# Patient Record
Sex: FEMALE | Race: WHITE | NOT HISPANIC OR LATINO | URBAN - METROPOLITAN AREA
[De-identification: names, ages, dates, MRNs, and addresses within clinical notes are randomized per-mention and may not be internally consistent; named-entity substitution may affect disease eponyms.]

---

## 2018-02-05 ENCOUNTER — INPATIENT (INPATIENT)
Facility: HOSPITAL | Age: 83
LOS: 0 days | Discharge: HOME | DRG: 203 | End: 2018-02-06
Attending: FAMILY MEDICINE | Admitting: FAMILY MEDICINE
Payer: COMMERCIAL

## 2018-02-05 ENCOUNTER — TRANSCRIPTION ENCOUNTER (OUTPATIENT)
Age: 83
End: 2018-02-05

## 2018-02-05 VITALS
OXYGEN SATURATION: 95 % | HEART RATE: 87 BPM | RESPIRATION RATE: 18 BRPM | WEIGHT: 125 LBS | DIASTOLIC BLOOD PRESSURE: 70 MMHG | TEMPERATURE: 98 F | SYSTOLIC BLOOD PRESSURE: 164 MMHG

## 2018-02-05 DIAGNOSIS — R05 COUGH: ICD-10-CM

## 2018-02-05 DIAGNOSIS — J20.9 ACUTE BRONCHITIS, UNSPECIFIED: ICD-10-CM

## 2018-02-05 DIAGNOSIS — R06.2 WHEEZING: ICD-10-CM

## 2018-02-05 DIAGNOSIS — I10 ESSENTIAL (PRIMARY) HYPERTENSION: ICD-10-CM

## 2018-02-05 DIAGNOSIS — Z96.662 PRESENCE OF LEFT ARTIFICIAL ANKLE JOINT: Chronic | ICD-10-CM

## 2018-02-05 DIAGNOSIS — B97.81 HUMAN METAPNEUMOVIRUS AS THE CAUSE OF DISEASES CLASSIFIED ELSEWHERE: ICD-10-CM

## 2018-02-05 DIAGNOSIS — Z29.9 ENCOUNTER FOR PROPHYLACTIC MEASURES, UNSPECIFIED: ICD-10-CM

## 2018-02-05 LAB
ALBUMIN SERPL ELPH-MCNC: 3.7 G/DL — SIGNIFICANT CHANGE UP (ref 3.3–5)
ALP SERPL-CCNC: 101 U/L — SIGNIFICANT CHANGE UP (ref 40–120)
ALT FLD-CCNC: 22 U/L — SIGNIFICANT CHANGE UP (ref 12–78)
ANION GAP SERPL CALC-SCNC: 3 MMOL/L — LOW (ref 5–17)
APPEARANCE UR: CLEAR — SIGNIFICANT CHANGE UP
APTT BLD: 28.6 SEC — SIGNIFICANT CHANGE UP (ref 27.5–37.4)
AST SERPL-CCNC: 31 U/L — SIGNIFICANT CHANGE UP (ref 15–37)
BACTERIA # UR AUTO: ABNORMAL
BASOPHILS # BLD AUTO: 0.1 K/UL — SIGNIFICANT CHANGE UP (ref 0–0.2)
BASOPHILS NFR BLD AUTO: 1.1 % — SIGNIFICANT CHANGE UP (ref 0–2)
BILIRUB SERPL-MCNC: 0.4 MG/DL — SIGNIFICANT CHANGE UP (ref 0.2–1.2)
BILIRUB UR-MCNC: NEGATIVE — SIGNIFICANT CHANGE UP
BUN SERPL-MCNC: 26 MG/DL — HIGH (ref 7–23)
CALCIUM SERPL-MCNC: 9 MG/DL — SIGNIFICANT CHANGE UP (ref 8.5–10.1)
CHLORIDE SERPL-SCNC: 103 MMOL/L — SIGNIFICANT CHANGE UP (ref 96–108)
CO2 SERPL-SCNC: 32 MMOL/L — HIGH (ref 22–31)
COLOR SPEC: YELLOW — SIGNIFICANT CHANGE UP
CREAT SERPL-MCNC: 1.1 MG/DL — SIGNIFICANT CHANGE UP (ref 0.5–1.3)
DIFF PNL FLD: ABNORMAL
EOSINOPHIL # BLD AUTO: 0.1 K/UL — SIGNIFICANT CHANGE UP (ref 0–0.5)
EOSINOPHIL NFR BLD AUTO: 1.6 % — SIGNIFICANT CHANGE UP (ref 0–6)
EPI CELLS # UR: SIGNIFICANT CHANGE UP
GLUCOSE SERPL-MCNC: 126 MG/DL — HIGH (ref 70–99)
GLUCOSE UR QL: NEGATIVE — SIGNIFICANT CHANGE UP
HCT VFR BLD CALC: 37.3 % — SIGNIFICANT CHANGE UP (ref 34.5–45)
HGB BLD-MCNC: 12.4 G/DL — SIGNIFICANT CHANGE UP (ref 11.5–15.5)
HMPV RNA SPEC QL NAA+PROBE: DETECTED
INR BLD: 1.05 RATIO — SIGNIFICANT CHANGE UP (ref 0.88–1.16)
KETONES UR-MCNC: NEGATIVE — SIGNIFICANT CHANGE UP
LACTATE SERPL-SCNC: 1.3 MMOL/L — SIGNIFICANT CHANGE UP (ref 0.7–2)
LEUKOCYTE ESTERASE UR-ACNC: NEGATIVE — SIGNIFICANT CHANGE UP
LYMPHOCYTES # BLD AUTO: 1.3 K/UL — SIGNIFICANT CHANGE UP (ref 1–3.3)
LYMPHOCYTES # BLD AUTO: 23.2 % — SIGNIFICANT CHANGE UP (ref 13–44)
MCHC RBC-ENTMCNC: 30.3 PG — SIGNIFICANT CHANGE UP (ref 27–34)
MCHC RBC-ENTMCNC: 33.3 GM/DL — SIGNIFICANT CHANGE UP (ref 32–36)
MCV RBC AUTO: 91 FL — SIGNIFICANT CHANGE UP (ref 80–100)
MONOCYTES # BLD AUTO: 0.7 K/UL — SIGNIFICANT CHANGE UP (ref 0–0.9)
MONOCYTES NFR BLD AUTO: 13.1 % — HIGH (ref 1–9)
NEUTROPHILS # BLD AUTO: 3.5 K/UL — SIGNIFICANT CHANGE UP (ref 1.8–7.4)
NEUTROPHILS NFR BLD AUTO: 61 % — SIGNIFICANT CHANGE UP (ref 43–77)
NITRITE UR-MCNC: NEGATIVE — SIGNIFICANT CHANGE UP
PH UR: 6 — SIGNIFICANT CHANGE UP (ref 5–8)
PLATELET # BLD AUTO: 166 K/UL — SIGNIFICANT CHANGE UP (ref 150–400)
POTASSIUM SERPL-MCNC: 3.9 MMOL/L — SIGNIFICANT CHANGE UP (ref 3.5–5.3)
POTASSIUM SERPL-SCNC: 3.9 MMOL/L — SIGNIFICANT CHANGE UP (ref 3.5–5.3)
PROCALCITONIN SERPL-MCNC: <0.05 — SIGNIFICANT CHANGE UP (ref 0–0.04)
PROT SERPL-MCNC: 7 G/DL — SIGNIFICANT CHANGE UP (ref 6–8.3)
PROT UR-MCNC: NEGATIVE — SIGNIFICANT CHANGE UP
PROTHROM AB SERPL-ACNC: 11.5 SEC — SIGNIFICANT CHANGE UP (ref 9.8–12.7)
RAPID RVP RESULT: DETECTED
RBC # BLD: 4.1 M/UL — SIGNIFICANT CHANGE UP (ref 3.8–5.2)
RBC # FLD: 12.2 % — SIGNIFICANT CHANGE UP (ref 10.3–14.5)
RBC CASTS # UR COMP ASSIST: SIGNIFICANT CHANGE UP /HPF (ref 0–4)
SODIUM SERPL-SCNC: 138 MMOL/L — SIGNIFICANT CHANGE UP (ref 135–145)
SP GR SPEC: 1.01 — SIGNIFICANT CHANGE UP (ref 1.01–1.02)
UROBILINOGEN FLD QL: NEGATIVE — SIGNIFICANT CHANGE UP
WBC # BLD: 5.7 K/UL — SIGNIFICANT CHANGE UP (ref 3.8–10.5)
WBC # FLD AUTO: 5.7 K/UL — SIGNIFICANT CHANGE UP (ref 3.8–10.5)
WBC UR QL: SIGNIFICANT CHANGE UP

## 2018-02-05 PROCEDURE — 93010 ELECTROCARDIOGRAM REPORT: CPT | Mod: 76

## 2018-02-05 PROCEDURE — 71045 X-RAY EXAM CHEST 1 VIEW: CPT | Mod: 26

## 2018-02-05 PROCEDURE — 99285 EMERGENCY DEPT VISIT HI MDM: CPT

## 2018-02-05 RX ORDER — CEFTRIAXONE 500 MG/1
1 INJECTION, POWDER, FOR SOLUTION INTRAMUSCULAR; INTRAVENOUS EVERY 24 HOURS
Qty: 0 | Refills: 0 | Status: DISCONTINUED | OUTPATIENT
Start: 2018-02-05 | End: 2018-02-06

## 2018-02-05 RX ORDER — IPRATROPIUM/ALBUTEROL SULFATE 18-103MCG
3 AEROSOL WITH ADAPTER (GRAM) INHALATION ONCE
Qty: 0 | Refills: 0 | Status: COMPLETED | OUTPATIENT
Start: 2018-02-05 | End: 2018-02-05

## 2018-02-05 RX ORDER — LACTOBACILLUS ACIDOPHILUS 100MM CELL
1 CAPSULE ORAL DAILY
Qty: 0 | Refills: 0 | Status: DISCONTINUED | OUTPATIENT
Start: 2018-02-05 | End: 2018-02-06

## 2018-02-05 RX ORDER — IPRATROPIUM/ALBUTEROL SULFATE 18-103MCG
3 AEROSOL WITH ADAPTER (GRAM) INHALATION EVERY 6 HOURS
Qty: 0 | Refills: 0 | Status: DISCONTINUED | OUTPATIENT
Start: 2018-02-05 | End: 2018-02-06

## 2018-02-05 RX ORDER — HEPARIN SODIUM 5000 [USP'U]/ML
5000 INJECTION INTRAVENOUS; SUBCUTANEOUS EVERY 12 HOURS
Qty: 0 | Refills: 0 | Status: DISCONTINUED | OUTPATIENT
Start: 2018-02-05 | End: 2018-02-06

## 2018-02-05 RX ORDER — CEFTRIAXONE 500 MG/1
1 INJECTION, POWDER, FOR SOLUTION INTRAMUSCULAR; INTRAVENOUS ONCE
Qty: 0 | Refills: 0 | Status: COMPLETED | OUTPATIENT
Start: 2018-02-05 | End: 2018-02-05

## 2018-02-05 RX ORDER — AZITHROMYCIN 500 MG/1
500 TABLET, FILM COATED ORAL EVERY 24 HOURS
Qty: 0 | Refills: 0 | Status: DISCONTINUED | OUTPATIENT
Start: 2018-02-05 | End: 2018-02-06

## 2018-02-05 RX ORDER — TIOTROPIUM BROMIDE 18 UG/1
1 CAPSULE ORAL; RESPIRATORY (INHALATION) DAILY
Qty: 0 | Refills: 0 | Status: DISCONTINUED | OUTPATIENT
Start: 2018-02-05 | End: 2018-02-06

## 2018-02-05 RX ORDER — AMLODIPINE BESYLATE 2.5 MG/1
5 TABLET ORAL DAILY
Qty: 0 | Refills: 0 | Status: DISCONTINUED | OUTPATIENT
Start: 2018-02-05 | End: 2018-02-06

## 2018-02-05 RX ORDER — AZITHROMYCIN 500 MG/1
500 TABLET, FILM COATED ORAL ONCE
Qty: 0 | Refills: 0 | Status: COMPLETED | OUTPATIENT
Start: 2018-02-05 | End: 2018-02-05

## 2018-02-05 RX ORDER — SODIUM CHLORIDE 9 MG/ML
1000 INJECTION INTRAMUSCULAR; INTRAVENOUS; SUBCUTANEOUS ONCE
Qty: 0 | Refills: 0 | Status: COMPLETED | OUTPATIENT
Start: 2018-02-05 | End: 2018-02-05

## 2018-02-05 RX ADMIN — SODIUM CHLORIDE 1000 MILLILITER(S): 9 INJECTION INTRAMUSCULAR; INTRAVENOUS; SUBCUTANEOUS at 11:41

## 2018-02-05 RX ADMIN — Medication 3 MILLILITER(S): at 11:38

## 2018-02-05 RX ADMIN — HEPARIN SODIUM 5000 UNIT(S): 5000 INJECTION INTRAVENOUS; SUBCUTANEOUS at 18:52

## 2018-02-05 RX ADMIN — CEFTRIAXONE 100 GRAM(S): 500 INJECTION, POWDER, FOR SOLUTION INTRAMUSCULAR; INTRAVENOUS at 12:46

## 2018-02-05 RX ADMIN — AZITHROMYCIN 255 MILLIGRAM(S): 500 TABLET, FILM COATED ORAL at 13:16

## 2018-02-05 RX ADMIN — Medication 125 MILLIGRAM(S): at 11:40

## 2018-02-05 NOTE — ED ADULT TRIAGE NOTE - CHIEF COMPLAINT QUOTE
c/o shortness of breath X a week. yesterday patient went to Urgent Care and was started with antibiotic. today, patient was instructed to go to ED for Pneumonia from their XRay result.

## 2018-02-05 NOTE — CONSULT NOTE ADULT - SUBJECTIVE AND OBJECTIVE BOX
HPI:  89 YO F hx HTN presented to the hospital with CC cold and congestion. Was seen at   urgent care and was told she possibly could have "mild pneumonia". Sent to ER to be  evaluated. Denies fever chills n/v/diarrhea. In ER she was afebrile. CXR clear. RVP is +  for hMPV.     Infectious Disease consult called to evaluate pt.      Past Medical & Surgical Hx:  PAST MEDICAL & SURGICAL HISTORY:  Fall  Hypertension  S/P ankle joint replacement, left    Social History--  EtOH: denies   Tobacco: denies  Drug Use: denies  Lives alone    Travel/Environmental/Occupational History:  *** inserth T/E/O Hx ***    FAMILY HISTORY:  No pertinent family history in first degree relatives      Allergies  codeine (Nausea)  Percocet 5/325 (Vomiting)    Home Medications:  amLODIPine 5 mg oral tablet: 1 tab(s) orally once a day (05 Feb 2018 15:35)  bisoprolol-hydrochlorothiazide 2.5 mg-6.25 mg oral tablet: 1 tab(s) orally once a day (05 Feb 2018 15:35)  Spiriva Respimat 1.25 mcg/inh inhalation aerosol: 2 puff(s) inhaled once a day (05 Feb 2018 11:10)      Current Inpatient Medications :    ANTIBIOTICS:   azithromycin  IVPB 500 milliGRAM(s) IV Intermittent every 24 hours  cefTRIAXone   IVPB 1 Gram(s) IV Intermittent every 24 hours      OTHER RELEVANT MEDICATIONS :  ALBUTerol/ipratropium for Nebulization 3 milliLiter(s) Nebulizer every 6 hours  amLODIPine   Tablet 5 milliGRAM(s) Oral daily  heparin  Injectable 5000 Unit(s) SubCutaneous every 12 hours  tiotropium 18 MICROgram(s) Capsule 1 Capsule(s) Inhalation daily      ROS:  CONSTITUTIONAL:  Negative fever or chills  EYES:  Negative  blurry vision or double vision  CARDIOVASCULAR:  Negative for chest pain or palpitations  RESPIRATORY:  Negative for cough, wheezing, or SOB   GASTROINTESTINAL:  Negative for nausea, vomiting, diarrhea, constipation, or abdominal pain  GENITOURINARY:  Negative frequency, urgency , dysuria or hematuria   NEUROLOGIC:  No headache, confusion, dizziness, lightheadedness  All other systems were reviewed and are negative    I&O's Detail      Physical Exam:  Vital Signs Last 24 Hrs  T(C): 36.8 (05 Feb 2018 12:50), Max: 36.8 (05 Feb 2018 12:50)  T(F): 98.3 (05 Feb 2018 12:50), Max: 98.3 (05 Feb 2018 12:50)  HR: 113 (05 Feb 2018 14:20) (77 - 125)  BP: 114/57 (05 Feb 2018 14:20) (114/57 - 164/70)  BP(mean): --  RR: 20 (05 Feb 2018 14:20) (18 - 20)    SpO2: 95% (05 Feb 2018 14:20) (93% - 98%)    Weight (kg): 56.7 (02-05 @ 11:03)    General: well developed well nourished, in no acute distress  Eyes: sclera anicteric, pupils equal and reactive to light  ENMT: buccal mucosa moist, pharynx not injected  Neck: supple, trachea midline  Lungs: clear, no wheeze/rhonchi  Cardiovascular: regular rate and rhythm, S1 S2  Abdomen: soft, nontender, no organomegaly present, bowel sounds normal  Neurological:  alert and oriented x3, Cranial Nerves II-XII grossly intact  Skin:no increased ecchymosis/petechiae/purpura  Lymph Nodes: no palpable cervical/supraclavicular lymph nodes enlargements  Extremities: no cyanosis/clubbing/edema    Labs:                         12.4   5.7   )-----------( 166      ( 05 Feb 2018 11:43 )             37.3     02-05    138  |  103  |  26<H>  ----------------------------<  126<H>  3.9   |  32<H>  |  1.10    Ca    9.0      05 Feb 2018 11:43    TPro  7.0  /  Alb  3.7  /  TBili  0.4  /  DBili  x   /  AST  31  /  ALT  22  /  AlkPhos  101  02-05    RECENT CULTURES:  Rapid Respiratory Viral Panel (02.05.18 @ 11:43)    Rapid RVP Result: Detected: The [employer] RVP Rapid uses polymerase chain reaction (PCR) and melt  curve analysis to screen for adenovirus; coronavirus HKU1, NL63, 229E,  OC43; human metapneumovirus (hMPV); human enterovirus/rhinovirus  (Entero/RV); influenza A; influenza A/H1;influenza A/H3; influenza  A/H1-2009; influenza B; parainfluenza viruses 1, 2, 3, 4; respiratory  syncytial virus; Bordetella pertussis; Mycoplasma pneumoniae; and  Chlamydophila pneumoniae.    hMPV (RapRVP): Detected      RADIOLOGY & ADDITIONAL STUDIES:  EXAM:  XR CHEST PORTABLE URGENT 1V                            PROCEDURE DATE:  02/05/2018        INTERPRETATION:  Short of breath.    AP chest. Prior dated 2/4/2018    No consolidation effusion or other interval change.    Impression: No acute interval change.    Assessment :   89 YO F admitted with cough congestion admitted with hPMV bronchitis  Doubt pna    Plan :   Empiric Rocephin Zithromax for now  Repeat CXR PA/LAT in am  If CXR negative dc antibiotics    Continue with present regiment .  Appropriate use of antibiotics and adverse effects reviewed.      I have discussed the above plan of care with patient in detail. She expressed understanding of the treatment plan . Risks, benefits and alternatives discussed in detail.   I have asked if she has any questions or concerns and appropriately addressed them to the best of my ability .      > 45 minutes spent in direct patient care reviewing  the notes, lab data/ imaging , discussion with multidisciplinary team. All questions were addressed and answered to the best of my capacity .    Thank you for allowing me to participate in the care of your patient .      Escobar Omalley MD  Infectious Disease  [phone]
PULMONARY/CRITICAL CARE        Patient is a 89y old  Female who presents with a chief complaint of sob (2018 15:35)    BRIEF HOSPITAL COURSE: ***HPI:  89 YO F hx HTN presented to the hospital with CC cold and congestion. Was seen at   urgent care and was told she possibly could have "mild pneumonia". Sent to ER to be  evaluated. Denies fever chills n/v/diarrhea. In ER she was afebrile. CXR clear. RVP is +  for hMPV.       Events last 24 hours: ***    PAST MEDICAL & SURGICAL HISTORY:  Fall  Hypertension  S/P ankle joint replacement, left    Allergies    codeine (Nausea)  Percocet 5/325 (Vomiting)    Intolerances      FAMILY HISTORY Lives at home, no cigs or etoh  No pertinent family history in first degree relatives      Review of Systems:  CONSTITUTIONAL: No fever, chills, or fatigue  EYES: No eye pain, visual disturbances, or discharge  ENMT:  No difficulty hearing, tinnitus, vertigo; No sinus or throat pain  NECK: No pain or stiffness  RESPIRATORY:Some cough, wheezing, No chills or hemoptysis; Mild shortness of breath  CARDIOVASCULAR: No chest pain, palpitations, dizziness, or leg swelling  GASTROINTESTINAL: No abdominal or epigastric pain. No nausea, vomiting, or hematemesis; No diarrhea or constipation. No melena or hematochezia.  GENITOURINARY: No dysuria, frequency, hematuria, or incontinence  NEUROLOGICAL: No headaches, memory loss, loss of strength, numbness, or tremors  SKIN: No itching, burning, rashes, or lesions   MUSCULOSKELETAL: No joint pain or swelling; No muscle, back, or extremity pain  PSYCHIATRIC: No depression, anxiety, mood swings, or difficulty sleeping      Medications:  azithromycin  IVPB 500 milliGRAM(s) IV Intermittent every 24 hours  cefTRIAXone   IVPB 1 Gram(s) IV Intermittent every 24 hours    amLODIPine   Tablet 5 milliGRAM(s) Oral daily    ALBUTerol/ipratropium for Nebulization 3 milliLiter(s) Nebulizer every 6 hours  tiotropium 18 MICROgram(s) Capsule 1 Capsule(s) Inhalation daily        heparin  Injectable 5000 Unit(s) SubCutaneous every 12 hours                lactobacillus acidophilus 1 Tablet(s) Oral daily          ICU Vital Signs Last 24 Hrs  T(C): 36.7 (2018 17:00), Max: 36.8 (2018 12:50)  T(F): 98 (2018 17:00), Max: 98.3 (2018 12:50)  HR: 92 (2018 17:00) (77 - 125)  BP: 118/60 (2018 17:00) (114/57 - 164/70)  BP(mean): --  ABP: --  ABP(mean): --  RR: 16 (2018 17:00) (16 - 20)  SpO2: 97% (2018 17:00) (93% - 98%)    Vital Signs Last 24 Hrs  T(C): 36.7 (2018 17:00), Max: 36.8 (2018 12:50)  T(F): 98 (2018 17:00), Max: 98.3 (2018 12:50)  HR: 92 (2018 17:00) (77 - 125)  BP: 118/60 (2018 17:00) (114/57 - 164/70)  BP(mean): --  RR: 16 (2018 17:00) (16 - 20)  SpO2: 97% (2018 17:00) (93% - 98%)        I&O's Detail        LABS:                        12.4   5.7   )-----------( 166      ( 2018 11:43 )             37.3     02-05    138  |  103  |  26<H>  ----------------------------<  126<H>  3.9   |  32<H>  |  1.10    Ca    9.0      2018 11:43    TPro  7.0  /  Alb  3.7  /  TBili  0.4  /  DBili  x   /  AST  31  /  ALT  22  /  AlkPhos  101  02-05      CARDIAC MARKERS ( 2018 11:43 )  <.015 ng/mL / x     / x     / x     / x          CAPILLARY BLOOD GLUCOSE        PT/INR - ( 2018 11:43 )   PT: 11.5 sec;   INR: 1.05 ratio         PTT - ( 2018 11:43 )  PTT:28.6 sec  Urinalysis Basic - ( 2018 15:46 )    Color: Yellow / Appearance: Clear / S.010 / pH: x  Gluc: x / Ketone: Negative  / Bili: Negative / Urobili: Negative   Blood: x / Protein: Negative / Nitrite: Negative   Leuk Esterase: Negative / RBC: 0-2 /HPF / WBC 0-2   Sq Epi: x / Non Sq Epi: Occasional / Bacteria: Occasional      CULTURES:      Physical Examination:    General: No acute distress.      HEENT: Pupils equal, reactive to light.  Symmetric.    PULM: Few rhonchi, wheezes . Good excursion    CVS: Regular rate and rhythm, no murmurs, rubs, or gallops    ABD: Soft, nondistended, nontender, normoactive bowel sounds, no masses    EXT: No edema, nontender    SKIN: Warm and well perfused, no rashes noted.    NEURO: Alert, oriented, interactive, nonfocal    RADIOLOGY: ***< from: Xray Chest 1 View- PORTABLE-Urgent (18 @ 12:18) >    EXAM:  XR CHEST PORTABLE URGENT 1V                            PROCEDURE DATE:  2018          INTERPRETATION:  Short of breath.    AP chest. Prior dated 2018    No consolidation effusion or other interval change.    Impression: No acute interval change.                EMMA TAPIA M.D., ATTENDING RADIOLOGIST  This document has been electronically signed. 2018 12:24PM        < end of copied text >      CRITICAL CARE TIME SPENT: ***

## 2018-02-05 NOTE — ED PROVIDER NOTE - ATTENDING CONTRIBUTION TO CARE
89 yo male with hx htn c/o 2-3 days of cough, congestion, weakness  patient went to [facility] yesterday, started on amox, and told possible pneumonia  +rhonchi b/l, diffusely  labs, cxr, nebs

## 2018-02-05 NOTE — PATIENT PROFILE ADULT. - ABILITY TO HEAR (WITH HEARING AID OR HEARING APPLIANCE IF NORMALLY USED):
Mildly to Moderately Impaired: difficulty hearing in some environments or speaker may need to increase volume or speak distinctly/does not wear any hearing devices

## 2018-02-05 NOTE — ED ADULT NURSE NOTE - OBJECTIVE STATEMENT
received pt in bed #4b Pt A&O c/o non productive cough x 1-2 weeks .....developed productive cough this AM. Pt went to urgent care yesterday where they did an xray & started her on Amox... States she got a call this AM & was told CXR =possible PN & to go to ED. CM placed w/ SR EKG done O2 sat 96% on RA Lung sounds w/ wheezing & rhonchi Resp Tx given as charted per order PA Leila in to see pt Will monitor

## 2018-02-05 NOTE — H&P ADULT - NSHPPHYSICALEXAM_GEN_ALL_CORE
PHYSICAL EXAM:   · CONSTITUTIONAL: Well appearing, well nourished, awake, alert, oriented to person, place, time/situation and in no apparent distress.  · CARDIAC: Normal rate, regular rhythm.  Heart sounds S1, S2.  No murmurs, rubs or gallops.  · RESPIRATORY: - - -  · Breath Sounds: WHEEZES  · Wheezes: DIFFUSE  · GASTROINTESTINAL: Abdomen soft, non-tender, no guarding.  · MUSCULOSKELETAL: Spine appears normal, range of motion is not limited, no muscle or joint tenderness  · NEUROLOGICAL: Alert and oriented, no focal deficits, no motor or sensory deficits.  · SKIN: Skin normal color for race, warm, dry and intact. No evidence of rash.  · PSYCHIATRIC: Alert and oriented to person, place, time/situation. normal mood and affect. no apparent risk to self or others.

## 2018-02-05 NOTE — GOALS OF CARE CONVERSATION - PERSONAL ADVANCE DIRECTIVE - CONVERSATION DETAILS
Pt states that she has all documents concerning advance directives at home. She has a HCP and Living Will. Asked pt about DNR   and she states she needs to think about that , She doesn't think that she ever completed that document but will think about it.

## 2018-02-05 NOTE — ED PROVIDER NOTE - CARE PLAN
Principal Discharge DX:	Cough with expectoration Principal Discharge DX:	Cough with expectoration  Secondary Diagnosis:	Wheezing  Secondary Diagnosis:	Hypoxia

## 2018-02-05 NOTE — ED ADULT NURSE REASSESSMENT NOTE - COMFORT CARE
wait time explained/darkened lights/warm blanket provided
wait time explained/repositioned/darkened lights/plan of care explained/po fluids offered
warm blanket provided/po fluids offered

## 2018-02-05 NOTE — H&P ADULT - NSHPREVIEWOFSYSTEMS_GEN_ALL_CORE
· CONSTITUTIONAL: no fever and no chills.  · EYES: no discharge, no irritation, no pain, no redness, and no visual changes.  · ENMT: Ears: no ear pain and no hearing problems.Nose: no nasal congestion and no nasal drainage.Mouth/Throat: no dysphagia, no hoarseness and no throat pain.Neck: no lumps, no pain, no stiffness and no swollen glands.  · CARDIOVASCULAR: normal rate and rhythm, no chest pain and no edema.  · RESPIRATORY: - - -  · Respiratory [+]: COUGH, wheezing  · GASTROINTESTINAL: no abdominal pain, no bloating, no constipation, no diarrhea, no nausea and no vomiting.  · GENITOURINARY: no dysuria, no frequency, and no hematuria.  · MUSCULOSKELETAL: no back pain, no gout, no musculoskeletal pain, no neck pain, and no weakness.  · SKIN: no abrasions, no jaundice, no lesions, no pruritis, and no rashes.  · NEURO: no loss of consciousness, no gait abnormality, no headache, no sensory deficits, and no weakness.  · PSYCHIATRIC: no known mental health issues.  · ROS STATEMENT: all other ROS negative except as per HPI

## 2018-02-05 NOTE — H&P ADULT - HISTORY OF PRESENT ILLNESS
89 yo female accompanied to ed by daughter presents with productive cough x 2 weeks, states intermittently has had sob with the coughing,  body aches, denies fever, no sick contacts.  was seen at urgent care and had cxr done , was started on amoxicillin yesterday.  former smoker , quit 40 years ago.  states has hx of flu years ago, no hx of pna,  had her flu shot this year.  In ER patient was found to have persistant wheezing and is being admitted for further work up and treatment.

## 2018-02-05 NOTE — ED PROVIDER NOTE - PROGRESS NOTE DETAILS
after albuterol, patients hr went to 130s patient c/w wheezing and shortness of breath. +hmv  d/w Dr. Martinez who will admit patient

## 2018-02-05 NOTE — H&P ADULT - NSHPLABSRESULTS_GEN_ALL_CORE
138  |  103  |  26<H>  ----------------------------<  126<H>  3.9   |  32<H>  |  1.10    Ca    9.0      2018 11:43    TPro  7.0  /  Alb  3.7  /  TBili  0.4  /  DBili  x   /  AST  31  /  ALT  22  /  AlkPhos  101                              12.4   5.7   )-----------( 166      ( 2018 11:43 )             37.3       CARDIAC MARKERS ( 2018 11:43 )  <.015 ng/mL / x     / x     / x     / x            LIVER FUNCTIONS - ( 2018 11:43 )  Alb: 3.7 g/dL / Pro: 7.0 g/dL / ALK PHOS: 101 U/L / ALT: 22 U/L / AST: 31 U/L / GGT: x             PT/INR - ( 2018 11:43 )   PT: 11.5 sec;   INR: 1.05 ratio         PTT - ( 2018 11:43 )  PTT:28.6 sec    Urinalysis Basic - ( 2018 15:46 )    Color: Yellow / Appearance: Clear / S.010 / pH: x  Gluc: x / Ketone: Negative  / Bili: Negative / Urobili: Negative   Blood: x / Protein: Negative / Nitrite: Negative   Leuk Esterase: Negative / RBC: 0-2 /HPF / WBC 0-2   Sq Epi: x / Non Sq Epi: Occasional / Bacteria: Occasional

## 2018-02-05 NOTE — ED PROVIDER NOTE - OBJECTIVE STATEMENT
89 yo female accompanied to ed by daughter presents with productive cough x 2 weeks, states intermittently has had sob with the coughing,  body aches, denies fever, no sick contacts.  was seen at urgent care and had cxr done , was started on amoxicillin yesterday.  former smoker , quit 40 years ago.  states has hx of flu years ago, no hx of pna,  had her flu shot this year.  PMd Dr Gaytan.

## 2018-02-05 NOTE — ED ADULT NURSE REASSESSMENT NOTE - GENERAL PATIENT STATE
smiling/interactive/family/SO at bedside/resting/sleeping/comfortable appearance
comfortable appearance/resting/sleeping/improvement verbalized/family/SO at bedside
smiling/interactive/family/SO at bedside/comfortable appearance

## 2018-02-06 ENCOUNTER — TRANSCRIPTION ENCOUNTER (OUTPATIENT)
Age: 83
End: 2018-02-06

## 2018-02-06 VITALS
TEMPERATURE: 98 F | DIASTOLIC BLOOD PRESSURE: 56 MMHG | SYSTOLIC BLOOD PRESSURE: 152 MMHG | RESPIRATION RATE: 17 BRPM | OXYGEN SATURATION: 98 % | HEART RATE: 88 BPM

## 2018-02-06 LAB
CULTURE RESULTS: NO GROWTH — SIGNIFICANT CHANGE UP
HBA1C BLD-MCNC: 5.9 % — HIGH (ref 4–5.6)
SPECIMEN SOURCE: SIGNIFICANT CHANGE UP

## 2018-02-06 PROCEDURE — G8978: CPT | Mod: CI

## 2018-02-06 PROCEDURE — 85610 PROTHROMBIN TIME: CPT

## 2018-02-06 PROCEDURE — 87086 URINE CULTURE/COLONY COUNT: CPT

## 2018-02-06 PROCEDURE — 87798 DETECT AGENT NOS DNA AMP: CPT

## 2018-02-06 PROCEDURE — 87040 BLOOD CULTURE FOR BACTERIA: CPT

## 2018-02-06 PROCEDURE — 36415 COLL VENOUS BLD VENIPUNCTURE: CPT

## 2018-02-06 PROCEDURE — 94760 N-INVAS EAR/PLS OXIMETRY 1: CPT

## 2018-02-06 PROCEDURE — 83605 ASSAY OF LACTIC ACID: CPT

## 2018-02-06 PROCEDURE — 71046 X-RAY EXAM CHEST 2 VIEWS: CPT | Mod: 26

## 2018-02-06 PROCEDURE — 96367 TX/PROPH/DG ADDL SEQ IV INF: CPT

## 2018-02-06 PROCEDURE — 84484 ASSAY OF TROPONIN QUANT: CPT

## 2018-02-06 PROCEDURE — 84145 PROCALCITONIN (PCT): CPT

## 2018-02-06 PROCEDURE — 94640 AIRWAY INHALATION TREATMENT: CPT

## 2018-02-06 PROCEDURE — 71250 CT THORAX DX C-: CPT

## 2018-02-06 PROCEDURE — 87486 CHLMYD PNEUM DNA AMP PROBE: CPT

## 2018-02-06 PROCEDURE — 85730 THROMBOPLASTIN TIME PARTIAL: CPT

## 2018-02-06 PROCEDURE — 71046 X-RAY EXAM CHEST 2 VIEWS: CPT

## 2018-02-06 PROCEDURE — 71250 CT THORAX DX C-: CPT | Mod: 26

## 2018-02-06 PROCEDURE — 87581 M.PNEUMON DNA AMP PROBE: CPT

## 2018-02-06 PROCEDURE — 81001 URINALYSIS AUTO W/SCOPE: CPT

## 2018-02-06 PROCEDURE — G8979: CPT | Mod: CI

## 2018-02-06 PROCEDURE — 96365 THER/PROPH/DIAG IV INF INIT: CPT

## 2018-02-06 PROCEDURE — 80053 COMPREHEN METABOLIC PANEL: CPT

## 2018-02-06 PROCEDURE — 71045 X-RAY EXAM CHEST 1 VIEW: CPT

## 2018-02-06 PROCEDURE — G8980: CPT

## 2018-02-06 PROCEDURE — 97161 PT EVAL LOW COMPLEX 20 MIN: CPT

## 2018-02-06 PROCEDURE — 93005 ELECTROCARDIOGRAM TRACING: CPT

## 2018-02-06 PROCEDURE — 85027 COMPLETE CBC AUTOMATED: CPT

## 2018-02-06 PROCEDURE — 83880 ASSAY OF NATRIURETIC PEPTIDE: CPT

## 2018-02-06 PROCEDURE — 96372 THER/PROPH/DIAG INJ SC/IM: CPT | Mod: 59

## 2018-02-06 PROCEDURE — 87633 RESP VIRUS 12-25 TARGETS: CPT

## 2018-02-06 PROCEDURE — 99285 EMERGENCY DEPT VISIT HI MDM: CPT | Mod: 25

## 2018-02-06 PROCEDURE — 96375 TX/PRO/DX INJ NEW DRUG ADDON: CPT

## 2018-02-06 RX ORDER — ALBUTEROL 90 UG/1
1 AEROSOL, METERED ORAL
Qty: 1 | Refills: 0
Start: 2018-02-06

## 2018-02-06 RX ORDER — HEPARIN SODIUM 5000 [USP'U]/ML
5000 INJECTION INTRAVENOUS; SUBCUTANEOUS EVERY 8 HOURS
Qty: 0 | Refills: 0 | Status: DISCONTINUED | OUTPATIENT
Start: 2018-02-06 | End: 2018-02-06

## 2018-02-06 RX ORDER — AZITHROMYCIN 500 MG/1
1 TABLET, FILM COATED ORAL
Qty: 5 | Refills: 0 | OUTPATIENT
Start: 2018-02-06 | End: 2018-02-10

## 2018-02-06 RX ORDER — AZITHROMYCIN 500 MG/1
250 TABLET, FILM COATED ORAL DAILY
Qty: 0 | Refills: 0 | Status: DISCONTINUED | OUTPATIENT
Start: 2018-02-06 | End: 2018-02-06

## 2018-02-06 RX ORDER — BUDESONIDE AND FORMOTEROL FUMARATE DIHYDRATE 160; 4.5 UG/1; UG/1
2 AEROSOL RESPIRATORY (INHALATION)
Qty: 0 | Refills: 0 | Status: DISCONTINUED | OUTPATIENT
Start: 2018-02-06 | End: 2018-02-06

## 2018-02-06 RX ORDER — AZITHROMYCIN 500 MG/1
500 TABLET, FILM COATED ORAL EVERY 24 HOURS
Qty: 0 | Refills: 0 | Status: DISCONTINUED | OUTPATIENT
Start: 2018-02-06 | End: 2018-02-06

## 2018-02-06 RX ORDER — LACTOBACILLUS ACIDOPHILUS 100MM CELL
1 CAPSULE ORAL
Qty: 0 | Refills: 0 | Status: DISCONTINUED | OUTPATIENT
Start: 2018-02-06 | End: 2018-02-06

## 2018-02-06 RX ORDER — LACTOBACILLUS ACIDOPHILUS 100MM CELL
1 CAPSULE ORAL
Qty: 0 | Refills: 0 | DISCHARGE
Start: 2018-02-06

## 2018-02-06 RX ORDER — FLUTICASONE PROPIONATE AND SALMETEROL 50; 250 UG/1; UG/1
1 POWDER ORAL; RESPIRATORY (INHALATION)
Qty: 1 | Refills: 0
Start: 2018-02-06 | End: 2018-03-07

## 2018-02-06 RX ADMIN — Medication 20 MILLIGRAM(S): at 06:28

## 2018-02-06 RX ADMIN — AZITHROMYCIN 250 MILLIGRAM(S): 500 TABLET, FILM COATED ORAL at 11:56

## 2018-02-06 RX ADMIN — TIOTROPIUM BROMIDE 1 CAPSULE(S): 18 CAPSULE ORAL; RESPIRATORY (INHALATION) at 06:30

## 2018-02-06 RX ADMIN — HEPARIN SODIUM 5000 UNIT(S): 5000 INJECTION INTRAVENOUS; SUBCUTANEOUS at 15:31

## 2018-02-06 RX ADMIN — Medication 1 TABLET(S): at 15:55

## 2018-02-06 RX ADMIN — Medication 20 MILLIGRAM(S): at 10:07

## 2018-02-06 RX ADMIN — AMLODIPINE BESYLATE 5 MILLIGRAM(S): 2.5 TABLET ORAL at 06:29

## 2018-02-06 RX ADMIN — HEPARIN SODIUM 5000 UNIT(S): 5000 INJECTION INTRAVENOUS; SUBCUTANEOUS at 06:29

## 2018-02-06 RX ADMIN — Medication 3 MILLILITER(S): at 14:24

## 2018-02-06 NOTE — PHYSICAL THERAPY INITIAL EVALUATION ADULT - GENERAL OBSERVATIONS, REHAB EVAL
Pt presents in recliner on 3 west /c droplet mask off face. RN outside. Pt is agreeable /c PT eval. Per RN pt has been ambulating self in room.

## 2018-02-06 NOTE — DISCHARGE NOTE ADULT - PATIENT PORTAL LINK FT
You can access the [facility] Portal, offered by [facility], by registering with the following website: [email or web address]

## 2018-02-06 NOTE — CHART NOTE - NSCHARTNOTEFT_GEN_A_CORE

## 2018-02-06 NOTE — DISCHARGE NOTE ADULT - HOSPITAL COURSE
admitted for SOB / SHIN with wheezing  found to have bronchitis  responded well to nebs and steroids  non compliant with nebulizer at home  Dc after PULM and ID clearance

## 2018-02-06 NOTE — DISCHARGE NOTE ADULT - CARE PROVIDER_API CALL
David Gaytan), Internal Medicine  [address] [place] [address]  Phone: [phone]  Fax: [phone]    Weil, Peter A (MD), Critical Care Medicine; Internal Medicine; Pulmonary Disease  [address] [facility]  [address]  [place] [address]  Phone: [phone]  Fax: [phone]

## 2018-02-06 NOTE — DISCHARGE NOTE ADULT - MEDICATION SUMMARY - MEDICATIONS TO TAKE
I will START or STAY ON the medications listed below when I get home from the hospital:    Medrol Dosepak 4 mg oral tablet  -- 1 packet(s) by mouth once   dispense 1 packet  take as directed on packet  -- It is very important that you take or use this exactly as directed.  Do not skip doses or discontinue unless directed by your doctor.  Obtain medical advice before taking any non-prescription drugs as some may affect the action of this medication.  Take with food or milk.    -- Indication: For Wheezing    bisoprolol-hydrochlorothiazide 2.5 mg-6.25 mg oral tablet  -- 1 tab(s) by mouth once a day  -- Indication: For Hypertension    Tessalon Perles 100 mg oral capsule  -- 1 cap(s) by mouth 3 times a day -for cough   -- May cause drowsiness.  Alcohol may intensify this effect.  Use care when operating dangerous machinery.  Swallow whole.  Do not crush.    -- Indication: For Cough    Spiriva Respimat 1.25 mcg/inh inhalation aerosol  -- 2 puff(s) inhaled once a day  -- Indication: For Copd    Ventolin HFA 90 mcg/inh inhalation aerosol  -- 1 puff(s) inhaled 4 times a day, As Needed   -- For inhalation only.  It is very important that you take or use this exactly as directed.  Do not skip doses or discontinue unless directed by your doctor.  Obtain medical advice before taking any non-prescription drugs as some may affect the action of this medication.  Shake well before use.    -- Indication: For Copd    Advair Diskus 250 mcg-50 mcg inhalation powder  -- 1 puff(s) inhaled 2 times a day   -- Check with your doctor before becoming pregnant.  For inhalation only.  Obtain medical advice before taking any non-prescription drugs as some may affect the action of this medication.  Rinse mouth thoroughly after use.    -- Indication: For Copd    amLODIPine 5 mg oral tablet  -- 1 tab(s) by mouth once a day  -- Indication: For Hypertension    azithromycin 250 mg oral tablet  -- 1 tab(s) by mouth once a day  -- Indication: For Acute bronchitis    lactobacillus acidophilus oral capsule  -- 1  by mouth once a day  -- Indication: For suplement

## 2018-02-06 NOTE — DISCHARGE NOTE ADULT - CARE PLAN
Principal Discharge DX:	Wheezing  Goal:	breath better  Assessment and plan of treatment:	follow up with LUNG Dr. WEIL  Secondary Diagnosis:	Acute bronchitis  Secondary Diagnosis:	Cough with expectoration  Secondary Diagnosis:	Hypertension

## 2018-02-06 NOTE — PHYSICAL THERAPY INITIAL EVALUATION ADULT - ASSISTIVE DEVICE FOR TRANSFER: GAIT, REHAB EVAL
pt periodically not placing cane on ground when ambulating, pt gait is narrow and shuffles. decreased heel strike and stride length/straight cane

## 2018-02-06 NOTE — PHYSICAL THERAPY INITIAL EVALUATION ADULT - RANGE OF MOTION EXAMINATION, REHAB EVAL
bilateral upper extremity ROM was WFL (within functional limits)/left ankle decreased flexibility and ROm due to old ankle fx. However ankle ROM is WFL/bilateral lower extremity ROM was WFL (within functional limits)

## 2018-02-06 NOTE — PROGRESS NOTE ADULT - SUBJECTIVE AND OBJECTIVE BOX
PULMONARY/CRITICAL CARE      INTERVAL HPI/OVERNIGHT EVENTS:  Pt. improved, less sob. Alert, nad  89y FemaleHPI:  89 yo female accompanied to ed by daughter presents with productive cough x 2 weeks, states intermittently has had sob with the coughing,  body aches, denies fever, no sick contacts.  was seen at urgent care and had cxr done , was started on amoxicillin yesterday.  former smoker , quit 40 years ago.  states has hx of flu years ago, no hx of pna,  had her flu shot this year.  In ER patient was found to have persistant wheezing and is being admitted for further work up and treatment. (2018 15:35)        PAST MEDICAL & SURGICAL HISTORY:  Pelvic fracture  Fall  Fall  Hypertension  S/P ankle joint replacement, left        ICU Vital Signs Last 24 Hrs  T(C): 36.6 (2018 05:46), Max: 36.8 (2018 12:50)  T(F): 97.8 (2018 05:46), Max: 98.3 (2018 12:50)  HR: 91 (2018 05:46) (77 - 125)  BP: 162/61 (2018 05:46) (114/57 - 164/70)  BP(mean): --  ABP: --  ABP(mean): --  RR: 18 (2018 05:46) (16 - 20)  SpO2: 94% (2018 05:46) (93% - 98%)    Qtts:     I&O's Summary          REVIEW OF SYSTEMS:    CONSTITUTIONAL: No fever, weight loss, or fatigue  EYES: No eye pain, visual disturbances, or discharge  ENMT:  No difficulty hearing, tinnitus, vertigo; No sinus or throat pain  NECK: No pain or stiffness  BREASTS: No pain, masses, or nipple discharge  RESPIRATORY: slight cough, No wheezing, chills or hemoptysis; No shortness of breath  CARDIOVASCULAR: No chest pain, palpitations, dizziness, or leg swelling  GASTROINTESTINAL: No abdominal or epigastric pain. No nausea, vomiting, or hematemesis; No diarrhea or constipation. No melena or hematochezia.  GENITOURINARY: No dysuria, frequency, hematuria, or incontinence  NEUROLOGICAL: No headaches, memory loss, loss of strength, numbness, or tremors  SKIN: No itching, burning, rashes, or lesions   LYMPH NODES: No enlarged glands  ENDOCRINE: No heat or cold intolerance; No hair loss  MUSCULOSKELETAL: No joint pain or swelling; No muscle, back, or extremity pain, no calf tenderness  PSYCHIATRIC: No depression, anxiety, mood swings, or difficulty sleeping  HEME/LYMPH: No easy bruising, or bleeding gums  ALLERGY AND IMMUNOLOGIC: No hives or eczema      PHYSICAL EXAM:    GENERAL: NAD, well-groomed, well-developed, NAD  HEAD:  Atraumatic, Normocephalic  EYES: EOMI, PERRLA, conjunctiva and sclera clear  ENMT: No tonsillar erythema, exudates, or enlargement; Moist mucous membranes, Good dentition, No lesions  NECK: Supple, No JVD, Normal thyroid  NERVOUS SYSTEM:  Alert & Oriented X3, Good concentration; Motor Strength 5/5 B/L upper and lower extremities  CHEST/LUNG: few rhonchi, No wheezing, or rubs  HEART: Regular rate and rhythm; No murmurs, rubs, or gallops  ABDOMEN: Soft, Nontender, Nondistended; Bowel sounds present  EXTREMITIES:  2+ Peripheral Pulses, No clubbing, cyanosis, or edema  LYMPH: No lymphadenopathy noted  SKIN: No rashes or lesions        LABS:                        12.4   5.7   )-----------( 166      ( 2018 11:43 )             37.3     02-    138  |  103  |  26<H>  ----------------------------<  126<H>  3.9   |  32<H>  |  1.10    Ca    9.0      2018 11:43    TPro  7.0  /  Alb  3.7  /  TBili  0.4  /  DBili  x   /  AST  31  /  ALT  22  /  AlkPhos  101  02-05    PT/INR - ( 2018 11:43 )   PT: 11.5 sec;   INR: 1.05 ratio         PTT - ( 2018 11:43 )  PTT:28.6 sec  Urinalysis Basic - ( 2018 15:46 )    Color: Yellow / Appearance: Clear / S.010 / pH: x  Gluc: x / Ketone: Negative  / Bili: Negative / Urobili: Negative   Blood: x / Protein: Negative / Nitrite: Negative   Leuk Esterase: Negative / RBC: 0-2 /HPF / WBC 0-2   Sq Epi: x / Non Sq Epi: Occasional / Bacteria: Occasional        vanco through     RADIOLOGY & ADDITIONAL STUDIES:      CRITICAL CARE TIME SPENT:

## 2018-02-06 NOTE — PHYSICAL THERAPY INITIAL EVALUATION ADULT - IMPAIRMENTS FOUND, PT EVAL
joint integrity and mobility/gait, locomotion, and balance/muscle strength/but close or at her baseline

## 2018-02-06 NOTE — PROGRESS NOTE ADULT - SUBJECTIVE AND OBJECTIVE BOX
SANDEE PEARSON is a 89yFemale , patient examined and chart reviewed.     INTERVAL HPI/ OVERNIGHT EVENTS:   Feeling better. Afebrile.    PAST MEDICAL & SURGICAL HISTORY:  Pelvic fracture  Fall  Fall  Hypertension  S/P ankle joint replacement, left    For details regarding the patient's social history, family history, and other miscellaneous elements, please refer the initial infectious diseases consultation and/or the admitting history and physical examination for this admission.    ROS:  CONSTITUTIONAL:  Negative fever or chills  EYES:  Negative  blurry vision or double vision  CARDIOVASCULAR:  Negative for chest pain or palpitations  RESPIRATORY:  Negative for cough, wheezing, or SOB   GASTROINTESTINAL:  Negative for nausea, vomiting, diarrhea, constipation, or abdominal pain  GENITOURINARY:  Negative frequency, urgency or dysuria  NEUROLOGIC:  No headache, confusion, dizziness, lightheadedness  All other systems were reviewed and are negative     ALLERGIES:  codeine (Nausea)  Percocet 5/325 (Vomiting)      Current inpatient medications :    ANTIBIOTICS/RELEVANT:  azithromycin   Tablet 250 milliGRAM(s) Oral daily  lactobacillus acidophilus 1 Tablet(s) Oral two times a day with meals      ALBUTerol/ipratropium for Nebulization 3 milliLiter(s) Nebulizer every 6 hours  amLODIPine   Tablet 5 milliGRAM(s) Oral daily  buDESOnide  80 MICROgram(s)/formoterol 4.5 MICROgram(s) Inhaler 2 Puff(s) Inhalation two times a day  heparin  Injectable 5000 Unit(s) SubCutaneous every 8 hours  predniSONE   Tablet 20 milliGRAM(s) Oral daily  tiotropium 18 MICROgram(s) Capsule 1 Capsule(s) Inhalation daily      Objective:     @ 07:01  -   @ 14:01  --------------------------------------------------------  IN: 360 mL / OUT: 0 mL / NET: 360 mL      T(C): 36.7 (18 @ 09:58), Max: 36.7 (18 @ 17:00)  HR: 95 (18 @ 09:58) (89 - 113)  BP: 176/63 (18 @ 09:58) (114/57 - 176/63)  RR: 18 (18 @ 09:58) (16 - 20)  SpO2: 96% (18 @ 09:58) (94% - 97%)  Wt(kg): --      Physical Exam:  General: no acute distress  Eyes: sclera anicteric, pupils equal and reactive to light  ENMT: buccal mucosa moist, pharynx not injected  Neck: supple, trachea midline  Lungs: clear, no wheeze/rhonchi  Cardiovascular: regular rate and rhythm, S1 S2  Abdomen: soft, nontender, no organomegaly present, bowel sounds normal  Neurological: alert and oriented x3, Cranial Nerves II-XII grossly intact  Skin: no increased ecchymosis/petechiae/purpura  Lymph Nodes: no palpable cervical/supraclavicular lymph nodes enlargements  Extremities: no cyanosis/clubbing/edema    LABS:                          12.4   5.7   )-----------( 166      ( 2018 11:43 )             37.3           138  |  103  |  26<H>  ----------------------------<  126<H>  3.9   |  32<H>  |  1.10    Ca    9.0      2018 11:43    TPro  7.0  /  Alb  3.7  /  TBili  0.4  /  DBili  x   /  AST  31  /  ALT  22  /  AlkPhos  101  02-05      PT/INR - ( 2018 11:43 )   PT: 11.5 sec;   INR: 1.05 ratio         PTT - ( 2018 11:43 )  PTT:28.6 sec  Urinalysis Basic - ( 2018 15:46 )    Color: Yellow / Appearance: Clear / S.010 / pH: x  Gluc: x / Ketone: Negative  / Bili: Negative / Urobili: Negative   Blood: x / Protein: Negative / Nitrite: Negative   Leuk Esterase: Negative / RBC: 0-2 /HPF / WBC 0-2   Sq Epi: x / Non Sq Epi: Occasional / Bacteria: Occasional    MICROBIOLOGY:  Rapid Respiratory Viral Panel (18 @ 11:43)    Rapid RVP Result: Detected: The [employer] RVP Rapid uses polymerase chain reaction (PCR) and melt  curve analysis to screen for adenovirus; coronavirus HKU1, NL63, 229E,  OC43; human metapneumovirus (hMPV); human enterovirus/rhinovirus  (Entero/RV); influenza A; influenza A/H1;influenza A/H3; influenza  A/H1-2009; influenza B; parainfluenza viruses 1, 2, 3, 4; respiratory  syncytial virus; Bordetella pertussis; Mycoplasma pneumoniae; and  Chlamydophila pneumoniae.    hMPV (RapRVP): Detected      RADIOLOGY & ADDITIONAL STUDIES:  EXAM:  XR CHEST PA LAT 2V                            PROCEDURE DATE:  2018          INTERPRETATION:  Cough, congestion.    PA lateral. Prior dated 2018.    Normal heart size. Atherosclerotic aorta. Hyperinflated lungs. Vague   nodular density projects over the left midlung field. Correlate with   chest CT.    Impression: Hyperinflated lungs. Vague nodular density left midlung.   Correlate with chest CT.        EXAM:  CT CHEST                            PROCEDURE DATE:  2018          INTERPRETATION:  Indication: Possible nodular density on chest plain film   of the same day.    Noncontrast chest CT.    Mild parenchymal scarring bilateral apices right middle lobe and lingula.   No lobar consolidation or suspicious nodularity.  No pleural collections.  Patent central airways. Normal caliber thoracic aorta. Scattered   nonspecific subcentimeter mediastinal lymph nodes. Hilar evaluation   limited without IV contrast.  Normal heart size. Trace pericardial effusion versus pericardial   thickening. Coronary artery calcification.  Nonobstructive left renal calculus.    Impression:    Mild bilateral parenchymal scarring as discussed. No suspicious   nodularity. No acute or malignant pathology on this noncontrast study.    Assessment :   89 YO F admitted with cough congestion admitted with hPMV bronchitis  Doubt pna  CT chest noted  Clinically better    Plan :   Zithromax 5 days.  Stable from ID standpoint    Continue with present regiment.  Appropriate use of antibiotics and adverse effects reviewed.      I have discussed the above plan of care with patientin detail. She expressed understanding of the  treatment plan . Risks, benefits and alternatives discussed in detail. I have asked if she has any questions or concerns and appropriately addressed them to the best of my ability .    > 35 minutes were spent in direct patient care reviewing notes, medications ,labs data/ imaging , discussion with multidisciplinary team.    Thank you for allowing me to participate in care of your patient .    Escobar Omalley MD  Infectious Disease  [phone]

## 2018-02-06 NOTE — PHYSICAL THERAPY INITIAL EVALUATION ADULT - ADDITIONAL COMMENTS
Pt lives in a private home with 3 steps to enter with 1 handrail. Once inside pt dwells on the 1st floor [place] style. Pt owns a cane and a wheeled walker, + driving. Pt is independent /c all functional mobility and ADLs prior to admission.

## 2018-02-06 NOTE — PHYSICAL THERAPY INITIAL EVALUATION ADULT - PERTINENT HX OF CURRENT PROBLEM, REHAB EVAL
As per H&P:"89 yo female accompanied to ed by daughter presents with productive cough x 2 weeks, states intermittently has had sob with the coughing,  body aches, denies fever, no sick contacts.  was seen at urgent care and had cxr done , was started on amoxicillin yesterday.  former smoker , quit 40 years ago.  states has hx of flu years ago, no hx of pna,  had her flu shot this year."

## 2018-02-07 ENCOUNTER — INPATIENT (INPATIENT)
Facility: HOSPITAL | Age: 83
LOS: 5 days | Discharge: NURSING, REHAB OR LONG-TERM CARE | DRG: 871 | End: 2018-02-13
Attending: FAMILY MEDICINE | Admitting: FAMILY MEDICINE
Payer: MEDICARE

## 2018-02-07 VITALS
DIASTOLIC BLOOD PRESSURE: 90 MMHG | TEMPERATURE: 99 F | SYSTOLIC BLOOD PRESSURE: 170 MMHG | HEART RATE: 100 BPM | RESPIRATION RATE: 16 BRPM | HEIGHT: 64 IN | WEIGHT: 110.01 LBS | OXYGEN SATURATION: 99 %

## 2018-02-07 DIAGNOSIS — J96.01 ACUTE RESPIRATORY FAILURE WITH HYPOXIA: ICD-10-CM

## 2018-02-07 DIAGNOSIS — Z29.9 ENCOUNTER FOR PROPHYLACTIC MEASURES, UNSPECIFIED: ICD-10-CM

## 2018-02-07 DIAGNOSIS — I48.91 UNSPECIFIED ATRIAL FIBRILLATION: ICD-10-CM

## 2018-02-07 DIAGNOSIS — Z96.662 PRESENCE OF LEFT ARTIFICIAL ANKLE JOINT: Chronic | ICD-10-CM

## 2018-02-07 DIAGNOSIS — A41.9 SEPSIS, UNSPECIFIED ORGANISM: ICD-10-CM

## 2018-02-07 DIAGNOSIS — R74.8 ABNORMAL LEVELS OF OTHER SERUM ENZYMES: ICD-10-CM

## 2018-02-07 DIAGNOSIS — J20.9 ACUTE BRONCHITIS, UNSPECIFIED: ICD-10-CM

## 2018-02-07 DIAGNOSIS — B97.81 HUMAN METAPNEUMOVIRUS AS THE CAUSE OF DISEASES CLASSIFIED ELSEWHERE: ICD-10-CM

## 2018-02-07 DIAGNOSIS — I10 ESSENTIAL (PRIMARY) HYPERTENSION: ICD-10-CM

## 2018-02-07 DIAGNOSIS — R06.02 SHORTNESS OF BREATH: ICD-10-CM

## 2018-02-07 DIAGNOSIS — J96.00 ACUTE RESPIRATORY FAILURE, UNSPECIFIED WHETHER WITH HYPOXIA OR HYPERCAPNIA: ICD-10-CM

## 2018-02-07 LAB
ALBUMIN SERPL ELPH-MCNC: 3.1 G/DL — LOW (ref 3.3–5)
ALBUMIN SERPL ELPH-MCNC: 4.1 G/DL — SIGNIFICANT CHANGE UP (ref 3.3–5)
ALP SERPL-CCNC: 71 U/L — SIGNIFICANT CHANGE UP (ref 40–120)
ALP SERPL-CCNC: 96 U/L — SIGNIFICANT CHANGE UP (ref 40–120)
ALT FLD-CCNC: 24 U/L — SIGNIFICANT CHANGE UP (ref 12–78)
ALT FLD-CCNC: 31 U/L — SIGNIFICANT CHANGE UP (ref 12–78)
ANION GAP SERPL CALC-SCNC: 10 MMOL/L — SIGNIFICANT CHANGE UP (ref 5–17)
ANION GAP SERPL CALC-SCNC: 11 MMOL/L — SIGNIFICANT CHANGE UP (ref 5–17)
ANION GAP SERPL CALC-SCNC: 12 MMOL/L — SIGNIFICANT CHANGE UP (ref 5–17)
APPEARANCE UR: CLEAR — SIGNIFICANT CHANGE UP
APTT BLD: 22.2 SEC — LOW (ref 27.5–37.4)
APTT BLD: 23.7 SEC — LOW (ref 27.5–37.4)
AST SERPL-CCNC: 37 U/L — SIGNIFICANT CHANGE UP (ref 15–37)
AST SERPL-CCNC: 49 U/L — HIGH (ref 15–37)
BASE EXCESS BLDA CALC-SCNC: -4 MMOL/L — LOW (ref -2–2)
BASE EXCESS BLDA CALC-SCNC: 1.4 MMOL/L — SIGNIFICANT CHANGE UP (ref -2–2)
BASOPHILS NFR BLD AUTO: 1 % — SIGNIFICANT CHANGE UP (ref 0–2)
BILIRUB SERPL-MCNC: 0.3 MG/DL — SIGNIFICANT CHANGE UP (ref 0.2–1.2)
BILIRUB SERPL-MCNC: 0.4 MG/DL — SIGNIFICANT CHANGE UP (ref 0.2–1.2)
BILIRUB UR-MCNC: NEGATIVE — SIGNIFICANT CHANGE UP
BLOOD GAS COMMENTS ARTERIAL: SIGNIFICANT CHANGE UP
BUN SERPL-MCNC: 21 MG/DL — SIGNIFICANT CHANGE UP (ref 7–23)
BUN SERPL-MCNC: 24 MG/DL — HIGH (ref 7–23)
BUN SERPL-MCNC: 25 MG/DL — HIGH (ref 7–23)
CALCIUM SERPL-MCNC: 7.5 MG/DL — LOW (ref 8.5–10.1)
CALCIUM SERPL-MCNC: 7.7 MG/DL — LOW (ref 8.5–10.1)
CALCIUM SERPL-MCNC: 8.6 MG/DL — SIGNIFICANT CHANGE UP (ref 8.5–10.1)
CHLORIDE SERPL-SCNC: 104 MMOL/L — SIGNIFICANT CHANGE UP (ref 96–108)
CHLORIDE SERPL-SCNC: 110 MMOL/L — HIGH (ref 96–108)
CHLORIDE SERPL-SCNC: 110 MMOL/L — HIGH (ref 96–108)
CK MB BLD-MCNC: 4.1 % — HIGH (ref 0–3.5)
CK MB BLD-MCNC: 4.7 % — HIGH (ref 0–3.5)
CK MB CFR SERPL CALC: 6.2 NG/ML — HIGH (ref 0–3.6)
CK MB CFR SERPL CALC: 7.1 NG/ML — HIGH (ref 0–3.6)
CK SERPL-CCNC: 150 U/L — SIGNIFICANT CHANGE UP (ref 26–192)
CK SERPL-CCNC: 152 U/L — SIGNIFICANT CHANGE UP (ref 26–192)
CO2 SERPL-SCNC: 22 MMOL/L — SIGNIFICANT CHANGE UP (ref 22–31)
CO2 SERPL-SCNC: 22 MMOL/L — SIGNIFICANT CHANGE UP (ref 22–31)
CO2 SERPL-SCNC: 27 MMOL/L — SIGNIFICANT CHANGE UP (ref 22–31)
COLOR SPEC: YELLOW — SIGNIFICANT CHANGE UP
CREAT SERPL-MCNC: 1.1 MG/DL — SIGNIFICANT CHANGE UP (ref 0.5–1.3)
CREAT SERPL-MCNC: 1.1 MG/DL — SIGNIFICANT CHANGE UP (ref 0.5–1.3)
CREAT SERPL-MCNC: 1.2 MG/DL — SIGNIFICANT CHANGE UP (ref 0.5–1.3)
DIFF PNL FLD: ABNORMAL
GLUCOSE SERPL-MCNC: 133 MG/DL — HIGH (ref 70–99)
GLUCOSE SERPL-MCNC: 207 MG/DL — HIGH (ref 70–99)
GLUCOSE SERPL-MCNC: 228 MG/DL — HIGH (ref 70–99)
GLUCOSE UR QL: NEGATIVE — SIGNIFICANT CHANGE UP
HCO3 BLDA-SCNC: 21 MMOL/L — LOW (ref 23–27)
HCO3 BLDA-SCNC: 26 MMOL/L — SIGNIFICANT CHANGE UP (ref 23–27)
HCT VFR BLD CALC: 28.1 % — LOW (ref 34.5–45)
HCT VFR BLD CALC: 31.4 % — LOW (ref 34.5–45)
HCT VFR BLD CALC: 37.9 % — SIGNIFICANT CHANGE UP (ref 34.5–45)
HGB BLD-MCNC: 10.2 G/DL — LOW (ref 11.5–15.5)
HGB BLD-MCNC: 12.6 G/DL — SIGNIFICANT CHANGE UP (ref 11.5–15.5)
HGB BLD-MCNC: 8.9 G/DL — LOW (ref 11.5–15.5)
HMPV RNA SPEC QL NAA+PROBE: DETECTED
HOROWITZ INDEX BLDA+IHG-RTO: 50 — SIGNIFICANT CHANGE UP
INR BLD: 0.98 RATIO — SIGNIFICANT CHANGE UP (ref 0.88–1.16)
INR BLD: 1.01 RATIO — SIGNIFICANT CHANGE UP (ref 0.88–1.16)
KETONES UR-MCNC: NEGATIVE — SIGNIFICANT CHANGE UP
LACTATE SERPL-SCNC: 3 MMOL/L — HIGH (ref 0.7–2)
LACTATE SERPL-SCNC: 3.6 MMOL/L — HIGH (ref 0.7–2)
LACTATE SERPL-SCNC: 4.2 MMOL/L — CRITICAL HIGH (ref 0.7–2)
LEUKOCYTE ESTERASE UR-ACNC: NEGATIVE — SIGNIFICANT CHANGE UP
LYMPHOCYTES # BLD AUTO: 1 % — LOW (ref 13–44)
LYMPHOCYTES # BLD AUTO: 19 % — SIGNIFICANT CHANGE UP (ref 13–44)
MAGNESIUM SERPL-MCNC: 1.7 MG/DL — SIGNIFICANT CHANGE UP (ref 1.6–2.6)
MAGNESIUM SERPL-MCNC: 1.9 MG/DL — SIGNIFICANT CHANGE UP (ref 1.6–2.6)
MCHC RBC-ENTMCNC: 29.1 PG — SIGNIFICANT CHANGE UP (ref 27–34)
MCHC RBC-ENTMCNC: 29.7 PG — SIGNIFICANT CHANGE UP (ref 27–34)
MCHC RBC-ENTMCNC: 30.2 PG — SIGNIFICANT CHANGE UP (ref 27–34)
MCHC RBC-ENTMCNC: 31.8 GM/DL — LOW (ref 32–36)
MCHC RBC-ENTMCNC: 32.4 GM/DL — SIGNIFICANT CHANGE UP (ref 32–36)
MCHC RBC-ENTMCNC: 33.1 GM/DL — SIGNIFICANT CHANGE UP (ref 32–36)
MCV RBC AUTO: 91.4 FL — SIGNIFICANT CHANGE UP (ref 80–100)
MCV RBC AUTO: 91.5 FL — SIGNIFICANT CHANGE UP (ref 80–100)
MCV RBC AUTO: 91.7 FL — SIGNIFICANT CHANGE UP (ref 80–100)
MONOCYTES NFR BLD AUTO: 7 % — SIGNIFICANT CHANGE UP (ref 1–9)
MYELOCYTES NFR BLD: 1 % — HIGH (ref 0–0)
NEUTROPHILS NFR BLD AUTO: 59 % — SIGNIFICANT CHANGE UP (ref 43–77)
NEUTROPHILS NFR BLD AUTO: 84 % — HIGH (ref 43–77)
NEUTS BAND # BLD: 9 % — HIGH (ref 0–8)
NITRITE UR-MCNC: NEGATIVE — SIGNIFICANT CHANGE UP
PCO2 BLDA: 38 MMHG — SIGNIFICANT CHANGE UP (ref 32–46)
PCO2 BLDA: 43 MMHG — SIGNIFICANT CHANGE UP (ref 32–46)
PH BLDA: 7.31 — LOW (ref 7.35–7.45)
PH BLDA: 7.44 — SIGNIFICANT CHANGE UP (ref 7.35–7.45)
PH UR: 5 — SIGNIFICANT CHANGE UP (ref 5–8)
PHOSPHATE SERPL-MCNC: 2.2 MG/DL — LOW (ref 2.5–4.5)
PHOSPHATE SERPL-MCNC: 2.4 MG/DL — LOW (ref 2.5–4.5)
PLAT MORPH BLD: NORMAL — SIGNIFICANT CHANGE UP
PLATELET # BLD AUTO: 129 K/UL — LOW (ref 150–400)
PLATELET # BLD AUTO: 146 K/UL — LOW (ref 150–400)
PLATELET # BLD AUTO: 200 K/UL — SIGNIFICANT CHANGE UP (ref 150–400)
PO2 BLDA: 217 MMHG — HIGH (ref 74–108)
PO2 BLDA: 312 MMHG — HIGH (ref 74–108)
POTASSIUM SERPL-MCNC: 3.3 MMOL/L — LOW (ref 3.5–5.3)
POTASSIUM SERPL-MCNC: 3.4 MMOL/L — LOW (ref 3.5–5.3)
POTASSIUM SERPL-MCNC: 5 MMOL/L — SIGNIFICANT CHANGE UP (ref 3.5–5.3)
POTASSIUM SERPL-SCNC: 3.3 MMOL/L — LOW (ref 3.5–5.3)
POTASSIUM SERPL-SCNC: 3.4 MMOL/L — LOW (ref 3.5–5.3)
POTASSIUM SERPL-SCNC: 5 MMOL/L — SIGNIFICANT CHANGE UP (ref 3.5–5.3)
PROT SERPL-MCNC: 5.8 G/DL — LOW (ref 6–8.3)
PROT SERPL-MCNC: 7.4 G/DL — SIGNIFICANT CHANGE UP (ref 6–8.3)
PROT UR-MCNC: 25 MG/DL
PROTHROM AB SERPL-ACNC: 10.7 SEC — SIGNIFICANT CHANGE UP (ref 9.8–12.7)
PROTHROM AB SERPL-ACNC: 11 SEC — SIGNIFICANT CHANGE UP (ref 9.8–12.7)
RAPID RVP RESULT: DETECTED
RBC # BLD: 3.07 M/UL — LOW (ref 3.8–5.2)
RBC # BLD: 3.43 M/UL — LOW (ref 3.8–5.2)
RBC # BLD: 4.15 M/UL — SIGNIFICANT CHANGE UP (ref 3.8–5.2)
RBC # FLD: 12.4 % — SIGNIFICANT CHANGE UP (ref 10.3–14.5)
RBC # FLD: 12.6 % — SIGNIFICANT CHANGE UP (ref 10.3–14.5)
RBC # FLD: 12.6 % — SIGNIFICANT CHANGE UP (ref 10.3–14.5)
RBC BLD AUTO: SIGNIFICANT CHANGE UP
SAO2 % BLDA: 100 % — HIGH (ref 92–96)
SAO2 % BLDA: 100 % — HIGH (ref 92–96)
SODIUM SERPL-SCNC: 142 MMOL/L — SIGNIFICANT CHANGE UP (ref 135–145)
SODIUM SERPL-SCNC: 142 MMOL/L — SIGNIFICANT CHANGE UP (ref 135–145)
SODIUM SERPL-SCNC: 144 MMOL/L — SIGNIFICANT CHANGE UP (ref 135–145)
SP GR SPEC: 1.01 — SIGNIFICANT CHANGE UP (ref 1.01–1.02)
TROPONIN I SERPL-MCNC: 0.38 NG/ML — HIGH (ref 0.01–0.04)
TROPONIN I SERPL-MCNC: 0.38 NG/ML — HIGH (ref 0.01–0.04)
UROBILINOGEN FLD QL: NEGATIVE — SIGNIFICANT CHANGE UP
VARIANT LYMPHS # BLD: 4 % — SIGNIFICANT CHANGE UP (ref 0–6)
WBC # BLD: 19.8 K/UL — HIGH (ref 3.8–10.5)
WBC # BLD: 8 K/UL — SIGNIFICANT CHANGE UP (ref 3.8–10.5)
WBC # BLD: 8.7 K/UL — SIGNIFICANT CHANGE UP (ref 3.8–10.5)
WBC # FLD AUTO: 19.8 K/UL — HIGH (ref 3.8–10.5)
WBC # FLD AUTO: 8 K/UL — SIGNIFICANT CHANGE UP (ref 3.8–10.5)
WBC # FLD AUTO: 8.7 K/UL — SIGNIFICANT CHANGE UP (ref 3.8–10.5)

## 2018-02-07 PROCEDURE — 99291 CRITICAL CARE FIRST HOUR: CPT

## 2018-02-07 PROCEDURE — 99285 EMERGENCY DEPT VISIT HI MDM: CPT

## 2018-02-07 PROCEDURE — 93970 EXTREMITY STUDY: CPT | Mod: 26

## 2018-02-07 PROCEDURE — 71045 X-RAY EXAM CHEST 1 VIEW: CPT | Mod: 26

## 2018-02-07 PROCEDURE — 93010 ELECTROCARDIOGRAM REPORT: CPT

## 2018-02-07 RX ORDER — HEPARIN SODIUM 5000 [USP'U]/ML
5000 INJECTION INTRAVENOUS; SUBCUTANEOUS EVERY 12 HOURS
Qty: 0 | Refills: 0 | Status: DISCONTINUED | OUTPATIENT
Start: 2018-02-07 | End: 2018-02-13

## 2018-02-07 RX ORDER — VANCOMYCIN HCL 1 G
VIAL (EA) INTRAVENOUS
Qty: 0 | Refills: 0 | Status: DISCONTINUED | OUTPATIENT
Start: 2018-02-07 | End: 2018-02-08

## 2018-02-07 RX ORDER — ACETAMINOPHEN 500 MG
650 TABLET ORAL EVERY 6 HOURS
Qty: 0 | Refills: 0 | Status: DISCONTINUED | OUTPATIENT
Start: 2018-02-07 | End: 2018-02-07

## 2018-02-07 RX ORDER — IPRATROPIUM/ALBUTEROL SULFATE 18-103MCG
3 AEROSOL WITH ADAPTER (GRAM) INHALATION EVERY 6 HOURS
Qty: 0 | Refills: 0 | Status: DISCONTINUED | OUTPATIENT
Start: 2018-02-07 | End: 2018-02-07

## 2018-02-07 RX ORDER — VANCOMYCIN HCL 1 G
750 VIAL (EA) INTRAVENOUS ONCE
Qty: 0 | Refills: 0 | Status: COMPLETED | OUTPATIENT
Start: 2018-02-07 | End: 2018-02-07

## 2018-02-07 RX ORDER — ASPIRIN/CALCIUM CARB/MAGNESIUM 324 MG
325 TABLET ORAL DAILY
Qty: 0 | Refills: 0 | Status: DISCONTINUED | OUTPATIENT
Start: 2018-02-07 | End: 2018-02-13

## 2018-02-07 RX ORDER — METOPROLOL TARTRATE 50 MG
25 TABLET ORAL
Qty: 0 | Refills: 0 | Status: DISCONTINUED | OUTPATIENT
Start: 2018-02-07 | End: 2018-02-09

## 2018-02-07 RX ORDER — AZITHROMYCIN 500 MG/1
500 TABLET, FILM COATED ORAL EVERY 24 HOURS
Qty: 0 | Refills: 0 | Status: DISCONTINUED | OUTPATIENT
Start: 2018-02-07 | End: 2018-02-08

## 2018-02-07 RX ORDER — IPRATROPIUM/ALBUTEROL SULFATE 18-103MCG
3 AEROSOL WITH ADAPTER (GRAM) INHALATION ONCE
Qty: 0 | Refills: 0 | Status: COMPLETED | OUTPATIENT
Start: 2018-02-07 | End: 2018-02-07

## 2018-02-07 RX ORDER — SODIUM CHLORIDE 9 MG/ML
1000 INJECTION INTRAMUSCULAR; INTRAVENOUS; SUBCUTANEOUS
Qty: 0 | Refills: 0 | Status: COMPLETED | OUTPATIENT
Start: 2018-02-07 | End: 2018-02-07

## 2018-02-07 RX ORDER — BUDESONIDE AND FORMOTEROL FUMARATE DIHYDRATE 160; 4.5 UG/1; UG/1
2 AEROSOL RESPIRATORY (INHALATION)
Qty: 0 | Refills: 0 | Status: DISCONTINUED | OUTPATIENT
Start: 2018-02-07 | End: 2018-02-07

## 2018-02-07 RX ORDER — PIPERACILLIN AND TAZOBACTAM 4; .5 G/20ML; G/20ML
3.38 INJECTION, POWDER, LYOPHILIZED, FOR SOLUTION INTRAVENOUS EVERY 8 HOURS
Qty: 0 | Refills: 0 | Status: DISCONTINUED | OUTPATIENT
Start: 2018-02-07 | End: 2018-02-09

## 2018-02-07 RX ORDER — SODIUM CHLORIDE 9 MG/ML
1000 INJECTION, SOLUTION INTRAVENOUS
Qty: 0 | Refills: 0 | Status: DISCONTINUED | OUTPATIENT
Start: 2018-02-07 | End: 2018-02-07

## 2018-02-07 RX ORDER — PIPERACILLIN AND TAZOBACTAM 4; .5 G/20ML; G/20ML
3.38 INJECTION, POWDER, LYOPHILIZED, FOR SOLUTION INTRAVENOUS ONCE
Qty: 0 | Refills: 0 | Status: COMPLETED | OUTPATIENT
Start: 2018-02-07 | End: 2018-02-07

## 2018-02-07 RX ORDER — POTASSIUM CHLORIDE 20 MEQ
40 PACKET (EA) ORAL
Qty: 0 | Refills: 0 | Status: COMPLETED | OUTPATIENT
Start: 2018-02-07 | End: 2018-02-07

## 2018-02-07 RX ORDER — VANCOMYCIN HCL 1 G
750 VIAL (EA) INTRAVENOUS EVERY 12 HOURS
Qty: 0 | Refills: 0 | Status: DISCONTINUED | OUTPATIENT
Start: 2018-02-07 | End: 2018-02-08

## 2018-02-07 RX ORDER — INSULIN LISPRO 100/ML
VIAL (ML) SUBCUTANEOUS
Qty: 0 | Refills: 0 | Status: DISCONTINUED | OUTPATIENT
Start: 2018-02-07 | End: 2018-02-13

## 2018-02-07 RX ORDER — IPRATROPIUM/ALBUTEROL SULFATE 18-103MCG
3 AEROSOL WITH ADAPTER (GRAM) INHALATION EVERY 6 HOURS
Qty: 0 | Refills: 0 | Status: DISCONTINUED | OUTPATIENT
Start: 2018-02-07 | End: 2018-02-08

## 2018-02-07 RX ORDER — ACETAMINOPHEN 500 MG
650 TABLET ORAL ONCE
Qty: 0 | Refills: 0 | Status: COMPLETED | OUTPATIENT
Start: 2018-02-07 | End: 2018-02-07

## 2018-02-07 RX ORDER — TIOTROPIUM BROMIDE 18 UG/1
1 CAPSULE ORAL; RESPIRATORY (INHALATION) EVERY 12 HOURS
Qty: 0 | Refills: 0 | Status: DISCONTINUED | OUTPATIENT
Start: 2018-02-07 | End: 2018-02-07

## 2018-02-07 RX ORDER — ASPIRIN/CALCIUM CARB/MAGNESIUM 324 MG
325 TABLET ORAL ONCE
Qty: 0 | Refills: 0 | Status: COMPLETED | OUTPATIENT
Start: 2018-02-07 | End: 2018-02-07

## 2018-02-07 RX ORDER — ACETAMINOPHEN 500 MG
650 TABLET ORAL EVERY 6 HOURS
Qty: 0 | Refills: 0 | Status: DISCONTINUED | OUTPATIENT
Start: 2018-02-07 | End: 2018-02-13

## 2018-02-07 RX ORDER — MORPHINE SULFATE 50 MG/1
1 CAPSULE, EXTENDED RELEASE ORAL ONCE
Qty: 0 | Refills: 0 | Status: DISCONTINUED | OUTPATIENT
Start: 2018-02-07 | End: 2018-02-07

## 2018-02-07 RX ORDER — BUDESONIDE, MICRONIZED 100 %
0.25 POWDER (GRAM) MISCELLANEOUS EVERY 12 HOURS
Qty: 0 | Refills: 0 | Status: DISCONTINUED | OUTPATIENT
Start: 2018-02-07 | End: 2018-02-07

## 2018-02-07 RX ORDER — LACTOBACILLUS ACIDOPHILUS 100MM CELL
1 CAPSULE ORAL
Qty: 0 | Refills: 0 | Status: DISCONTINUED | OUTPATIENT
Start: 2018-02-07 | End: 2018-02-13

## 2018-02-07 RX ORDER — ALBUTEROL 90 UG/1
1.25 AEROSOL, METERED ORAL EVERY 6 HOURS
Qty: 0 | Refills: 0 | Status: DISCONTINUED | OUTPATIENT
Start: 2018-02-07 | End: 2018-02-07

## 2018-02-07 RX ORDER — FUROSEMIDE 40 MG
20 TABLET ORAL ONCE
Qty: 0 | Refills: 0 | Status: COMPLETED | OUTPATIENT
Start: 2018-02-07 | End: 2018-02-07

## 2018-02-07 RX ADMIN — AZITHROMYCIN 255 MILLIGRAM(S): 500 TABLET, FILM COATED ORAL at 16:45

## 2018-02-07 RX ADMIN — Medication 3 MILLILITER(S): at 10:21

## 2018-02-07 RX ADMIN — Medication 40 MILLIEQUIVALENT(S): at 18:04

## 2018-02-07 RX ADMIN — Medication 40 MILLIGRAM(S): at 21:38

## 2018-02-07 RX ADMIN — PIPERACILLIN AND TAZOBACTAM 25 GRAM(S): 4; .5 INJECTION, POWDER, LYOPHILIZED, FOR SOLUTION INTRAVENOUS at 16:45

## 2018-02-07 RX ADMIN — Medication 650 MILLIGRAM(S): at 12:10

## 2018-02-07 RX ADMIN — Medication 40 MILLIEQUIVALENT(S): at 19:46

## 2018-02-07 RX ADMIN — SODIUM CHLORIDE 1000 MILLILITER(S): 9 INJECTION INTRAMUSCULAR; INTRAVENOUS; SUBCUTANEOUS at 10:29

## 2018-02-07 RX ADMIN — Medication 125 MILLIGRAM(S): at 10:29

## 2018-02-07 RX ADMIN — Medication 150 MILLIGRAM(S): at 12:41

## 2018-02-07 RX ADMIN — Medication 325 MILLIGRAM(S): at 11:41

## 2018-02-07 RX ADMIN — HEPARIN SODIUM 5000 UNIT(S): 5000 INJECTION INTRAVENOUS; SUBCUTANEOUS at 18:05

## 2018-02-07 RX ADMIN — SODIUM CHLORIDE 1000 MILLILITER(S): 9 INJECTION INTRAMUSCULAR; INTRAVENOUS; SUBCUTANEOUS at 11:42

## 2018-02-07 RX ADMIN — Medication 3 MILLILITER(S): at 20:22

## 2018-02-07 RX ADMIN — PIPERACILLIN AND TAZOBACTAM 25 GRAM(S): 4; .5 INJECTION, POWDER, LYOPHILIZED, FOR SOLUTION INTRAVENOUS at 23:27

## 2018-02-07 RX ADMIN — Medication 25 MILLIGRAM(S): at 18:04

## 2018-02-07 RX ADMIN — Medication 2: at 18:15

## 2018-02-07 RX ADMIN — Medication 3 MILLILITER(S): at 13:57

## 2018-02-07 RX ADMIN — Medication 3 MILLILITER(S): at 20:16

## 2018-02-07 RX ADMIN — TIOTROPIUM BROMIDE 1 CAPSULE(S): 18 CAPSULE ORAL; RESPIRATORY (INHALATION) at 19:04

## 2018-02-07 RX ADMIN — Medication 100 MILLIGRAM(S): at 16:46

## 2018-02-07 RX ADMIN — Medication 1 TABLET(S): at 18:04

## 2018-02-07 RX ADMIN — Medication 20 MILLIGRAM(S): at 19:33

## 2018-02-07 RX ADMIN — SODIUM CHLORIDE 100 MILLILITER(S): 9 INJECTION, SOLUTION INTRAVENOUS at 16:47

## 2018-02-07 RX ADMIN — Medication 150 MILLIGRAM(S): at 18:25

## 2018-02-07 RX ADMIN — PIPERACILLIN AND TAZOBACTAM 200 GRAM(S): 4; .5 INJECTION, POWDER, LYOPHILIZED, FOR SOLUTION INTRAVENOUS at 11:41

## 2018-02-07 RX ADMIN — Medication 40 MILLIGRAM(S): at 18:25

## 2018-02-07 RX ADMIN — MORPHINE SULFATE 1 MILLIGRAM(S): 50 CAPSULE, EXTENDED RELEASE ORAL at 18:37

## 2018-02-07 NOTE — H&P ADULT - PROBLEM SELECTOR PLAN 1
Patient is critically ill -- ADMIT TO ICU  Start BIPAP for respiratory support  IV solumedrol 40mg q8h  Duonebs q6h  Oxygen as needed  Check urine legionella  Check LE u/s  Pulmonary/critical care consult  Further work-up/management pending clinical course.

## 2018-02-07 NOTE — PROGRESS NOTE ADULT - SUBJECTIVE AND OBJECTIVE BOX
PULMONARY/CRITICAL CARE      INTERVAL HPI/OVERNIGHT EVENTS:    89y FemaleHPI:· Chief Complaint: The patient is a 89y Female complaining of difficulty breathing.  · HPI Objective Statement: 89 yo F p/w recent admission for pna / resp distress. Pt dc yesterday. Daughter states starting last night, pt c/o inc cough, wheezing, dyspnea, malaise. No fever/chills. NO n/v/d. No neck / back pain. no numb/ting/focal weak. No agg/allev factors. No other inj or co.  Found to be in rapid at fib.  · Negative Findings: no body aches, no chest pain, but had chest pressure.: no diaphoresis, no fever, no headache, no hemoptysis  · Timing: constant  · Duration: day(s)  past few days  · Quality: alteration in breathing pattern  · Severity: PAIN SCALE 3 OF 10.  · Context: unknown  · Recent Exposure To: none known  · Aggravating Factors: none  · Relieving Factors: none  Pt had been treated for Human metapneumovirus bronchitis and was virtually asymptomatic by the time she went home.        PAST MEDICAL & SURGICAL HISTORY:  Pelvic fracture  Fall  Fall  Hypertension  S/P ankle joint replacement, left        ICU Vital Signs Last 24 Hrs  T(C): 38.3 (2018 11:19), Max: 38.3 (2018 11:19)  T(F): 100.9 (2018 11:19), Max: 100.9 (2018 11:19)  HR: 100 (2018 09:56) (88 - 100)  BP: 170/90 (2018 09:56) (152/56 - 170/90)  BP(mean): --  ABP: --  ABP(mean): --  RR: 16 (2018 09:56) (16 - 17)  SpO2: 99% (2018 09:56) (98% - 99%)    Qtts:     I&O's Summary          REVIEW OF SYSTEMS:    CONSTITUTIONAL: No fever, weight loss, or fatigue  EYES: No eye pain, visual disturbances, or discharge  ENMT:  No difficulty hearing, tinnitus, vertigo; No sinus or throat pain  NECK: No pain or stiffness  BREASTS: No pain, masses, or nipple discharge  RESPIRATORY: has cough, wheezing, No chills or hemoptysis;mod  shortness of breath  CARDIOVASCULAR: No chest pain, palpitations, dizziness, or leg swelling  GASTROINTESTINAL: No abdominal or epigastric pain. No nausea, vomiting, or hematemesis; No diarrhea or constipation. No melena or hematochezia.  GENITOURINARY: No dysuria, frequency, hematuria, or incontinence  NEUROLOGICAL: No headaches, memory loss, loss of strength, numbness, or tremors  SKIN: No itching, burning, rashes, or lesions   LYMPH NODES: No enlarged glands  ENDOCRINE: No heat or cold intolerance; No hair loss  MUSCULOSKELETAL: No joint pain or swelling; No muscle, back, or extremity pain, no calf tenderness  PSYCHIATRIC: No depression, anxiety, mood swings, or difficulty sleeping  HEME/LYMPH: No easy bruising, or bleeding gums  ALLERGY AND IMMUNOLOGIC: No hives or eczema      PHYSICAL EXAM:    GENERAL: mild respiratory distress on O2, well-groomed, well-developed,  HEAD:  Atraumatic, Normocephalic  EYES: EOMI, PERRLA, conjunctiva and sclera clear  ENMT: No tonsillar erythema, exudates, or enlargement; Moist mucous membranes, Good dentition, No lesions  NECK: Supple, No JVD, Normal thyroid  NERVOUS SYSTEM:  Alert & Oriented X3, Good concentration; Motor Strength 5/5 B/L upper and lower extremities  CHEST/LUNG: Wheezing, rhonchi bilat. Good excursion  HEART: irregular rate and rhythm; No murmurs, rubs, or gallops  ABDOMEN: Soft, Nontender, Nondistended; Bowel sounds present  EXTREMITIES:  2+ Peripheral Pulses, No clubbing, cyanosis, or edema  LYMPH: No lymphadenopathy noted  SKIN: No rashes or lesions        LABS:                        12.6   19.8  )-----------( 200      ( 2018 10:30 )             37.9     02-    142  |  104  |  25<H>  ----------------------------<  133<H>  3.3<L>   |  27  |  1.10    Ca    8.6      2018 10:30    TPro  7.4  /  Alb  4.1  /  TBili  0.4  /  DBili  x   /  AST  49<H>  /  ALT  31  /  AlkPhos  96      PT/INR - ( 2018 11:43 )   PT: 11.5 sec;   INR: 1.05 ratio         PTT - ( 2018 11:43 )  PTT:28.6 sec  Urinalysis Basic - ( 2018 15:46 )    Color: Yellow / Appearance: Clear / S.010 / pH: x  Gluc: x / Ketone: Negative  / Bili: Negative / Urobili: Negative   Blood: x / Protein: Negative / Nitrite: Negative   Leuk Esterase: Negative / RBC: 0-2 /HPF / WBC 0-2   Sq Epi: x / Non Sq Epi: Occasional / Bacteria: Occasional      ABG - ( 2018 10:26 )  pH: 7.44  /  pCO2: 38    /  pO2: 217   / HCO3: 26    / Base Excess: 1.4   /  SaO2: 100               vanco through     RADIOLOGY & ADDITIONAL STUDIES:< from: Xray Chest 1 View AP/PA (18 @ 10:17) >    EXAM:  XR CHEST AP OR PA 1V                            PROCEDURE DATE:  2018          INTERPRETATION:  Short of breath.    AP chest. Prior dated 2018. Normal heart size with atherosclerotic   aorta. No consolidation or effusion.    Impression: No active infiltrates.                EMMA TAPIA M.D., ATTENDING RADIOLOGIST  This document has been electronically signed. 2018 10:23AM              < end of copied text >        CRITICAL CARE TIME SPENT:

## 2018-02-07 NOTE — CONSULT NOTE ADULT - ATTENDING COMMENTS
Patient seen and evaluated independently from above.    This is a 89-year-old female with a history of COPD (not on home oxygen), former smoker, HTN, and falls who was recently admitted with COPD exacerbation due to human metapneumovirus discharged on 2/6, now presents with worsening dyspnea, wheezing, and malaise. In the ED, she was found to be in new onset A-Fib with RVR with respiratory distress and diffuse wheezing with low grade fever 100.9, hemodynamically stable. She was given vancomycin, zosyn, acetaminophen, duonebs x3, Solumedrol 125 mg IV, , and NS 1 liter bolus.     ABG revealed mild respiratory alkalosis. CXR showed clear lungs. LE dopplers negative for DVT. RVP again positive for human metapneumovirus. She was found to have leukocytosis with bandemia and mild lactic acidosis, increased to 4.2 with albuterol. CE's with elevated , Tn 0.5.    Plan:  - Admit to ICU  - HD stable, continue diltiazem gtt at 10 mg/hr, hold a/c at this time, trend CE's, check TSH  - Continue BiPAP for work of breathing, continue steroids, duonebs, spiriva, symbicort  - Agree with empiric Vanc/Zosyn/Azithro, check BCx, sputum culture, UA, Urine legionella --> de-escalate as appropriate  - Diet as tolerated  - Stable kidney function and lytes  - DVT ppx with HSQ  - Discussed with patient and daughter at bedside  CC time spent: 35 min

## 2018-02-07 NOTE — ED ADULT NURSE NOTE - OBJECTIVE STATEMENT
A/OX4 89 y/o F BIBEMS for difficulty breathing, shortness of breath and dyspnea. Patient is currently tachypneic, dyspneic with retractions and shallow respirations. Using accessory muscles, labored breathing. Rectal temp 100.9. Just d/c from hospital yesterday with dx of bronchitis and PNA. Hx of asthma. Patient states she was sent home, spent one night at home and this morning woke up worse and couldn't breathe. Patient placed on BiPAP, IV steroids and breathing tx administered per MD orders. ICU at bedside -Pending eval. Tachycardic @145-150 - placed on cardizem gtt @5ml/hr, zosyn ABT initiated, IVF started. Patient given aspirin for elevated troponins. Lactate 3.0. ---Patient handed off to MELISSA Menchaca.

## 2018-02-07 NOTE — PROGRESS NOTE ADULT - ASSESSMENT
Pt recently treated for Human Metapneumovirus bronchitis now has worsening sob, bronchospasm, fever, hi lactate as well at rapid At Fib.  R/o secondary bacterial pneumonia, sepsis??

## 2018-02-07 NOTE — CONSULT NOTE ADULT - ASSESSMENT
Plan:    #Neuro:  neuro checks q 4 hrs and prn for changes  bedrest at present    #Pulm:  supplemental O2 to maintain spo2>92%  duoneb treatments q 6 hrs and prn for sob/wheezes  HOB>/= 30 degree angle  consider bipap therapy for ease of WOB    #CV:  New ST changes in I,L.V4 - V6  C.E. q 8 hrs x 3   ecg q a.m.    mg now and qd  afib with RVR - diltiazem gtt   TTE to eval LVFx    #GI/:  strict I & O's  keep even to negative  dash diet as tolerated    #I.D.:  send RVP  send urine legionella  pan culture  send U/A  zosyn 3.375 gms q 8 hrs  vanco 1 gm qd  consider zithromax 500 mg IV qd    #FEN/ENDO/HEME:  supplement electrolytes as need - keep K+ to 4.0 to 4.5  CMP/Mg++/PO--4/Coags/CBC now and in a.m  C.E. as above  obtain ABG Plan:    #Neuro:  neuro checks q 4 hrs and prn for changes  bedrest at present    #Pulm:  supplemental O2 to maintain spo2>92%  duoneb treatments q 6 hrs and prn for sob/wheezes  HOB>/= 30 degree angle  consider bipap therapy for ease of WOB    #CV:  New ST changes in I,L.V4 - V6  C.E. q 8 hrs x 3   ecg q a.m.    mg now and qd  afib with RVR - diltiazem gtt   TTE to eval LVFx    #GI/:  strict I & O's  keep even to negative  dash diet as tolerated    #I.D.:  send RVP  send urine legionella  pan culture  send U/A  zosyn 3.375 gms q 8 hrs  vanco 1 gm qd  consider zithromax 500 mg IV qd    #FEN/ENDO/HEME:  supplement electrolytes as need - keep K+ to 4.0 to 4.5  CMP/Mg++/PO--4/Coags/CBC now and in a.m  C.E. as above  obtain ABG      Critical Care Time: 40 minutes  Reviewing data, imaging, discussing with multidisciplinary team, no inclusive of procedures, discussing goals of care with patient and family 89 yr old female with PMHx of HTN, COPD not on home O2, s/p Lt hip fx '16, s/p Lt ankle fx. Discharged from [facility] 2/6, admitted after presentation to [facility] and advised to present to E.D. for r/o pmn found to have hPMV bronchitis. Initiated zithromax with planned completion on p.o home meds and medrol floridalma, bronhodilators with plan or f/u with Dr Weil. Presents this morning from home via EMS with progressive SOB, cough, general malaise since last evening, denies n/v/d/c/f. In E.D. found to have afib with RVR new Lateral ECG ST changes placed on dilitazem gtt, ASA, solumedrol 125 mg IV, vanco, zosyn. Consult called for Rapid afib, R/O NSTEMI, COPD exacerbation r/o PNM    Plan:    #Neuro:  neuro checks q 4 hrs and prn for changes  bedrest at present    #Pulm:  supplemental O2 to maintain spo2>92%  duoneb treatments q 6 hrs and prn for sob/wheezes  solumedrol 40 mg q 8 hrs  HOB>/= 30 degree angle  consider bipap therapy for ease of WOB    #CV:  New ST changes in I,L.V4 - V6  C.E. q 8 hrs x 3   ecg q a.m.    mg now and qd  afib with RVR - diltiazem gtt   TTE to eval LVFx    #GI/:  strict I & O's  keep even to negative  dash diet as tolerated    #I.D.:  send RVP  send urine legionella  pan culture  send U/A  zosyn 3.375 gms q 8 hrs  vanco 1 gm qd  consider zithromax 500 mg IV qd    #FEN/ENDO/HEME:  supplement electrolytes as need - keep K+ to 4.0 to 4.5  CMP/Mg++/PO--4/Coags/CBC now and in a.m  C.E. as above  obtain ABG  POC glucose q 4 hrs maintain glucose 140 to 160      Critical Care Time: 40 minutes  Reviewing data, imaging, discussing with multidisciplinary team, no inclusive of procedures, discussing goals of care with patient and family

## 2018-02-07 NOTE — ED ADULT NURSE NOTE - ED STAT RN HANDOFF DETAILS
ASSumed care of pt from previous nurse, Bita TORRES in   on cardiac monitor, BI PAP 12/5 rate = 10, a/0 x4 skin warm and dry, + sensation, + capillary refill < 3 sec, all pulses are palpable. IV access noted on left ac, right lower forearm, patent, intact, sno s/sx of infiltration. ASSumed care of pt from previous nurse, Bita TORRES in   on cardiac monitor, BI PAP 12/5 rate = 10 FI02 = 50%, a/0 x4 skin warm and dry, + sensation, + capillary refill < 3 sec, all pulses are palpable. IV access noted on left ac, right lower forearm, patent, intact, sno s/sx of infiltration.

## 2018-02-07 NOTE — H&P ADULT - HISTORY OF PRESENT ILLNESS
This is a 89 year old woman with a history of hypertension, asthma/COPD who presents to the ED [facility] for increased respiratory distress and dyspnea.  She was treated for bronchitis as an inpatient, and was discharged yesterday with PO antibiotics and a steroid taper.  She developed overnight increased wheezing, dyspnea, and respiratory distress, and was brought back.  She was found to be in atrial flutter with RVR.  She is reporting mild chest heaviness.  She is denying fevers, chills, PND, orthopnea, new LE swelling, dizziness, or syncope.  She is being admitted to the ICU for respiratory failure and rapid atrial flutter.  She has mildly positive cardiac enzymes.     On her previous admission (2/5 -2/6) she was diagnosed bronchitis secondary to human metapneumovirus. CT chest yesterday did not show an pneumonia. Her cultures were negative prior to discharge. She has a nebulizer machine at home which she does not use. She is a former smoker -- she quit 40 y/a.

## 2018-02-07 NOTE — H&P ADULT - PROBLEM SELECTOR PLAN 5
Pan-culture  Empiric iv antibiotics -- zosyn/vanco  Trend CBC and lactate  Check procalcitonin  Check urine legionella  Further work-up/management pending clinical course.

## 2018-02-07 NOTE — H&P ADULT - PROBLEM SELECTOR PLAN 7
GI/DVT prophylaxis with lovenox/PPI  Advanced care planning discussed with patient/family. Advanced care planning forms reviewed/discussed/completed. 20 minutes spent.

## 2018-02-07 NOTE — ED ADULT NURSE NOTE - CHPI ED SYMPTOMS POS
DYSPNEA ON EXERTION/DIFFICULTY BREATHING/CHEST PAIN/COUGH/FEVER/SHORTNESS OF BREATH/CHEST CONGESTION/WHEEZING

## 2018-02-07 NOTE — CONSULT NOTE ADULT - SUBJECTIVE AND OBJECTIVE BOX
CHIEF COMPLAINT: SOB    HPI:    PAST MEDICAL & SURGICAL HISTORY:  Pelvic fracture  Fall  Fall  Hypertension  S/P ankle joint replacement, left      FAMILY HISTORY:  No pertinent family history in first degree relatives      SOCIAL HISTORY:  Smoking:  [xxxx ] Former Smoker (__ packs x ___ years)   Substance Use: [ xxxx] Never Used [ ] Used ____  EtOH Use:  1 5 oz glass of red wine nightly  Marital Status: [ ] Single [ ]  [ ]  [xxxx ]   Sexual History:   Occupation: retired -   Recent Travel:  Country of Birth:  Advance Directives:    Allergies    codeine (Nausea)  Percocet 5/325 (Vomiting)    Intolerances        HOME MEDICATIONS:    REVIEW OF SYSTEMS:  CV: [xxxxx ] chest pain [ ] orthopnea [ ] palpitations [ ] murmur  Resp: [xxxxx ] cough [xxxxx ] shortness of breath [ ] dyspnea [xxxxx ] wheezing [ ] sputum [ ] hemoptysis  [xxxxx ] All other systems negative    OBJECTIVE:  ICU Vital Signs Last 24 Hrs  T(C): 38.3 (2018 11:19), Max: 38.3 (2018 11:19)  T(F): 100.9 (2018 11:19), Max: 100.9 (2018 11:19)  HR: 100 (2018 09:56) (88 - 100)  BP: 170/90 (2018 09:56) (152/56 - 170/90)  BP(mean): --  ABP: --  ABP(mean): --  RR: 16 (2018 09:56) (16 - 17)  SpO2: 99% (2018 09:56) (98% - 99%)        CAPILLARY BLOOD GLUCOSE          PHYSICAL EXAM:  Neuro:  awake alert oriented x4, c/o sob, c/p. speech clear coherent, WAITE 5/5, makes needs known well and appropriately, follows commands well    Pulm:  utilizing 4 liters N/C resp tachypneic, labored with minimal exertion, breath sounds bilat with end inspiratory, expiratory wheezes in lower lung fields, with few bibasilar crackles, able to take deep breaths spontaneously and upon command, spo2 98%    CV:  cardiac monitor with afib with ventricular response of 145 to 148, s1/s2 I/VI sys murmur appreciated. pulses palpable with radials 2+ bilat, dp/pt 1+/1+ bilat, digits warm to touch with good cap refill < 3 sec's, without peripheral edema noted    GI/:  abd distended, soft non tender + for bowel sounds, bladder non distended non palpable    Skin:  warm dry intact, without JVD appreciated    LINES:     HOSPITAL MEDICATIONS:  MEDICATIONS  (STANDING):  aspirin 325 milliGRAM(s) Oral Once  diltiazem Infusion 5 mG/Hr (5 mL/Hr) IV Continuous <Continuous>  piperacillin/tazobactam IVPB. 3.375 Gram(s) IV Intermittent once  sodium chloride 0.9% Bolus 1000 milliLiter(s) IV Bolus every 1 hour    MEDICATIONS  (PRN):      LABS:                        12.6   19.8  )-----------( 200      ( 2018 10:30 )             37.9     Hgb Trend: 12.6<--, 12.4<--  0207    142  |  104  |  25<H>  ----------------------------<  133<H>  3.3<L>   |  27  |  1.10    Ca    8.6      2018 10:30    TPro  7.4  /  Alb  4.1  /  TBili  0.4  /  DBili  x   /  AST  49<H>  /  ALT  31  /  AlkPhos  96      Creatinine Trend: 1.10<--, 1.10<--  PT/INR - ( 2018 11:43 )   PT: 11.5 sec;   INR: 1.05 ratio         PTT - ( 2018 11:43 )  PTT:28.6 sec  Urinalysis Basic - ( 2018 15:46 )    Color: Yellow / Appearance: Clear / S.010 / pH: x  Gluc: x / Ketone: Negative  / Bili: Negative / Urobili: Negative   Blood: x / Protein: Negative / Nitrite: Negative   Leuk Esterase: Negative / RBC: 0-2 /HPF / WBC 0-2   Sq Epi: x / Non Sq Epi: Occasional / Bacteria: Occasional      Arterial Blood Gas:   @ 10:26  7.44/38/217/26/100/1.4  ABG lactate: --        MICROBIOLOGY:     RADIOLOGY:  [ ] Reviewed and interpreted by me    EKG:        Jorge ANP-BC (ext. [id number]) CHIEF COMPLAINT: SOB    HPI:  89 yr old female with PMHx of HTN, COPD not on home O2, s/p Lt hip fx '16, s/p Lt ankle fx. Discharged from [facility] hospital , admitted after presentation to [facility] and advised to present to E.D. for r/o pmn found to have hPMV bronchitis. Initiated zithromax with planned completion on p.o home meds and medrol floridalma, bronhodilators with plan or f/u with Dr Weil. Presents this morning from home via EMS with progressive SOB, cough, general malaise since last evening, denies n/v/d/c/f. In E.D. found to have afib with RVR new Lateral ECG ST changes placed on dilitazem gtt, ASA, solumedrol 125 mg IV, vanco, zosyn. Consult called for Rapid afib, R/O NSTEMI, COPD exacerbation r/o PNM            PAST MEDICAL & SURGICAL HISTORY:  Pelvic fracture  Fall  Fall  Hypertension  S/P ankle joint replacement, left      FAMILY HISTORY:  No pertinent family history in first degree relatives      SOCIAL HISTORY:  Smoking:  [xxxx ] Former Smoker (__ packs x ___ years)   Substance Use: [ xxxx] Never Used [ ] Used ____  EtOH Use:  1 5 oz glass of red wine nightly  Marital Status: [ ] Single [ ]  [ ]  [xxxx ]   Sexual History:   Occupation: retired -   Recent Travel:  Country of Birth:  Advance Directives:    Allergies    codeine (Nausea)  Percocet 5/325 (Vomiting)    Intolerances        HOME MEDICATIONS:    REVIEW OF SYSTEMS:  CV: [xxxxx ] chest pain [ ] orthopnea [ ] palpitations [ ] murmur  Resp: [xxxxx ] cough [xxxxx ] shortness of breath [ ] dyspnea [xxxxx ] wheezing [ ] sputum [ ] hemoptysis  [xxxxx ] All other systems negative    OBJECTIVE:  ICU Vital Signs Last 24 Hrs  T(C): 38.3 (2018 11:19), Max: 38.3 (2018 11:19)  T(F): 100.9 (2018 11:19), Max: 100.9 (2018 11:19)  HR: 100 (2018 09:56) (88 - 100)  BP: 170/90 (2018 09:56) (152/56 - 170/90)  BP(mean): --  ABP: --  ABP(mean): --  RR: 16 (2018 09:56) (16 - 17)  SpO2: 99% (2018 09:56) (98% - 99%)        CAPILLARY BLOOD GLUCOSE          PHYSICAL EXAM:  Neuro:  awake alert oriented x4, c/o sob, c/p. speech clear coherent, WAITE 5/5, makes needs known well and appropriately, follows commands well    Pulm:  utilizing 4 liters N/C resp tachypneic, labored with minimal exertion, breath sounds bilat with end inspiratory, expiratory wheezes in lower lung fields, with few bibasilar crackles, able to take deep breaths spontaneously and upon command, spo2 98%    CV:  cardiac monitor with afib with ventricular response of 145 to 148, s1/s2 I/VI sys murmur appreciated. pulses palpable with radials 2+ bilat, dp/pt 1+/1+ bilat, digits warm to touch with good cap refill < 3 sec's, without peripheral edema noted    GI/:  abd distended, soft non tender + for bowel sounds, bladder non distended non palpable    Skin:  warm dry intact, without JVD appreciated    LINES:     HOSPITAL MEDICATIONS:  MEDICATIONS  (STANDING):  aspirin 325 milliGRAM(s) Oral Once  diltiazem Infusion 5 mG/Hr (5 mL/Hr) IV Continuous <Continuous>  piperacillin/tazobactam IVPB. 3.375 Gram(s) IV Intermittent once  sodium chloride 0.9% Bolus 1000 milliLiter(s) IV Bolus every 1 hour    MEDICATIONS  (PRN):      LABS:                        12.6   19.8  )-----------( 200      ( 2018 10:30 )             37.9     Hgb Trend: 12.6<--, 12.4<--  02-    142  |  104  |  25<H>  ----------------------------<  133<H>  3.3<L>   |  27  |  1.10    Ca    8.6      2018 10:30    TPro  7.4  /  Alb  4.1  /  TBili  0.4  /  DBili  x   /  AST  49<H>  /  ALT  31  /  AlkPhos  96      Creatinine Trend: 1.10<--, 1.10<--  PT/INR - ( 2018 11:43 )   PT: 11.5 sec;   INR: 1.05 ratio         PTT - ( 2018 11:43 )  PTT:28.6 sec  Urinalysis Basic - ( 2018 15:46 )    Color: Yellow / Appearance: Clear / S.010 / pH: x  Gluc: x / Ketone: Negative  / Bili: Negative / Urobili: Negative   Blood: x / Protein: Negative / Nitrite: Negative   Leuk Esterase: Negative / RBC: 0-2 /HPF / WBC 0-2   Sq Epi: x / Non Sq Epi: Occasional / Bacteria: Occasional      Arterial Blood Gas:   @ 10:26  7.44/38/217/26/100/1.4  ABG lactate: --        MICROBIOLOGY:     RADIOLOGY:  [ ] Reviewed and interpreted by me  EXAM:  CT CHEST                        PROCEDURE DATE:  2018      Mild parenchymal scarring bilateral apices right middle lobe and lingula.   No lobar consolidation or suspicious nodularity.  No pleural collections.  Patent central airways. Normal caliber thoracic aorta. Scattered   nonspecific subcentimeter mediastinal lymph nodes. Hilar evaluation   limited without IV contrast.  Normal heart size. Trace pericardial effusion versus pericardial   thickening. Coronary artery calcification.  Nonobstructive left renal calculus.  Impression:  Mild bilateral parenchymal scarring as discussed. No suspicious   nodularity. No acute or malignant pathology on this noncontrast study.    EKG:        Jorge ANP-BC (ext. [id number])

## 2018-02-07 NOTE — H&P ADULT - PROBLEM SELECTOR PLAN 6
Hold patient's home meds for now  Continue iv cardizem  Cardiology consult  Further work-up/management pending clinical course.

## 2018-02-07 NOTE — H&P ADULT - PROBLEM SELECTOR PLAN 4
Likely secondary to demand ischemia due to LORRI  Trend enzymes  Will start full dose lovenox/plavix only if sharp increase in troponin level  Continue ASA 325mg for now  Check ECHO  Cardiology consult  Further work-up/management pending clinical course.

## 2018-02-07 NOTE — H&P ADULT - PROBLEM SELECTOR PLAN 2
Start iv cardizem drip  Check TFTs  Check 2D ECHO  Hold full dose AC for now  Start ASA 325mg qd  Serial EKG/enzymes  Cardiology consult  Further work-up/management pending clinical course.

## 2018-02-07 NOTE — CONSULT NOTE ADULT - ASSESSMENT
This is a 89 year old woman with a history of hypertension who presents to the ED BIBEMS for increased respiratory distress and dyspnea, acute respiratory failure, atrial flutter with RVR, mildly positive cardiac enzymes with ischemic ST depressions on her EKG:    - Admit to ICU  - Start patient on BIPAP for respiratory support  - Start diltiazem gtt at 5mg/hr and increase as needed for HR control  - Aspirin 325 QD  - Hold off on full AC for now  - Abx per primary team  - Nebs and steroids per primary team  - Monitor and replete electrlytes  - At present time, unclear if true NSTEMI or demand ischemia.  Would trend cardiac enzymes, and only start full AC and Plavix if they uptrend significantly  - Check echocardiogram  - Trend EKGs  -  Hold remainder of home antihypertensive medications  - To follow closely with you.  Critically ill.  Time greater than 35 minutes.

## 2018-02-07 NOTE — CONSULT NOTE ADULT - SUBJECTIVE AND OBJECTIVE BOX
[facility] - Cristy Infante, Suraj, Twila, Matty, Tommy Connor  Office Number: [phone]    Initial Consult Note    CHIEF COMPLAINT: Patient is a 89y old  Female who presents with a chief complaint of dyspnea and respiratory distress.      HPI:  This is a 89 year old woman with a history of hypertension who presents to the ED BIBEMS for increased respiratory distress and dyspnea.  She was treated for PNA as an inpatient, and was discharged yesterday with PO antibiotics and a steroid taper.  She developed overnight increased wheezing, dyspnea, and respiratory distress, and was brought back.  SHe was found to be in atrial flutter with RVR.  She is reporting mild chest heaviness.  SHe is denying fevers, chills, PND, orthopnea, new LE swelling, dizziness, or syncope.  SHe is being admitted to the ICU for respiratory failure.  SHe has mildly positive cardiac enzymes.      PAST MEDICAL & SURGICAL HISTORY:  Pelvic fracture  Fall  Fall  Hypertension  S/P ankle joint replacement, left      SOCIAL HISTORY:  No tobacco, ethanol, or drug abuse.  Former smoker    FAMILY HISTORY:  No pertinent family history in first degree relatives  No family history of acute MI or sudden cardiac death.    MEDICATIONS  (STANDING):  ALBUTerol/ipratropium for Nebulization 3 milliLiter(s) Nebulizer every 6 hours  diltiazem Infusion 5 mG/Hr (5 mL/Hr) IV Continuous <Continuous>  methylPREDNISolone sodium succinate Injectable 40 milliGRAM(s) IV Push every 8 hours  vancomycin  IVPB 750 milliGRAM(s) IV Intermittent once  vancomycin  IVPB 750 milliGRAM(s) IV Intermittent every 12 hours  vancomycin  IVPB        Allergies    codeine (Nausea)  Percocet 5/325 (Vomiting)      REVIEW OF SYSTEMS:  All other review of systems is negative unless indicated above    VITAL SIGNS:   Vital Signs Last 24 Hrs  T(C): 38.3 (07 Feb 2018 11:19), Max: 38.3 (07 Feb 2018 11:19)  T(F): 100.9 (07 Feb 2018 11:19), Max: 100.9 (07 Feb 2018 11:19)  HR: 143 (07 Feb 2018 11:56) (88 - 143)  BP: 144/65 (07 Feb 2018 11:56) (144/65 - 170/90)  BP(mean): --  RR: 25 (07 Feb 2018 11:56) (16 - 25)  SpO2: 99% (07 Feb 2018 11:56) (98% - 100%)    I&O's Summary      On Exam:    Constitutional: Moderate respiratory distress alert and oriented x 3  Lungs:  CTAB.  B/l diffuse wheezes  Cardiovascular: RRR.  tachy. S1 and S2 positive.    Gastrointestinal: Bowel Sounds present, soft, nontender.   Lymph: Trace peripheral edema. No cervical lymphadenopathy.  Neurological: Alert, no focal deficits  Skin: No rashes or ulcers   Psych:  Mood & affect appropriate.    LABS: All Labs Reviewed:                        12.6   19.8  )-----------( 200      ( 07 Feb 2018 10:30 )             37.9                         12.4   5.7   )-----------( 166      ( 05 Feb 2018 11:43 )             37.3     07 Feb 2018 10:30    142    |  104    |  25     ----------------------------<  133    3.3     |  27     |  1.10   05 Feb 2018 11:43    138    |  103    |  26     ----------------------------<  126    3.9     |  32     |  1.10     Ca    8.6        07 Feb 2018 10:30  Ca    9.0        05 Feb 2018 11:43    TPro  7.4    /  Alb  4.1    /  TBili  0.4    /  DBili  x      /  AST  49     /  ALT  31     /  AlkPhos  96     07 Feb 2018 10:30  TPro  7.0    /  Alb  3.7    /  TBili  0.4    /  DBili  x      /  AST  31     /  ALT  22     /  AlkPhos  101    05 Feb 2018 11:43    PT/INR - ( 07 Feb 2018 10:30 )   PT: 10.7 sec;   INR: 0.98 ratio         PTT - ( 07 Feb 2018 10:30 )  PTT:23.7 sec  CARDIAC MARKERS ( 07 Feb 2018 10:30 )  .530 ng/mL / x     / 211 U/L / x     / 8.2 ng/mL      Blood Culture: Organism --  Gram Stain Blood -- Gram Stain --  Specimen Source .Urine Clean Catch (Midstream)  Culture-Blood --    Organism --  Gram Stain Blood -- Gram Stain --  Specimen Source .Blood Blood  Culture-Blood --    Organism --  Gram Stain Blood -- Gram Stain --  Specimen Source .Blood Blood  Culture-Blood --      02-05 @ 11:43  Pro Bnp [id number]        RADIOLOGY:  < from: Xray Chest 1 View AP/PA (02.07.18 @ 10:17) >    EXAM:  XR CHEST AP OR PA 1V                            PROCEDURE DATE:  02/07/2018          INTERPRETATION:  Short of breath.    AP chest. Prior dated 2/6/2018. Normal heart size with atherosclerotic   aorta. No consolidation or effusion.    Impression: No active infiltrates.                EMMA TAPIA M.D., ATTENDING RADIOLOGIST  This document has been electronically signed. Feb 7 2018 10:23AM              < end of copied text >        EKG:  Atrial flutter with RVR with ischemic lateral ST segment depressions.

## 2018-02-07 NOTE — ED PROVIDER NOTE - OBJECTIVE STATEMENT
89 yo F p/w recent admission for pna / resp distress. Pt dc yesterday. Daughter states starting last night, pt co inc cough, dyspnea, malaise. No fever/chills. NO n/v/d. No neck / back pain. no numb/ting/focal weak. No agg/allev factors. No other inj or co.

## 2018-02-07 NOTE — ED PROVIDER NOTE - CARE PLAN
Principal Discharge DX:	Shortness of breath  Secondary Diagnosis:	Reactive airway disease  Secondary Diagnosis:	Myocardial infarct

## 2018-02-08 ENCOUNTER — TRANSCRIPTION ENCOUNTER (OUTPATIENT)
Age: 83
End: 2018-02-08

## 2018-02-08 LAB
-  COAGULASE NEGATIVE STAPHYLOCOCCUS: SIGNIFICANT CHANGE UP
ALBUMIN SERPL ELPH-MCNC: 3 G/DL — LOW (ref 3.3–5)
ALP SERPL-CCNC: 60 U/L — SIGNIFICANT CHANGE UP (ref 40–120)
ALT FLD-CCNC: 28 U/L — SIGNIFICANT CHANGE UP (ref 12–78)
ANION GAP SERPL CALC-SCNC: 8 MMOL/L — SIGNIFICANT CHANGE UP (ref 5–17)
APTT BLD: 24.4 SEC — LOW (ref 27.5–37.4)
AST SERPL-CCNC: 43 U/L — HIGH (ref 15–37)
BILIRUB SERPL-MCNC: 0.4 MG/DL — SIGNIFICANT CHANGE UP (ref 0.2–1.2)
BUN SERPL-MCNC: 22 MG/DL — SIGNIFICANT CHANGE UP (ref 7–23)
CALCIUM SERPL-MCNC: 7.8 MG/DL — LOW (ref 8.5–10.1)
CHLORIDE SERPL-SCNC: 110 MMOL/L — HIGH (ref 96–108)
CHOLEST SERPL-MCNC: 159 MG/DL — SIGNIFICANT CHANGE UP (ref 10–199)
CO2 SERPL-SCNC: 26 MMOL/L — SIGNIFICANT CHANGE UP (ref 22–31)
CREAT SERPL-MCNC: 0.98 MG/DL — SIGNIFICANT CHANGE UP (ref 0.5–1.3)
GLUCOSE SERPL-MCNC: 164 MG/DL — HIGH (ref 70–99)
GRAM STN FLD: SIGNIFICANT CHANGE UP
HCT VFR BLD CALC: 28.6 % — LOW (ref 34.5–45)
HDLC SERPL-MCNC: 57 MG/DL — SIGNIFICANT CHANGE UP (ref 40–125)
HGB BLD-MCNC: 9.3 G/DL — LOW (ref 11.5–15.5)
INR BLD: 0.99 RATIO — SIGNIFICANT CHANGE UP (ref 0.88–1.16)
LEGIONELLA AG UR QL: NEGATIVE — SIGNIFICANT CHANGE UP
LIPID PNL WITH DIRECT LDL SERPL: 84 MG/DL — SIGNIFICANT CHANGE UP
LYMPHOCYTES # BLD AUTO: 1 % — LOW (ref 13–44)
MAGNESIUM SERPL-MCNC: 2 MG/DL — SIGNIFICANT CHANGE UP (ref 1.6–2.6)
MCHC RBC-ENTMCNC: 29.8 PG — SIGNIFICANT CHANGE UP (ref 27–34)
MCHC RBC-ENTMCNC: 32.6 GM/DL — SIGNIFICANT CHANGE UP (ref 32–36)
MCV RBC AUTO: 91.7 FL — SIGNIFICANT CHANGE UP (ref 80–100)
METHOD TYPE: SIGNIFICANT CHANGE UP
MONOCYTES NFR BLD AUTO: 2 % — SIGNIFICANT CHANGE UP (ref 1–9)
MRSA PCR RESULT.: SIGNIFICANT CHANGE UP
NEUTROPHILS NFR BLD AUTO: 83 % — HIGH (ref 43–77)
PHOSPHATE SERPL-MCNC: 2.9 MG/DL — SIGNIFICANT CHANGE UP (ref 2.5–4.5)
PLATELET # BLD AUTO: 131 K/UL — LOW (ref 150–400)
POTASSIUM SERPL-MCNC: 4.1 MMOL/L — SIGNIFICANT CHANGE UP (ref 3.5–5.3)
POTASSIUM SERPL-SCNC: 4.1 MMOL/L — SIGNIFICANT CHANGE UP (ref 3.5–5.3)
PROT SERPL-MCNC: 5.6 G/DL — LOW (ref 6–8.3)
PROTHROM AB SERPL-ACNC: 10.8 SEC — SIGNIFICANT CHANGE UP (ref 9.8–12.7)
RBC # BLD: 3.12 M/UL — LOW (ref 3.8–5.2)
RBC # FLD: 12.5 % — SIGNIFICANT CHANGE UP (ref 10.3–14.5)
S AUREUS DNA NOSE QL NAA+PROBE: SIGNIFICANT CHANGE UP
SODIUM SERPL-SCNC: 144 MMOL/L — SIGNIFICANT CHANGE UP (ref 135–145)
T4 AB SER-ACNC: 4.3 UG/DL — LOW (ref 4.6–12)
TOTAL CHOLESTEROL/HDL RATIO MEASUREMENT: 2.8 RATIO — LOW (ref 3.3–7.1)
TRIGL SERPL-MCNC: 89 MG/DL — SIGNIFICANT CHANGE UP (ref 10–149)
TSH SERPL-MCNC: 0.85 UIU/ML — SIGNIFICANT CHANGE UP (ref 0.36–3.74)
WBC # BLD: 5.9 K/UL — SIGNIFICANT CHANGE UP (ref 3.8–10.5)
WBC # FLD AUTO: 5.9 K/UL — SIGNIFICANT CHANGE UP (ref 3.8–10.5)

## 2018-02-08 PROCEDURE — 71045 X-RAY EXAM CHEST 1 VIEW: CPT | Mod: 26

## 2018-02-08 PROCEDURE — 93010 ELECTROCARDIOGRAM REPORT: CPT

## 2018-02-08 PROCEDURE — 99291 CRITICAL CARE FIRST HOUR: CPT

## 2018-02-08 PROCEDURE — 99233 SBSQ HOSP IP/OBS HIGH 50: CPT | Mod: GC

## 2018-02-08 RX ORDER — TIOTROPIUM BROMIDE 18 UG/1
1 CAPSULE ORAL; RESPIRATORY (INHALATION) DAILY
Qty: 0 | Refills: 0 | Status: DISCONTINUED | OUTPATIENT
Start: 2018-02-08 | End: 2018-02-13

## 2018-02-08 RX ORDER — AZITHROMYCIN 500 MG/1
500 TABLET, FILM COATED ORAL EVERY 24 HOURS
Qty: 0 | Refills: 0 | Status: DISCONTINUED | OUTPATIENT
Start: 2018-02-08 | End: 2018-02-09

## 2018-02-08 RX ORDER — BUDESONIDE AND FORMOTEROL FUMARATE DIHYDRATE 160; 4.5 UG/1; UG/1
2 AEROSOL RESPIRATORY (INHALATION)
Qty: 0 | Refills: 0 | Status: DISCONTINUED | OUTPATIENT
Start: 2018-02-08 | End: 2018-02-13

## 2018-02-08 RX ORDER — ALBUTEROL 90 UG/1
1 AEROSOL, METERED ORAL EVERY 6 HOURS
Qty: 0 | Refills: 0 | Status: DISCONTINUED | OUTPATIENT
Start: 2018-02-08 | End: 2018-02-12

## 2018-02-08 RX ADMIN — Medication 325 MILLIGRAM(S): at 12:46

## 2018-02-08 RX ADMIN — Medication 3 MILLILITER(S): at 02:04

## 2018-02-08 RX ADMIN — Medication 25 MILLIGRAM(S): at 05:25

## 2018-02-08 RX ADMIN — Medication 1 TABLET(S): at 18:06

## 2018-02-08 RX ADMIN — Medication 100 MILLIGRAM(S): at 23:26

## 2018-02-08 RX ADMIN — Medication 40 MILLIGRAM(S): at 05:25

## 2018-02-08 RX ADMIN — Medication 1 TABLET(S): at 12:46

## 2018-02-08 RX ADMIN — PIPERACILLIN AND TAZOBACTAM 25 GRAM(S): 4; .5 INJECTION, POWDER, LYOPHILIZED, FOR SOLUTION INTRAVENOUS at 16:52

## 2018-02-08 RX ADMIN — Medication 100 MILLIGRAM(S): at 05:25

## 2018-02-08 RX ADMIN — BUDESONIDE AND FORMOTEROL FUMARATE DIHYDRATE 2 PUFF(S): 160; 4.5 AEROSOL RESPIRATORY (INHALATION) at 20:21

## 2018-02-08 RX ADMIN — Medication 1 TABLET(S): at 09:05

## 2018-02-08 RX ADMIN — PIPERACILLIN AND TAZOBACTAM 25 GRAM(S): 4; .5 INJECTION, POWDER, LYOPHILIZED, FOR SOLUTION INTRAVENOUS at 09:05

## 2018-02-08 RX ADMIN — TIOTROPIUM BROMIDE 1 CAPSULE(S): 18 CAPSULE ORAL; RESPIRATORY (INHALATION) at 12:46

## 2018-02-08 RX ADMIN — Medication 25 MILLIGRAM(S): at 18:06

## 2018-02-08 RX ADMIN — Medication 3 MILLILITER(S): at 07:48

## 2018-02-08 RX ADMIN — Medication 150 MILLIGRAM(S): at 05:25

## 2018-02-08 RX ADMIN — Medication 100 MILLIGRAM(S): at 16:11

## 2018-02-08 RX ADMIN — Medication 20 MILLIGRAM(S): at 18:00

## 2018-02-08 RX ADMIN — HEPARIN SODIUM 5000 UNIT(S): 5000 INJECTION INTRAVENOUS; SUBCUTANEOUS at 18:06

## 2018-02-08 RX ADMIN — PIPERACILLIN AND TAZOBACTAM 25 GRAM(S): 4; .5 INJECTION, POWDER, LYOPHILIZED, FOR SOLUTION INTRAVENOUS at 23:25

## 2018-02-08 RX ADMIN — AZITHROMYCIN 500 MILLIGRAM(S): 500 TABLET, FILM COATED ORAL at 18:21

## 2018-02-08 RX ADMIN — ALBUTEROL 1 PUFF(S): 90 AEROSOL, METERED ORAL at 23:40

## 2018-02-08 RX ADMIN — HEPARIN SODIUM 5000 UNIT(S): 5000 INJECTION INTRAVENOUS; SUBCUTANEOUS at 05:25

## 2018-02-08 NOTE — PROGRESS NOTE ADULT - ASSESSMENT
This is a 89 year old woman with a history of hypertension who presents to the ED BIBEMS for increased respiratory distress and dyspnea, acute respiratory failure, atrial flutter with RVR, mildly positive cardiac enzymes with ischemic ST depressions on her EKG. Likely secondary to demand ischemia and not true coronary atherothrombosis. She is now somewhat improved, currently in sinus rhythm at 60 beats per minute. She did have some evidence of bronchospasm last night, now improved.    - cont ICU  - cont BIPAP for respiratory support  - change IV to po diltiazem for now  - Aspirin 325 QD  - Hold off on full AC for now  - DVT prophylaxis  - Abx per primary team  - Nebs and steroids per primary team  - Monitor and replete electrolytes  - Check echocardiogram  - Trend EKGs  -  Hold remainder of home antihypertensive medications  - To follow closely with you.  Critically ill.  Time greater than 35 minutes.

## 2018-02-08 NOTE — PROGRESS NOTE ADULT - SUBJECTIVE AND OBJECTIVE BOX
Brief Hospital Course:     This is a 89 year old woman with a history of hypertension, asthma/COPD not on home O2, who was BIBEMS  for increased respiratory distress and dyspnea.  She was treated for bronchitis as an inpatient, and was discharged  with PO antibiotics and a steroid taper.  She developed overnight increased wheezing, dyspnea, and respiratory distress, and was brought back.  She was found to be in atrial flutter with RVR.  She also reported mild chest heaviness.  In E.D. found to have afib with RVR new Lateral ECG ST changes placed on dilitazem gtt, ASA, solumedrol 125 mg IV, vanco, zosyn. Admitted to ICU for Respiratory failure with  Rapid afib, R/O NSTEMI, COPD exacerbation.    On her previous admission ( -) she was diagnosed bronchitis secondary to human metapneumovirus. CT chest  did not show an pneumonia. Her cultures were negative prior to discharge. She has a nebulizer machine at home which she does not use. She is a former smoker -- she quit 40 y/a.     Interval events:       Review of Systems:      T(F): 96.3 (18 @ 04:50), Max: 100.9 (18 @ 11:19)  HR: 60 (18 @ 07:00) (60 - 143)  BP: 162/65 (18 @ 07:00) (99/49 - 186/74)  RR: 14 (18 @ 07:00) (12 - 54)  SpO2: 100% (18 @ 07:00) (99% - 100%)          CAPILLARY BLOOD GLUCOSE      POCT Blood Glucose.: 203 mg/dL (2018 23:19)    I&O's Summary    2018 07:01  -  2018 07:00  --------------------------------------------------------  IN: 1230 mL / OUT: 1840 mL / NET: -610 mL        Physical Exam:     Gen:  Neuro:  HEENT:  CV:  Pulm:  GI:  Ext:  Skin:    Meds:  aspirin enteric coated 325 milliGRAM(s) Oral daily  heparin  Injectable 5000 Unit(s) SubCutaneous every 12 hours    azithromycin  IVPB 500 milliGRAM(s) IV Intermittent every 24 hours  piperacillin/tazobactam IVPB. 3.375 Gram(s) IV Intermittent every 8 hours  vancomycin  IVPB 750 milliGRAM(s) IV Intermittent every 12 hours  vancomycin  IVPB        diltiazem Infusion 5 mG/Hr IV Continuous <Continuous>  metoprolol     tartrate 25 milliGRAM(s) Oral two times a day    insulin lispro (HumaLOG) corrective regimen sliding scale   SubCutaneous three times a day before meals  methylPREDNISolone sodium succinate Injectable 40 milliGRAM(s) IV Push every 8 hours    ALBUTerol/ipratropium for Nebulization 3 milliLiter(s) Nebulizer every 6 hours  benzonatate 100 milliGRAM(s) Oral three times a day    acetaminophen   Tablet. 650 milliGRAM(s) Oral every 6 hours PRN                lactobacillus acidophilus 1 Tablet(s) Oral three times a day with meals                                9.3    5.9   )-----------( 131      ( 2018 06:19 )             28.6     Bands 15.0  Bands 9.0    02-08    144  |  110<H>  |  22  ----------------------------<  164<H>  4.1   |  26  |  0.98    Ca    7.8<L>      2018 06:19  Phos  2.9     02-08  Mg     2.0     02-08    TPro  5.6<L>  /  Alb  3.0<L>  /  TBili  0.4  /  DBili  x   /  AST  43<H>  /  ALT  28  /  AlkPhos  60  02-08    Lactate 3.6           02-07 @ 21:49    Lactate 4.2           02-07 @ 13:19    Lactate 3.0           02-07 @ 10:30      CARDIAC MARKERS ( 2018 21:49 )  .384 ng/mL / x     / 150 U/L / x     / 7.1 ng/mL  CARDIAC MARKERS ( 2018 14:22 )  .376 ng/mL / x     / 152 U/L / x     / 6.2 ng/mL  CARDIAC MARKERS ( 2018 10:30 )  .530 ng/mL / x     / 211 U/L / x     / 8.2 ng/mL      PT/INR - ( 2018 06:19 )   PT: 10.8 sec;   INR: 0.99 ratio         PTT - ( 2018 06:19 )  PTT:24.4 sec  Urinalysis Basic - ( 2018 18:13 )    Color: Yellow / Appearance: Clear / S.015 / pH: x  Gluc: x / Ketone: Negative  / Bili: Negative / Urobili: Negative   Blood: x / Protein: 25 mg/dL / Nitrite: Negative   Leuk Esterase: Negative / RBC: 0-2 /HPF / WBC 3-5   Sq Epi: x / Non Sq Epi: Few / Bacteria: Few      .Urine Clean Catch (Midstream)   No growth --  @ 21:39  .Blood Blood   No growth to date. --  @ 19:06  .Blood Blood   No growth to date. --  @ 19:04      Rapid RVP Result: Detected ( @ 11:22)  Rapid RVP Result: Detected ( @ 11:43)    ABG - ( 2018 20:18 )  pH: 7.31  /  pCO2: 43    /  pO2: 312   / HCO3: 21    / Base Excess: -4.0  /  SaO2: 100         Radiology: ***    Bedside Lung U/S: ***    Bedside Cardiac U/S: ***    CENTRAL LINE: Y/N   DATE INSERTED:   REMOVE: Y/N    RIVERA: Y/N      DATE INSERTED:        REMOVE: Y/N    A-LINE: Y/N     DATE INSERTED:              REMOVE: Y/N    GLOBAL ISSUE/BEST PRACTICE:  Analgesia:  Sedation:  HOB elevation: yes  Stress ulcer prophylaxis:  VTE prophylaxis:  Glycemic control:  Nutrition:    CODE STATUS: Brief Hospital Course:     This is a 89 year old woman with a history of hypertension, asthma/COPD not on home O2, who was BIBEMS  for increased respiratory distress and dyspnea.  She was treated for bronchitis as an inpatient, and was discharged  with PO antibiotics and a steroid taper.  She developed overnight increased wheezing, dyspnea, and respiratory distress, and was brought back.  She was found to be in atrial flutter with RVR.  She also reported mild chest heaviness.  In E.D. found to have afib with RVR new Lateral ECG ST changes placed on dilitazem gtt, ASA, solumedrol 125 mg IV, vanco, zosyn. Admitted to ICU for Respiratory failure with  Rapid afib, R/O NSTEMI, COPD exacerbation.    On her previous admission ( -) she was diagnosed bronchitis secondary to human metapneumovirus. CT chest  did not show an pneumonia. Her cultures were negative prior to discharge. She has a nebulizer machine at home which she does not use. She is a former smoker -- she quit 40 y/a.     Interval events:     Pt is AAOx3 and resting comfortably at bedside. She admits to slight pressure type chest discomfort since her current hospital stay. Afebrile overnight, HD stable, currently in sinus rhythm on amio gtt.      Review of Systems:  Constitutional: denies fever, chills, diaphoresis   HEENT: denies blurry vision, difficulty hearing  Respiratory: denies SOB, SHIN, cough, sputum production, wheezing, hemoptysis  Cardiovascular: admits chest discomfort, denies palpitations, edema  Gastrointestinal: denies nausea, vomiting, diarrhea, constipation, abdominal pain  Genitourinary: denies dysuria, frequency, urgency, hematuria   Skin/Breast: denies rash, itching  Musculoskeletal: denies myalgias, joint swelling, muscle weakness  Neurologic: denies headache, weakness, dizziness, paresthesias, numbness/tingling      T(F): 96.3 (18 @ 04:50), Max: 100.9 (18 @ 11:19)  HR: 60 (18 @ 07:00) (60 - 143)  BP: 162/65 (18 @ 07:00) (99/49 - 186/74)  RR: 14 (18 @ 07:00) (12 - 54)  SpO2: 100% (18 @ 07:00) (99% - 100%)      CAPILLARY BLOOD GLUCOSE      POCT Blood Glucose.: 203 mg/dL (2018 23:19)    I&O's Summary    2018 07:01  -  2018 07:00  --------------------------------------------------------  IN: 1230 mL / OUT: 1840 mL / NET: -610 mL    Physical Exam:     General: elderly white pleasant female, curently on BiPAP  HEENT: NCAT, PERRLA, EOMI bl, moist mucous membranes   Neck: Supple, nontender, no mass  Neurology: A&Ox3, nonfocal, CN II-XII grossly intact  Respiratory: + diffuse wheezing, rhonchi, no rhales  CV: RRR, +S1/S2, no murmurs, rubs or gallops  Abdominal: Soft, NT, ND +BSx4,  Extremities: No C/C/E, + peripheral pulses  MSK:  no joint erythema or warmth, no joint swelling   Skin: + hematoma on RLQ      Meds:  aspirin enteric coated 325 milliGRAM(s) Oral daily  heparin  Injectable 5000 Unit(s) SubCutaneous every 12 hours    azithromycin  IVPB 500 milliGRAM(s) IV Intermittent every 24 hours  piperacillin/tazobactam IVPB. 3.375 Gram(s) IV Intermittent every 8 hours  vancomycin  IVPB 750 milliGRAM(s) IV Intermittent every 12 hours  vancomycin  IVPB        diltiazem Infusion 5 mG/Hr IV Continuous <Continuous>  metoprolol     tartrate 25 milliGRAM(s) Oral two times a day    insulin lispro (HumaLOG) corrective regimen sliding scale   SubCutaneous three times a day before meals  methylPREDNISolone sodium succinate Injectable 40 milliGRAM(s) IV Push every 8 hours    ALBUTerol/ipratropium for Nebulization 3 milliLiter(s) Nebulizer every 6 hours  benzonatate 100 milliGRAM(s) Oral three times a day    acetaminophen   Tablet. 650 milliGRAM(s) Oral every 6 hours PRN      lactobacillus acidophilus 1 Tablet(s) Oral three times a day with meals                        9.3    5.9   )-----------( 131      ( 2018 06:19 )             28.6     Bands 15.0  Bands 9.0        144  |  110<H>  |  22  ----------------------------<  164<H>  4.1   |  26  |  0.98    Ca    7.8<L>      2018 06:19  Phos  2.9       Mg     2.0         TPro  5.6<L>  /  Alb  3.0<L>  /  TBili  0.4  /  DBili  x   /  AST  43<H>  /  ALT  28  /  AlkPhos  60      Lactate 3.6            @ 21:49    Lactate 4.2            @ 13:19    Lactate 3.0            @ 10:30      CARDIAC MARKERS ( 2018 21:49 )  .384 ng/mL / x     / 150 U/L / x     / 7.1 ng/mL  CARDIAC MARKERS ( 2018 14:22 )  .376 ng/mL / x     / 152 U/L / x     / 6.2 ng/mL  CARDIAC MARKERS ( 2018 10:30 )  .530 ng/mL / x     / 211 U/L / x     / 8.2 ng/mL      PT/INR - ( 2018 06:19 )   PT: 10.8 sec;   INR: 0.99 ratio         PTT - ( 2018 06:19 )  PTT:24.4 sec  Urinalysis Basic - ( 2018 18:13 )    Color: Yellow / Appearance: Clear / S.015 / pH: x  Gluc: x / Ketone: Negative  / Bili: Negative / Urobili: Negative   Blood: x / Protein: 25 mg/dL / Nitrite: Negative   Leuk Esterase: Negative / RBC: 0-2 /HPF / WBC 3-5   Sq Epi: x / Non Sq Epi: Few / Bacteria: Few      .Urine Clean Catch (Midstream)   No growth --  @ 21:39  .Blood Blood   No growth to date. --  @ 19:06  .Blood Blood   No growth to date. --  @ 19:04      Rapid RVP Result: Detected ( @ 11:22)  Rapid RVP Result: Detected ( @ 11:43)    ABG - ( 2018 20:18 )  pH: 7.31  /  pCO2: 43    /  pO2: 312   / HCO3: 21    / Base Excess: -4.0  /  SaO2: 100         Radiology: ***    Bedside Lung U/S: ***    Bedside Cardiac U/S: ***    CENTRAL LINE: N    RIVERA: Y          A-LINE: N       GLOBAL ISSUE/BEST PRACTICE:  Analgesia:N  Sedation:N  HOB elevation: yes  Stress ulcer prophylaxis:Y  VTE prophylaxis:Y  Glycemic control:N  Nutrition: Y    CODE STATUS: Full Code Brief Hospital Course:     This is a 89 year old woman with a history of hypertension, asthma/COPD not on home O2, who was BIBEMS  for increased respiratory distress and dyspnea.  She was treated for bronchitis as an inpatient, and was discharged  with PO antibiotics and a steroid taper.  She developed overnight increased wheezing, dyspnea, and respiratory distress, and was brought back.  She was found to be in atrial flutter with RVR.  She also reported mild chest heaviness.  In E.D. found to have afib with RVR new Lateral ECG ST changes placed on dilitazem gtt, ASA, solumedrol 125 mg IV, vanco, zosyn. Admitted to ICU for Respiratory failure with  Rapid afib, R/O NSTEMI, COPD exacerbation.  On her previous admission ( -) she was diagnosed bronchitis secondary to human metapneumovirus. CT chest  did not show an pneumonia. Her cultures were negative prior to discharge. She has a nebulizer machine at home which she does not use. She is a former smoker -- she quit 40 y/a.     Interval events:     Pt is AAOx3 and resting comfortably at bedside, off BiPAP on NC, saturating well. She admits to slight pressure type chest discomfort since her current hospital stay. Afebrile overnight, HD stable, currently in sinus rhythm on cardizem gtt, will change to po today..      Review of Systems:  Constitutional: denies fever, chills, diaphoresis   HEENT: denies blurry vision, difficulty hearing  Respiratory: denies SOB, SHIN, cough, sputum production, wheezing, hemoptysis  Cardiovascular: admits chest discomfort, denies palpitations, edema  Gastrointestinal: denies nausea, vomiting, diarrhea, constipation, abdominal pain  Genitourinary: denies dysuria, frequency, urgency, hematuria   Skin/Breast: denies rash, itching  Musculoskeletal: denies myalgias, joint swelling, muscle weakness  Neurologic: denies headache, weakness, dizziness, paresthesias, numbness/tingling      T(F): 96.3 (18 @ 04:50), Max: 100.9 (18 @ 11:19)  HR: 60 (18 @ 07:00) (60 - 143)  BP: 162/65 (02-08-18 @ 07:00) (99/49 - 186/74)  RR: 14 (18 @ 07:00) (12 - 54)  SpO2: 100% (18 @ 07:00) (99% - 100%)      CAPILLARY BLOOD GLUCOSE      POCT Blood Glucose.: 203 mg/dL (2018 23:19)    I&O's Summary    2018 07:01  -  2018 07:00  --------------------------------------------------------  IN: 1230 mL / OUT: 1840 mL / NET: -610 mL    Physical Exam:     General: elderly white pleasant female, curently on BiPAP  HEENT: NCAT, PERRLA, EOMI bl, moist mucous membranes   Neck: Supple, nontender, no mass  Neurology: A&Ox3, nonfocal, CN II-XII grossly intact  Respiratory: + diffuse wheezing, rhonchi, no rhales  CV: RRR, +S1/S2, no murmurs, rubs or gallops  Abdominal: Soft, NT, ND +BSx4,  Extremities: No C/C/E, + peripheral pulses  MSK:  no joint erythema or warmth, no joint swelling   Skin: + hematoma on RLQ      Meds:  aspirin enteric coated 325 milliGRAM(s) Oral daily  heparin  Injectable 5000 Unit(s) SubCutaneous every 12 hours    azithromycin  IVPB 500 milliGRAM(s) IV Intermittent every 24 hours  piperacillin/tazobactam IVPB. 3.375 Gram(s) IV Intermittent every 8 hours  vancomycin  IVPB 750 milliGRAM(s) IV Intermittent every 12 hours  vancomycin  IVPB        diltiazem Infusion 5 mG/Hr IV Continuous <Continuous>  metoprolol     tartrate 25 milliGRAM(s) Oral two times a day    insulin lispro (HumaLOG) corrective regimen sliding scale   SubCutaneous three times a day before meals  methylPREDNISolone sodium succinate Injectable 40 milliGRAM(s) IV Push every 8 hours    ALBUTerol/ipratropium for Nebulization 3 milliLiter(s) Nebulizer every 6 hours  benzonatate 100 milliGRAM(s) Oral three times a day    acetaminophen   Tablet. 650 milliGRAM(s) Oral every 6 hours PRN      lactobacillus acidophilus 1 Tablet(s) Oral three times a day with meals                        9.3    5.9   )-----------( 131      ( 2018 06:19 )             28.6     Bands 15.0  Bands 9.0    -08    144  |  110<H>  |  22  ----------------------------<  164<H>  4.1   |  26  |  0.98    Ca    7.8<L>      2018 06:19  Phos  2.9     -08  Mg     2.0     08    TPro  5.6<L>  /  Alb  3.0<L>  /  TBili  0.4  /  DBili  x   /  AST  43<H>  /  ALT  28  /  AlkPhos  60  02-    Lactate 3.6            @ 21:49    Lactate 4.2           02 @ 13:19    Lactate 3.0            @ 10:30      CARDIAC MARKERS ( 2018 21:49 )  .384 ng/mL / x     / 150 U/L / x     / 7.1 ng/mL  CARDIAC MARKERS ( 2018 14:22 )  .376 ng/mL / x     / 152 U/L / x     / 6.2 ng/mL  CARDIAC MARKERS ( 2018 10:30 )  .530 ng/mL / x     / 211 U/L / x     / 8.2 ng/mL      PT/INR - ( 2018 06:19 )   PT: 10.8 sec;   INR: 0.99 ratio         PTT - ( 2018 06:19 )  PTT:24.4 sec  Urinalysis Basic - ( 2018 18:13 )    Color: Yellow / Appearance: Clear / S.015 / pH: x  Gluc: x / Ketone: Negative  / Bili: Negative / Urobili: Negative   Blood: x / Protein: 25 mg/dL / Nitrite: Negative   Leuk Esterase: Negative / RBC: 0-2 /HPF / WBC 3-5   Sq Epi: x / Non Sq Epi: Few / Bacteria: Few      .Urine Clean Catch (Midstream)   No growth --  @ 21:39  .Blood Blood   No growth to date. --  @ 19:06  .Blood Blood   No growth to date. -- 02-05 @ 19:04      Rapid RVP Result: Detected ( @ 11:22)  Rapid RVP Result: Detected ( @ 11:43)    ABG - ( 2018 20:18 )  pH: 7.31  /  pCO2: 43    /  pO2: 312   / HCO3: 21    / Base Excess: -4.0  /  SaO2: 100         Radiology:CXR : Mild opacities in the right midlung.      CENTRAL LINE: N    RIVERA: Y          A-LINE: N       GLOBAL ISSUE/BEST PRACTICE:  Analgesia:N  Sedation:N  HOB elevation: yes  Stress ulcer prophylaxis:Y  VTE prophylaxis:Y  Glycemic control:N  Nutrition: Y    CODE STATUS: Full Code

## 2018-02-08 NOTE — PROGRESS NOTE ADULT - SUBJECTIVE AND OBJECTIVE BOX
PULMONARY/CRITICAL CARE      INTERVAL HPI/OVERNIGHT EVENTS:    89y FemaleHPI:  Intermittent sob, wheezing, Overall looks better. Alert and conversant.  This is a 89 year old woman with a history of hypertension, asthma/COPD who presents to the ED BIBEMS for increased respiratory distress and dyspnea.  She was treated for bronchitis as an inpatient, and was discharged yesterday with PO antibiotics and a steroid taper.  She developed overnight increased wheezing, dyspnea, and respiratory distress, and was brought back.  She was found to be in atrial flutter with RVR.  She is reporting mild chest heaviness.  She is denying fevers, chills, PND, orthopnea, new LE swelling, dizziness, or syncope.  She is being admitted to the ICU for respiratory failure and rapid atrial flutter.  She has mildly positive cardiac enzymes.     On her previous admission ( -) she was diagnosed bronchitis secondary to human metapneumovirus. CT chest yesterday did not show an pneumonia. Her cultures were negative prior to discharge. She has a nebulizer machine at home which she does not use. She is a former smoker -- she quit 40 y/a. (2018 12:58)        PAST MEDICAL & SURGICAL HISTORY:  Pelvic fracture  Fall  Fall  Hypertension  S/P ankle joint replacement, left        ICU Vital Signs Last 24 Hrs  T(C): 36.2 (2018 08:06), Max: 38.3 (2018 11:19)  T(F): 97.2 (2018 08:06), Max: 100.9 (2018 11:19)  HR: 84 (2018 10:00) (58 - 143)  BP: 150/68 (2018 10:00) (99/49 - 186/74)  BP(mean): 98 (2018 10:00) (71 - 121)  ABP: --  ABP(mean): --  RR: 20 (2018 10:00) (12 - 54)  SpO2: 100% (2018 10:00) (99% - 100%)    Qtts:     I&O's Summary    2018 07:01  -  2018 07:00  --------------------------------------------------------  IN: 1230 mL / OUT: 1840 mL / NET: -610 mL        REVIEW OF SYSTEMS:    CONSTITUTIONAL: No fever, weight loss, or fatigue  EYES: No eye pain, visual disturbances, or discharge  ENMT:  No difficulty hearing, tinnitus, vertigo; No sinus or throat pain  NECK: No pain or stiffness  BREASTS: No pain, masses, or nipple discharge  RESPIRATORY: has cough, wheezing, No chills or hemoptysis;mod  shortness of breath  CARDIOVASCULAR: No chest pain, palpitations, dizziness, or leg swelling  GASTROINTESTINAL: No abdominal or epigastric pain. No nausea, vomiting, or hematemesis; No diarrhea or constipation. No melena or hematochezia.  GENITOURINARY: No dysuria, frequency, hematuria, or incontinence  NEUROLOGICAL: No headaches, memory loss, loss of strength, numbness, or tremors  SKIN: No itching, burning, rashes, or lesions   LYMPH NODES: No enlarged glands  ENDOCRINE: No heat or cold intolerance; No hair loss  MUSCULOSKELETAL: No joint pain or swelling; No muscle, back, or extremity pain, no calf tenderness  PSYCHIATRIC: No depression, anxiety, mood swings, or difficulty sleeping  HEME/LYMPH: No easy bruising, or bleeding gums  ALLERGY AND IMMUNOLOGIC: No hives or eczema      PHYSICAL EXAM:    GENERAL: mild respiratory distress on O2, well-groomed, well-developed,  HEAD:  Atraumatic, Normocephalic  EYES: EOMI, PERRLA, conjunctiva and sclera clear  ENMT: No tonsillar erythema, exudates, or enlargement; Moist mucous membranes, Good dentition, No lesions  NECK: Supple, No JVD, Normal thyroid  NERVOUS SYSTEM:  Alert & Oriented X3, Good concentration; Motor Strength 5/5 B/L upper and lower extremities  CHEST/LUNG: Wheezing, rhonchi bilat. Good excursion  HEART: irregular rate and rhythm; No murmurs, rubs, or gallops  ABDOMEN: Soft, Nontender, Nondistended; Bowel sounds present  EXTREMITIES:  2+ Peripheral Pulses, No clubbing, cyanosis, or edema  LYMPH: No lymphadenopathy noted  SKIN: No rashes or lesions              LABS:                        9.3    5.9   )-----------( 131      ( 2018 06:19 )             28.6     02-08    144  |  110<H>  |  22  ----------------------------<  164<H>  4.1   |  26  |  0.98    Ca    7.8<L>      2018 06:19  Phos  2.9     02-08  Mg     2.0     02-08    TPro  5.6<L>  /  Alb  3.0<L>  /  TBili  0.4  /  DBili  x   /  AST  43<H>  /  ALT  28  /  AlkPhos  60  02-08    PT/INR - ( 2018 06:19 )   PT: 10.8 sec;   INR: 0.99 ratio         PTT - ( 2018 06:19 )  PTT:24.4 sec  Urinalysis Basic - ( 2018 18:13 )    Color: Yellow / Appearance: Clear / S.015 / pH: x  Gluc: x / Ketone: Negative  / Bili: Negative / Urobili: Negative   Blood: x / Protein: 25 mg/dL / Nitrite: Negative   Leuk Esterase: Negative / RBC: 0-2 /HPF / WBC 3-5   Sq Epi: x / Non Sq Epi: Few / Bacteria: Few      ABG - ( 2018 20:18 )  pH: 7.31  /  pCO2: 43    /  pO2: 312   / HCO3: 21    / Base Excess: -4.0  /  SaO2: 100               vanco through     RADIOLOGY & ADDITIONAL STUDIES:  < from: Xray Chest 1 View- PORTABLE-Routine (18 @ 06:30) >  EXAM:  XR CHEST PORTABLE ROUTINE 1V                            PROCEDURE DATE:  2018          INTERPRETATION:  PORTABLE CHEST X-RAY    HISTORY: bronchitis.    COMPARISON: 2018.    FINDINGS:  There are mild opacities in the right midlung.  Mild biapical thickening again noted.  No pneumothorax or pleural effusion.   The cardiac silhouette is not accurately assessed by AP technique. Aortic   calcifications.    IMPRESSION:  Mild opacities in the right midlung.                HARPREET COOK MD., ATTENDING RADIOLOGIST  This document has been electronically signed. 2018  8:35AM        < end of copied text >      CRITICAL CARE TIME SPENT:

## 2018-02-08 NOTE — DISCHARGE NOTE ADULT - PLAN OF CARE
remain stable finished course of abx  take tapering dose of steroids  take inhalers as prescribed fu with pcp continue asa, metoprolol and cardizem take meds as prescribed  new regimen of meds

## 2018-02-08 NOTE — PROGRESS NOTE ADULT - ATTENDING COMMENTS
89F PMH COPD (not on home oxygen), former smoker, HTN, and falls who was recently admitted with COPD exacerbation due to human metapneumovirus discharged on 2/6, now presents with worsening dyspnea, wheezing, and malaise, found to be in new onset A-Fib with RVR with respiratory distress and diffuse wheezing with low grade fever 100.9, improved with steroids, nebs, bipap, and abx.    - No neuro issues at present  - HD stable, start diltiazem oral and d/c gtt, continue metoprolol, mild demand ischemia due to A-fib with RVR, continue aspirin  - Doing ok off BiPAP currently, decrease solumedrol to 20 mg IV q12h, continue duonebs, spiriva, symbicort  - Would d/c vanc given negative MRSA nares, continue Zosyn and azithro for now, f/up blood cultures and urine legionella  - Diet as tolerated  - Stable kidney function and lytes  - DVT ppx with HSQ  - Discussed with patient at bedside  - Transfer to tele

## 2018-02-08 NOTE — PHYSICAL THERAPY INITIAL EVALUATION ADULT - PLANNED THERAPY INTERVENTIONS, PT EVAL
transfer training/Assess stairs in 1-2 sessions/gait training/balance training/bed mobility training

## 2018-02-08 NOTE — DISCHARGE NOTE ADULT - COMMUNITY RESOURCES
Private hire options were offered patient and daughter/ will follow up with the visiting nurse post discharge

## 2018-02-08 NOTE — DISCHARGE NOTE ADULT - MEDICATION SUMMARY - MEDICATIONS TO TAKE
I will START or STAY ON the medications listed below when I get home from the hospital:    predniSONE 20 mg oral tablet  -- 1 tab(s) by mouth once a day for 3 days then 0.5 tabs once a day by mouth for 3 days then stop  -- Indication: For Sob    aspirin 325 mg oral delayed release tablet  -- 1 tab(s) by mouth once a day  -- Indication: For Heart    dilTIAZem 30 mg oral tablet  -- 1 tab(s) by mouth every 6 hours  -- Indication: For Afib    Tessalon Perles 100 mg oral capsule  -- 1 cap(s) by mouth 3 times a day -for cough   -- May cause drowsiness.  Alcohol may intensify this effect.  Use care when operating dangerous machinery.  Swallow whole.  Do not crush.    -- Indication: For cough    metoprolol tartrate 50 mg oral tablet  -- 1 tab(s) by mouth 2 times a day  -- Indication: For Htn    Spiriva Respimat 1.25 mcg/inh inhalation aerosol  -- 2 puff(s) inhaled once a day  -- Indication: For Sob    Ventolin HFA 90 mcg/inh inhalation aerosol  -- 1 puff(s) inhaled 4 times a day, As Needed   -- For inhalation only.  It is very important that you take or use this exactly as directed.  Do not skip doses or discontinue unless directed by your doctor.  Obtain medical advice before taking any non-prescription drugs as some may affect the action of this medication.  Shake well before use.    -- Indication: For Sob    Advair Diskus 250 mcg-50 mcg inhalation powder  -- 1 puff(s) inhaled 2 times a day   -- Check with your doctor before becoming pregnant.  For inhalation only.  Obtain medical advice before taking any non-prescription drugs as some may affect the action of this medication.  Rinse mouth thoroughly after use.    -- Indication: For Sob    amLODIPine 5 mg oral tablet  -- 1 tab(s) by mouth once a day  -- Indication: For Htn    lactobacillus acidophilus oral capsule  -- 1  by mouth once a day  -- Indication: For gi    hydrALAZINE 50 mg oral tablet  -- 1 tab(s) by mouth every 8 hours  -- Indication: For Htn I will START or STAY ON the medications listed below when I get home from the hospital:    predniSONE 20 mg oral tablet  -- 1 tab(s) by mouth once a day for 3 days then 0.5 tabs once a day by mouth for 3 days then stop  -- Indication: For Sob    aspirin 325 mg oral delayed release tablet  -- 1 tab(s) by mouth once a day  -- Indication: For Heart    Cardizem 120 mg oral tablet  -- 1 tab(s) by mouth once a day   -- Indication: For Afib    Tessalon Perles 100 mg oral capsule  -- 1 cap(s) by mouth 3 times a day -for cough   -- May cause drowsiness.  Alcohol may intensify this effect.  Use care when operating dangerous machinery.  Swallow whole.  Do not crush.    -- Indication: For cough    metoprolol tartrate 50 mg oral tablet  -- 1 tab(s) by mouth 2 times a day  -- Indication: For Afib    Ventolin HFA 90 mcg/inh inhalation aerosol  -- 1 puff(s) inhaled 4 times a day, As Needed   -- For inhalation only.  It is very important that you take or use this exactly as directed.  Do not skip doses or discontinue unless directed by your doctor.  Obtain medical advice before taking any non-prescription drugs as some may affect the action of this medication.  Shake well before use.    -- Indication: For Sob    Advair Diskus 250 mcg-50 mcg inhalation powder  -- 1 puff(s) inhaled 2 times a day   -- Check with your doctor before becoming pregnant.  For inhalation only.  Obtain medical advice before taking any non-prescription drugs as some may affect the action of this medication.  Rinse mouth thoroughly after use.    -- Indication: For Sob    Spiriva Respimat 1.25 mcg/inh inhalation aerosol  -- 2 puff(s) inhaled once a day  -- Indication: For Sob    lactobacillus acidophilus oral capsule  -- 1  by mouth once a day  -- Indication: For gi    hydrALAZINE 50 mg oral tablet  -- 1 tab(s) by mouth every 8 hours  -- Indication: For Htn

## 2018-02-08 NOTE — DISCHARGE NOTE ADULT - MEDICATION SUMMARY - MEDICATIONS TO STOP TAKING
I will STOP taking the medications listed below when I get home from the hospital:    Medrol Dosepak 4 mg oral tablet  -- 1 packet(s) by mouth once   dispense 1 packet  take as directed on packet  -- It is very important that you take or use this exactly as directed.  Do not skip doses or discontinue unless directed by your doctor.  Obtain medical advice before taking any non-prescription drugs as some may affect the action of this medication.  Take with food or milk.    azithromycin 250 mg oral tablet  -- 1 tab(s) by mouth once a day I will STOP taking the medications listed below when I get home from the hospital:    amLODIPine 5 mg oral tablet  -- 1 tab(s) by mouth once a day    Medrol Dosepak 4 mg oral tablet  -- 1 packet(s) by mouth once   dispense 1 packet  take as directed on packet  -- It is very important that you take or use this exactly as directed.  Do not skip doses or discontinue unless directed by your doctor.  Obtain medical advice before taking any non-prescription drugs as some may affect the action of this medication.  Take with food or milk.    azithromycin 250 mg oral tablet  -- 1 tab(s) by mouth once a day

## 2018-02-08 NOTE — PHYSICAL THERAPY INITIAL EVALUATION ADULT - IMPAIRMENTS FOUND, PT EVAL
muscle strength/but close or at her baseline/gait, locomotion, and balance/joint integrity and mobility

## 2018-02-08 NOTE — PROGRESS NOTE ADULT - SUBJECTIVE AND OBJECTIVE BOX
Patient is a 89y old  Female who presents with a chief complaint of difficulty breathing  and wheezing as well as cough (2018 15:19)      INTERVAL /OVERNIGHT EVENTS: feels better    MEDICATIONS  (STANDING):  ALBUTerol    90 MICROgram(s) HFA Inhaler 1 Puff(s) Inhalation every 6 hours  aspirin enteric coated 325 milliGRAM(s) Oral daily  azithromycin   Tablet 500 milliGRAM(s) Oral every 24 hours  benzonatate 100 milliGRAM(s) Oral three times a day  buDESOnide 160 MICROgram(s)/formoterol 4.5 MICROgram(s) Inhaler 2 Puff(s) Inhalation two times a day  diltiazem    Tablet 30 milliGRAM(s) Oral every 6 hours  heparin  Injectable 5000 Unit(s) SubCutaneous every 12 hours  insulin lispro (HumaLOG) corrective regimen sliding scale   SubCutaneous three times a day before meals  lactobacillus acidophilus 1 Tablet(s) Oral three times a day with meals  methylPREDNISolone sodium succinate Injectable 20 milliGRAM(s) IV Push every 12 hours  metoprolol     tartrate 25 milliGRAM(s) Oral two times a day  piperacillin/tazobactam IVPB. 3.375 Gram(s) IV Intermittent every 8 hours  tiotropium 18 MICROgram(s) Capsule 1 Capsule(s) Inhalation daily    MEDICATIONS  (PRN):  acetaminophen   Tablet. 650 milliGRAM(s) Oral every 6 hours PRN Mild Pain (1 - 3)      Allergies    codeine (Nausea)  Percocet 5/325 (Vomiting)    Intolerances        REVIEW OF SYSTEMS:  CONSTITUTIONAL: No fever, weight loss, or fatigue  EYES: No eye pain, visual disturbances, or discharge  ENMT:  No difficulty hearing, tinnitus, vertigo; No sinus or throat pain  NECK: No pain or stiffness  RESPIRATORY: No cough, wheezing, chills or hemoptysis; +shortness of breath  CARDIOVASCULAR: No chest pain, palpitations, dizziness, or leg swelling  GASTROINTESTINAL: No abdominal or epigastric pain. No nausea, vomiting, or hematemesis; No diarrhea or constipation. No melena or hematochezia.  GENITOURINARY: No dysuria, frequency, hematuria, or incontinence  NEUROLOGICAL: No headaches, memory loss, loss of strength, numbness, or tremors  SKIN: No itching, burning, rashes, or lesions   LYMPH NODES: No enlarged glands  ENDOCRINE: No heat or cold intolerance; No hair loss; No polydipsia or polyuria  MUSCULOSKELETAL: No joint pain or swelling; No muscle, back, or extremity pain  PSYCHIATRIC: No depression, anxiety, mood swings, or difficulty sleeping  HEME/LYMPH: No easy bruising, or bleeding gums  ALLERGY AND IMMUNOLOGIC: No hives or eczema    Vital Signs Last 24 Hrs  T(C): 36.6 (2018 16:00), Max: 36.7 (2018 12:25)  T(F): 97.8 (2018 16:00), Max: 98 (2018 12:25)  HR: 80 (2018 14:00) (58 - 97)  BP: 141/62 (2018 14:00) (99/49 - 186/74)  BP(mean): 89 (:) (71 - 121)  RR: 18 (2018 14:00) (12 - 54)  SpO2: 100% (2018 14:00) (99% - 100%)    PHYSICAL EXAM:  GENERAL: NAD, well-groomed, well-developed  HEAD:  Atraumatic, Normocephalic  EYES: EOMI, PERRLA, conjunctiva and sclera clear  ENMT: No tonsillar erythema, exudates, or enlargement; Moist mucous membranes, Good dentition, No lesions  NECK: Supple, No JVD, Normal thyroid  NERVOUS SYSTEM:  Alert & Oriented X3, Good concentration; Motor Strength 5/5 B/L upper and lower extremities; DTRs 2+ intact and symmetric  CHEST/LUNG: Clear to auscultation bilaterally; No rales, rhonchi, wheezing, or rubs  HEART: Regular rate and rhythm; No murmurs, rubs, or gallops  ABDOMEN: Soft, Nontender, Nondistended; Bowel sounds present  EXTREMITIES:  2+ Peripheral Pulses, No clubbing, cyanosis, or edema  LYMPH: No lymphadenopathy noted  SKIN: No rashes or lesions    LABS:                        9.3    5.9   )-----------( 131      ( 2018 06:19 )             28.6     2018 06:19    144    |  110    |  22     ----------------------------<  164    4.1     |  26     |  0.98     Ca    7.8        2018 06:19  Phos  2.9       2018 06:19  Mg     2.0       2018 06:19    TPro  5.6    /  Alb  3.0    /  TBili  0.4    /  DBili  x      /  AST  43     /  ALT  28     /  AlkPhos  60     2018 06:19    PT/INR - ( 2018 06:19 )   PT: 10.8 sec;   INR: 0.99 ratio         PTT - ( 2018 06:19 )  PTT:24.4 sec  Urinalysis Basic - ( 2018 18:13 )    Color: Yellow / Appearance: Clear / S.015 / pH: x  Gluc: x / Ketone: Negative  / Bili: Negative / Urobili: Negative   Blood: x / Protein: 25 mg/dL / Nitrite: Negative   Leuk Esterase: Negative / RBC: 0-2 /HPF / WBC 3-5   Sq Epi: x / Non Sq Epi: Few / Bacteria: Few      CAPILLARY BLOOD GLUCOSE      POCT Blood Glucose.: 151 mg/dL (2018 12:19)  POCT Blood Glucose.: 147 mg/dL (2018 08:24)  POCT Blood Glucose.: 203 mg/dL (2018 23:19)  POCT Blood Glucose.: 219 mg/dL (2018 18:12)      RADIOLOGY & ADDITIONAL TESTS:    Notes Reviewed:  [x ] YES  [ ] NO    Care Discussed with Consultants/Other Providers [ x] YES  [ ] NO

## 2018-02-08 NOTE — DIETITIAN INITIAL EVALUATION ADULT. - ADHERENCE
Denies following modified diet PTA. Reports she does add some salt when cooking but denies excessive sodium intake. Reports cooking all foods and follows [place] Mediterranean diet. Reports taking MVI and vitamin D with Calcium supplements daily PTA.

## 2018-02-08 NOTE — DISCHARGE NOTE ADULT - PATIENT PORTAL LINK FT
You can access the [facility] Portal, offered by [facility], by registering with the following website: [email or web address]

## 2018-02-08 NOTE — DIETITIAN INITIAL EVALUATION ADULT. - OTHER INFO
Pt admitted for dyspnea and respiratory distress and found with atrial flutter. Placed on BiPAP however switched to NC this morning. Pt has been NPO x 1 day due to BiPAP however discussed changing to po diet with MD due to NC. Pt denies nausea/emesis. Reports usual body weight 123-128 pounds. Per prior RD notes (11/3/2016 & 1/6/2016) pt noted with weights of 125-128 pounds. Note current admission weight noted as 109.8 pounds which pt denies accuracy. Bed scale currently reading 121.4 pounds likely more accurate. Pt denies recent changes in weight. Reports NKFA.

## 2018-02-08 NOTE — DIETITIAN INITIAL EVALUATION ADULT. - ENERGY NEEDS
Stated Height: 51 inches; Current Bed Scale Weight: 121.4 pounds  BMI: 22.9; IBW: 105 pounds (+/- 10%); 116%IBW

## 2018-02-08 NOTE — PHYSICAL THERAPY INITIAL EVALUATION ADULT - PERTINENT HX OF CURRENT PROBLEM, REHAB EVAL
As per H&P:"89 yo female with a history of hypertension, asthma/COPD who presents to the ED BIBEMS for increased respiratory distress and dyspnea.  She was treated for bronchitis as an inpatient, and was discharged yesterday with PO antibiotics and a steroid taper.  She developed overnight increased wheezing, dyspnea, and respiratory distress, and was brought back.

## 2018-02-08 NOTE — PROGRESS NOTE ADULT - SUBJECTIVE AND OBJECTIVE BOX
Follow up: SOB, AF, elev troponin    HPI:  This is a 89 year old woman with a history of hypertension, asthma/COPD who presents to the ED BIBEMS for increased respiratory distress and dyspnea.  She was treated for bronchitis as an inpatient, and was discharged yesterday with PO antibiotics and a steroid taper.  She developed overnight increased wheezing, dyspnea, and respiratory distress, and was brought back.  She was found to be in atrial flutter with RVR.  She is reporting mild chest heaviness.  She is denying fevers, chills, PND, orthopnea, new LE swelling, dizziness, or syncope.  She is being admitted to the ICU for respiratory failure and rapid atrial flutter.  She has mildly positive cardiac enzymes.     On her previous admission (2/5 -2/6) she was diagnosed bronchitis secondary to human metapneumovirus. CT chest yesterday did not show an pneumonia. Her cultures were negative prior to discharge. She has a nebulizer machine at home which she does not use. She is a former smoker -- she quit 40 y/a. (07 Feb 2018 12:58)      She is sleeping but arousable and oxygen therapy. She had some dyspnea with wheezing overnight responding to medical therapy.    PAST MEDICAL & SURGICAL HISTORY:  Pelvic fracture  Fall  Fall  Hypertension  S/P ankle joint replacement, left      MEDICATIONS  (STANDING):  ALBUTerol/ipratropium for Nebulization 3 milliLiter(s) Nebulizer every 6 hours  aspirin enteric coated 325 milliGRAM(s) Oral daily  azithromycin  IVPB 500 milliGRAM(s) IV Intermittent every 24 hours  benzonatate 100 milliGRAM(s) Oral three times a day  diltiazem    Tablet 30 milliGRAM(s) Oral every 6 hours  diltiazem Infusion 5 mG/Hr (5 mL/Hr) IV Continuous <Continuous>  heparin  Injectable 5000 Unit(s) SubCutaneous every 12 hours  insulin lispro (HumaLOG) corrective regimen sliding scale   SubCutaneous three times a day before meals  lactobacillus acidophilus 1 Tablet(s) Oral three times a day with meals  methylPREDNISolone sodium succinate Injectable 40 milliGRAM(s) IV Push every 8 hours  metoprolol     tartrate 25 milliGRAM(s) Oral two times a day  piperacillin/tazobactam IVPB. 3.375 Gram(s) IV Intermittent every 8 hours    Vital Signs Last 24 Hrs  T(C): 35.7 (08 Feb 2018 04:50), Max: 38.3 (07 Feb 2018 11:19)  T(F): 96.3 (08 Feb 2018 04:50), Max: 100.9 (07 Feb 2018 11:19)  HR: 60 (08 Feb 2018 07:00) (60 - 143)  BP: 162/65 (08 Feb 2018 07:00) (99/49 - 186/74)  BP(mean): 93 (08 Feb 2018 07:00) (71 - 107)  RR: 14 (08 Feb 2018 07:00) (12 - 54)  SpO2: 100% (08 Feb 2018 07:00) (99% - 100%)    I&O's Summary    07 Feb 2018 07:01  -  08 Feb 2018 07:00  --------------------------------------------------------  IN: 1230 mL / OUT: 1840 mL / NET: -610 mL        PHYSICAL EXAM:    Constitutional: NAD, awake and alert,  Eyes:   Pupils round, no lesions  ENMT: no exudate or erythema  Pulmonary: few scattered wheezes in expiration bilaterally.  Cardiovascular: PMI not palpable RRR normal S1 and S2, no murmurs, rubs, gallops or clicks  Gastrointestinal: Bowel Sounds present, soft, nontender.   Lymph: No cervical lymphadenopathy.  Neurological: Alert, no focal deficits  Skin: No rashes.  No cyanosis.  Psych:  Mood & affect appropriate   Ext: trc lower ext edema      CARDIAC MARKERS ( 07 Feb 2018 21:49 )  .384 ng/mL / x     / 150 U/L / x     / 7.1 ng/mL  CARDIAC MARKERS ( 07 Feb 2018 14:22 )  .376 ng/mL / x     / 152 U/L / x     / 6.2 ng/mL  CARDIAC MARKERS ( 07 Feb 2018 10:30 )  .530 ng/mL / x     / 211 U/L / x     / 8.2 ng/mL                            9.3    5.9   )-----------( 131      ( 08 Feb 2018 06:19 )             28.6     02-08    144  |  110<H>  |  22  ----------------------------<  164<H>  4.1   |  26  |  0.98    Ca    7.8<L>      08 Feb 2018 06:19  Phos  2.9     02-08  Mg     2.0     02-08    TPro  5.6<L>  /  Alb  3.0<L>  /  TBili  0.4  /  DBili  x   /  AST  43<H>  /  ALT  28  /  AlkPhos  60  02-08      < from: 12 Lead ECG (02.05.18 @ 12:43) >    Ventricular Rate 125 BPM    Atrial Rate 125 BPM    P-R Interval 140 ms    QRS Duration 86 ms    Q-T Interval 296 ms    QTC Calculation(Bezet) 427 ms    P Axis -28 degrees    R Axis 21 degrees    T Axis 215 degrees    Diagnosis Line Sinus tachycardia  Septal infarct , age undetermined  Marked ST abnormality, possible inferior subendocardial injury  Marked ST abnormality, possible anterolateral subendocardial injury  Abnormal ECG  No previous ECGs available  Confirmed by Kait Fletcher ([id number]) on 2/6/2018 1:42:15 PM    < end of copied text >  < from: Xray Chest 1 View AP/PA (02.07.18 @ 10:17) >    EXAM:  XR CHEST AP OR PA 1V                            PROCEDURE DATE:  02/07/2018          INTERPRETATION:  Short of breath.    AP chest. Prior dated 2/6/2018. Normal heart size with atherosclerotic   aorta. No consolidation or effusion.    Impression: No active infiltrates.                EMMA TAPIA M.D., ATTENDING RADIOLOGIST  This document has been electronically signed. Feb 7 2018 10:23AM                < end of copied text >

## 2018-02-08 NOTE — PHYSICAL THERAPY INITIAL EVALUATION ADULT - ASSISTIVE DEVICE FOR TRANSFER: GAIT, REHAB EVAL
straight cane/pt periodically not placing cane on ground when ambulating, pt gait is narrow and shuffles. decreased heel strike and stride length

## 2018-02-08 NOTE — DISCHARGE NOTE ADULT - NS AS ACTIVITY OBS
No Heavy lifting/straining/Bathing allowed/Do not make important decisions/Walking-Outdoors allowed/Stairs allowed/Showering allowed

## 2018-02-08 NOTE — DISCHARGE NOTE ADULT - HOSPITAL COURSE
This is a 89 year old woman with a history of hypertension, asthma/COPD not on home O2, who was BIBEMS 2/7 for increased respiratory distress and dyspnea.  She was treated for bronchitis as an inpatient, and was discharged 2/6 with PO antibiotics and a steroid taper.  She developed overnight increased wheezing, dyspnea, and respiratory distress, and was brought back.   She also reported mild chest heaviness.  In E.D. found to have afib with RVR new Lateral ECG ST changes placed on dilitazem gtt, ASA, solumedrol 125 mg IV, vanco, zosyn. Admitted to ICU for Respiratory failure with Rapid afib, Type II NSTEMI, acute COPD exacerbation and placed on Bipap.  During ICU course, Bipap weaned down to 50% VM, CE still elevated however downtrending, cardiology following. Pt dilitazem ggt switched to po, patient went back in sinus rhythm and abx coverage continued with zosyn, zithromax. This is a 89 year old woman with a history of hypertension, asthma/COPD not on home O2, who was BIBEMS 2/7 for increased respiratory distress and dyspnea.  She was treated for bronchitis as an inpatient, and was discharged 2/6 with PO antibiotics and a steroid taper.  She developed overnight increased wheezing, dyspnea, and respiratory distress, and was brought back.   She also reported mild chest heaviness.  In E.D. found to have afib with RVR new Lateral ECG ST changes placed on dilitazem gtt, ASA, solumedrol 125 mg IV, vanco, zosyn. Admitted to ICU for Respiratory failure with Rapid afib, Type II NSTEMI, acute COPD exacerbation and placed on Bipap.  During ICU course, Bipap weaned down to 50% VM, CE still elevated however downtrending, cardiology following. Pt dilitazem ggt switched to po, patient went back in sinus rhythm and abx coverage continued with zosyn, zithromax. Finished course of abx and now on steroid taper, bp meds adjusted, going home with home care today. This is a 89 year old woman with a history of hypertension, asthma/COPD not on home O2, who was BIBEMS 2/7 for increased respiratory distress and dyspnea.  She was treated for bronchitis as an inpatient, and was discharged 2/6 with PO antibiotics and a steroid taper.  She developed overnight increased wheezing, dyspnea, and respiratory distress, and was brought back.   She also reported mild chest heaviness.  In E.D. found to have afib with RVR new Lateral ECG ST changes placed on dilitazem gtt, ASA, solumedrol 125 mg IV, vanco, zosyn. Admitted to ICU for Respiratory failure with Rapid afib, Type II NSTEMI, acute COPD exacerbation and placed on Bipap.  During ICU course, Bipap weaned down to 50% VM, CE still elevated however downtrending, cardiology following. Pt dilitazem ggt switched to po, patient went back in sinus rhythm and abx coverage continued with zosyn, zithromax. Finished course of abx and now on steroid taper, bp meds adjusted,

## 2018-02-08 NOTE — PROGRESS NOTE ADULT - ASSESSMENT
89 yr old female with PMHx of HTN, COPD not on home O2, s/p Lt hip fx '16, s/p Lt ankle fx. Discharged from [facility]6, [facility] and advised to present to E.D. for r/o pmn found to have hPMV bronchitis. Initiated zithromax with planned completion on p.o home meds and medrol floridalma, bronhodilators with plan or f/u with Dr Weil. Presents this morning from home via EMS with progressive SOB, cough, general malaise since last evening, denies n/v/d/c/f. In E.D. found to have afib with RVR new Lateral ECG ST changes placed on dilitazem gtt, ASA, solumedrol 125 mg IV, vanco, zosyn. Admitted to ICU for Rapid afib, R/O NSTEMI, COPD exacerbation r/o PNM      Neuro:neuro checks q 4 hrs and prn for changes. bedrest at present  Pulm:supplemental O2 to maintain spo2>92%,duoneb treatments q 6 hrs and prn for sob/wheezes, solumedrol 40 mg q 8 hrs, HOB>/= 30 degree angle, continue bipap therapy for ease of WOB  CV: R/O NSTEMI: Continue ASA 325mg, consider starting plavix, full a/c in setting of uptrendign CE.  New ST changes in I,L.V4 - V6, C.E. q 8 hrs x 3. Afib with RVR - diltiazem gtt, contineu heparin subq, f/u TTE to eval LVFx  GI/:strict I & O's, keep even to negative  I.D.: Sepsis: Continue zosyn and vanco (day #2), f/u urine legionella, cultures. RVP + for hMPV which is from recent admission bronchitis, supportive care (contact precautions)  FEN/ENDO/HEME: supplement electrolytes as need - keep K+ to 4.0 to 4.5, POC glucose q 4 hrs maintain glucose 140 to 160    Dispo: Full Code 89 yr old female with PMHx of HTN, COPD not on home O2, s/p Lt hip fx '16, s/p Lt ankle fx. Discharged from [facility], [facility] and advised to present to E.D. for r/o pmn found to have hPMV bronchitis. Initiated zithromax with planned completion on p.o home meds and medrol floridalma, bronhodilators with plan or f/u with Dr Weil. Presents this morning from home via EMS with progressive SOB, cough, general malaise since last evening, denies n/v/d/c/f. In E.D. found to have afib with RVR new Lateral ECG ST changes placed on dilitazem gtt, ASA, solumedrol 125 mg IV, vanco, zosyn. Admitted to ICU for Rapid afib, R/O NSTEMI, COPD exacerbation r/o PNM      Neuro:  Stable.  AAOx3. D/c Neurochecks q4 hrs  Pulm: Acute COPD exacerbation: Supplemental O2 via NC to maintain spo2>92%. Add Spiriva, symbicort. Continue Proventil, Solu-medrol (decreased to 20 mg Q12H),  HOB>/= 30 degree angle, continue bipap therapy  as needed   CV: NSTEMI Type II: Continue ASA 325mg, no need for plavix or, full a/c in setting of downtrending CE.   New onset Afib with RVR - diltiazem gtt will d/c and change to po, currently in sinus rhythm, was in fib for <24hrs continue heparin subq, f/u TTE to eval LVFx  GI/: DIET:Regular, strict I & O's, keep even to negative  I.D.: Sepsis: Continue zosyn and zithromax (day #2), d/c vanco as MRSA PCR neg. F/u urine legionella, cultures. RVP + for hMPV which is from recent admission bronchitis, supportive care (contact precautions)  FEN/ENDO/HEME: supplement electrolytes as need - keep K+ to 4.0 to 4.5, POC glucose q 4 hrs maintain glucose 140 to 160  Dispo: Full Code. Optimized for transfer to Telemetry

## 2018-02-08 NOTE — DISCHARGE NOTE ADULT - CARE PLAN
Principal Discharge DX:	Acute bronchitis  Goal:	remain stable  Assessment and plan of treatment:	finished course of abx  take tapering dose of steroids  take inhalers as prescribed  Secondary Diagnosis:	Human metapneumovirus (hMPV) as cause of disease classified elsewhere  Assessment and plan of treatment:	fu with pcp  Secondary Diagnosis:	Rapid atrial fibrillation  Assessment and plan of treatment:	continue asa, metoprolol and cardizem  Secondary Diagnosis:	Hypertension  Assessment and plan of treatment:	take meds as prescribed  new regimen of meds

## 2018-02-08 NOTE — DISCHARGE NOTE ADULT - SECONDARY DIAGNOSIS.
Human metapneumovirus (hMPV) as cause of disease classified elsewhere Rapid atrial fibrillation Hypertension

## 2018-02-08 NOTE — DISCHARGE NOTE ADULT - NSTOBACCOHOTLINE_GEN_A_CS
[facility] Smokers Quitline ([id number]) [facility] Smokers Quitline ([phone])

## 2018-02-09 LAB
ALBUMIN SERPL ELPH-MCNC: 2.9 G/DL — LOW (ref 3.3–5)
ALP SERPL-CCNC: 63 U/L — SIGNIFICANT CHANGE UP (ref 40–120)
ALT FLD-CCNC: 34 U/L — SIGNIFICANT CHANGE UP (ref 12–78)
ANION GAP SERPL CALC-SCNC: 6 MMOL/L — SIGNIFICANT CHANGE UP (ref 5–17)
APTT BLD: 26.1 SEC — LOW (ref 27.5–37.4)
AST SERPL-CCNC: 38 U/L — HIGH (ref 15–37)
BASOPHILS # BLD AUTO: 0 K/UL — SIGNIFICANT CHANGE UP (ref 0–0.2)
BASOPHILS NFR BLD AUTO: 0.2 % — SIGNIFICANT CHANGE UP (ref 0–2)
BILIRUB SERPL-MCNC: 0.3 MG/DL — SIGNIFICANT CHANGE UP (ref 0.2–1.2)
BUN SERPL-MCNC: 21 MG/DL — SIGNIFICANT CHANGE UP (ref 7–23)
CALCIUM SERPL-MCNC: 7.9 MG/DL — LOW (ref 8.5–10.1)
CHLORIDE SERPL-SCNC: 108 MMOL/L — SIGNIFICANT CHANGE UP (ref 96–108)
CK MB BLD-MCNC: 5 % — HIGH (ref 0–3.5)
CK MB CFR SERPL CALC: 2.9 NG/ML — SIGNIFICANT CHANGE UP (ref 0–3.6)
CK SERPL-CCNC: 58 U/L — SIGNIFICANT CHANGE UP (ref 26–192)
CO2 SERPL-SCNC: 31 MMOL/L — SIGNIFICANT CHANGE UP (ref 22–31)
CREAT SERPL-MCNC: 1 MG/DL — SIGNIFICANT CHANGE UP (ref 0.5–1.3)
CULTURE RESULTS: SIGNIFICANT CHANGE UP
EOSINOPHIL # BLD AUTO: 0 K/UL — SIGNIFICANT CHANGE UP (ref 0–0.5)
EOSINOPHIL NFR BLD AUTO: 0 % — SIGNIFICANT CHANGE UP (ref 0–6)
GLUCOSE SERPL-MCNC: 124 MG/DL — HIGH (ref 70–99)
HCT VFR BLD CALC: 31.3 % — LOW (ref 34.5–45)
HGB BLD-MCNC: 10.4 G/DL — LOW (ref 11.5–15.5)
INR BLD: 0.98 RATIO — SIGNIFICANT CHANGE UP (ref 0.88–1.16)
LYMPHOCYTES # BLD AUTO: 0.6 K/UL — LOW (ref 1–3.3)
LYMPHOCYTES # BLD AUTO: 6.6 % — LOW (ref 13–44)
MAGNESIUM SERPL-MCNC: 2.1 MG/DL — SIGNIFICANT CHANGE UP (ref 1.6–2.6)
MCHC RBC-ENTMCNC: 30.3 PG — SIGNIFICANT CHANGE UP (ref 27–34)
MCHC RBC-ENTMCNC: 33.2 GM/DL — SIGNIFICANT CHANGE UP (ref 32–36)
MCV RBC AUTO: 91.4 FL — SIGNIFICANT CHANGE UP (ref 80–100)
MONOCYTES # BLD AUTO: 0.5 K/UL — SIGNIFICANT CHANGE UP (ref 0–0.9)
MONOCYTES NFR BLD AUTO: 4.7 % — SIGNIFICANT CHANGE UP (ref 1–9)
NEUTROPHILS # BLD AUTO: 8.6 K/UL — HIGH (ref 1.8–7.4)
NEUTROPHILS NFR BLD AUTO: 88.5 % — HIGH (ref 43–77)
ORGANISM # SPEC MICROSCOPIC CNT: SIGNIFICANT CHANGE UP
ORGANISM # SPEC MICROSCOPIC CNT: SIGNIFICANT CHANGE UP
PHOSPHATE SERPL-MCNC: 2.6 MG/DL — SIGNIFICANT CHANGE UP (ref 2.5–4.5)
PLATELET # BLD AUTO: 137 K/UL — LOW (ref 150–400)
POTASSIUM SERPL-MCNC: 4.4 MMOL/L — SIGNIFICANT CHANGE UP (ref 3.5–5.3)
POTASSIUM SERPL-SCNC: 4.4 MMOL/L — SIGNIFICANT CHANGE UP (ref 3.5–5.3)
PROT SERPL-MCNC: 5.8 G/DL — LOW (ref 6–8.3)
PROTHROM AB SERPL-ACNC: 10.7 SEC — SIGNIFICANT CHANGE UP (ref 9.8–12.7)
RBC # BLD: 3.42 M/UL — LOW (ref 3.8–5.2)
RBC # FLD: 12.4 % — SIGNIFICANT CHANGE UP (ref 10.3–14.5)
SODIUM SERPL-SCNC: 145 MMOL/L — SIGNIFICANT CHANGE UP (ref 135–145)
SPECIMEN SOURCE: SIGNIFICANT CHANGE UP
TROPONIN I SERPL-MCNC: 0.3 NG/ML — HIGH (ref 0.01–0.04)
WBC # BLD: 9.7 K/UL — SIGNIFICANT CHANGE UP (ref 3.8–10.5)
WBC # FLD AUTO: 9.7 K/UL — SIGNIFICANT CHANGE UP (ref 3.8–10.5)

## 2018-02-09 PROCEDURE — 99233 SBSQ HOSP IP/OBS HIGH 50: CPT | Mod: GC

## 2018-02-09 PROCEDURE — 99232 SBSQ HOSP IP/OBS MODERATE 35: CPT

## 2018-02-09 RX ORDER — DOCUSATE SODIUM 100 MG
100 CAPSULE ORAL THREE TIMES A DAY
Qty: 0 | Refills: 0 | Status: DISCONTINUED | OUTPATIENT
Start: 2018-02-09 | End: 2018-02-13

## 2018-02-09 RX ORDER — POLYETHYLENE GLYCOL 3350 17 G/17G
17 POWDER, FOR SOLUTION ORAL DAILY
Qty: 0 | Refills: 0 | Status: DISCONTINUED | OUTPATIENT
Start: 2018-02-09 | End: 2018-02-09

## 2018-02-09 RX ORDER — AMLODIPINE BESYLATE 2.5 MG/1
5 TABLET ORAL DAILY
Qty: 0 | Refills: 0 | Status: DISCONTINUED | OUTPATIENT
Start: 2018-02-09 | End: 2018-02-09

## 2018-02-09 RX ORDER — PIPERACILLIN AND TAZOBACTAM 4; .5 G/20ML; G/20ML
3.38 INJECTION, POWDER, LYOPHILIZED, FOR SOLUTION INTRAVENOUS EVERY 8 HOURS
Qty: 0 | Refills: 0 | Status: COMPLETED | OUTPATIENT
Start: 2018-02-09 | End: 2018-02-11

## 2018-02-09 RX ORDER — SENNA PLUS 8.6 MG/1
2 TABLET ORAL AT BEDTIME
Qty: 0 | Refills: 0 | Status: DISCONTINUED | OUTPATIENT
Start: 2018-02-09 | End: 2018-02-13

## 2018-02-09 RX ORDER — METOPROLOL TARTRATE 50 MG
50 TABLET ORAL
Qty: 0 | Refills: 0 | Status: DISCONTINUED | OUTPATIENT
Start: 2018-02-09 | End: 2018-02-13

## 2018-02-09 RX ADMIN — Medication 1 TABLET(S): at 11:33

## 2018-02-09 RX ADMIN — PIPERACILLIN AND TAZOBACTAM 25 GRAM(S): 4; .5 INJECTION, POWDER, LYOPHILIZED, FOR SOLUTION INTRAVENOUS at 07:46

## 2018-02-09 RX ADMIN — Medication 20 MILLIGRAM(S): at 05:57

## 2018-02-09 RX ADMIN — HEPARIN SODIUM 5000 UNIT(S): 5000 INJECTION INTRAVENOUS; SUBCUTANEOUS at 17:40

## 2018-02-09 RX ADMIN — Medication 50 MILLIGRAM(S): at 17:41

## 2018-02-09 RX ADMIN — AZITHROMYCIN 500 MILLIGRAM(S): 500 TABLET, FILM COATED ORAL at 16:30

## 2018-02-09 RX ADMIN — TIOTROPIUM BROMIDE 1 CAPSULE(S): 18 CAPSULE ORAL; RESPIRATORY (INHALATION) at 06:00

## 2018-02-09 RX ADMIN — HEPARIN SODIUM 5000 UNIT(S): 5000 INJECTION INTRAVENOUS; SUBCUTANEOUS at 05:57

## 2018-02-09 RX ADMIN — Medication 1 TABLET(S): at 07:46

## 2018-02-09 RX ADMIN — Medication 3: at 11:32

## 2018-02-09 RX ADMIN — AMLODIPINE BESYLATE 5 MILLIGRAM(S): 2.5 TABLET ORAL at 11:36

## 2018-02-09 RX ADMIN — BUDESONIDE AND FORMOTEROL FUMARATE DIHYDRATE 2 PUFF(S): 160; 4.5 AEROSOL RESPIRATORY (INHALATION) at 05:58

## 2018-02-09 RX ADMIN — ALBUTEROL 1 PUFF(S): 90 AEROSOL, METERED ORAL at 13:06

## 2018-02-09 RX ADMIN — BUDESONIDE AND FORMOTEROL FUMARATE DIHYDRATE 2 PUFF(S): 160; 4.5 AEROSOL RESPIRATORY (INHALATION) at 23:05

## 2018-02-09 RX ADMIN — PIPERACILLIN AND TAZOBACTAM 25 GRAM(S): 4; .5 INJECTION, POWDER, LYOPHILIZED, FOR SOLUTION INTRAVENOUS at 23:07

## 2018-02-09 RX ADMIN — Medication 100 MILLIGRAM(S): at 21:29

## 2018-02-09 RX ADMIN — PIPERACILLIN AND TAZOBACTAM 25 GRAM(S): 4; .5 INJECTION, POWDER, LYOPHILIZED, FOR SOLUTION INTRAVENOUS at 16:30

## 2018-02-09 RX ADMIN — Medication 100 MILLIGRAM(S): at 05:58

## 2018-02-09 RX ADMIN — ALBUTEROL 1 PUFF(S): 90 AEROSOL, METERED ORAL at 18:37

## 2018-02-09 RX ADMIN — Medication 100 MILLIGRAM(S): at 13:06

## 2018-02-09 RX ADMIN — ALBUTEROL 1 PUFF(S): 90 AEROSOL, METERED ORAL at 13:08

## 2018-02-09 RX ADMIN — Medication 25 MILLIGRAM(S): at 05:58

## 2018-02-09 RX ADMIN — Medication 325 MILLIGRAM(S): at 11:14

## 2018-02-09 RX ADMIN — Medication 1 TABLET(S): at 16:30

## 2018-02-09 NOTE — PROGRESS NOTE ADULT - ASSESSMENT
Pt recently treated for Human Metapneumovirus bronchitis now has worsening sob, bronchospasm, fever, hi lactate as well at rapid At Fib. Hi troponin.  R/o secondary bacterial pneumonia, sepsis??

## 2018-02-09 NOTE — PROGRESS NOTE ADULT - ASSESSMENT
89 year old woman with a history of hypertension who presents to the ED BIBEMS for increased respiratory distress and dyspnea, acute respiratory failure, atrial flutter with RVR, mildly positive cardiac enzymes with ischemic ST depressions on her EKG. Likely secondary to demand ischemia and not true coronary atherothrombosis. She is now somewhat improved, currently in sinus rhythm at 60 beats per minute. She did have some evidence of bronchospasm last night, now improved.    -no acute ischemia  -maintaining sr  -cont bb  -cont ccb  - Aspirin 325 QD  - Hold off on full AC for now, though will need to reconsider pending her rhythm while hospitalized and her clinical course  - bp has been elevated  -amlodipine and diltiazem both being given.  would dc amlodipine and substitute ace or arb if not medically contraindicated  - DVT prophylaxis  - Abx per primary team  - Nebs and steroids per primary team  -resp status remains tenuous but suitable for transfer from icu  - Monitor and replete electrolytes  - await echocardiogram  - To follow closely with you

## 2018-02-09 NOTE — PROGRESS NOTE ADULT - SUBJECTIVE AND OBJECTIVE BOX
Interval events:   PT seen and examined at bedside. Pt feels weak today, and increased cough. no BM for 3 days, will order senna/colace. She denies any chest pain, dizziness, n/v.      Review of Systems:  Constitutional: denies fever, chills, diaphoresis   HEENT: denies blurry vision, difficulty hearing  Respiratory: denies SOB, SHIN, cough, sputum production, wheezing, hemoptysis  Cardiovascular: admits chest discomfort, denies palpitations, edema  Gastrointestinal: denies nausea, vomiting, diarrhea, constipation, abdominal pain  Genitourinary: denies dysuria, frequency, urgency, hematuria   Skin/Breast: denies rash, itching  Musculoskeletal: denies myalgias, joint swelling, muscle weakness  Neurologic: denies headache, weakness, dizziness, paresthesias, numbness/tingling        T(F): 98.2 (18 @ 08:00), Max: 98.2 (18 @ 08:00)  HR: 72 (18 @ 08:00) (55 - 99)  BP: 132/95 (18 @ 08:00) (132/95 - 183/83)  RR: 15 (18 @ 08:00) (11 - 37)  SpO2: 94% (18 @ 08:00) (94% - 100%)        CAPILLARY BLOOD GLUCOSE      POCT Blood Glucose.: 140 mg/dL (2018 07:41)    I&O's Summary    2018 07:  -  2018 07:00  --------------------------------------------------------  IN: 1160 mL / OUT: 2140 mL / NET: -980 mL    2018 07:  -  2018 08:14  --------------------------------------------------------  IN: 25 mL / OUT: 200 mL / NET: -175 mL    Physical Exam:     General: elderly white pleasant female  HEENT: NCAT, PERRLA, EOMI bl, moist mucous membranes   Neck: Supple, nontender, no mass  Neurology: A&Ox3, nonfocal, CN II-XII grossly intact  Respiratory: + diffuse wheezing, rhonchi, no rhales  CV: RRR, +S1/S2, no murmurs, rubs or gallops  Abdominal: Soft, NT, ND +BSx4,  Extremities: No C/C/E, + peripheral pulses  MSK:  no joint erythema or warmth, no joint swelling   Skin: + hematoma on RLQ        Meds:  aspirin enteric coated 325 milliGRAM(s) Oral daily  heparin  Injectable 5000 Unit(s) SubCutaneous every 12 hours    azithromycin   Tablet 500 milliGRAM(s) Oral every 24 hours  piperacillin/tazobactam IVPB. 3.375 Gram(s) IV Intermittent every 8 hours    amLODIPine   Tablet 5 milliGRAM(s) Oral daily  diltiazem    Tablet 30 milliGRAM(s) Oral every 6 hours  metoprolol     tartrate 25 milliGRAM(s) Oral two times a day    insulin lispro (HumaLOG) corrective regimen sliding scale   SubCutaneous three times a day before meals  methylPREDNISolone sodium succinate Injectable 20 milliGRAM(s) IV Push every 12 hours    ALBUTerol    90 MICROgram(s) HFA Inhaler 1 Puff(s) Inhalation every 6 hours  benzonatate 100 milliGRAM(s) Oral three times a day  buDESOnide 160 MICROgram(s)/formoterol 4.5 MICROgram(s) Inhaler 2 Puff(s) Inhalation two times a day  tiotropium 18 MICROgram(s) Capsule 1 Capsule(s) Inhalation daily    acetaminophen   Tablet. 650 milliGRAM(s) Oral every 6 hours PRN    docusate sodium 100 milliGRAM(s) Oral three times a day  senna 2 Tablet(s) Oral at bedtime      lactobacillus acidophilus 1 Tablet(s) Oral three times a day with meals                        10.4   9.7   )-----------( 137      ( 2018 05:10 )             31.3       02-09    145  |  108  |  21  ----------------------------<  124<H>  4.4   |  31  |  1.00    Ca    7.9<L>      2018 05:10  Phos  2.6     -  Mg     2.1     -    TPro  5.8<L>  /  Alb  2.9<L>  /  TBili  0.3  /  DBili  x   /  AST  38<H>  /  ALT  34  /  AlkPhos  63  -      CARDIAC MARKERS ( 2018 05:10 )  .303 ng/mL / x     / 58 U/L / x     / 2.9 ng/mL  CARDIAC MARKERS ( 2018 06:19 )  .407 ng/mL / x     / 96 U/L / x     / 5.2 ng/mL  CARDIAC MARKERS ( 2018 21:49 )  .384 ng/mL / x     / 150 U/L / x     / 7.1 ng/mL  CARDIAC MARKERS ( 2018 14:22 )  .376 ng/mL / x     / 152 U/L / x     / 6.2 ng/mL  CARDIAC MARKERS ( 2018 10:30 )  .530 ng/mL / x     / 211 U/L / x     / 8.2 ng/mL      PT/INR - ( 2018 05:10 )   PT: 10.7 sec;   INR: 0.98 ratio         PTT - ( 2018 05:10 )  PTT:26.1 sec  Urinalysis Basic - ( 2018 18:13 )    Color: Yellow / Appearance: Clear / S.015 / pH: x  Gluc: x / Ketone: Negative  / Bili: Negative / Urobili: Negative   Blood: x / Protein: 25 mg/dL / Nitrite: Negative   Leuk Esterase: Negative / RBC: 0-2 /HPF / WBC 3-5   Sq Epi: x / Non Sq Epi: Few / Bacteria: Few      .Blood Blood   No growth to date. --  @ 18:04  .Blood Blood-Peripheral   Growth in aerobic bottle: Gram Positive Cocci in Clusters  "Due to technical problems, Proteus sp. will Not be reported as part of  the BCID panel until further notice"  ***Blood Panel PCR results on this specimen are available  approximately 3 hours after the Gram stain result.***  Gram stain, PCR, and/or culture results may not always  correspond due to difference in methodologies.  ************************************************************  This PCR assay was performed using [employer].  The following targets are tested for: Enterococcus,  vancomycin resistant enterococci, Listeria monocytogenes,  coagulase negative staphylococci, S. aureus,  methicillin resistant S. aureus, Streptococcus agalactiae  (Group B), S. pneumoniae, S.pyogenes (Group A),  Acinetobacter baumannii, Enterobacter cloacae, E. coli,  Klebsiella oxytoca, K. pneumoniae, Proteus sp.,  Serratia marcescens, Haemophilus influenzae,  Neisseria meningitidis, Pseudomonas aeruginosa, Candida  albicans, C. glabrata, C krusei, C parapsilosis,  C. tropicalis and the KPC resistance gene.   Growth in aerobic bottle: Gram Positive Cocci in Clusters  @ 12:29  .Urine Clean Catch (Midstream)   No growth --  @ 21:39  .Blood Blood   No growth to date. --  @ 19:06  .Blood Blood   No growth to date. --  @ 19:04      Rapid RVP Result: Detected ( @ 11:22)  Rapid RVP Result: Detected ( @ 11:43)    ABG - ( 2018 20:18 )  pH: 7.31  /  pCO2: 43    /  pO2: 312   / HCO3: 21    / Base Excess: -4.0  /  SaO2: 100       CENTRAL LINE: N    RIVERA: Y          A-LINE: N       GLOBAL ISSUE/BEST PRACTICE:  Analgesia:N  Sedation:N  HOB elevation: yes  Stress ulcer prophylaxis:Y  VTE prophylaxis:Y  Glycemic control:N  Nutrition: Y    CODE STATUS: Full Code Interval events:   PT seen and examined at bedside. Pt feels weak today, and increased cough. no BM for 3 days, will order senna/colace. She denies any chest pain, dizziness, n/v.      Review of Systems:  Constitutional: denies fever, chills, diaphoresis   HEENT: denies blurry vision, difficulty hearing  Respiratory: denies SOB, SHIN, cough, sputum production, wheezing, hemoptysis  Cardiovascular: admits chest discomfort, denies palpitations, edema  Gastrointestinal: denies nausea, vomiting, diarrhea, constipation, abdominal pain  Genitourinary: denies dysuria, frequency, urgency, hematuria   Skin/Breast: denies rash, itching  Musculoskeletal: denies myalgias, joint swelling, muscle weakness  Neurologic: denies headache, weakness, dizziness, paresthesias, numbness/tingling        T(F): 98.2 (18 @ 08:00), Max: 98.2 (18 @ 08:00)  HR: 72 (18 @ 08:00) (55 - 99)  BP: 132/95 (18 @ 08:00) (132/95 - 183/83)  RR: 15 (18 @ 08:00) (11 - 37)  SpO2: 94% (18 @ 08:00) (94% - 100%)        CAPILLARY BLOOD GLUCOSE      POCT Blood Glucose.: 140 mg/dL (2018 07:41)    I&O's Summary    2018 07:  -  2018 07:00  --------------------------------------------------------  IN: 1160 mL / OUT: 2140 mL / NET: -980 mL    2018 07:  -  2018 08:14  --------------------------------------------------------  IN: 25 mL / OUT: 200 mL / NET: -175 mL    Physical Exam:     General: elderly white pleasant female  HEENT: NCAT, PERRLA, EOMI bl, moist mucous membranes   Neck: Supple, nontender, no mass  Neurology: A&Ox3, nonfocal, CN II-XII grossly intact  Respiratory: + diffuse wheezing, rhonchi, no rhales  CV: RRR, +S1/S2, no murmurs, rubs or gallops  Abdominal: Soft, NT, ND +BSx4,  Extremities: No C/C/E, + peripheral pulses  MSK:  no joint erythema or warmth, no joint swelling   Skin: + hematoma on RLQ        Meds:  aspirin enteric coated 325 milliGRAM(s) Oral daily  heparin  Injectable 5000 Unit(s) SubCutaneous every 12 hours    azithromycin   Tablet 500 milliGRAM(s) Oral every 24 hours  piperacillin/tazobactam IVPB. 3.375 Gram(s) IV Intermittent every 8 hours    amLODIPine   Tablet 5 milliGRAM(s) Oral daily  diltiazem    Tablet 30 milliGRAM(s) Oral every 6 hours  metoprolol     tartrate 25 milliGRAM(s) Oral two times a day    insulin lispro (HumaLOG) corrective regimen sliding scale   SubCutaneous three times a day before meals  methylPREDNISolone sodium succinate Injectable 20 milliGRAM(s) IV Push every 12 hours    ALBUTerol    90 MICROgram(s) HFA Inhaler 1 Puff(s) Inhalation every 6 hours  benzonatate 100 milliGRAM(s) Oral three times a day  buDESOnide 160 MICROgram(s)/formoterol 4.5 MICROgram(s) Inhaler 2 Puff(s) Inhalation two times a day  tiotropium 18 MICROgram(s) Capsule 1 Capsule(s) Inhalation daily    acetaminophen   Tablet. 650 milliGRAM(s) Oral every 6 hours PRN    docusate sodium 100 milliGRAM(s) Oral three times a day  senna 2 Tablet(s) Oral at bedtime      lactobacillus acidophilus 1 Tablet(s) Oral three times a day with meals                        10.4   9.7   )-----------( 137      ( 2018 05:10 )             31.3       02-09    145  |  108  |  21  ----------------------------<  124<H>  4.4   |  31  |  1.00    Ca    7.9<L>      2018 05:10  Phos  2.6     -  Mg     2.1     -    TPro  5.8<L>  /  Alb  2.9<L>  /  TBili  0.3  /  DBili  x   /  AST  38<H>  /  ALT  34  /  AlkPhos  63  -      CARDIAC MARKERS ( 2018 05:10 )  .303 ng/mL / x     / 58 U/L / x     / 2.9 ng/mL  CARDIAC MARKERS ( 2018 06:19 )  .407 ng/mL / x     / 96 U/L / x     / 5.2 ng/mL  CARDIAC MARKERS ( 2018 21:49 )  .384 ng/mL / x     / 150 U/L / x     / 7.1 ng/mL  CARDIAC MARKERS ( 2018 14:22 )  .376 ng/mL / x     / 152 U/L / x     / 6.2 ng/mL  CARDIAC MARKERS ( 2018 10:30 )  .530 ng/mL / x     / 211 U/L / x     / 8.2 ng/mL      PT/INR - ( 2018 05:10 )   PT: 10.7 sec;   INR: 0.98 ratio         PTT - ( 2018 05:10 )  PTT:26.1 sec  Urinalysis Basic - ( 2018 18:13 )    Color: Yellow / Appearance: Clear / S.015 / pH: x  Gluc: x / Ketone: Negative  / Bili: Negative / Urobili: Negative   Blood: x / Protein: 25 mg/dL / Nitrite: Negative   Leuk Esterase: Negative / RBC: 0-2 /HPF / WBC 3-5   Sq Epi: x / Non Sq Epi: Few / Bacteria: Few      .Blood Blood   No growth to date. --  @ 18:04  .Blood Blood-Peripheral   Growth in aerobic bottle: Gram Positive Cocci in Clusters  "Due to technical problems, Proteus sp. will Not be reported as part of  the BCID panel until further notice"  ***Blood Panel PCR results on this specimen are available  approximately 3 hours after the Gram stain result.***  Gram stain, PCR, and/or culture results may not always  correspond due to difference in methodologies.  ************************************************************  This PCR assay was performed using [employer].  The following targets are tested for: Enterococcus,  vancomycin resistant enterococci, Listeria monocytogenes,  coagulase negative staphylococci, S. aureus,  methicillin resistant S. aureus, Streptococcus agalactiae  (Group B), S. pneumoniae, S.pyogenes (Group A),  Acinetobacter baumannii, Enterobacter cloacae, E. coli,  Klebsiella oxytoca, K. pneumoniae, Proteus sp.,  Serratia marcescens, Haemophilus influenzae,  Neisseria meningitidis, Pseudomonas aeruginosa, Candida  albicans, C. glabrata, C krusei, C parapsilosis,  C. tropicalis and the KPC resistance gene.   Growth in aerobic bottle: Gram Positive Cocci in Clusters  @ 12:29  .Urine Clean Catch (Midstream)   No growth --  @ 21:39  .Blood Blood   No growth to date. --  @ 19:06  .Blood Blood   No growth to date. --  @ 19:04      Rapid RVP Result: Detected ( @ 11:22)  Rapid RVP Result: Detected ( @ 11:43)    ABG - ( 2018 20:18 )  pH: 7.31  /  pCO2: 43    /  pO2: 312   / HCO3: 21    / Base Excess: -4.0  /  SaO2: 100       CENTRAL LINE: N    RIVERA: N, removed          A-LINE: N       GLOBAL ISSUE/BEST PRACTICE:  Analgesia:N  Sedation:N  HOB elevation: yes  Stress ulcer prophylaxis:Y  VTE prophylaxis:Y  Glycemic control:N  Nutrition: Y    CODE STATUS: Full Code

## 2018-02-09 NOTE — PROGRESS NOTE ADULT - SUBJECTIVE AND OBJECTIVE BOX
[facility]    Cristy Infante, Suraj, Twila, Matty, Nikolas, Tommy      [phone]    CHIEF COMPLAINT: Patient is a 89y old  Female who presents with a chief complaint of difficulty breathing  and wheezing as well as cough (08 Feb 2018 15:19)      Follow Up: resp distress, paf/fl    Interim history: The patient reports no new symptoms.  Denies chest discomfort.  No abdominal pain.  No new neurologic symptoms. Breathing is unchanged      MEDICATIONS  (STANDING):  ALBUTerol    90 MICROgram(s) HFA Inhaler 1 Puff(s) Inhalation every 6 hours  amLODIPine   Tablet 5 milliGRAM(s) Oral daily  aspirin enteric coated 325 milliGRAM(s) Oral daily  azithromycin   Tablet 500 milliGRAM(s) Oral every 24 hours  benzonatate 100 milliGRAM(s) Oral three times a day  buDESOnide 160 MICROgram(s)/formoterol 4.5 MICROgram(s) Inhaler 2 Puff(s) Inhalation two times a day  diltiazem    Tablet 30 milliGRAM(s) Oral every 6 hours  docusate sodium 100 milliGRAM(s) Oral three times a day  heparin  Injectable 5000 Unit(s) SubCutaneous every 12 hours  insulin lispro (HumaLOG) corrective regimen sliding scale   SubCutaneous three times a day before meals  lactobacillus acidophilus 1 Tablet(s) Oral three times a day with meals  methylPREDNISolone sodium succinate Injectable 20 milliGRAM(s) IV Push every 12 hours  metoprolol     tartrate 25 milliGRAM(s) Oral two times a day  piperacillin/tazobactam IVPB. 3.375 Gram(s) IV Intermittent every 8 hours  senna 2 Tablet(s) Oral at bedtime  tiotropium 18 MICROgram(s) Capsule 1 Capsule(s) Inhalation daily    MEDICATIONS  (PRN):  acetaminophen   Tablet. 650 milliGRAM(s) Oral every 6 hours PRN Mild Pain (1 - 3)      REVIEW OF SYSTEMS:  eye, ent, GI, , allergic, dermatologic, musculoskeletal and neurologic are negative except as described above    Vital Signs Last 24 Hrs  T(C): 36.8 (09 Feb 2018 08:00), Max: 36.8 (09 Feb 2018 08:00)  T(F): 98.2 (09 Feb 2018 08:00), Max: 98.2 (09 Feb 2018 08:00)  HR: 70 (09 Feb 2018 09:00) (55 - 99)  BP: 148/90 (09 Feb 2018 09:00) (132/95 - 183/83)  BP(mean): 108 (09 Feb 2018 09:00) (89 - 123)  RR: 20 (09 Feb 2018 09:00) (11 - 31)  SpO2: 95% (09 Feb 2018 09:00) (94% - 100%)    I&O's Summary    08 Feb 2018 07:01  -  09 Feb 2018 07:00  --------------------------------------------------------  IN: 1160 mL / OUT: 2140 mL / NET: -980 mL    09 Feb 2018 07:01  -  09 Feb 2018 11:39  --------------------------------------------------------  IN: 230 mL / OUT: 350 mL / NET: -120 mL        Telemetry past 24h: sr    PHYSICAL EXAM:    Constitutional: well-nourished, well-developed, NAD   HEENT:  MMM, sclerae anicteric, conjunctivae clear, no oral cyanosis.  Pulmonary: Non-labored, mild wheeze anteriorly  Cardiovascular: Regular, S1 and S2.  No murmur.  No rubs, gallops or clicks  Gastrointestinal: Bowel Sounds present, soft, nontender.   Lymph: No peripheral edema.   Neurological: Alert, no focal deficits  Skin: No rashes.  Psych:  Mood & affect appropriate    LABS: All Labs Reviewed:                        10.4   9.7   )-----------( 137      ( 09 Feb 2018 05:10 )             31.3                         9.3    5.9   )-----------( 131      ( 08 Feb 2018 06:19 )             28.6                         10.2   8.7   )-----------( 146      ( 07 Feb 2018 21:49 )             31.4     09 Feb 2018 05:10    145    |  108    |  21     ----------------------------<  124    4.4     |  31     |  1.00   08 Feb 2018 06:19    144    |  110    |  22     ----------------------------<  164    4.1     |  26     |  0.98   07 Feb 2018 21:49    142    |  110    |  21     ----------------------------<  228    5.0     |  22     |  1.20     Ca    7.9        09 Feb 2018 05:10  Ca    7.8        08 Feb 2018 06:19  Ca    7.5        07 Feb 2018 21:49  Phos  2.6       09 Feb 2018 05:10  Phos  2.9       08 Feb 2018 06:19  Phos  2.4       07 Feb 2018 21:49  Mg     2.1       09 Feb 2018 05:10  Mg     2.0       08 Feb 2018 06:19  Mg     1.9       07 Feb 2018 21:49    TPro  5.8    /  Alb  2.9    /  TBili  0.3    /  DBili  x      /  AST  38     /  ALT  34     /  AlkPhos  63     09 Feb 2018 05:10  TPro  5.6    /  Alb  3.0    /  TBili  0.4    /  DBili  x      /  AST  43     /  ALT  28     /  AlkPhos  60     08 Feb 2018 06:19  TPro  5.8    /  Alb  3.1    /  TBili  0.3    /  DBili  x      /  AST  37     /  ALT  24     /  AlkPhos  71     07 Feb 2018 14:22    PT/INR - ( 09 Feb 2018 05:10 )   PT: 10.7 sec;   INR: 0.98 ratio         PTT - ( 09 Feb 2018 05:10 )  PTT:26.1 sec  CARDIAC MARKERS ( 09 Feb 2018 05:10 )  .303 ng/mL / x     / 58 U/L / x     / 2.9 ng/mL  CARDIAC MARKERS ( 08 Feb 2018 06:19 )  .407 ng/mL / x     / 96 U/L / x     / 5.2 ng/mL  CARDIAC MARKERS ( 07 Feb 2018 21:49 )  .384 ng/mL / x     / 150 U/L / x     / 7.1 ng/mL  CARDIAC MARKERS ( 07 Feb 2018 14:22 )  .376 ng/mL / x     / 152 U/L / x     / 6.2 ng/mL      Blood Culture: Organism --  Gram Stain Blood -- Gram Stain --  Specimen Source .Blood Blood  Culture-Blood --    Organism Blood Culture PCR  Gram Stain Blood -- Gram Stain   Growth in aerobic bottle: Gram Positive Cocci in Clusters  Specimen Source .Blood Blood-Peripheral  Culture-Blood --    Organism --  Gram Stain Blood -- Gram Stain --  Specimen Source .Urine Clean Catch (Midstream)  Culture-Blood --    Organism --  Gram Stain Blood -- Gram Stain --  Specimen Source .Blood Blood  Culture-Blood --    Organism --  Gram Stain Blood -- Gram Stain --  Specimen Source .Blood Blood  Culture-Blood --      02-07 @ 14:27  Pro Bnp [id number]    02-08 @ 06:19  TSH: 0.85      RADIOLOGY:    EKG:    Echo:

## 2018-02-09 NOTE — PROGRESS NOTE ADULT - ATTENDING COMMENTS
89F PMH COPD (not on home oxygen), former smoker, HTN, and falls who was recently admitted with COPD exacerbation due to human metapneumovirus discharged on 2/6, now presents with worsening dyspnea, wheezing, and malaise, found to be in new onset A-Fib with RVR with respiratory distress and diffuse wheezing with low grade fever 100.9, improved with steroids, nebs, bipap, and abx.    - No neuro issues at present  - HD stable, remains in NSR, continue diltiazem, increase metoprolol to 50 q12h  - Mild demand ischemia due to A-Fib with RVR, continue aspirin  - Doing well off BiPAP, change steroids to prednisone 30 mg qd for 5 days, continue duonebs, spiriva, symbicort  - Cultures all negative, d/c azithromycin (s/p 3 doses), continue Zosyn until 2/11  - Diet as tolerated  - Stable kidney function and lytes  - DVT ppx with HSQ  - HISS for pre-DM  - Discussed with patient at bedside  - Transfer to tele

## 2018-02-09 NOTE — PROGRESS NOTE ADULT - ASSESSMENT
89 yr old female with PMHx of HTN, COPD not on home O2, s/p Lt hip fx '16, s/p Lt ankle fx. Discharged from [facility] 2/6, [facility] and advised to present to E.D. for r/o pmn found to have hPMV bronchitis. Initiated zithromax with planned completion on p.o home meds and medrol floridalma, bronhodilators with plan or f/u with Dr Weil. Presents this morning from home via EMS with progressive SOB, cough, general malaise since last evening, denies n/v/d/c/f. In E.D. found to have afib with RVR new Lateral ECG ST changes placed on dilitazem gtt, ASA, solumedrol 125 mg IV, vanco, zosyn. Admitted to ICU for NSTEMI Type II,  acute COPD exacerbation, new onset  Rapid afib. Currently improving.      Neuro:  Stable.  AAOx3.   Pulm: Acute COPD exacerbation: Supplemental O2 via NC to maintain spo2>92%. Continue Spiriva, symbicort, Proventil, Solu-medrol 20 mg Q12H,   HOB>/= 30 degree angle, continue bipap therapy  as needed   CV: NSTEMI Type II: Continue ASA 325mg, no need for plavix or, full a/c in setting of downtrending CE.   New onset Afib with RVR - diltiazem gttd/brian yesterday, currently on po, currently in sinus rhythm, was in fib for <24hrs continue heparin subq, f/u TTE to eval LVFx  GI/: DIET:Regular, strict I & O's, keep even to negative. Added senna/colace for bowels  I.D.: Sepsis: Continue zosyn and zithromax (day #3), d/c vanco as MRSA PCR neg. F/u urine legionella, cultures. RVP + for hMPV which is from recent admission bronchitis, supportive care (contact precautions)  FEN/ENDO/HEME: supplement electrolytes as need - keep K+ to 4.0 to 4.5, POC glucose q 4 hrs maintain glucose 140 to 160  Dispo: Full Code. Optimized for transfer to Telemetry 89 yr old female with PMHx of HTN, COPD not on home O2, s/p Lt hip fx '16, s/p Lt ankle fx. Discharged from [facility] 2/6, admitted after presentation to [facility] and advised to present to E.D. for r/o pmn found to have hPMV bronchitis. Initiated zithromax with planned completion on p.o home meds and medrol floridalma, bronhodilators with plan or f/u with Dr Weil. Presents this morning from home via EMS with progressive SOB, cough, general malaise since last evening, denies n/v/d/c/f. In E.D. found to have afib with RVR new Lateral ECG ST changes placed on dilitazem gtt, ASA, solumedrol 125 mg IV, vanco, zosyn. Admitted to ICU for NSTEMI Type II,  acute COPD exacerbation, new onset  Rapid afib. Currently improving.      Neuro:  Stable.  AAOx3.   Pulm: Acute COPD exacerbation: Supplemental O2 via NC to maintain spo2>92%. Continue Spiriva, symbicort, Proventil, Solu-medrol 20 mg Q12H changed to prednisone 30 mg daily,   HOB>/= 30 degree angle, pt hasnt needed to use Bipap at night, will d/c  CV: Currently Hypertensive, will increase metoprolol to 50 mg Q12H.  NSTEMI Type II: Continue ASA 325mg, no need for plavix or, full a/c in setting of downtrending CE.   New onset Afib with RVR - diltiazem gttd/brian yesterday, currently on po, currently in sinus rhythm, was in fib for <24hrs continue heparin subq, f/u TTE to eval LVFx  GI/: DIET: Regular,  added senna/colace for constipation. D/C Lara  I.D.: Sepsis: Continue zosyn and zithromax (day #3), d/c vanco as MRSA PCR neg. F/u urine legionella, cultures. RVP + for hMPV which is from recent admission bronchitis, supportive care (contact precautions)  FEN/ENDO/HEME: supplement electrolytes as need - keep K+ to 4.0 to 4.5, POC glucose q 4 hrs maintain glucose 140 to 160  Dispo: Full Code. Optimized for transfer to Telemetry

## 2018-02-09 NOTE — PROGRESS NOTE ADULT - SUBJECTIVE AND OBJECTIVE BOX
Patient is a 89y old  Female who presents with a chief complaint of difficulty breathing  and wheezing as well as cough (2018 15:19)      INTERVAL /OVERNIGHT EVENTS: feels better    MEDICATIONS  (STANDING):  ALBUTerol    90 MICROgram(s) HFA Inhaler 1 Puff(s) Inhalation every 6 hours  aspirin enteric coated 325 milliGRAM(s) Oral daily  azithromycin   Tablet 500 milliGRAM(s) Oral every 24 hours  benzonatate 100 milliGRAM(s) Oral three times a day  buDESOnide 160 MICROgram(s)/formoterol 4.5 MICROgram(s) Inhaler 2 Puff(s) Inhalation two times a day  diltiazem    Tablet 30 milliGRAM(s) Oral every 6 hours  docusate sodium 100 milliGRAM(s) Oral three times a day  heparin  Injectable 5000 Unit(s) SubCutaneous every 12 hours  insulin lispro (HumaLOG) corrective regimen sliding scale   SubCutaneous three times a day before meals  lactobacillus acidophilus 1 Tablet(s) Oral three times a day with meals  metoprolol     tartrate 50 milliGRAM(s) Oral two times a day  piperacillin/tazobactam IVPB. 3.375 Gram(s) IV Intermittent every 8 hours  predniSONE   Tablet 30 milliGRAM(s) Oral daily  senna 2 Tablet(s) Oral at bedtime  tiotropium 18 MICROgram(s) Capsule 1 Capsule(s) Inhalation daily    MEDICATIONS  (PRN):  acetaminophen   Tablet. 650 milliGRAM(s) Oral every 6 hours PRN Mild Pain (1 - 3)      Allergies    codeine (Nausea)  Percocet 5/325 (Vomiting)    Intolerances        REVIEW OF SYSTEMS:  CONSTITUTIONAL: No fever, weight loss, or fatigue  EYES: No eye pain, visual disturbances, or discharge  ENMT:  No difficulty hearing, tinnitus, vertigo; No sinus or throat pain  NECK: No pain or stiffness  RESPIRATORY: No cough, wheezing, chills or hemoptysis; No shortness of breath  CARDIOVASCULAR: No chest pain, palpitations, dizziness, or leg swelling  GASTROINTESTINAL: No abdominal or epigastric pain. No nausea, vomiting, or hematemesis; No diarrhea or constipation. No melena or hematochezia.  GENITOURINARY: No dysuria, frequency, hematuria, or incontinence  NEUROLOGICAL: No headaches, memory loss, loss of strength, numbness, or tremors  SKIN: No itching, burning, rashes, or lesions   LYMPH NODES: No enlarged glands  ENDOCRINE: No heat or cold intolerance; No hair loss; No polydipsia or polyuria  MUSCULOSKELETAL: No joint pain or swelling; No muscle, back, or extremity pain  PSYCHIATRIC: No depression, anxiety, mood swings, or difficulty sleeping  HEME/LYMPH: No easy bruising, or bleeding gums  ALLERGY AND IMMUNOLOGIC: No hives or eczema    Vital Signs Last 24 Hrs  T(C): 36.7 (2018 15:20), Max: 36.8 (2018 08:00)  T(F): 98 (2018 15:20), Max: 98.2 (2018 08:00)  HR: 74 (2018 15:00) (55 - 99)  BP: 168/68 (2018 15:00) (116/73 - 183/83)  BP(mean): 98 (2018 15:00) (89 - 123)  RR: 20 (2018 15:00) (11 - 31)  SpO2: 94% (2018 15:00) (94% - 100%)    PHYSICAL EXAM:  GENERAL: NAD, well-groomed, well-developed  HEAD:  Atraumatic, Normocephalic  EYES: EOMI, PERRLA, conjunctiva and sclera clear  ENMT: No tonsillar erythema, exudates, or enlargement; Moist mucous membranes, Good dentition, No lesions  NECK: Supple, No JVD, Normal thyroid  NERVOUS SYSTEM:  Alert & Oriented X3, Good concentration; Motor Strength 5/5 B/L upper and lower extremities; DTRs 2+ intact and symmetric  CHEST/LUNG: Clear to auscultation bilaterally; No rales, rhonchi, wheezing, or rubs  HEART: Regular rate and rhythm; No murmurs, rubs, or gallops  ABDOMEN: Soft, Nontender, Nondistended; Bowel sounds present  EXTREMITIES:  2+ Peripheral Pulses, No clubbing, cyanosis, or edema  LYMPH: No lymphadenopathy noted  SKIN: No rashes or lesions    LABS:                        10.4   9.7   )-----------( 137      ( 2018 05:10 )             31.3     2018 05:10    145    |  108    |  21     ----------------------------<  124    4.4     |  31     |  1.00     Ca    7.9        2018 05:10  Phos  2.6       2018 05:10  Mg     2.1       2018 05:10    TPro  5.8    /  Alb  2.9    /  TBili  0.3    /  DBili  x      /  AST  38     /  ALT  34     /  AlkPhos  63     2018 05:10    PT/INR - ( 2018 05:10 )   PT: 10.7 sec;   INR: 0.98 ratio         PTT - ( 2018 05:10 )  PTT:26.1 sec  Urinalysis Basic - ( 2018 18:13 )    Color: Yellow / Appearance: Clear / S.015 / pH: x  Gluc: x / Ketone: Negative  / Bili: Negative / Urobili: Negative   Blood: x / Protein: 25 mg/dL / Nitrite: Negative   Leuk Esterase: Negative / RBC: 0-2 /HPF / WBC 3-5   Sq Epi: x / Non Sq Epi: Few / Bacteria: Few      CAPILLARY BLOOD GLUCOSE      POCT Blood Glucose.: 263 mg/dL (2018 11:11)  POCT Blood Glucose.: 140 mg/dL (2018 07:41)  POCT Blood Glucose.: 169 mg/dL (2018 22:06)  POCT Blood Glucose.: 136 mg/dL (2018 16:57)      RADIOLOGY & ADDITIONAL TESTS:    Notes Reviewed:  [x ] YES  [ ] NO    Care Discussed with Consultants/Other Providers [x ] YES  [ ] NO

## 2018-02-10 LAB
ANION GAP SERPL CALC-SCNC: 7 MMOL/L — SIGNIFICANT CHANGE UP (ref 5–17)
BASOPHILS # BLD AUTO: 0 K/UL — SIGNIFICANT CHANGE UP (ref 0–0.2)
BASOPHILS NFR BLD AUTO: 0.4 % — SIGNIFICANT CHANGE UP (ref 0–2)
BUN SERPL-MCNC: 24 MG/DL — HIGH (ref 7–23)
CALCIUM SERPL-MCNC: 8.3 MG/DL — LOW (ref 8.5–10.1)
CHLORIDE SERPL-SCNC: 104 MMOL/L — SIGNIFICANT CHANGE UP (ref 96–108)
CO2 SERPL-SCNC: 33 MMOL/L — HIGH (ref 22–31)
CREAT SERPL-MCNC: 0.91 MG/DL — SIGNIFICANT CHANGE UP (ref 0.5–1.3)
CULTURE RESULTS: SIGNIFICANT CHANGE UP
CULTURE RESULTS: SIGNIFICANT CHANGE UP
EOSINOPHIL # BLD AUTO: 0 K/UL — SIGNIFICANT CHANGE UP (ref 0–0.5)
EOSINOPHIL NFR BLD AUTO: 0 % — SIGNIFICANT CHANGE UP (ref 0–6)
GLUCOSE SERPL-MCNC: 125 MG/DL — HIGH (ref 70–99)
HCT VFR BLD CALC: 33.4 % — LOW (ref 34.5–45)
HGB BLD-MCNC: 11.4 G/DL — LOW (ref 11.5–15.5)
LYMPHOCYTES # BLD AUTO: 0.6 K/UL — LOW (ref 1–3.3)
LYMPHOCYTES # BLD AUTO: 7 % — LOW (ref 13–44)
MAGNESIUM SERPL-MCNC: 2.2 MG/DL — SIGNIFICANT CHANGE UP (ref 1.6–2.6)
MCHC RBC-ENTMCNC: 30.4 PG — SIGNIFICANT CHANGE UP (ref 27–34)
MCHC RBC-ENTMCNC: 34 GM/DL — SIGNIFICANT CHANGE UP (ref 32–36)
MCV RBC AUTO: 89.4 FL — SIGNIFICANT CHANGE UP (ref 80–100)
MONOCYTES # BLD AUTO: 0.8 K/UL — SIGNIFICANT CHANGE UP (ref 0–0.9)
MONOCYTES NFR BLD AUTO: 10.2 % — HIGH (ref 1–9)
NEUTROPHILS # BLD AUTO: 6.7 K/UL — SIGNIFICANT CHANGE UP (ref 1.8–7.4)
NEUTROPHILS NFR BLD AUTO: 82.4 % — HIGH (ref 43–77)
PHOSPHATE SERPL-MCNC: 2 MG/DL — LOW (ref 2.5–4.5)
PLATELET # BLD AUTO: 171 K/UL — SIGNIFICANT CHANGE UP (ref 150–400)
POTASSIUM SERPL-MCNC: 3.5 MMOL/L — SIGNIFICANT CHANGE UP (ref 3.5–5.3)
POTASSIUM SERPL-SCNC: 3.5 MMOL/L — SIGNIFICANT CHANGE UP (ref 3.5–5.3)
RBC # BLD: 3.74 M/UL — LOW (ref 3.8–5.2)
RBC # FLD: 12 % — SIGNIFICANT CHANGE UP (ref 10.3–14.5)
SODIUM SERPL-SCNC: 144 MMOL/L — SIGNIFICANT CHANGE UP (ref 135–145)
SPECIMEN SOURCE: SIGNIFICANT CHANGE UP
SPECIMEN SOURCE: SIGNIFICANT CHANGE UP
WBC # BLD: 8.2 K/UL — SIGNIFICANT CHANGE UP (ref 3.8–10.5)
WBC # FLD AUTO: 8.2 K/UL — SIGNIFICANT CHANGE UP (ref 3.8–10.5)

## 2018-02-10 PROCEDURE — 99233 SBSQ HOSP IP/OBS HIGH 50: CPT

## 2018-02-10 PROCEDURE — 93306 TTE W/DOPPLER COMPLETE: CPT | Mod: 26

## 2018-02-10 RX ORDER — POTASSIUM PHOSPHATE, MONOBASIC POTASSIUM PHOSPHATE, DIBASIC 236; 224 MG/ML; MG/ML
15 INJECTION, SOLUTION INTRAVENOUS ONCE
Qty: 0 | Refills: 0 | Status: COMPLETED | OUTPATIENT
Start: 2018-02-10 | End: 2018-02-10

## 2018-02-10 RX ORDER — HYDRALAZINE HCL 50 MG
25 TABLET ORAL EVERY 8 HOURS
Qty: 0 | Refills: 0 | Status: DISCONTINUED | OUTPATIENT
Start: 2018-02-10 | End: 2018-02-11

## 2018-02-10 RX ORDER — HYDRALAZINE HCL 50 MG
10 TABLET ORAL ONCE
Qty: 0 | Refills: 0 | Status: COMPLETED | OUTPATIENT
Start: 2018-02-10 | End: 2018-02-10

## 2018-02-10 RX ORDER — POTASSIUM CHLORIDE 20 MEQ
40 PACKET (EA) ORAL EVERY 4 HOURS
Qty: 0 | Refills: 0 | Status: COMPLETED | OUTPATIENT
Start: 2018-02-10 | End: 2018-02-10

## 2018-02-10 RX ADMIN — Medication 3: at 10:57

## 2018-02-10 RX ADMIN — BUDESONIDE AND FORMOTEROL FUMARATE DIHYDRATE 2 PUFF(S): 160; 4.5 AEROSOL RESPIRATORY (INHALATION) at 18:07

## 2018-02-10 RX ADMIN — TIOTROPIUM BROMIDE 1 CAPSULE(S): 18 CAPSULE ORAL; RESPIRATORY (INHALATION) at 06:01

## 2018-02-10 RX ADMIN — BUDESONIDE AND FORMOTEROL FUMARATE DIHYDRATE 2 PUFF(S): 160; 4.5 AEROSOL RESPIRATORY (INHALATION) at 06:02

## 2018-02-10 RX ADMIN — HEPARIN SODIUM 5000 UNIT(S): 5000 INJECTION INTRAVENOUS; SUBCUTANEOUS at 17:15

## 2018-02-10 RX ADMIN — Medication 25 MILLIGRAM(S): at 10:09

## 2018-02-10 RX ADMIN — POTASSIUM PHOSPHATE, MONOBASIC POTASSIUM PHOSPHATE, DIBASIC 62.5 MILLIMOLE(S): 236; 224 INJECTION, SOLUTION INTRAVENOUS at 10:09

## 2018-02-10 RX ADMIN — Medication 100 MILLIGRAM(S): at 06:01

## 2018-02-10 RX ADMIN — Medication 40 MILLIEQUIVALENT(S): at 13:17

## 2018-02-10 RX ADMIN — Medication 30 MILLIGRAM(S): at 08:23

## 2018-02-10 RX ADMIN — Medication 1 TABLET(S): at 10:58

## 2018-02-10 RX ADMIN — ALBUTEROL 1 PUFF(S): 90 AEROSOL, METERED ORAL at 18:08

## 2018-02-10 RX ADMIN — Medication 10 MILLIGRAM(S): at 05:16

## 2018-02-10 RX ADMIN — Medication 25 MILLIGRAM(S): at 17:15

## 2018-02-10 RX ADMIN — Medication 1 TABLET(S): at 08:23

## 2018-02-10 RX ADMIN — Medication 40 MILLIEQUIVALENT(S): at 10:11

## 2018-02-10 RX ADMIN — Medication 50 MILLIGRAM(S): at 06:01

## 2018-02-10 RX ADMIN — Medication 100 MILLIGRAM(S): at 21:14

## 2018-02-10 RX ADMIN — Medication 325 MILLIGRAM(S): at 10:57

## 2018-02-10 RX ADMIN — ALBUTEROL 1 PUFF(S): 90 AEROSOL, METERED ORAL at 06:03

## 2018-02-10 RX ADMIN — HEPARIN SODIUM 5000 UNIT(S): 5000 INJECTION INTRAVENOUS; SUBCUTANEOUS at 06:01

## 2018-02-10 RX ADMIN — Medication 50 MILLIGRAM(S): at 17:15

## 2018-02-10 RX ADMIN — ALBUTEROL 1 PUFF(S): 90 AEROSOL, METERED ORAL at 13:19

## 2018-02-10 RX ADMIN — Medication 1 TABLET(S): at 16:19

## 2018-02-10 RX ADMIN — PIPERACILLIN AND TAZOBACTAM 25 GRAM(S): 4; .5 INJECTION, POWDER, LYOPHILIZED, FOR SOLUTION INTRAVENOUS at 16:17

## 2018-02-10 RX ADMIN — PIPERACILLIN AND TAZOBACTAM 25 GRAM(S): 4; .5 INJECTION, POWDER, LYOPHILIZED, FOR SOLUTION INTRAVENOUS at 08:23

## 2018-02-10 RX ADMIN — Medication 100 MILLIGRAM(S): at 13:16

## 2018-02-10 NOTE — PROGRESS NOTE ADULT - SUBJECTIVE AND OBJECTIVE BOX
Patient is a 89y old  Female who presents with a chief complaint of difficulty breathing  and wheezing as well as cough (08 Feb 2018 15:19)      INTERVAL /OVERNIGHT EVENTS: still feels same, no distress    MEDICATIONS  (STANDING):  ALBUTerol    90 MICROgram(s) HFA Inhaler 1 Puff(s) Inhalation every 6 hours  aspirin enteric coated 325 milliGRAM(s) Oral daily  benzonatate 100 milliGRAM(s) Oral three times a day  buDESOnide 160 MICROgram(s)/formoterol 4.5 MICROgram(s) Inhaler 2 Puff(s) Inhalation two times a day  diltiazem    Tablet 30 milliGRAM(s) Oral every 6 hours  docusate sodium 100 milliGRAM(s) Oral three times a day  heparin  Injectable 5000 Unit(s) SubCutaneous every 12 hours  hydrALAZINE 25 milliGRAM(s) Oral every 8 hours  insulin lispro (HumaLOG) corrective regimen sliding scale   SubCutaneous three times a day before meals  lactobacillus acidophilus 1 Tablet(s) Oral three times a day with meals  metoprolol     tartrate 50 milliGRAM(s) Oral two times a day  piperacillin/tazobactam IVPB. 3.375 Gram(s) IV Intermittent every 8 hours  senna 2 Tablet(s) Oral at bedtime  tiotropium 18 MICROgram(s) Capsule 1 Capsule(s) Inhalation daily    MEDICATIONS  (PRN):  acetaminophen   Tablet. 650 milliGRAM(s) Oral every 6 hours PRN Mild Pain (1 - 3)      Allergies    codeine (Nausea)  Percocet 5/325 (Vomiting)    Intolerances        REVIEW OF SYSTEMS:  CONSTITUTIONAL: No fever, weight loss, or fatigue  EYES: No eye pain, visual disturbances, or discharge  ENMT:  No difficulty hearing, tinnitus, vertigo; No sinus or throat pain  NECK: No pain or stiffness  RESPIRATORY: No cough, wheezing, chills or hemoptysis; + shortness of breath  CARDIOVASCULAR: No chest pain, palpitations, dizziness, or leg swelling  GASTROINTESTINAL: No abdominal or epigastric pain. No nausea, vomiting, or hematemesis; No diarrhea or constipation. No melena or hematochezia.  GENITOURINARY: No dysuria, frequency, hematuria, or incontinence  NEUROLOGICAL: No headaches, memory loss, loss of strength, numbness, or tremors  SKIN: No itching, burning, rashes, or lesions   LYMPH NODES: No enlarged glands  ENDOCRINE: No heat or cold intolerance; No hair loss; No polydipsia or polyuria  MUSCULOSKELETAL: No joint pain or swelling; No muscle, back, or extremity pain  PSYCHIATRIC: No depression, anxiety, mood swings, or difficulty sleeping  HEME/LYMPH: No easy bruising, or bleeding gums  ALLERGY AND IMMUNOLOGIC: No hives or eczema    Vital Signs Last 24 Hrs  T(C): 36.4 (10 Feb 2018 15:34), Max: 37 (10 Feb 2018 00:05)  T(F): 97.5 (10 Feb 2018 15:34), Max: 98.6 (10 Feb 2018 00:05)  HR: 78 (10 Feb 2018 17:00) (52 - 103)  BP: 190/81 (10 Feb 2018 17:00) (146/65 - 194/83)  BP(mean): 116 (10 Feb 2018 17:00) (81 - 143)  RR: 19 (10 Feb 2018 17:00) (11 - 33)  SpO2: 96% (10 Feb 2018 17:00) (90% - 100%)    PHYSICAL EXAM:  GENERAL: NAD, well-groomed, well-developed  HEAD:  Atraumatic, Normocephalic  EYES: EOMI, PERRLA, conjunctiva and sclera clear  ENMT: No tonsillar erythema, exudates, or enlargement; Moist mucous membranes, Good dentition, No lesions  NECK: Supple, No JVD, Normal thyroid  NERVOUS SYSTEM:  Alert & Oriented X3, Good concentration; Motor Strength 5/5 B/L upper and lower extremities; DTRs 2+ intact and symmetric  CHEST/LUNG: Clear to auscultation bilaterally; No rales, rhonchi, wheezing, or rubs  HEART: Regular rate and rhythm; No murmurs, rubs, or gallops  ABDOMEN: Soft, Nontender, Nondistended; Bowel sounds present  EXTREMITIES:  2+ Peripheral Pulses, No clubbing, cyanosis, or edema  LYMPH: No lymphadenopathy noted  SKIN: No rashes or lesions    LABS:                        11.4   8.2   )-----------( 171      ( 10 Feb 2018 07:25 )             33.4     10 Feb 2018 07:25    144    |  104    |  24     ----------------------------<  125    3.5     |  33     |  0.91     Ca    8.3        10 Feb 2018 07:25  Phos  2.0       10 Feb 2018 07:25  Mg     2.2       10 Feb 2018 07:25      PT/INR - ( 09 Feb 2018 05:10 )   PT: 10.7 sec;   INR: 0.98 ratio         PTT - ( 09 Feb 2018 05:10 )  PTT:26.1 sec    CAPILLARY BLOOD GLUCOSE      POCT Blood Glucose.: 146 mg/dL (10 Feb 2018 15:59)  POCT Blood Glucose.: 253 mg/dL (10 Feb 2018 10:51)  POCT Blood Glucose.: 119 mg/dL (10 Feb 2018 08:19)  POCT Blood Glucose.: 195 mg/dL (09 Feb 2018 21:25)      RADIOLOGY & ADDITIONAL TESTS:    Notes Reviewed:  [x ] YES  [ ] NO    Care Discussed with Consultants/Other Providers [x ] YES  [ ] NO

## 2018-02-10 NOTE — PROGRESS NOTE ADULT - SUBJECTIVE AND OBJECTIVE BOX
Interval events: no acute events overnight, no fevers or chills, improved dyspnea. Feels anxious    Review of Systems:  Constitutional: no fever, chills, fatigue  Neuro: no headache, numbness, weakness  Resp: improved dyspnea and cough  CVS: no chest pain, palpitations, leg swelling  GI: no abdominal pain, nausea, vomiting, diarrhea   : no dysuria, frequency, incontinence  Skin: no itching, burning, rashes, or lesions   Msk: no joint pain or swelling  Psych: +anxiety    T(F): 97.1 (02-10-18 @ 08:00), Max: 98.6 (02-10-18 @ 00:05)  HR: 69 (02-10-18 @ 10:00) (52 - 103)  BP: 151/74 (02-10-18 @ 10:00) (116/73 - 194/83)  RR: 15 (02-10-18 @ 10:00) (11 - 33)  SpO2: 95% (02-10-18 @ 10:00) (90% - 100%)    POCT Blood Glucose.: 119 mg/dL (10 Feb 2018 08:19)    I&O's Summary    09 Feb 2018 07:01  -  10 Feb 2018 07:00  --------------------------------------------------------  IN: 1260 mL / OUT: 900 mL / NET: 360 mL    Physical Exam:     Gen: appears anxious, NAD; AAOx3  Neuro: CN II-XII grossly intact; moving all extremities   HEENT: NC/AT; EOMI; MMM  CV: normal S1 & S2; regular rate and rhythm   Pulm: faint end-expiratory wheezing bilaterally  GI: soft; NT/ND  Ext: no edema; pulses intact; +varicose veins  Skin: warm, well perfused    Meds:  aspirin enteric coated 325 milliGRAM(s) Oral daily  heparin  Injectable 5000 Unit(s) SubCutaneous every 12 hours    piperacillin/tazobactam IVPB. 3.375 Gram(s) IV Intermittent every 8 hours    diltiazem    Tablet 30 milliGRAM(s) Oral every 6 hours  hydrALAZINE 25 milliGRAM(s) Oral every 8 hours  metoprolol     tartrate 50 milliGRAM(s) Oral two times a day    insulin lispro (HumaLOG) corrective regimen sliding scale   SubCutaneous three times a day before meals    ALBUTerol    90 MICROgram(s) HFA Inhaler 1 Puff(s) Inhalation every 6 hours  benzonatate 100 milliGRAM(s) Oral three times a day  buDESOnide 160 MICROgram(s)/formoterol 4.5 MICROgram(s) Inhaler 2 Puff(s) Inhalation two times a day  tiotropium 18 MICROgram(s) Capsule 1 Capsule(s) Inhalation daily    acetaminophen   Tablet. 650 milliGRAM(s) Oral every 6 hours PRN    docusate sodium 100 milliGRAM(s) Oral three times a day  senna 2 Tablet(s) Oral at bedtime        potassium chloride    Tablet ER 40 milliEquivalent(s) Oral every 4 hours        lactobacillus acidophilus 1 Tablet(s) Oral three times a day with meals                                11.4   8.2   )-----------( 171      ( 10 Feb 2018 07:25 )             33.4       02-10    144  |  104  |  24<H>  ----------------------------<  125<H>  3.5   |  33<H>  |  0.91    Ca    8.3<L>      10 Feb 2018 07:25  Phos  2.0     02-10  Mg     2.2     02-10    TPro  5.8<L>  /  Alb  2.9<L>  /  TBili  0.3  /  DBili  x   /  AST  38<H>  /  ALT  34  /  AlkPhos  63  02-09      CARDIAC MARKERS ( 09 Feb 2018 05:10 )  .303 ng/mL / x     / 58 U/L / x     / 2.9 ng/mL      PT/INR - ( 09 Feb 2018 05:10 )   PT: 10.7 sec;   INR: 0.98 ratio         PTT - ( 09 Feb 2018 05:10 )  PTT:26.1 sec    .Blood Blood   No growth to date. -- 02-07 @ 18:04  .Blood Blood-Peripheral   Growth in aerobic bottle: Coag Negative Staphylococcus  Single set isolate, possible contaminant. Contact  Microbiology if susceptibility testing clinically  indicated.  "Due to technical problems, Proteus sp. will Not be reported as part of  the BCID panel until further notice"  ***Blood Panel PCR results on this specimen are available  approximately 3 hours after the Gram stain result.***  Gram stain, PCR, and/or culture results may not always  correspond due to difference in methodologies.  ************************************************************  This PCR assay was performed using [employer].  The following targets are tested for: Enterococcus,  vancomycin resistant enterococci, Listeria monocytogenes,  coagulase negative staphylococci, S. aureus,  methicillin resistant S. aureus, Streptococcus agalactiae  (Group B), S. pneumoniae, S. pyogenes (Group A),  Acinetobacter baumannii, Enterobacter cloacae, E. coli,  Klebsiella oxytoca, K. pneumoniae, Proteus sp.,  Serratia marcescens, Haemophilus influenzae,  Neisseria meningitidis, Pseudomonas aeruginosa, Candida  albicans, C. glabrata, C krusei, C parapsilosis,  C. tropicalis and the KPC resistance gene.   Growth in aerobic bottle: Gram Positive Cocci in Clusters 02-07 @ 12:29  .Urine Clean Catch (Midstream)   No growth -- 02-05 @ 21:39  .Blood Blood   No growth to date. -- 02-05 @ 19:06  .Blood Blood   No growth to date. -- 02-05 @ 19:04      Rapid RVP Result: Detected (02-07 @ 11:22)  Rapid RVP Result: Detected (02-05 @ 11:43)        Radiology: ***    Bedside Lung U/S: ***    Bedside Cardiac U/S: ***    CENTRAL LINE: Y/N   DATE INSERTED:   REMOVE: Y/N    RIVERA: Y/N      DATE INSERTED:        REMOVE: Y/N    A-LINE: Y/N     DATE INSERTED:              REMOVE: Y/N    GLOBAL ISSUE/BEST PRACTICE:  Analgesia:  Sedation:  HOB elevation: yes  Stress ulcer prophylaxis:  VTE prophylaxis:  Glycemic control:  Nutrition:    CODE STATUS: *** Interval events: no acute events overnight, no fevers or chills, improved dyspnea. Feels anxious    Review of Systems:  Constitutional: no fever, chills, fatigue  Neuro: no headache, numbness, weakness  Resp: improved dyspnea and cough  CVS: no chest pain, palpitations, leg swelling  GI: no abdominal pain, nausea, vomiting, diarrhea   : no dysuria, frequency, incontinence  Skin: no itching, burning, rashes, or lesions   Msk: no joint pain or swelling  Psych: +anxiety    T(F): 97.1 (02-10-18 @ 08:00), Max: 98.6 (02-10-18 @ 00:05)  HR: 69 (02-10-18 @ 10:00) (52 - 103)  BP: 151/74 (02-10-18 @ 10:00) (116/73 - 194/83)  RR: 15 (02-10-18 @ 10:00) (11 - 33)  SpO2: 95% (02-10-18 @ 10:00) (90% - 100%)    POCT Blood Glucose.: 119 mg/dL (10 Feb 2018 08:19)    I&O's Summary    09 Feb 2018 07:01  -  10 Feb 2018 07:00  --------------------------------------------------------  IN: 1260 mL / OUT: 900 mL / NET: 360 mL    Physical Exam:     Gen: appears anxious, NAD; AAOx3  Neuro: CN II-XII grossly intact; moving all extremities   HEENT: NC/AT; EOMI; MMM  CV: normal S1 & S2; regular rate and rhythm   Pulm: faint end-expiratory wheezing bilaterally  GI: soft; NT/ND  Ext: no edema; pulses intact; +varicose veins  Skin: warm, well perfused    Meds:  aspirin enteric coated 325 milliGRAM(s) Oral daily  heparin  Injectable 5000 Unit(s) SubCutaneous every 12 hours  piperacillin/tazobactam IVPB. 3.375 Gram(s) IV Intermittent every 8 hours  diltiazem    Tablet 30 milliGRAM(s) Oral every 6 hours  hydrALAZINE 25 milliGRAM(s) Oral every 8 hours  metoprolol     tartrate 50 milliGRAM(s) Oral two times a day  insulin lispro (HumaLOG) corrective regimen sliding scale   SubCutaneous three times a day before meals  ALBUTerol    90 MICROgram(s) HFA Inhaler 1 Puff(s) Inhalation every 6 hours  benzonatate 100 milliGRAM(s) Oral three times a day  buDESOnide 160 MICROgram(s)/formoterol 4.5 MICROgram(s) Inhaler 2 Puff(s) Inhalation two times a day  tiotropium 18 MICROgram(s) Capsule 1 Capsule(s) Inhalation daily  acetaminophen   Tablet. 650 milliGRAM(s) Oral every 6 hours PRN  docusate sodium 100 milliGRAM(s) Oral three times a day  senna 2 Tablet(s) Oral at bedtime  potassium chloride    Tablet ER 40 milliEquivalent(s) Oral every 4 hours  lactobacillus acidophilus 1 Tablet(s) Oral three times a day with meals                        11.4   8.2   )-----------( 171      ( 10 Feb 2018 07:25 )             33.4     144  |  104  |  24<H>  ----------------------------<  125<H>  3.5   |  33   |  0.91    Ca    8.3<L>      10 Feb 2018 07:25  Phos  2.0     02-10  Mg     2.2     02-10    TPro  5.8<L>  /  Alb  2.9<L>  /  TBili  0.3  /  DBili  x   /  AST  38<H>  /  ALT  34  /  AlkPhos  63  02-09      CARDIAC MARKERS ( 09 Feb 2018 05:10 )  .303 ng/mL / x     / 58 U/L / x     / 2.9 ng/mL      PT/INR - ( 09 Feb 2018 05:10 )   PT: 10.7 sec;   INR: 0.98 ratio         PTT - ( 09 Feb 2018 05:10 )  PTT:26.1 sec    Rapid RVP Result: Detected (02-07 @ 11:22) --> human metapneumovirus    CENTRAL LINE: N    RIVERA: N    A-LINE: N    GLOBAL ISSUE/BEST PRACTICE:  Analgesia: n/a  Sedation: n/a  HOB elevation: yes  Stress ulcer prophylaxis: n/a  VTE prophylaxis: yes  Glycemic control: yes  Nutrition: yes    CODE STATUS: full

## 2018-02-10 NOTE — PROGRESS NOTE ADULT - SUBJECTIVE AND OBJECTIVE BOX
[facility]    Cristy Infante, Suraj, Twila, Matty, Nikolas, Tommy      [phone]    CHIEF COMPLAINT: Patient is a 89y old  Female who presents with a chief complaint of difficulty breathing  and wheezing as well as cough (08 Feb 2018 15:19)      Follow Up: resp failure, pos troponin, paf, hypertension    Interim history: reports feeling better, but persistent cough    MEDICATIONS  (STANDING):  ALBUTerol    90 MICROgram(s) HFA Inhaler 1 Puff(s) Inhalation every 6 hours  aspirin enteric coated 325 milliGRAM(s) Oral daily  benzonatate 100 milliGRAM(s) Oral three times a day  buDESOnide 160 MICROgram(s)/formoterol 4.5 MICROgram(s) Inhaler 2 Puff(s) Inhalation two times a day  diltiazem    Tablet 30 milliGRAM(s) Oral every 6 hours  docusate sodium 100 milliGRAM(s) Oral three times a day  heparin  Injectable 5000 Unit(s) SubCutaneous every 12 hours  insulin lispro (HumaLOG) corrective regimen sliding scale   SubCutaneous three times a day before meals  lactobacillus acidophilus 1 Tablet(s) Oral three times a day with meals  metoprolol     tartrate 50 milliGRAM(s) Oral two times a day  piperacillin/tazobactam IVPB. 3.375 Gram(s) IV Intermittent every 8 hours  potassium chloride    Tablet ER 40 milliEquivalent(s) Oral every 4 hours  potassium phosphate IVPB 15 milliMole(s) IV Intermittent once  predniSONE   Tablet 30 milliGRAM(s) Oral daily  senna 2 Tablet(s) Oral at bedtime  tiotropium 18 MICROgram(s) Capsule 1 Capsule(s) Inhalation daily    MEDICATIONS  (PRN):  acetaminophen   Tablet. 650 milliGRAM(s) Oral every 6 hours PRN Mild Pain (1 - 3)      REVIEW OF SYSTEMS:  eye, ent, GI, , allergic, dermatologic, musculoskeletal and neurologic are negative except as described above    Vital Signs Last 24 Hrs  T(C): 36.1 (10 Feb 2018 04:43), Max: 37 (10 Feb 2018 00:05)  T(F): 97 (10 Feb 2018 04:43), Max: 98.6 (10 Feb 2018 00:05)  HR: 52 (10 Feb 2018 07:00) (52 - 103)  BP: 146/65 (10 Feb 2018 07:00) (116/73 - 194/83)  BP(mean): 94 (10 Feb 2018 07:00) (94 - 138)  RR: 15 (10 Feb 2018 07:00) (11 - 33)  SpO2: 98% (10 Feb 2018 07:00) (90% - 100%)    I&O's Summary    09 Feb 2018 07:01  -  10 Feb 2018 07:00  --------------------------------------------------------  IN: 1260 mL / OUT: 900 mL / NET: 360 mL        Telemetry past 24h: sr    PHYSICAL EXAM:    Constitutional: well-nourished, well-developed, NAD   HEENT:  MMM, sclerae anicteric, conjunctivae clear, no oral cyanosis.  Pulmonary: Non-labored, mild wheeze  Cardiovascular: Regular, S1 and S2.  No murmur.  No rubs, gallops or clicks  Gastrointestinal: Bowel Sounds present, soft, nontender.   Lymph: No peripheral edema.   Neurological: Alert, no focal deficits  Skin: No rashes.  Psych:  Mood & affect appropriate    LABS: All Labs Reviewed:                        11.4   8.2   )-----------( 171      ( 10 Feb 2018 07:25 )             33.4                         10.4   9.7   )-----------( 137      ( 09 Feb 2018 05:10 )             31.3                         9.3    5.9   )-----------( 131      ( 08 Feb 2018 06:19 )             28.6     10 Feb 2018 07:25    144    |  104    |  24     ----------------------------<  125    3.5     |  33     |  0.91   09 Feb 2018 05:10    145    |  108    |  21     ----------------------------<  124    4.4     |  31     |  1.00   08 Feb 2018 06:19    144    |  110    |  22     ----------------------------<  164    4.1     |  26     |  0.98     Ca    8.3        10 Feb 2018 07:25  Ca    7.9        09 Feb 2018 05:10  Ca    7.8        08 Feb 2018 06:19  Phos  2.0       10 Feb 2018 07:25  Phos  2.6       09 Feb 2018 05:10  Phos  2.9       08 Feb 2018 06:19  Mg     2.2       10 Feb 2018 07:25  Mg     2.1       09 Feb 2018 05:10  Mg     2.0       08 Feb 2018 06:19    TPro  5.8    /  Alb  2.9    /  TBili  0.3    /  DBili  x      /  AST  38     /  ALT  34     /  AlkPhos  63     09 Feb 2018 05:10  TPro  5.6    /  Alb  3.0    /  TBili  0.4    /  DBili  x      /  AST  43     /  ALT  28     /  AlkPhos  60     08 Feb 2018 06:19  TPro  5.8    /  Alb  3.1    /  TBili  0.3    /  DBili  x      /  AST  37     /  ALT  24     /  AlkPhos  71     07 Feb 2018 14:22    PT/INR - ( 09 Feb 2018 05:10 )   PT: 10.7 sec;   INR: 0.98 ratio         PTT - ( 09 Feb 2018 05:10 )  PTT:26.1 sec  CARDIAC MARKERS ( 09 Feb 2018 05:10 )  .303 ng/mL / x     / 58 U/L / x     / 2.9 ng/mL      Blood Culture: Organism --  Gram Stain Blood -- Gram Stain --  Specimen Source .Blood Blood  Culture-Blood --    Organism Blood Culture PCR  Gram Stain Blood -- Gram Stain   Growth in aerobic bottle: Gram Positive Cocci in Clusters  Specimen Source .Blood Blood-Peripheral  Culture-Blood --    Organism --  Gram Stain Blood -- Gram Stain --  Specimen Source .Urine Clean Catch (Midstream)  Culture-Blood --    Organism --  Gram Stain Blood -- Gram Stain --  Specimen Source .Blood Blood  Culture-Blood --    Organism --  Gram Stain Blood -- Gram Stain --  Specimen Source .Blood Blood  Culture-Blood --      02-07 @ 14:27  Pro Bnp [id number]    02-08 @ 06:19  TSH: 0.85      RADIOLOGY:    EKG:    Echo:

## 2018-02-10 NOTE — PROGRESS NOTE ADULT - SUBJECTIVE AND OBJECTIVE BOX
PULMONARY/CRITICAL CARE      INTERVAL HPI/OVERNIGHT EVENTS: Pt less sob. Was constipated, had loose bm. No cp.    89y FemaleHPI:  This is a 89 year old woman with a history of hypertension, asthma[facility]D [facility].  She was treated for bronchitis as an inpatient, and was discharged yesterday with PO antibiotics and a steroid taper.  She developed overnight increased wheezing, dyspnea, and respiratory distress, and was brought back.  She was found to be in atrial flutter with RVR.  She is reporting mild chest heaviness.  She is denying fevers, chills, PND, orthopnea, new LE swelling, dizziness, or syncope.  She is being admitted to the ICU for respiratory failure and rapid atrial flutter.  She has mildly positive cardiac enzymes.     On her previous admission (2/5 -2/6) she was diagnosed bronchitis secondary to human metapneumovirus. CT chest yesterday did not show an pneumonia. Her cultures were negative prior to discharge. She has a nebulizer machine at home which she does not use. She is a former smoker -- she quit 40 y/a. (07 Feb 2018 12:58)        PAST MEDICAL & SURGICAL HISTORY:  Pelvic fracture  Fall  Fall  Hypertension  S/P ankle joint replacement, left        ICU Vital Signs Last 24 Hrs  T(C): 36.2 (10 Feb 2018 08:00), Max: 37 (10 Feb 2018 00:05)  T(F): 97.1 (10 Feb 2018 08:00), Max: 98.6 (10 Feb 2018 00:05)  HR: 75 (10 Feb 2018 09:00) (52 - 103)  BP: 166/92 (10 Feb 2018 09:00) (116/73 - 194/83)  BP(mean): 123 (10 Feb 2018 09:00) (94 - 138)  ABP: --  ABP(mean): --  RR: 15 (10 Feb 2018 09:00) (11 - 33)  SpO2: 95% (10 Feb 2018 09:00) (90% - 100%)    Qtts:     I&O's Summary    09 Feb 2018 07:01  -  10 Feb 2018 07:00  --------------------------------------------------------  IN: 1260 mL / OUT: 900 mL / NET: 360 mL    10 Feb 2018 07:01  -  10 Feb 2018 09:26  --------------------------------------------------------  IN: 240 mL / OUT: 200 mL / NET: 40 mL      REVIEW OF SYSTEMS:    CONSTITUTIONAL: No fever, weight loss, or fatigue  EYES: No eye pain, visual disturbances, or discharge  ENMT:  No difficulty hearing, tinnitus, vertigo; No sinus or throat pain  NECK: No pain or stiffness  BREASTS: No pain, masses, or nipple discharge  RESPIRATORY: has cough, wheezing, No chills or hemoptysis; mild   shortness of breath  CARDIOVASCULAR: No chest pain, palpitations, dizziness, or leg swelling  GASTROINTESTINAL: No abdominal or epigastric pain. No nausea, vomiting, or hematemesis; No diarrhea or constipation. No melena or hematochezia.  GENITOURINARY: No dysuria, frequency, hematuria, or incontinence  NEUROLOGICAL: No headaches, memory loss, loss of strength, numbness, or tremors  SKIN: No itching, burning, rashes, or lesions   LYMPH NODES: No enlarged glands  ENDOCRINE: No heat or cold intolerance; No hair loss  MUSCULOSKELETAL: No joint pain or swelling; No muscle, back, or extremity pain, no calf tenderness  PSYCHIATRIC: No depression, anxiety, mood swings, or difficulty sleeping  HEME/LYMPH: No easy bruising, or bleeding gums  ALLERGY AND IMMUNOLOGIC: No hives or eczema      PHYSICAL EXAM:    GENERAL: no  respiratory distress on O2, well-groomed, well-developed,  HEAD:  Atraumatic, Normocephalic  EYES: EOMI, PERRLA, conjunctiva and sclera clear  ENMT: No tonsillar erythema, exudates, or enlargement; Moist mucous membranes, Good dentition, No lesions  NECK: Supple, No JVD, Normal thyroid  NERVOUS SYSTEM:  Alert & Oriented X3, Good concentration; Motor Strength 5/5 B/L upper and lower extremities  CHEST/LUNG: Wheezing, rhonchi bilat. Good excursion  HEART: irregular rate and rhythm; No murmurs, rubs, or gallops  ABDOMEN: Soft, Nontender, Nondistended; Bowel sounds present  EXTREMITIES:  2+ Peripheral Pulses, No clubbing, cyanosis, or edema  LYMPH: No lymphadenopathy noted  SKIN: No rashes or lesions          LABS:                        11.4   8.2   )-----------( 171      ( 10 Feb 2018 07:25 )             33.4     02-10    144  |  104  |  24<H>  ----------------------------<  125<H>  3.5   |  33<H>  |  0.91    Ca    8.3<L>      10 Feb 2018 07:25  Phos  2.0     02-10  Mg     2.2     02-10    TPro  5.8<L>  /  Alb  2.9<L>  /  TBili  0.3  /  DBili  x   /  AST  38<H>  /  ALT  34  /  AlkPhos  63  02-09    PT/INR - ( 09 Feb 2018 05:10 )   PT: 10.7 sec;   INR: 0.98 ratio         PTT - ( 09 Feb 2018 05:10 )  PTT:26.1 sec      vanco through     RADIOLOGY & ADDITIONAL STUDIES:< from: Xray Chest 1 View- PORTABLE-Routine (02.08.18 @ 06:30) >    EXAM:  XR CHEST PORTABLE ROUTINE 1V                            PROCEDURE DATE:  02/08/2018          INTERPRETATION:  PORTABLE CHEST X-RAY    HISTORY: bronchitis.    COMPARISON: 2/7/2018.    FINDINGS:  There are mild opacities in the right midlung.  Mild biapical thickening again noted.  No pneumothorax or pleural effusion.   The cardiac silhouette is not accurately assessed by AP technique. Aortic   calcifications.    IMPRESSION:  Mild opacities in the right midlung.                HARPREET COOK MD., ATTENDING RADIOLOGIST  This document has been electronically signed. Feb 8 2018  8:35AM              < end of copied text >        CRITICAL CARE TIME SPENT:

## 2018-02-10 NOTE — PROGRESS NOTE ADULT - ASSESSMENT
89F PMH COPD (not on home oxygen), former smoker, HTN, and falls who was recently admitted with COPD exacerbation due to human metapneumovirus discharged on 2/6, now presents with worsening dyspnea, wheezing, and malaise, found to be in new onset A-Fib with RVR with respiratory distress and diffuse wheezing with low grade fever 100.9, improved with steroids, nebs, bipap, and abx.    - Mildly anxious today, likely due to steroids --> will d/c  - HD stable, remains in NSR, continue diltiazem and metoprolol, start hydralazine for HTN  - Mild demand ischemia due to A-Fib with RVR, continue aspirin  - Doing well off BiPAP, hold steroids given anxiety, continue duonebs, spiriva, symbicort  - Cultures all negative, continue zosyn day 4/5  - Diet as tolerated  - Stable kidney function, replete low K and Phos  - DVT ppx with HSQ  - HISS for pre-DM  - Discussed with patient at bedside  - Transfer to floors with remote tele

## 2018-02-10 NOTE — PROGRESS NOTE ADULT - ASSESSMENT
Pt recently treated for Human Metapneumovirus bronchitis now has worsening sob, bronchospasm, fever, hi lactate as well at rapid At Fib. Hi troponin.  R/o secondary bacterial pneumonia, sepsis less likely

## 2018-02-10 NOTE — PROVIDER CONTACT NOTE (CRITICAL VALUE NOTIFICATION) - ACTION/TREATMENT ORDERED:
No further er orders rendered at this time. Will be address in the ICU reilly per MD
will repeat blood work
pt urorseptic, vanco added

## 2018-02-10 NOTE — PROGRESS NOTE ADULT - ASSESSMENT
89 year old woman with a history of hypertension who presents to the ED [facility] for increased respiratory distress and dyspnea, acute respiratory failure, atrial flutter with RVR, mildly positive cardiac enzymes with ischemic ST depressions on her EKG. Likely secondary to demand ischemia and not true coronary atherothrombosis. She is now somewhat improved, currently in sinus rhythm at 60 beats per minute. She did have some evidence of bronchospasm last night, now improved.    -no acute ischemia  -maintaining sr  -cont bb  -cont ccb  - Aspirin 325 QD  - Hold off on full AC for now, though will need to reconsider pending her rhythm while hospitalized and her clinical course  - bp has remained elevated  - amlodipine dc yesterday given overlap with diltiazem.  bb increased and given iv hydralazine this am for severe syst htn  - will start hydralazine tid, d/w icu staff  - DVT prophylaxis  - Abx per primary team  - Nebs and steroids per primary team  - Monitor and replete electrolytes  - await echocardiogram  - To follow closely with you

## 2018-02-11 LAB
ANION GAP SERPL CALC-SCNC: 10 MMOL/L — SIGNIFICANT CHANGE UP (ref 5–17)
BUN SERPL-MCNC: 23 MG/DL — SIGNIFICANT CHANGE UP (ref 7–23)
CALCIUM SERPL-MCNC: 8.3 MG/DL — LOW (ref 8.5–10.1)
CHLORIDE SERPL-SCNC: 104 MMOL/L — SIGNIFICANT CHANGE UP (ref 96–108)
CO2 SERPL-SCNC: 28 MMOL/L — SIGNIFICANT CHANGE UP (ref 22–31)
CREAT SERPL-MCNC: 0.79 MG/DL — SIGNIFICANT CHANGE UP (ref 0.5–1.3)
GLUCOSE SERPL-MCNC: 136 MG/DL — HIGH (ref 70–99)
HCT VFR BLD CALC: 35.5 % — SIGNIFICANT CHANGE UP (ref 34.5–45)
HGB BLD-MCNC: 12 G/DL — SIGNIFICANT CHANGE UP (ref 11.5–15.5)
MAGNESIUM SERPL-MCNC: 2.3 MG/DL — SIGNIFICANT CHANGE UP (ref 1.6–2.6)
MCHC RBC-ENTMCNC: 30.5 PG — SIGNIFICANT CHANGE UP (ref 27–34)
MCHC RBC-ENTMCNC: 33.9 GM/DL — SIGNIFICANT CHANGE UP (ref 32–36)
MCV RBC AUTO: 89.9 FL — SIGNIFICANT CHANGE UP (ref 80–100)
PHOSPHATE SERPL-MCNC: 2.1 MG/DL — LOW (ref 2.5–4.5)
PLATELET # BLD AUTO: 192 K/UL — SIGNIFICANT CHANGE UP (ref 150–400)
POTASSIUM SERPL-MCNC: 3.6 MMOL/L — SIGNIFICANT CHANGE UP (ref 3.5–5.3)
POTASSIUM SERPL-SCNC: 3.6 MMOL/L — SIGNIFICANT CHANGE UP (ref 3.5–5.3)
RBC # BLD: 3.94 M/UL — SIGNIFICANT CHANGE UP (ref 3.8–5.2)
RBC # FLD: 12 % — SIGNIFICANT CHANGE UP (ref 10.3–14.5)
SODIUM SERPL-SCNC: 142 MMOL/L — SIGNIFICANT CHANGE UP (ref 135–145)
WBC # BLD: 8.1 K/UL — SIGNIFICANT CHANGE UP (ref 3.8–10.5)
WBC # FLD AUTO: 8.1 K/UL — SIGNIFICANT CHANGE UP (ref 3.8–10.5)

## 2018-02-11 PROCEDURE — 71045 X-RAY EXAM CHEST 1 VIEW: CPT | Mod: 26

## 2018-02-11 PROCEDURE — 99232 SBSQ HOSP IP/OBS MODERATE 35: CPT

## 2018-02-11 PROCEDURE — 99233 SBSQ HOSP IP/OBS HIGH 50: CPT

## 2018-02-11 RX ORDER — HYDRALAZINE HCL 50 MG
25 TABLET ORAL ONCE
Qty: 0 | Refills: 0 | Status: COMPLETED | OUTPATIENT
Start: 2018-02-11 | End: 2018-02-11

## 2018-02-11 RX ORDER — HYDRALAZINE HCL 50 MG
50 TABLET ORAL EVERY 8 HOURS
Qty: 0 | Refills: 0 | Status: DISCONTINUED | OUTPATIENT
Start: 2018-02-11 | End: 2018-02-13

## 2018-02-11 RX ADMIN — ALBUTEROL 1 PUFF(S): 90 AEROSOL, METERED ORAL at 00:07

## 2018-02-11 RX ADMIN — Medication 50 MILLIGRAM(S): at 22:20

## 2018-02-11 RX ADMIN — BUDESONIDE AND FORMOTEROL FUMARATE DIHYDRATE 2 PUFF(S): 160; 4.5 AEROSOL RESPIRATORY (INHALATION) at 22:19

## 2018-02-11 RX ADMIN — TIOTROPIUM BROMIDE 1 CAPSULE(S): 18 CAPSULE ORAL; RESPIRATORY (INHALATION) at 08:02

## 2018-02-11 RX ADMIN — PIPERACILLIN AND TAZOBACTAM 25 GRAM(S): 4; .5 INJECTION, POWDER, LYOPHILIZED, FOR SOLUTION INTRAVENOUS at 00:06

## 2018-02-11 RX ADMIN — BUDESONIDE AND FORMOTEROL FUMARATE DIHYDRATE 2 PUFF(S): 160; 4.5 AEROSOL RESPIRATORY (INHALATION) at 08:03

## 2018-02-11 RX ADMIN — ALBUTEROL 1 PUFF(S): 90 AEROSOL, METERED ORAL at 22:19

## 2018-02-11 RX ADMIN — Medication 25 MILLIGRAM(S): at 00:08

## 2018-02-11 RX ADMIN — PIPERACILLIN AND TAZOBACTAM 25 GRAM(S): 4; .5 INJECTION, POWDER, LYOPHILIZED, FOR SOLUTION INTRAVENOUS at 17:00

## 2018-02-11 RX ADMIN — ALBUTEROL 1 PUFF(S): 90 AEROSOL, METERED ORAL at 05:22

## 2018-02-11 RX ADMIN — Medication 10 MILLIGRAM(S): at 00:05

## 2018-02-11 RX ADMIN — Medication 325 MILLIGRAM(S): at 13:00

## 2018-02-11 RX ADMIN — ALBUTEROL 1 PUFF(S): 90 AEROSOL, METERED ORAL at 14:41

## 2018-02-11 RX ADMIN — Medication 1 TABLET(S): at 13:01

## 2018-02-11 RX ADMIN — Medication 100 MILLIGRAM(S): at 14:40

## 2018-02-11 RX ADMIN — Medication 50 MILLIGRAM(S): at 14:40

## 2018-02-11 RX ADMIN — PIPERACILLIN AND TAZOBACTAM 25 GRAM(S): 4; .5 INJECTION, POWDER, LYOPHILIZED, FOR SOLUTION INTRAVENOUS at 09:34

## 2018-02-11 RX ADMIN — Medication 25 MILLIGRAM(S): at 13:00

## 2018-02-11 RX ADMIN — HEPARIN SODIUM 5000 UNIT(S): 5000 INJECTION INTRAVENOUS; SUBCUTANEOUS at 05:22

## 2018-02-11 RX ADMIN — Medication 25 MILLIGRAM(S): at 08:02

## 2018-02-11 RX ADMIN — Medication 100 MILLIGRAM(S): at 22:20

## 2018-02-11 RX ADMIN — Medication 1 TABLET(S): at 08:02

## 2018-02-11 RX ADMIN — Medication 1 TABLET(S): at 17:20

## 2018-02-11 RX ADMIN — Medication 50 MILLIGRAM(S): at 05:21

## 2018-02-11 RX ADMIN — Medication 100 MILLIGRAM(S): at 05:21

## 2018-02-11 RX ADMIN — Medication 50 MILLIGRAM(S): at 17:19

## 2018-02-11 RX ADMIN — HEPARIN SODIUM 5000 UNIT(S): 5000 INJECTION INTRAVENOUS; SUBCUTANEOUS at 17:19

## 2018-02-11 NOTE — PROGRESS NOTE ADULT - SUBJECTIVE AND OBJECTIVE BOX
Patient is a 89y old  Female who presents with a chief complaint of difficulty breathing  and wheezing as well as cough (08 Feb 2018 15:19)      INTERVAL HPI/OVERNIGHT EVENTS:pt stable, still in icu, has sob  MEDICATIONS  (STANDING):  ALBUTerol    90 MICROgram(s) HFA Inhaler 1 Puff(s) Inhalation every 6 hours  aspirin enteric coated 325 milliGRAM(s) Oral daily  benzonatate 100 milliGRAM(s) Oral three times a day  buDESOnide 160 MICROgram(s)/formoterol 4.5 MICROgram(s) Inhaler 2 Puff(s) Inhalation two times a day  diltiazem    Tablet 30 milliGRAM(s) Oral every 6 hours  docusate sodium 100 milliGRAM(s) Oral three times a day  heparin  Injectable 5000 Unit(s) SubCutaneous every 12 hours  hydrALAZINE 50 milliGRAM(s) Oral every 8 hours  insulin lispro (HumaLOG) corrective regimen sliding scale   SubCutaneous three times a day before meals  lactobacillus acidophilus 1 Tablet(s) Oral three times a day with meals  metoprolol     tartrate 50 milliGRAM(s) Oral two times a day  piperacillin/tazobactam IVPB. 3.375 Gram(s) IV Intermittent every 8 hours  senna 2 Tablet(s) Oral at bedtime  tiotropium 18 MICROgram(s) Capsule 1 Capsule(s) Inhalation daily    MEDICATIONS  (PRN):  acetaminophen   Tablet. 650 milliGRAM(s) Oral every 6 hours PRN Mild Pain (1 - 3)      Allergies    codeine (Nausea)  Percocet 5/325 (Vomiting)    Intolerances        REVIEW OF SYSTEMS:  CONSTITUTIONAL: No fever, weight loss, or fatigue  EYES: No eye pain, visual disturbances  ENMT:  No difficulty hearing, tinnitus, vertigo; No sinus or throat pain  NECK: No pain or stiffness  RESPIRATORY: sob  CARDIOVASCULAR: No chest pain, palpitations, dizziness  GASTROINTESTINAL: No abdominal or epigastric pain. No nausea, vomiting, or hematemesis; No diarrhea or constipation. No melena or hematochezia.  GENITOURINARY: No dysuria, frequency, hematuria, or incontinence  NEUROLOGICAL: No headaches, memory loss, loss of strength, numbness, or tremors  SKIN: No itching, burning  LYMPH NODES: No enlarged glands  MUSCULOSKELETAL: No joint pain or swelling; No muscle, back, or extremity pain  PSYCHIATRIC: No depression, mood swings  HEME/LYMPH: No easy bruising, or bleeding gums  ALLERGY AND IMMUNOLOGIC: No hives    Vital Signs Last 24 Hrs  T(C): 36.1 (11 Feb 2018 11:53), Max: 36.4 (10 Feb 2018 15:34)  T(F): 97 (11 Feb 2018 11:53), Max: 97.6 (10 Feb 2018 20:30)  HR: 78 (11 Feb 2018 13:00) (57 - 87)  BP: 155/70 (11 Feb 2018 12:00) (138/69 - 199/88)  BP(mean): 101 (11 Feb 2018 12:00) (91 - 126)  RR: 49 (11 Feb 2018 13:00) (12 - 49)  SpO2: 97% (11 Feb 2018 13:00) (92% - 98%)    PHYSICAL EXAM:  GENERAL: NAD, well-groomed, well-developed  HEAD:  Atraumatic, Normocephalic  EYES: EOMI, PERRLA, conjunctiva and sclera clear  ENMT: No tonsillar erythema, exudates, or enlargement   NECK: Supple, No JVD  NERVOUS SYSTEM:  Alert & Oriented   CHEST/LUNG: b/l wheezing  HEART: Regular rate and rhythm  ABDOMEN: Soft, Nontender, Nondistended; Bowel sounds present  EXTREMITIES:  2+ Peripheral Pulses   LYMPH: No lymphadenopathy noted  SKIN: No rashes     LABS:                        12.0   8.1   )-----------( 192      ( 11 Feb 2018 06:48 )             35.5     11 Feb 2018 06:48    142    |  104    |  23     ----------------------------<  136    3.6     |  28     |  0.79     Ca    8.3        11 Feb 2018 06:48  Phos  2.1       11 Feb 2018 06:48  Mg     2.3       11 Feb 2018 06:48          CAPILLARY BLOOD GLUCOSE      POCT Blood Glucose.: 112 mg/dL (11 Feb 2018 12:49)  POCT Blood Glucose.: 116 mg/dL (11 Feb 2018 07:51)  POCT Blood Glucose.: 146 mg/dL (10 Feb 2018 15:59)    blood culture --   No growth to date.   02-07 @ 18:04    blood culture --   Growth in aerobic bottle: Coag Negative Staphylococcus  Single set isolate, possible contaminant. Contact  Microbiology if susceptibility testing clinically  indicated.  "Due to technical problems, Proteus sp. will Not be reported as part of  the BCID panel until further notice"  ***Blood Panel PCR results on this specimen are available  approximately 3 hours after the Gram stain result.***  Gram stain, PCR, and/or culture results may not always  correspond due to difference in methodologies.  ************************************************************  This PCR assay was performed using [employer].  The following targets are tested for: Enterococcus,  vancomycin resistant enterococci, Listeria monocytogenes,  coagulase negative staphylococci, S. aureus,  methicillin resistant S. aureus, Streptococcus agalactiae  (Group B), S. pneumoniae, S. pyogenes (Group A),  Acinetobacter baumannii, Enterobacter cloacae, E. coli,  Klebsiella oxytoca, K. pneumoniae, Proteus sp.,  Serratia marcescens, Haemophilus influenzae,  Neisseria meningitidis, Pseudomonas aeruginosa, Candida  albicans, C. glabrata, C krusei, C parapsilosis,  C. tropicalis and the KPC resistance gene.   02-07 @ 12:29    blood culture --   No growth   02-05 @ 21:39    blood culture --   No growth at 5 days.   02-05 @ 19:06    blood culture --   No growth at 5 days.   02-05 @ 19:04      urine culture --  02-07 @ 18:04  results   No growth to date. 02-07 @ 18:04  urine culture --  02-07 @ 12:29  results   Growth in aerobic bottle: Coag Negative Staphylococcus  Single set isolate, possible contaminant. Contact  Microbiology if susceptibility testing clinically  indicated.  "Due to technical problems, Proteus sp. will Not be reported as part of  the BCID panel until further notice"  ***Blood Panel PCR results on this specimen are available  approximately 3 hours after the Gram stain result.***  Gram stain, PCR, and/or culture results may not always  correspond due to difference in methodologies.  ************************************************************  This PCR assay was performed using [employer].  The following targets are tested for: Enterococcus,  vancomycin resistant enterococci, Listeria monocytogenes,  coagulase negative staphylococci, S. aureus,  methicillin resistant S. aureus, Streptococcus agalactiae  (Group B), S. pneumoniae, S. pyogenes (Group A),  Acinetobacter baumannii, Enterobacter cloacae, E. coli,  Klebsiella oxytoca, K. pneumoniae, Proteus sp.,  Serratia marcescens, Haemophilus influenzae,  Neisseria meningitidis, Pseudomonas aeruginosa, Candida  albicans, C. glabrata, C krusei, C parapsilosis,  C. tropicalis and the KPC resistance gene. 02-07 @ 12:29  urine culture --  02-05 @ 21:39  results   No growth 02-05 @ 21:39  urine culture --  02-05 @ 19:06  results   No growth at 5 days. 02-05 @ 19:06  urine culture --  02-05 @ 19:04  results   No growth at 5 days. 02-05 @ 19:04    wound with gram statin --    02-07 @ 18:04  organism  --   02-07 @ 18:04  specimen source .Blood Blood  02-07 @ 18:04  wound with gram statin --    02-07 @ 12:29  organism  Blood Culture PCR   02-07 @ 12:29  specimen source .Blood Blood-Peripheral  02-07 @ 12:29  wound with gram statin --    02-05 @ 21:39  organism  --   02-05 @ 21:39  specimen source .Urine Clean Catch (Midstream)  02-05 @ 21:39  wound with gram statin --    02-05 @ 19:06  organism  --   02-05 @ 19:06  specimen source .Blood Blood  02-05 @ 19:06  wound with gram statin --    02-05 @ 19:04  organism  --   02-05 @ 19:04  specimen source .Blood Blood  02-05 @ 19:04      RADIOLOGY & ADDITIONAL TESTS:  < from: Xray Chest 1 View- PORTABLE-Routine (02.11.18 @ 06:10) >  XAM:  XR CHEST PORTABLE ROUTINE 1V                            PROCEDURE DATE:  02/11/2018          INTERPRETATION:  Clinical information: Shortness of breath    Portable study, 5:58 AM    Comparison exam dated February 8, 2018.    Cardiac monitor leads overlie the chest.    Vague right perihilar opacity unchanged since the prior exam. Left lung   is normally expanded. Heart size within normal limits. Aorta shows   atherosclerotic changes. No acute osseous abnormalities.    IMPRESSION: See above report.                MECHELLE SALDANA M.D.,ATTENDING RADIOLOGIST  This document has been electronically signed. Feb 11 2018 10:21AM                < end of copied text >        Consultant(s) Notes Reviewed:  [x ] YES  [ ] NO    Care Discussed with Consultants/Other Providers [x ] YES  [ ] NO    Advanced care planning discussed with patient and family, advanced care planning forms reviewed, discussed, and completed.  20 minutes spent.

## 2018-02-11 NOTE — PROGRESS NOTE ADULT - SUBJECTIVE AND OBJECTIVE BOX
Patient is a 89y old  Female who presents with a chief complaint of difficulty breathing  and wheezing as well as cough (08 Feb 2018 15:19)    24 hour events: anxious and upset   wants to walk  persistent cough  breathing better    REVIEW OF SYSTEMS  Constitutional: No fever, chills, fatigue  Neuro: +anxiety, No headache, numbness, weakness  Resp: +cough, No wheezing, shortness of breath  CVS: No chest pain, palpitations, leg swelling  GI: No abdominal pain, nausea, vomiting, diarrhea   : No dysuria, frequency, incontinence  Skin: No itching, burning, rashes, or lesions   Msk: No joint pain or swelling  Psych: No depression, anxiety, mood swings    T(F): 97.4 (02-11-18 @ 08:30), Max: 97.6 (02-10-18 @ 20:30)  HR: 57 (02-11-18 @ 09:00) (57 - 87)  BP: 163/79 (02-11-18 @ 09:00) (138/69 - 199/88)  RR: 40 (02-11-18 @ 09:00) (12 - 41)  SpO2: 98% (02-11-18 @ 09:00) (92% - 98%)    I&O's Summary    02-10 @ 07:01  -  02-11 @ 07:00  --------------------------------------------------------  IN: 1210 mL / OUT: 1350 mL / NET: -140 mL    PHYSICAL EXAM  General: NAD  CNS: AAO x 3, no focal deficits, extremely anxious  HEENT: PERRL  Resp: mostly clear with scattered rales, minimal wheeze, no distress  CVS: S1S2, regular  Abd: soft, NT, +BS  Ext: no edema  Skin: warm    MEDICATIONS  piperacillin/tazobactam IVPB. IV Intermittent  diltiazem    Tablet Oral  hydrALAZINE Oral  metoprolol     tartrate Oral  insulin lispro (HumaLOG) corrective regimen sliding scale SubCutaneous  ALBUTerol    90 MICROgram(s) HFA Inhaler Inhalation  benzonatate Oral  buDESOnide 160 MICROgram(s)/formoterol 4.5 MICROgram(s) Inhaler Inhalation  tiotropium 18 MICROgram(s) Capsule Inhalation  acetaminophen   Tablet. Oral PRN  aspirin enteric coated Oral  heparin  Injectable SubCutaneous  docusate sodium Oral  senna Oral  lactobacillus acidophilus Oral                        12.0   8.1   )-----------( 192      ( 11 Feb 2018 06:48 )             35.5     02-11    142  |  104  |  23  ----------------------------<  136<H>  3.6   |  28  |  0.79    Ca    8.3<L>      11 Feb 2018 06:48  Phos  2.1     02-11  Mg     2.3     02-11    .Blood Blood   No growth to date. -- 02-07 @ 18:04  .Blood Blood-Peripheral   Growth in aerobic bottle: Coag Negative Staphylococcus  Single set isolate, possible contaminant. Contact  Microbiology if susceptibility testing clinically  indicated.  "Due to technical problems, Proteus sp. will Not be reported as part of  the BCID panel until further notice"  ***Blood Panel PCR results on this specimen are available  approximately 3 hours after the Gram stain result.***  Gram stain, PCR, and/or culture results may not always  correspond due to difference in methodologies.  ************************************************************  This PCR assay was performed using [employer].  The following targets are tested for: Enterococcus,  vancomycin resistant enterococci, Listeria monocytogenes,  coagulase negative staphylococci, S. aureus,  methicillin resistant S. aureus, Streptococcus agalactiae  (Group B), S. pneumoniae, S. pyogenes (Group A),  Acinetobacter baumannii, Enterobacter cloacae, E. coli,  Klebsiella oxytoca, K. pneumoniae, Proteus sp.,  Serratia marcescens, Haemophilus influenzae,  Neisseria meningitidis, Pseudomonas aeruginosa, Candida  albicans, C. glabrata, C krusei, C parapsilosis,  C. tropicalis and the KPC resistance gene.   Growth in aerobic bottle: Gram Positive Cocci in Clusters 02-07 @ 12:29    Rapid RVP Result: Detected (02-07 @ 11:22)    CENTRAL LINE: N           RIVERA: N                        A-LINE: N                      GLOBAL ISSUE/BEST PRACTICE  Analgesia: NA  Sedation: NA  CAM-ICU: neg  HOB elevation: yes  Stress ulcer prophylaxis: NA  VTE prophylaxis: Y  Glycemic control: Y  Nutrition: Y    CODE STATUS: full  GO discussion: Y

## 2018-02-11 NOTE — PROGRESS NOTE ADULT - SUBJECTIVE AND OBJECTIVE BOX
[facility]    Cristy Infante, Suraj, Twila, Matty, Nikolas, Tommy      [phone]    CHIEF COMPLAINT: Patient is a 89y old  Female who presents with a chief complaint of difficulty breathing  and wheezing as well as cough (08 Feb 2018 15:19)      Follow Up:  pna, flutter, rvr, htn    Interim history: aggravated over being awakened constantly. no cp    MEDICATIONS  (STANDING):  ALBUTerol    90 MICROgram(s) HFA Inhaler 1 Puff(s) Inhalation every 6 hours  aspirin enteric coated 325 milliGRAM(s) Oral daily  benzonatate 100 milliGRAM(s) Oral three times a day  buDESOnide 160 MICROgram(s)/formoterol 4.5 MICROgram(s) Inhaler 2 Puff(s) Inhalation two times a day  diltiazem    Tablet 30 milliGRAM(s) Oral every 6 hours  docusate sodium 100 milliGRAM(s) Oral three times a day  heparin  Injectable 5000 Unit(s) SubCutaneous every 12 hours  hydrALAZINE 25 milliGRAM(s) Oral every 8 hours  insulin lispro (HumaLOG) corrective regimen sliding scale   SubCutaneous three times a day before meals  lactobacillus acidophilus 1 Tablet(s) Oral three times a day with meals  metoprolol     tartrate 50 milliGRAM(s) Oral two times a day  piperacillin/tazobactam IVPB. 3.375 Gram(s) IV Intermittent every 8 hours  predniSONE   Tablet 50 milliGRAM(s) Oral daily  senna 2 Tablet(s) Oral at bedtime  tiotropium 18 MICROgram(s) Capsule 1 Capsule(s) Inhalation daily    MEDICATIONS  (PRN):  acetaminophen   Tablet. 650 milliGRAM(s) Oral every 6 hours PRN Mild Pain (1 - 3)      REVIEW OF SYSTEMS:  eye, ent, GI, , allergic, dermatologic, musculoskeletal and neurologic are negative except as described above    Vital Signs Last 24 Hrs  T(C): 36.3 (11 Feb 2018 08:30), Max: 36.4 (10 Feb 2018 15:34)  T(F): 97.4 (11 Feb 2018 08:30), Max: 97.6 (10 Feb 2018 20:30)  HR: 57 (11 Feb 2018 09:00) (57 - 87)  BP: 163/79 (11 Feb 2018 09:00) (138/69 - 199/88)  BP(mean): 114 (11 Feb 2018 09:00) (81 - 143)  RR: 40 (11 Feb 2018 09:00) (12 - 41)  SpO2: 98% (11 Feb 2018 09:00) (92% - 98%)    I&O's Summary    10 Feb 2018 07:01  -  11 Feb 2018 07:00  --------------------------------------------------------  IN: 1210 mL / OUT: 1350 mL / NET: -140 mL        Telemetry past 24h: sr    PHYSICAL EXAM:    Constitutional: well-nourished, well-developed, NAD   HEENT:  MMM, sclerae anicteric, conjunctivae clear, no oral cyanosis.  Pulmonary: Non-labored, mild wheeze bilaterally  Cardiovascular: Regular, S1 and S2.  No murmur.  No rubs, gallops or clicks  Gastrointestinal: Bowel Sounds present, soft, nontender.   Lymph: No peripheral edema.   Neurological: Alert, no focal deficits  Skin: No rashes.  Psych:  Mood & affect appropriate    LABS: All Labs Reviewed:                        12.0   8.1   )-----------( 192      ( 11 Feb 2018 06:48 )             35.5                         11.4   8.2   )-----------( 171      ( 10 Feb 2018 07:25 )             33.4                         10.4   9.7   )-----------( 137      ( 09 Feb 2018 05:10 )             31.3     11 Feb 2018 06:48    142    |  104    |  23     ----------------------------<  136    3.6     |  28     |  0.79   10 Feb 2018 07:25    144    |  104    |  24     ----------------------------<  125    3.5     |  33     |  0.91   09 Feb 2018 05:10    145    |  108    |  21     ----------------------------<  124    4.4     |  31     |  1.00     Ca    8.3        11 Feb 2018 06:48  Ca    8.3        10 Feb 2018 07:25  Ca    7.9        09 Feb 2018 05:10  Phos  2.1       11 Feb 2018 06:48  Phos  2.0       10 Feb 2018 07:25  Phos  2.6       09 Feb 2018 05:10  Mg     2.3       11 Feb 2018 06:48  Mg     2.2       10 Feb 2018 07:25  Mg     2.1       09 Feb 2018 05:10    TPro  5.8    /  Alb  2.9    /  TBili  0.3    /  DBili  x      /  AST  38     /  ALT  34     /  AlkPhos  63     09 Feb 2018 05:10          Blood Culture: Organism --  Gram Stain Blood -- Gram Stain --  Specimen Source .Blood Blood  Culture-Blood --    Organism Blood Culture PCR  Gram Stain Blood -- Gram Stain   Growth in aerobic bottle: Gram Positive Cocci in Clusters  Specimen Source .Blood Blood-Peripheral  Culture-Blood --            RADIOLOGY:    EKG:    Echo:    < from: TTE Echo Doppler w/o Cont (02.10.18 @ 12:54) >     EXAM:  ECHO TTE W/O CON COMP W/DOPPLR         PROCEDURE DATE:  02/10/2018        INTERPRETATION:  INDICATION: Atrial fibrillation    Blood Pressure 151/70    Height 162.56     Weight 49.9       BSA 1.5    Dimensions:    LA 3.8       Normal Values:2.0 - 4.0 cm    Ao 2.8        Normal Values: 2.0 - 3.8 cm  SEPTUM 1.0       Normal Values: 0.6 - 1.2 cm  PWT 0.9       Normal Values: 0.6 - 1.1 cm  LVIDd 4.6         Normal Values: 3.0 - 5.6 cm  LVIDs 2.5         Normal Values: 1.8 - 4.0 cm    Derived Variables:  LVMI     g/m2  RWT      Fractional Short      Ejection Fraction 65    Doppler Peak v. AoV=   (m/sec)    OBSERVATIONS:    Mitral Valve: normal, mild MR.  Aortic Valve/Aorta: normal trileaflet aortic valve.  Tricuspid Valve: normal with trace TR.  Pulmonic Valve: normal  Left Atrium: normal  Right Atrium: normal  Left Ventricle: normal LV size and systolic function, estimated LVEF of   65%.  Right Ventricle: normal size and systolic function.  Pericardium/Pleura: no significant pleuraleffusion, no significant   pericardial effusion.  Pulmonary/RV Pressure: estimated PA systolic pressure of 45 mmHg assuming   an RA pressure of 10 mmHg.  LV Diastolic Function: Indeterminate    Normal left ventricular size and systolic function, estimated LVEF of   65%. Normal right ventricular size and systolic function. Diastolic   function is indeterminate. biatrial size. The aortic root is   normal in size. The mitral valve is structurally normal, mild MR. The   aortic valve is trileaflet without stenosis or insufficiency. Trace   physiologic TR. Estimated PA systolic pressure is 45 mm Hg, assuming an   RA pressure of 10 mmHg. No significant pericardial effusion.                  ISABELA GOODRICH M.D., ATTENDING CARDIOLOGIST  This document has been electronically signed. Feb 10 2018  3:18PM                < end of copied text >

## 2018-02-11 NOTE — PROGRESS NOTE ADULT - ASSESSMENT
A:    89yFemale  HD # 5    Here for:    1. COPD exacerbation 2/2 hMPV  2. New onset afib w/ RVR  3. Acute respiratory distress  4. Troponemia      P:    Avoid deleriogenic meds  BP control, HD monitoring, beta blockade, ASA, cardizem  Nebs, steroids d/c'd, pulm toileting  Cont diet, bowel regimen  ISS  DVT ppx  Ambulate as able    Cont care

## 2018-02-11 NOTE — PROGRESS NOTE ADULT - SUBJECTIVE AND OBJECTIVE BOX
PULMONARY/CRITICAL CARE      INTERVAL HPI/OVERNIGHT EVENTS:  Feels better, still some wheeze. No fever.  89y FemaleHPI:  This is a 89 year old woman with a history of hypertension, asthma/COPD who presents to the ED BIBEMS for increased respiratory distress and dyspnea.  She was treated for bronchitis as an inpatient, and was discharged yesterday with PO antibiotics and a steroid taper.  She developed overnight increased wheezing, dyspnea, and respiratory distress, and was brought back.  She was found to be in atrial flutter with RVR.  She is reporting mild chest heaviness.  She is denying fevers, chills, PND, orthopnea, new LE swelling, dizziness, or syncope.  She is being admitted to the ICU for respiratory failure and rapid atrial flutter.  She has mildly positive cardiac enzymes.     On her previous admission (2/5 -2/6) she was diagnosed bronchitis secondary to human metapneumovirus. CT chest yesterday did not show an pneumonia. Her cultures were negative prior to discharge. She has a nebulizer machine at home which she does not use. She is a former smoker -- she quit 40 y/a. (07 Feb 2018 12:58)        PAST MEDICAL & SURGICAL HISTORY:  Pelvic fracture  Fall  Fall  Hypertension  S/P ankle joint replacement, left        ICU Vital Signs Last 24 Hrs  T(C): 36.3 (11 Feb 2018 08:30), Max: 36.4 (10 Feb 2018 15:34)  T(F): 97.4 (11 Feb 2018 08:30), Max: 97.6 (10 Feb 2018 20:30)  HR: 57 (11 Feb 2018 09:00) (57 - 87)  BP: 163/79 (11 Feb 2018 09:00) (138/69 - 199/88)  BP(mean): 114 (11 Feb 2018 09:00) (81 - 143)  ABP: --  ABP(mean): --  RR: 40 (11 Feb 2018 09:00) (12 - 41)  SpO2: 98% (11 Feb 2018 09:00) (92% - 98%)    Qtts:     I&O's Summary    10 Feb 2018 07:01  -  11 Feb 2018 07:00  --------------------------------------------------------  IN: 1210 mL / OUT: 1350 mL / NET: -140 mL      REVIEW OF SYSTEMS:    CONSTITUTIONAL: No fever, weight loss, or fatigue  EYES: No eye pain, visual disturbances, or discharge  ENMT:  No difficulty hearing, tinnitus, vertigo; No sinus or throat pain  NECK: No pain or stiffness  BREASTS: No pain, masses, or nipple discharge  RESPIRATORY: has cough, wheezing, No chills or hemoptysis; mild   shortness of breath  CARDIOVASCULAR: No chest pain, palpitations, dizziness, or leg swelling  GASTROINTESTINAL: No abdominal or epigastric pain. No nausea, vomiting, or hematemesis; No diarrhea or constipation. No melena or hematochezia.  GENITOURINARY: No dysuria, frequency, hematuria, or incontinence  NEUROLOGICAL: No headaches, memory loss, loss of strength, numbness, or tremors  SKIN: No itching, burning, rashes, or lesions   LYMPH NODES: No enlarged glands  ENDOCRINE: No heat or cold intolerance; No hair loss  MUSCULOSKELETAL: No joint pain or swelling; No muscle, back, or extremity pain, no calf tenderness  PSYCHIATRIC: No depression, anxiety, mood swings, or difficulty sleeping  HEME/LYMPH: No easy bruising, or bleeding gums  ALLERGY AND IMMUNOLOGIC: No hives or eczema      PHYSICAL EXAM:    GENERAL: no  respiratory distress on O2, well-groomed, well-developed,  HEAD:  Atraumatic, Normocephalic  EYES: EOMI, PERRLA, conjunctiva and sclera clear  ENMT: No tonsillar erythema, exudates, or enlargement; Moist mucous membranes, Good dentition, No lesions  NECK: Supple, No JVD, Normal thyroid  NERVOUS SYSTEM:  Alert & Oriented X3, Good concentration; Motor Strength 5/5 B/L upper and lower extremities  CHEST/LUNG: Wheezing, rhonchi bilat. Good excursion  HEART: irregular rate and rhythm; No murmurs, rubs, or gallops  ABDOMEN: Soft, Nontender, Nondistended; Bowel sounds present  EXTREMITIES:  2+ Peripheral Pulses, No clubbing, cyanosis, or edema  LYMPH: No lymphadenopathy noted  SKIN: No rashes or lesions          LABS:                        12.0   8.1   )-----------( 192      ( 11 Feb 2018 06:48 )             35.5     02-11    142  |  104  |  23  ----------------------------<  136<H>  3.6   |  28  |  0.79    Ca    8.3<L>      11 Feb 2018 06:48  Phos  2.1     02-11  Mg     2.3     02-11            vanco through     RADIOLOGY & ADDITIONAL STUDIES:< from: Xray Chest 1 View- PORTABLE-Routine (02.11.18 @ 06:10) >    EXAM:  XR CHEST PORTABLE ROUTINE 1V                            PROCEDURE DATE:  02/11/2018          INTERPRETATION:  Clinical information: Shortness of breath    Portable study, 5:58 AM    Comparison exam dated February 8, 2018.    Cardiac monitor leads overlie the chest.    Vague right perihilar opacity unchanged since the prior exam. Left lung   is normally expanded. Heart size within normal limits. Aorta shows   atherosclerotic changes. No acute osseous abnormalities.    IMPRESSION: See above report.                MECHELLE SALDANA M.D.,ATTENDING RADIOLOGIST  This document has been electronically signed. Feb 11 2018 10:21AM    < end of copied text >        CRITICAL CARE TIME SPENT:

## 2018-02-11 NOTE — PROGRESS NOTE ADULT - ATTENDING COMMENTS
clinically stable  increase hydralazine for HTN  continue other meds  Spiriva, albuterol  d/c steroids   ambulate  stable for transfer 89F PMH COPD (not on home oxygen), former smoker, HTN, and falls who was recently admitted with COPD exacerbation due to human metapneumovirus discharged on 2/6, now presents with worsening dyspnea, wheezing, and malaise, found to be in new onset A-Fib with RVR with respiratory distress and diffuse wheezing with low grade fever 100.9, improved with steroids, nebs, bipap, and abx.    - Mildly anxious today, likely due to steroids --> will d/c  - HD stable, remains in NSR, continue diltiazem and metoprolol, increase hydralazine for uncontrolled HTN  - Mild demand ischemia due to A-Fib with RVR, continue aspirin  - Doing well off BiPAP, hold steroids given anxiety, continue duonebs, spiriva, symbicort  - Cultures all negative, finish zosyn today  - Diet as tolerated  - DVT ppx with HSQ  - HISS for pre-DM  - ambulate  - Transfer to floors with remote tele

## 2018-02-11 NOTE — PROGRESS NOTE ADULT - SUBJECTIVE AND OBJECTIVE BOX
ICU Progress Note    HPI:    S:    Pt seen and examined  HD # 5  PMHx COPD, former smoker, HTN, frequent falls  Pt here for respiratory distress    Pt improved  On nasal cannula, resting  Off steroids  Remains in NSR    Allergies    codeine (Nausea)  Percocet 5/325 (Vomiting)    Intolerances    MEDICATIONS  (STANDING):  ALBUTerol    90 MICROgram(s) HFA Inhaler 1 Puff(s) Inhalation every 6 hours  aspirin enteric coated 325 milliGRAM(s) Oral daily  benzonatate 100 milliGRAM(s) Oral three times a day  buDESOnide 160 MICROgram(s)/formoterol 4.5 MICROgram(s) Inhaler 2 Puff(s) Inhalation two times a day  diltiazem    Tablet 30 milliGRAM(s) Oral every 6 hours  docusate sodium 100 milliGRAM(s) Oral three times a day  heparin  Injectable 5000 Unit(s) SubCutaneous every 12 hours  hydrALAZINE 50 milliGRAM(s) Oral every 8 hours  insulin lispro (HumaLOG) corrective regimen sliding scale   SubCutaneous three times a day before meals  lactobacillus acidophilus 1 Tablet(s) Oral three times a day with meals  metoprolol     tartrate 50 milliGRAM(s) Oral two times a day  senna 2 Tablet(s) Oral at bedtime  tiotropium 18 MICROgram(s) Capsule 1 Capsule(s) Inhalation daily    MEDICATIONS  (PRN):  acetaminophen   Tablet. 650 milliGRAM(s) Oral every 6 hours PRN Mild Pain (1 - 3)      Drug Dosing Weight  Height (cm): 154.94 (08 Feb 2018 13:52)  Weight (kg): 49.9 (07 Feb 2018 10:03)  BMI (kg/m2): 20.8 (08 Feb 2018 13:52)  BSA (m2): 1.47 (08 Feb 2018 13:52)      PAST MEDICAL & SURGICAL HISTORY:  Pelvic fracture  Fall  Fall  Hypertension  S/P ankle joint replacement, left      FAMILY HISTORY:  No pertinent family history in first degree relatives          ROS: See HPI; otherwise, all systems reviewed and negative.    O:    ICU Vital Signs Last 24 Hrs  T(C): 36.6 (11 Feb 2018 20:50), Max: 37.7 (11 Feb 2018 15:45)  T(F): 97.9 (11 Feb 2018 20:50), Max: 99.8 (11 Feb 2018 15:45)  HR: 67 (11 Feb 2018 20:00) (57 - 89)  BP: 156/88 (11 Feb 2018 20:00) (154/68 - 201/86)  BP(mean): 113 (11 Feb 2018 20:00) (98 - 135)  ABP: --  ABP(mean): --  RR: 22 (11 Feb 2018 20:00) (12 - 56)  SpO2: 94% (11 Feb 2018 20:00) (92% - 98%)          I&O's Detail    10 Feb 2018 07:01  -  11 Feb 2018 07:00  --------------------------------------------------------  IN:    Oral Fluid: 660 mL    Solution: 250 mL    Solution: 300 mL  Total IN: 1210 mL    OUT:    Voided: 1350 mL  Total OUT: 1350 mL    Total NET: -140 mL      11 Feb 2018 07:01  -  11 Feb 2018 22:29  --------------------------------------------------------  IN:    Oral Fluid: 420 mL    Solution: 100 mL  Total IN: 520 mL    OUT:    Voided: 700 mL  Total OUT: 700 mL    Total NET: -180 mL    PE:    Constitutional: Elderly F lying in bed in NAD.   Neck: No JVD, trachea midline.   Respiratory: CTA B/L good BS B/L no W/R/R.  Cardiovascular: S1S2+ RRR no M/R/G.  Gastrointestinal: Soft, NTND.  Extremities: No peripheral edema, No cyanosis, clubbing.  Neurological: Sleeping; awake, conversive, alert, no gross deficits.  Skin: No rashes, warm, moist.    LABS:    CBC Full  -  ( 11 Feb 2018 06:48 )  WBC Count : 8.1 K/uL  Hemoglobin : 12.0 g/dL  Hematocrit : 35.5 %  Platelet Count - Automated : 192 K/uL  Mean Cell Volume : 89.9 fl  Mean Cell Hemoglobin : 30.5 pg  Mean Cell Hemoglobin Concentration : 33.9 gm/dL  Auto Neutrophil # : x  Auto Lymphocyte # : x  Auto Monocyte # : x  Auto Eosinophil # : x  Auto Basophil # : x  Auto Neutrophil % : x  Auto Lymphocyte % : x  Auto Monocyte % : x  Auto Eosinophil % : x  Auto Basophil % : x    02-11    142  |  104  |  23  ----------------------------<  136<H>  3.6   |  28  |  0.79    Ca    8.3<L>      11 Feb 2018 06:48  Phos  2.1     02-11  Mg     2.3     02-11          CAPILLARY BLOOD GLUCOSE      POCT Blood Glucose.: 98 mg/dL (11 Feb 2018 17:16)  POCT Blood Glucose.: 112 mg/dL (11 Feb 2018 12:49)  POCT Blood Glucose.: 116 mg/dL (11 Feb 2018 07:51)

## 2018-02-12 LAB
ANION GAP SERPL CALC-SCNC: 9 MMOL/L — SIGNIFICANT CHANGE UP (ref 5–17)
BASOPHILS # BLD AUTO: 0 K/UL — SIGNIFICANT CHANGE UP (ref 0–0.2)
BASOPHILS NFR BLD AUTO: 0.6 % — SIGNIFICANT CHANGE UP (ref 0–2)
BUN SERPL-MCNC: 23 MG/DL — SIGNIFICANT CHANGE UP (ref 7–23)
CALCIUM SERPL-MCNC: 8.5 MG/DL — SIGNIFICANT CHANGE UP (ref 8.5–10.1)
CHLORIDE SERPL-SCNC: 105 MMOL/L — SIGNIFICANT CHANGE UP (ref 96–108)
CO2 SERPL-SCNC: 28 MMOL/L — SIGNIFICANT CHANGE UP (ref 22–31)
CREAT SERPL-MCNC: 0.87 MG/DL — SIGNIFICANT CHANGE UP (ref 0.5–1.3)
CULTURE RESULTS: SIGNIFICANT CHANGE UP
EOSINOPHIL # BLD AUTO: 0 K/UL — SIGNIFICANT CHANGE UP (ref 0–0.5)
EOSINOPHIL NFR BLD AUTO: 0.2 % — SIGNIFICANT CHANGE UP (ref 0–6)
GLUCOSE SERPL-MCNC: 107 MG/DL — HIGH (ref 70–99)
HCT VFR BLD CALC: 35.3 % — SIGNIFICANT CHANGE UP (ref 34.5–45)
HGB BLD-MCNC: 11.7 G/DL — SIGNIFICANT CHANGE UP (ref 11.5–15.5)
LYMPHOCYTES # BLD AUTO: 1.1 K/UL — SIGNIFICANT CHANGE UP (ref 1–3.3)
LYMPHOCYTES # BLD AUTO: 18.3 % — SIGNIFICANT CHANGE UP (ref 13–44)
MAGNESIUM SERPL-MCNC: 2.4 MG/DL — SIGNIFICANT CHANGE UP (ref 1.6–2.6)
MCHC RBC-ENTMCNC: 29.8 PG — SIGNIFICANT CHANGE UP (ref 27–34)
MCHC RBC-ENTMCNC: 33.2 GM/DL — SIGNIFICANT CHANGE UP (ref 32–36)
MCV RBC AUTO: 89.7 FL — SIGNIFICANT CHANGE UP (ref 80–100)
MONOCYTES # BLD AUTO: 0.7 K/UL — SIGNIFICANT CHANGE UP (ref 0–0.9)
MONOCYTES NFR BLD AUTO: 10.7 % — HIGH (ref 1–9)
NEUTROPHILS # BLD AUTO: 4.4 K/UL — SIGNIFICANT CHANGE UP (ref 1.8–7.4)
NEUTROPHILS NFR BLD AUTO: 70.2 % — SIGNIFICANT CHANGE UP (ref 43–77)
PHOSPHATE SERPL-MCNC: 2.8 MG/DL — SIGNIFICANT CHANGE UP (ref 2.5–4.5)
PLATELET # BLD AUTO: 186 K/UL — SIGNIFICANT CHANGE UP (ref 150–400)
POTASSIUM SERPL-MCNC: 4.1 MMOL/L — SIGNIFICANT CHANGE UP (ref 3.5–5.3)
POTASSIUM SERPL-SCNC: 4.1 MMOL/L — SIGNIFICANT CHANGE UP (ref 3.5–5.3)
RBC # BLD: 3.94 M/UL — SIGNIFICANT CHANGE UP (ref 3.8–5.2)
RBC # FLD: 12.2 % — SIGNIFICANT CHANGE UP (ref 10.3–14.5)
SODIUM SERPL-SCNC: 142 MMOL/L — SIGNIFICANT CHANGE UP (ref 135–145)
SPECIMEN SOURCE: SIGNIFICANT CHANGE UP
WBC # BLD: 6.2 K/UL — SIGNIFICANT CHANGE UP (ref 3.8–10.5)
WBC # FLD AUTO: 6.2 K/UL — SIGNIFICANT CHANGE UP (ref 3.8–10.5)

## 2018-02-12 PROCEDURE — 99233 SBSQ HOSP IP/OBS HIGH 50: CPT | Mod: GC

## 2018-02-12 PROCEDURE — 99232 SBSQ HOSP IP/OBS MODERATE 35: CPT

## 2018-02-12 RX ORDER — ALBUTEROL 90 UG/1
4 AEROSOL, METERED ORAL EVERY 6 HOURS
Qty: 0 | Refills: 0 | Status: DISCONTINUED | OUTPATIENT
Start: 2018-02-12 | End: 2018-02-13

## 2018-02-12 RX ORDER — DILTIAZEM HCL 120 MG
1 CAPSULE, EXT RELEASE 24 HR ORAL
Qty: 120 | Refills: 0 | OUTPATIENT
Start: 2018-02-12 | End: 2018-03-13

## 2018-02-12 RX ORDER — HYDRALAZINE HCL 50 MG
10 TABLET ORAL ONCE
Qty: 0 | Refills: 0 | Status: COMPLETED | OUTPATIENT
Start: 2018-02-12 | End: 2018-02-12

## 2018-02-12 RX ORDER — ASPIRIN/CALCIUM CARB/MAGNESIUM 324 MG
1 TABLET ORAL
Qty: 0 | Refills: 0 | DISCHARGE
Start: 2018-02-12

## 2018-02-12 RX ORDER — HYDRALAZINE HCL 50 MG
1 TABLET ORAL
Qty: 90 | Refills: 0
Start: 2018-02-12 | End: 2018-03-13

## 2018-02-12 RX ORDER — AMLODIPINE BESYLATE 2.5 MG/1
1 TABLET ORAL
Qty: 0 | Refills: 0 | COMMUNITY

## 2018-02-12 RX ORDER — DILTIAZEM HCL 120 MG
1 CAPSULE, EXT RELEASE 24 HR ORAL
Qty: 30 | Refills: 0
Start: 2018-02-12 | End: 2018-03-13

## 2018-02-12 RX ORDER — METOPROLOL TARTRATE 50 MG
1 TABLET ORAL
Qty: 60 | Refills: 0
Start: 2018-02-12 | End: 2018-03-13

## 2018-02-12 RX ADMIN — Medication 100 MILLIGRAM(S): at 21:17

## 2018-02-12 RX ADMIN — Medication 325 MILLIGRAM(S): at 12:40

## 2018-02-12 RX ADMIN — Medication 1: at 12:41

## 2018-02-12 RX ADMIN — Medication 10 MILLIGRAM(S): at 23:08

## 2018-02-12 RX ADMIN — Medication 100 MILLIGRAM(S): at 13:32

## 2018-02-12 RX ADMIN — Medication 50 MILLIGRAM(S): at 17:50

## 2018-02-12 RX ADMIN — Medication 1 TABLET(S): at 17:50

## 2018-02-12 RX ADMIN — Medication 50 MILLIGRAM(S): at 21:17

## 2018-02-12 RX ADMIN — ALBUTEROL 1 PUFF(S): 90 AEROSOL, METERED ORAL at 05:37

## 2018-02-12 RX ADMIN — Medication 50 MILLIGRAM(S): at 13:32

## 2018-02-12 RX ADMIN — BUDESONIDE AND FORMOTEROL FUMARATE DIHYDRATE 2 PUFF(S): 160; 4.5 AEROSOL RESPIRATORY (INHALATION) at 09:22

## 2018-02-12 RX ADMIN — ALBUTEROL 4 PUFF(S): 90 AEROSOL, METERED ORAL at 12:47

## 2018-02-12 RX ADMIN — Medication 20 MILLIGRAM(S): at 12:39

## 2018-02-12 RX ADMIN — Medication 50 MILLIGRAM(S): at 05:35

## 2018-02-12 RX ADMIN — HEPARIN SODIUM 5000 UNIT(S): 5000 INJECTION INTRAVENOUS; SUBCUTANEOUS at 05:35

## 2018-02-12 RX ADMIN — Medication 100 MILLIGRAM(S): at 05:35

## 2018-02-12 RX ADMIN — HEPARIN SODIUM 5000 UNIT(S): 5000 INJECTION INTRAVENOUS; SUBCUTANEOUS at 17:50

## 2018-02-12 RX ADMIN — TIOTROPIUM BROMIDE 1 CAPSULE(S): 18 CAPSULE ORAL; RESPIRATORY (INHALATION) at 05:36

## 2018-02-12 RX ADMIN — ALBUTEROL 4 PUFF(S): 90 AEROSOL, METERED ORAL at 23:13

## 2018-02-12 RX ADMIN — BUDESONIDE AND FORMOTEROL FUMARATE DIHYDRATE 2 PUFF(S): 160; 4.5 AEROSOL RESPIRATORY (INHALATION) at 22:00

## 2018-02-12 RX ADMIN — SENNA PLUS 2 TABLET(S): 8.6 TABLET ORAL at 21:17

## 2018-02-12 RX ADMIN — Medication 1 TABLET(S): at 09:21

## 2018-02-12 RX ADMIN — Medication 1 TABLET(S): at 12:42

## 2018-02-12 NOTE — PROGRESS NOTE ADULT - ASSESSMENT
89F PMH COPD (not on home oxygen), former smoker, HTN, and falls who was recently admitted with COPD exacerbation due to human metapneumovirus discharged on 2/6, now presents with worsening dyspnea, wheezing, and malaise, found to be in new onset A-Fib with RVR with respiratory distress and diffuse wheezing with low grade fever 100.9, improved with steroids, nebs, bipap, and abx.    Neuro- mildly anxious-ready to go home.   CV-HD stable, in NSR continue with diltiazem, metoprolol, hydralazine, c/w   Resp- Breathing improved-still w/ cough and mild wheezing. Ambulated off O2 today-SpO2 95%, given prednisone 20 today. Continue with albuterol, spiriva, symbicort  ID: Cultures negative thus far, no need for abx at this time  GI- Diet- DASH/TLC  Endo: Insulin Sliding scale for pre-DM  DVT PPX: HSQ 89F PMH COPD (not on home oxygen), former smoker, HTN, and falls who was recently admitted with COPD exacerbation due to human metapneumovirus discharged on 2/6, now presents with worsening dyspnea, wheezing, and malaise, found to be in new onset A-Fib with RVR with respiratory distress and diffuse wheezing with low grade fever 100.9, improved with steroids, nebs, bipap, and abx.    Neuro- mildly anxious-ready to go home.   CV-HD stable, in NSR continue with diltiazem, metoprolol, hydralazine, c/w   Resp- Breathing improved-still w/ cough and mild wheezing. Ambulated off O2 today-SpO2 95%, given prednisone 20 today. Continue with albuterol, spiriva, symbicort. Solu-Medrol dose pack for discharge.  ID: Cultures negative thus far, no need for abx at this time  GI- Diet- DASH/TLC  Endo: Insulin Sliding scale for pre-DM  DVT PPX: HSQ 89F PMH COPD (not on home oxygen), former smoker, HTN, and falls who was recently admitted with COPD exacerbation due to human metapneumovirus discharged on 2/6, now presents with worsening dyspnea, wheezing, and malaise, found to be in new onset A-Fib with RVR with respiratory distress and diffuse wheezing with low grade fever 100.9, improved with steroids, nebs, bipap, and abx.    Neuro- mildly anxious-ready to go home.   CV-HD stable, in NSR continue with diltiazem, metoprolol, hydralazine, c/w   Resp- Breathing improved-still w/ cough and mild wheezing. Ambulated off O2 today-SpO2 95%, given prednisone 20 today. Continue with Spiriva, symbicort. Increase albuterol to 4 puffs Q6 Solu-Medrol dose pack for discharge  ID: Cultures negative thus far, no need for abx at this time  GI- Diet- DASH/TLC  Endo: Insulin Sliding scale for pre-DM  DVT PPX: HSQ

## 2018-02-12 NOTE — PROGRESS NOTE ADULT - PROBLEM SELECTOR PLAN 6
Hold patient's home meds for now  Continue iv cardizem  Cardiology consult with Dr. HERNÁNDEZ  Further work-up/management pending clinical course.
HYDRALAZINE INCREASED  CONTINUE OTHER MEDICATIONS  CARDIO FU NOTED
HYDRALAZINE INCREASED  CONTINUE OTHER MEDICATIONS  CARDIO FU NOTED
Hold patient's home meds for now  Continue iv cardizem  Cardiology consult  Further work-up/management pending clinical course.
restart patient's home meds   Continue PO cardizem  Cardiology consult with Dr. HERNÁNDEZ  Further work-up/management pending clinical course.

## 2018-02-12 NOTE — PROGRESS NOTE ADULT - SUBJECTIVE AND OBJECTIVE BOX
Patient is a 89y old  Female who presents with a chief complaint of difficulty breathing  and wheezing as well as cough (08 Feb 2018 15:19)      INTERVAL HPI/OVERNIGHT EVENTS: pt stable breathing well, dc planning, home with home care    MEDICATIONS  (STANDING):  ALBUTerol    90 MICROgram(s) HFA Inhaler 4 Puff(s) Inhalation every 6 hours  aspirin enteric coated 325 milliGRAM(s) Oral daily  benzonatate 100 milliGRAM(s) Oral three times a day  buDESOnide 160 MICROgram(s)/formoterol 4.5 MICROgram(s) Inhaler 2 Puff(s) Inhalation two times a day  diltiazem    Tablet 30 milliGRAM(s) Oral every 6 hours  docusate sodium 100 milliGRAM(s) Oral three times a day  heparin  Injectable 5000 Unit(s) SubCutaneous every 12 hours  hydrALAZINE 50 milliGRAM(s) Oral every 8 hours  insulin lispro (HumaLOG) corrective regimen sliding scale   SubCutaneous three times a day before meals  lactobacillus acidophilus 1 Tablet(s) Oral three times a day with meals  metoprolol     tartrate 50 milliGRAM(s) Oral two times a day  predniSONE   Tablet 20 milliGRAM(s) Oral daily  senna 2 Tablet(s) Oral at bedtime  tiotropium 18 MICROgram(s) Capsule 1 Capsule(s) Inhalation daily    MEDICATIONS  (PRN):  acetaminophen   Tablet. 650 milliGRAM(s) Oral every 6 hours PRN Mild Pain (1 - 3)      Allergies    codeine (Nausea)  Percocet 5/325 (Vomiting)    Intolerances        REVIEW OF SYSTEMS:  CONSTITUTIONAL: No fever, weight loss, or fatigue  EYES: No eye pain, visual disturbances  ENMT:  No difficulty hearing, tinnitus, vertigo; No sinus or throat pain  NECK: No pain or stiffness  RESPIRATORY: no sob  CARDIOVASCULAR: No chest pain, palpitations, dizziness  GASTROINTESTINAL: No abdominal or epigastric pain. No nausea, vomiting, or hematemesis; No diarrhea or constipation. No melena or hematochezia.  GENITOURINARY: No dysuria, frequency, hematuria, or incontinence  NEUROLOGICAL: No headaches, memory loss, loss of strength, numbness, or tremors  SKIN: No itching, burning  LYMPH NODES: No enlarged glands  MUSCULOSKELETAL: No joint pain or swelling; No muscle, back, or extremity pain  PSYCHIATRIC: No depression, mood swings  HEME/LYMPH: No easy bruising, or bleeding gums  ALLERGY AND IMMUNOLOGIC: No hives    Vital Signs Last 24 Hrs  T(C): 36.1 (12 Feb 2018 12:30), Max: 37.7 (11 Feb 2018 15:45)  T(F): 97 (12 Feb 2018 12:30), Max: 99.8 (11 Feb 2018 15:45)  HR: 65 (12 Feb 2018 14:00) (54 - 89)  BP: 170/74 (12 Feb 2018 14:00) (127/60 - 201/86)  BP(mean): 106 (12 Feb 2018 14:00) (87 - 123)  RR: 39 (12 Feb 2018 14:00) (11 - 56)  SpO2: 96% (12 Feb 2018 14:00) (94% - 100%)    PHYSICAL EXAM:  GENERAL: NAD, well-groomed, well-developed  HEAD:  Atraumatic, Normocephalic  EYES: EOMI, PERRLA, conjunctiva and sclera clear  ENMT: No tonsillar erythema, exudates, or enlargement   NECK: Supple, No JVD  NERVOUS SYSTEM:  Alert & Oriented  CHEST/LUNG: b/l rhonic, no wheezing  HEART: Regular rate and rhythm  ABDOMEN: Soft, Nontender, Nondistended; Bowel sounds present  EXTREMITIES:  2+ Peripheral Pulses   LYMPH: No lymphadenopathy noted  SKIN: No rashes     LABS:                        11.7   6.2   )-----------( 186      ( 12 Feb 2018 05:55 )             35.3     12 Feb 2018 05:55    142    |  105    |  23     ----------------------------<  107    4.1     |  28     |  0.87     Ca    8.5        12 Feb 2018 05:55  Phos  2.8       12 Feb 2018 05:55  Mg     2.4       12 Feb 2018 05:55          CAPILLARY BLOOD GLUCOSE      POCT Blood Glucose.: 196 mg/dL (12 Feb 2018 11:54)  POCT Blood Glucose.: 88 mg/dL (12 Feb 2018 07:55)  POCT Blood Glucose.: 120 mg/dL (11 Feb 2018 22:59)  POCT Blood Glucose.: 98 mg/dL (11 Feb 2018 17:16)    blood culture --   No growth to date.   02-07 @ 18:04    blood culture --   Growth in aerobic bottle: Coag Negative Staphylococcus  Single set isolate, possible contaminant. Contact  Microbiology if susceptibility testing clinically  indicated.  "Due to technical problems, Proteus sp. will Not be reported as part of  the BCID panel until further notice"  ***Blood Panel PCR results on this specimen are available  approximately 3 hours after the Gram stain result.***  Gram stain, PCR, and/or culture results may not always  correspond due to difference in methodologies.  ************************************************************  This PCR assay was performed using [employer].  The following targets are tested for: Enterococcus,  vancomycin resistant enterococci, Listeria monocytogenes,  coagulase negative staphylococci, S. aureus,  methicillin resistant S. aureus, Streptococcus agalactiae  (Group B), S. pneumoniae, S. pyogenes (Group A),  Acinetobacter baumannii, Enterobacter cloacae, E. coli,  Klebsiella oxytoca, K. pneumoniae, Proteus sp.,  Serratia marcescens, Haemophilus influenzae,  Neisseria meningitidis, Pseudomonas aeruginosa, Candida  albicans, C. glabrata, C krusei, C parapsilosis,  C. tropicalis and the KPC resistance gene.   02-07 @ 12:29    blood culture --   No growth   02-05 @ 21:39    blood culture --   No growth at 5 days.   02-05 @ 19:06    blood culture --   No growth at 5 days.   02-05 @ 19:04      urine culture --  02-07 @ 18:04  results   No growth to date. 02-07 @ 18:04  urine culture --  02-07 @ 12:29  results   Growth in aerobic bottle: Coag Negative Staphylococcus  Single set isolate, possible contaminant. Contact  Microbiology if susceptibility testing clinically  indicated.  "Due to technical problems, Proteus sp. will Not be reported as part of  the BCID panel until further notice"  ***Blood Panel PCR results on this specimen are available  approximately 3 hours after the Gram stain result.***  Gram stain, PCR, and/or culture results may not always  correspond due to difference in methodologies.  ************************************************************  This PCR assay was performed using [employer].  The following targets are tested for: Enterococcus,  vancomycin resistant enterococci, Listeria monocytogenes,  coagulase negative staphylococci, S. aureus,  methicillin resistant S. aureus, Streptococcus agalactiae  (Group B), S. pneumoniae, S. pyogenes (Group A),  Acinetobacter baumannii, Enterobacter cloacae, E. coli,  Klebsiella oxytoca, K. pneumoniae, Proteus sp.,  Serratia marcescens, Haemophilus influenzae,  Neisseria meningitidis, Pseudomonas aeruginosa, Candida  albicans, C. glabrata, C krusei, C parapsilosis,  C. tropicalis and the KPC resistance gene. 02-07 @ 12:29  urine culture --  02-05 @ 21:39  results   No growth 02-05 @ 21:39  urine culture --  02-05 @ 19:06  results   No growth at 5 days. 02-05 @ 19:06  urine culture --  02-05 @ 19:04  results   No growth at 5 days. 02-05 @ 19:04    wound with gram statin --    02-07 @ 18:04  organism  --   02-07 @ 18:04  specimen source .Blood Blood  02-07 @ 18:04  wound with gram statin --    02-07 @ 12:29  organism  Blood Culture PCR   02-07 @ 12:29  specimen source .Blood Blood-Peripheral  02-07 @ 12:29  wound with gram statin --    02-05 @ 21:39  organism  --   02-05 @ 21:39  specimen source .Urine Clean Catch (Midstream)  02-05 @ 21:39  wound with gram statin --    02-05 @ 19:06  organism  --   02-05 @ 19:06  specimen source .Blood Blood  02-05 @ 19:06  wound with gram statin --    02-05 @ 19:04  organism  --   02-05 @ 19:04  specimen source .Blood Blood  02-05 @ 19:04      RADIOLOGY & ADDITIONAL TESTS:  no new      Consultant(s) Notes Reviewed:  [x ] YES  [ ] NO    Care Discussed with Consultants/Other Providers [x ] YES  [ ] NO    Advanced care planning discussed with patient and family, advanced care planning forms reviewed, discussed, and completed.  20 minutes spent.

## 2018-02-12 NOTE — PROGRESS NOTE ADULT - SUBJECTIVE AND OBJECTIVE BOX
Patient is a 89y old  Female who presents with a chief complaint of difficulty breathing  and wheezing as well as cough (08 Feb 2018 15:19)    24 hour events: Patient ambulated off O2 today, satting at 95%.   Still has cough but it is improved today. Breathing has improved    REVIEW OF SYSTEMS  Constitutional: No fever, chills, fatigue  Neuro: +anxiety, No headache, numbness, weakness  Resp: +cough, No wheezing, shortness of breath  CVS: No chest pain, palpitations, leg swelling  GI: No abdominal pain, nausea, vomiting, diarrhea   : No dysuria, frequency, incontinence  Skin: No itching, burning, rashes, or lesions   Msk: No joint pain or swelling  Psych: No depression, anxiety, mood swings    T(F): 97 (02-12-18 @ 12:30), Max: 99.8 (02-11-18 @ 15:45)  HR: 65 (02-12-18 @ 14:00) (54 - 89)  BP: 170/74 (02-12-18 @ 14:00) (127/60 - 201/86)  RR: 39 (02-12-18 @ 14:00) (11 - 56)  SpO2: 96% (02-12-18 @ 14:00) (94% - 100%)  Wt(kg): --        CAPILLARY BLOOD GLUCOSE      I&O's Summary    02-11 @ 07:01  -  02-12 @ 07:00  --------------------------------------------------------  IN: 740 mL / OUT: 1500 mL / NET: -760 mL      PHYSICAL EXAM  General: NAD  CNS: AAO x 3, no focal deficits,  anxious  HEENT: PERRL  Resp: minimal wheezing heard b/l  CVS: S1S2, regular  Abd: soft, NT, +BS  Ext: no edema  Skin: warm    MEDICATIONS    diltiazem    Tablet Oral  hydrALAZINE Oral  metoprolol     tartrate Oral    insulin lispro (HumaLOG) corrective regimen sliding scale SubCutaneous  predniSONE   Tablet Oral    ALBUTerol    90 MICROgram(s) HFA Inhaler Inhalation  benzonatate Oral  buDESOnide 160 MICROgram(s)/formoterol 4.5 MICROgram(s) Inhaler Inhalation  tiotropium 18 MICROgram(s) Capsule Inhalation    acetaminophen   Tablet. Oral PRN      aspirin enteric coated Oral  heparin  Injectable SubCutaneous    docusate sodium Oral  senna Oral            lactobacillus acidophilus Oral                          11.7   6.2   )-----------( 186      ( 12 Feb 2018 05:55 )             35.3       02-12    142  |  105  |  23  ----------------------------<  107<H>  4.1   |  28  |  0.87    Ca    8.5      12 Feb 2018 05:55  Phos  2.8     02-12  Mg     2.4     02-12                .Blood Blood   No growth to date. -- 02-07 @ 18:04          CENTRAL LINE: N            RIVERA: N                         A-LINE: N                         GLOBAL ISSUE/BEST PRACTICE  Analgesia:  NA  Sedation: NA  CAM-ICU: neg  HOB elevation: yes  Stress ulcer prophylaxis: NA  VTE prophylaxis: Y  Glycemic control: Y  Nutrition: Y    CODE STATUS: full  GOC discussion: Y Patient is a 89y old  Female who presents with a chief complaint of difficulty breathing  and wheezing as well as cough (08 Feb 2018 15:19)    24 hour events: Patient ambulated off O2 today, satting at 95%.  Still has cough but it is improved today. Breathing has improved    REVIEW OF SYSTEMS  Constitutional: No fever, chills, fatigue  Neuro: +anxiety, No headache, numbness, weakness  Resp: +cough, No wheezing, shortness of breath  CVS: No chest pain, palpitations, leg swelling  GI: No abdominal pain, nausea, vomiting, diarrhea   : No dysuria, frequency, incontinence  Skin: No itching, burning, rashes, or lesions   Msk: No joint pain or swelling  Psych: No depression, anxiety, mood swings    T(F): 97 (02-12-18 @ 12:30), Max: 99.8 (02-11-18 @ 15:45)  HR: 65 (02-12-18 @ 14:00) (54 - 89)  BP: 170/74 (02-12-18 @ 14:00) (127/60 - 201/86)  RR: 39 (02-12-18 @ 14:00) (11 - 56)  SpO2: 96% (02-12-18 @ 14:00) (94% - 100%)  Wt(kg): --        CAPILLARY BLOOD GLUCOSE      I&O's Summary    02-11 @ 07:01  -  02-12 @ 07:00  --------------------------------------------------------  IN: 740 mL / OUT: 1500 mL / NET: -760 mL      PHYSICAL EXAM  General: NAD  CNS: AAO x 3, no focal deficits,  anxious  HEENT: PERRL  Resp: minimal wheezing heard b/l  CVS: S1S2, regular  Abd: soft, NT, +BS  Ext: no edema  Skin: warm    MEDICATIONS    diltiazem    Tablet Oral  hydrALAZINE Oral  metoprolol     tartrate Oral    insulin lispro (HumaLOG) corrective regimen sliding scale SubCutaneous  predniSONE   Tablet Oral    ALBUTerol    90 MICROgram(s) HFA Inhaler Inhalation  benzonatate Oral  buDESOnide 160 MICROgram(s)/formoterol 4.5 MICROgram(s) Inhaler Inhalation  tiotropium 18 MICROgram(s) Capsule Inhalation    acetaminophen   Tablet. Oral PRN      aspirin enteric coated Oral  heparin  Injectable SubCutaneous    docusate sodium Oral  senna Oral            lactobacillus acidophilus Oral                          11.7   6.2   )-----------( 186      ( 12 Feb 2018 05:55 )             35.3       02-12    142  |  105  |  23  ----------------------------<  107<H>  4.1   |  28  |  0.87    Ca    8.5      12 Feb 2018 05:55  Phos  2.8     02-12  Mg     2.4     02-12                .Blood Blood   No growth to date. -- 02-07 @ 18:04          CENTRAL LINE: N            RIVERA: N                         A-LINE: N                         GLOBAL ISSUE/BEST PRACTICE  Analgesia:  NA  Sedation: NA  CAM-ICU: neg  HOB elevation: yes  Stress ulcer prophylaxis: NA  VTE prophylaxis: Y  Glycemic control: Y  Nutrition: Y    CODE STATUS: full  GOC discussion: Y

## 2018-02-12 NOTE — PROGRESS NOTE ADULT - SUBJECTIVE AND OBJECTIVE BOX
[facility] - Cristy Infante, Suraj, Twila, Matty, Tommy Connor  Office Number:  [phone]    Patient resting comfortably in bed in NAD.  Laying flat with no respiratory distress.  No complaints of chest pain, dyspnea, palpitations, PND, or orthopnea.    Telemetry:  Sinus rhythm    MEDICATIONS  (STANDING):  ALBUTerol    90 MICROgram(s) HFA Inhaler 1 Puff(s) Inhalation every 6 hours  aspirin enteric coated 325 milliGRAM(s) Oral daily  benzonatate 100 milliGRAM(s) Oral three times a day  buDESOnide 160 MICROgram(s)/formoterol 4.5 MICROgram(s) Inhaler 2 Puff(s) Inhalation two times a day  diltiazem    Tablet 30 milliGRAM(s) Oral every 6 hours  docusate sodium 100 milliGRAM(s) Oral three times a day  heparin  Injectable 5000 Unit(s) SubCutaneous every 12 hours  hydrALAZINE 50 milliGRAM(s) Oral every 8 hours  insulin lispro (HumaLOG) corrective regimen sliding scale   SubCutaneous three times a day before meals  lactobacillus acidophilus 1 Tablet(s) Oral three times a day with meals  metoprolol     tartrate 50 milliGRAM(s) Oral two times a day  senna 2 Tablet(s) Oral at bedtime  tiotropium 18 MICROgram(s) Capsule 1 Capsule(s) Inhalation daily    MEDICATIONS  (PRN):  acetaminophen   Tablet. 650 milliGRAM(s) Oral every 6 hours PRN Mild Pain (1 - 3)      Allergies    codeine (Nausea)  Percocet 5/325 (Vomiting)    Intolerances        Vital Signs Last 24 Hrs  T(C): 36.3 (12 Feb 2018 03:58), Max: 37.7 (11 Feb 2018 15:45)  T(F): 97.3 (12 Feb 2018 03:58), Max: 99.8 (11 Feb 2018 15:45)  HR: 65 (12 Feb 2018 04:00) (54 - 89)  BP: 149/67 (12 Feb 2018 04:00) (149/67 - 201/86)  BP(mean): 96 (12 Feb 2018 04:00) (96 - 135)  RR: 20 (12 Feb 2018 04:00) (11 - 56)  SpO2: 99% (12 Feb 2018 04:00) (94% - 100%)    I&O's Summary    10 Feb 2018 07:01  -  11 Feb 2018 07:00  --------------------------------------------------------  IN: 1210 mL / OUT: 1350 mL / NET: -140 mL    11 Feb 2018 07:01  -  12 Feb 2018 06:02  --------------------------------------------------------  IN: 620 mL / OUT: 1200 mL / NET: -580 mL        ON EXAM:    General: NAD, awake and alert, oriented x 3  HEENT: Mucous membranes are moist, anicteric  Lungs: Non-labored, breath sounds are clear bilaterally, b/l soft end expiratory wheezes  Cardiovascular: Regular, S1 and S2, no murmurs, rubs, or gallops  Gastrointestinal: Bowel Sounds present, soft, nontender.   Lymph: No peripheral edema. No lymphadenopathy.  Skin: No rashes or ulcers  Psych:  Mood & affect appropriate    LABS: All Labs Reviewed:                        12.0   8.1   )-----------( 192      ( 11 Feb 2018 06:48 )             35.5                         11.4   8.2   )-----------( 171      ( 10 Feb 2018 07:25 )             33.4     11 Feb 2018 06:48    142    |  104    |  23     ----------------------------<  136    3.6     |  28     |  0.79   10 Feb 2018 07:25    144    |  104    |  24     ----------------------------<  125    3.5     |  33     |  0.91     Ca    8.3        11 Feb 2018 06:48  Ca    8.3        10 Feb 2018 07:25  Phos  2.1       11 Feb 2018 06:48  Phos  2.0       10 Feb 2018 07:25  Mg     2.3       11 Feb 2018 06:48  Mg     2.2       10 Feb 2018 07:25            Blood Culture: Organism --  Gram Stain Blood -- Gram Stain --  Specimen Source .Blood Blood  Culture-Blood --    Organism Blood Culture PCR  Gram Stain Blood -- Gram Stain   Growth in aerobic bottle: Gram Positive Cocci in Clusters  Specimen Source .Blood Blood-Peripheral  Culture-Blood --

## 2018-02-12 NOTE — PROGRESS NOTE ADULT - ASSESSMENT
89 year old woman with a history of hypertension who presents to the ED BIBEMS for increased respiratory distress and dyspnea, acute respiratory failure, atrial flutter with RVR, mildly positive cardiac enzymes with ischemic ST depressions on her EKG. Likely secondary to demand ischemia and not true coronary atherothrombosis. She is now somewhat improved, currently in sinus rhythm with a controlled heart rate.  She continues to wheeze.      -no acute ischemia  -maintaining sr  -cont metoprolol 50 BID.  Can change to Toprol Xl 100 QD on dc  -cont diltiazem 30 6h.  Can change to  QD  - Aspirin 325 QD  - Hold off on full AC for now, though will need to reconsider pending her rhythm while hospitalized and her clinical course  - bp has improved on increased dose of hydralazine.  Continue to monitor.    - no volume overload  - normal lvef by echo 2/10  - Nebs and steroids per primary team  - Monitor and replete electrolytes  - To follow closely with you

## 2018-02-12 NOTE — PROGRESS NOTE ADULT - SUBJECTIVE AND OBJECTIVE BOX
PULMONARY/CRITICAL CARE      INTERVAL HPI/OVERNIGHT EVENTS:  Pt. wheezing, but overall improved. Wants to ambulate  89y FemaleHPI:  This is a 89 year old woman with a history of hypertension, asthma/COPD who presents to the ED BIBEMS for increased respiratory distress and dyspnea.  She was treated for bronchitis as an inpatient, and was discharged yesterday with PO antibiotics and a steroid taper.  She developed overnight increased wheezing, dyspnea, and respiratory distress, and was brought back.  She was found to be in atrial flutter with RVR.  She is reporting mild chest heaviness.  She is denying fevers, chills, PND, orthopnea, new LE swelling, dizziness, or syncope.  She is being admitted to the ICU for respiratory failure and rapid atrial flutter.  She has mildly positive cardiac enzymes.     On her previous admission (2/5 -2/6) she was diagnosed bronchitis secondary to human metapneumovirus. CT chest yesterday did not show an pneumonia. Her cultures were negative prior to discharge. She has a nebulizer machine at home which she does not use. She is a former smoker -- she quit 40 y/a. (07 Feb 2018 12:58)        PAST MEDICAL & SURGICAL HISTORY:  Pelvic fracture  Fall  Fall  Hypertension  S/P ankle joint replacement, left        ICU Vital Signs Last 24 Hrs  T(C): 36.3 (12 Feb 2018 08:09), Max: 37.7 (11 Feb 2018 15:45)  T(F): 97.4 (12 Feb 2018 08:09), Max: 99.8 (11 Feb 2018 15:45)  HR: 54 (12 Feb 2018 07:00) (54 - 89)  BP: 138/64 (12 Feb 2018 07:00) (138/64 - 201/86)  BP(mean): 92 (12 Feb 2018 07:00) (92 - 135)  ABP: --  ABP(mean): --  RR: 16 (12 Feb 2018 07:00) (11 - 56)  SpO2: 99% (12 Feb 2018 07:00) (94% - 100%)    Qtts:     I&O's Summary    11 Feb 2018 07:01  -  12 Feb 2018 07:00  --------------------------------------------------------  IN: 740 mL / OUT: 1500 mL / NET: -760 mL      REVIEW OF SYSTEMS:    CONSTITUTIONAL: No fever, weight loss, or fatigue  EYES: No eye pain, visual disturbances, or discharge  ENMT:  No difficulty hearing, tinnitus, vertigo; No sinus or throat pain  NECK: No pain or stiffness  BREASTS: No pain, masses, or nipple discharge  RESPIRATORY: has cough, wheezing, No chills or hemoptysis; mild   shortness of breath  CARDIOVASCULAR: No chest pain, palpitations, dizziness, or leg swelling  GASTROINTESTINAL: No abdominal or epigastric pain. No nausea, vomiting, or hematemesis; No diarrhea or constipation. No melena or hematochezia.  GENITOURINARY: No dysuria, frequency, hematuria, or incontinence  NEUROLOGICAL: No headaches, memory loss, loss of strength, numbness, or tremors  SKIN: No itching, burning, rashes, or lesions   LYMPH NODES: No enlarged glands  ENDOCRINE: No heat or cold intolerance; No hair loss  MUSCULOSKELETAL: No joint pain or swelling; No muscle, back, or extremity pain, no calf tenderness  PSYCHIATRIC: No depression, anxiety, mood swings, or difficulty sleeping  HEME/LYMPH: No easy bruising, or bleeding gums  ALLERGY AND IMMUNOLOGIC: No hives or eczema      PHYSICAL EXAM:    GENERAL: no  respiratory distress on O2, well-groomed, well-developed,  HEAD:  Atraumatic, Normocephalic  EYES: EOMI, PERRLA, conjunctiva and sclera clear  ENMT: No tonsillar erythema, exudates, or enlargement; Moist mucous membranes, Good dentition, No lesions  NECK: Supple, No JVD, Normal thyroid  NERVOUS SYSTEM:  Alert & Oriented X3, Good concentration; Motor Strength 5/5 B/L upper and lower extremities  CHEST/LUNG: Wheezing, rhonchi bilat. Good excursion  HEART: irregular rate and rhythm; No murmurs, rubs, or gallops  ABDOMEN: Soft, Nontender, Nondistended; Bowel sounds present  EXTREMITIES:  2+ Peripheral Pulses, No clubbing, cyanosis, or edema  LYMPH: No lymphadenopathy noted  SKIN: No rashes or lesions            LABS:                        11.7   6.2   )-----------( 186      ( 12 Feb 2018 05:55 )             35.3     02-12    142  |  105  |  23  ----------------------------<  107<H>  4.1   |  28  |  0.87    Ca    8.5      12 Feb 2018 05:55  Phos  2.8     02-12  Mg     2.4     02-12            vanco through     RADIOLOGY & ADDITIONAL STUDIES:      CRITICAL CARE TIME SPENT:

## 2018-02-12 NOTE — CHART NOTE - NSCHARTNOTEFT_GEN_A_CORE
Assessment: Pt eating 75% of meals. SLP eval pending. no new wt avail. BM 2/11, soft after colace/senna.    Factors impacting intake: [x ] none [ ] nausea  [ ] vomiting [ ] diarrhea [ ] constipation  [ ]chewing problems [ ] swallowing issues  [ ] other:     Diet Presciption: Diet, DASH/TLC:   Sodium & Cholesterol Restricted (02-08-18 @ 09:08)    Intake: 75%        Pertinent Medications: MEDICATIONS  (STANDING):  ALBUTerol    90 MICROgram(s) HFA Inhaler 1 Puff(s) Inhalation every 6 hours  aspirin enteric coated 325 milliGRAM(s) Oral daily  benzonatate 100 milliGRAM(s) Oral three times a day  buDESOnide 160 MICROgram(s)/formoterol 4.5 MICROgram(s) Inhaler 2 Puff(s) Inhalation two times a day  diltiazem    Tablet 30 milliGRAM(s) Oral every 6 hours  docusate sodium 100 milliGRAM(s) Oral three times a day  heparin  Injectable 5000 Unit(s) SubCutaneous every 12 hours  hydrALAZINE 50 milliGRAM(s) Oral every 8 hours  insulin lispro (HumaLOG) corrective regimen sliding scale   SubCutaneous three times a day before meals  lactobacillus acidophilus 1 Tablet(s) Oral three times a day with meals  metoprolol     tartrate 50 milliGRAM(s) Oral two times a day  senna 2 Tablet(s) Oral at bedtime  tiotropium 18 MICROgram(s) Capsule 1 Capsule(s) Inhalation daily    MEDICATIONS  (PRN):  acetaminophen   Tablet. 650 milliGRAM(s) Oral every 6 hours PRN Mild Pain (1 - 3)    Pertinent Labs: 02-12 Na142 mmol/L Glu 107 mg/dL<H> K+ 4.1 mmol/L Cr  0.87 mg/dL BUN 23 mg/dL Phos 2.8 mg/dL Alb n/a   PAB n/a        CAPILLARY BLOOD GLUCOSE      POCT Blood Glucose.: 88 mg/dL (12 Feb 2018 07:55)  POCT Blood Glucose.: 120 mg/dL (11 Feb 2018 22:59)  POCT Blood Glucose.: 98 mg/dL (11 Feb 2018 17:16)  POCT Blood Glucose.: 112 mg/dL (11 Feb 2018 12:49)    Skin:     Estimated Needs:   [x ] no change since previous assessment  [ ] recalculated:     Previous Nutrition Diagnosis:   [ ] Inadequate Energy Intake [x ]Inadequate Oral Intake [ ] Excessive Energy Intake   [ ] Underweight [ ] Increased Nutrient Needs [ ] Overweight/Obesity   [ ] Altered GI Function [ ] Unintended Weight Loss [ ] Food & Nutrition Related Knowledge Deficit [ ] Malnutrition     Nutrition Diagnosis is [ ] ongoing  [x ] resolved [ ] not applicable     New Nutrition Diagnosis: [x ] not applicable       Interventions:   Recommend  [ ] Change Diet To:  [ ] Nutrition Supplement  [ ] Nutrition Support  [ ] Other:     Monitoring and Evaluation:   [ ] PO intake [ x ] Tolerance to diet prescription [ x ] weights [ x ] labs[ x ] follow up per protocol  [ ] other:

## 2018-02-12 NOTE — PROGRESS NOTE ADULT - ASSESSMENT
Pt recently treated for Human Metapneumovirus bronchitis now has worsening sob, bronchospasm, fever, hi lactate as well at rapid At Fib. Hi troponin.  R/o secondary bacterial pneumonia, sepsis less likely  Now off antibiotics

## 2018-02-12 NOTE — PROGRESS NOTE ADULT - ATTENDING COMMENTS
89F PMH COPD (not on home oxygen), former smoker, HTN, and falls who was recently admitted with COPD exacerbation due to human metapneumovirus discharged on 2/6, now presents with worsening dyspnea, wheezing, and malaise, found to be in new onset A-Fib with RVR with respiratory distress and diffuse wheezing with low grade fever 100.9, improved with steroids, nebs, bipap, and abx.    - HD stable, remains in NSR, continue diltiazem and metoprolol  - Hydralazine for HTN  - Mild demand ischemia due to A-Fib with RVR, continue aspirin  - Doing well off BiPAP, continue duonebs, spiriva, symbicort  - Add prednisone 20 mg daily as bronchospastic today, bedside US with a-line predominance w/o significant effusion and normal LV sys fn  - Cultures all negative, off zosyn   - Diet as tolerated  - DVT ppx with HSQ  - HISS for pre-DM  - Ambulated w/o oxygen today, Spo2 > 90%  - Patient wants to go home and there are no active issues that require inpatient stay, her BP is better controlled and her resp status is stable, she ambulated off oxygen w/o any issues - d/w daughter, she's ok with discharge after home care/visiting nurse/PT is setup

## 2018-02-12 NOTE — PROGRESS NOTE ADULT - PROBLEM SELECTOR PLAN 7
GI/DVT prophylaxis with lovenox/PPI  Advanced care planning discussed with patient/family. Advanced care planning forms reviewed/discussed/completed. 20 minutes spent.
GI/DVT prophylaxis with lovenox/PPI
GI/DVT prophylaxis with lovenox/PPI
GI/DVT prophylaxis with lovenox/PPI  Advanced care planning discussed with patient/family. Advanced care planning forms reviewed/discussed/completed. 20 minutes spent.
GI/DVT prophylaxis with lovenox/PPI  Advanced care planning discussed with patient/family. Advanced care planning forms reviewed/discussed/completed. 20 minutes spent.

## 2018-02-12 NOTE — SWALLOW BEDSIDE ASSESSMENT ADULT - COMMENTS
89 y/o female c dx metapneumo virus Bronchitis presents w adequate phases of swallow c no overt si/sx of aspiration

## 2018-02-12 NOTE — CHART NOTE - NSCHARTNOTEFT_GEN_A_CORE
Called to bedside by family.  Patient was up for discharge home with Home PT however patient's daughter is no longer comfortable taking the patient home.  Patient's daughter is very anxious because her mother has been anxious all day about going home.  Patient's daughter requesting "rehab facility". Patient to be kept in the hospital overnight, up for transfer to medical floor and case to be readdressed by social work/case management in am.

## 2018-02-13 VITALS
DIASTOLIC BLOOD PRESSURE: 70 MMHG | SYSTOLIC BLOOD PRESSURE: 153 MMHG | OXYGEN SATURATION: 97 % | HEART RATE: 90 BPM | RESPIRATION RATE: 73 BRPM

## 2018-02-13 LAB
ANION GAP SERPL CALC-SCNC: 10 MMOL/L — SIGNIFICANT CHANGE UP (ref 5–17)
BUN SERPL-MCNC: 25 MG/DL — HIGH (ref 7–23)
CALCIUM SERPL-MCNC: 8.6 MG/DL — SIGNIFICANT CHANGE UP (ref 8.5–10.1)
CHLORIDE SERPL-SCNC: 104 MMOL/L — SIGNIFICANT CHANGE UP (ref 96–108)
CO2 SERPL-SCNC: 25 MMOL/L — SIGNIFICANT CHANGE UP (ref 22–31)
CREAT SERPL-MCNC: 0.81 MG/DL — SIGNIFICANT CHANGE UP (ref 0.5–1.3)
GLUCOSE SERPL-MCNC: 165 MG/DL — HIGH (ref 70–99)
HCT VFR BLD CALC: 37.4 % — SIGNIFICANT CHANGE UP (ref 34.5–45)
HGB BLD-MCNC: 12.6 G/DL — SIGNIFICANT CHANGE UP (ref 11.5–15.5)
MAGNESIUM SERPL-MCNC: 2.2 MG/DL — SIGNIFICANT CHANGE UP (ref 1.6–2.6)
MCHC RBC-ENTMCNC: 30.3 PG — SIGNIFICANT CHANGE UP (ref 27–34)
MCHC RBC-ENTMCNC: 33.8 GM/DL — SIGNIFICANT CHANGE UP (ref 32–36)
MCV RBC AUTO: 89.7 FL — SIGNIFICANT CHANGE UP (ref 80–100)
PHOSPHATE SERPL-MCNC: 2.6 MG/DL — SIGNIFICANT CHANGE UP (ref 2.5–4.5)
PLATELET # BLD AUTO: 209 K/UL — SIGNIFICANT CHANGE UP (ref 150–400)
POTASSIUM SERPL-MCNC: 3.6 MMOL/L — SIGNIFICANT CHANGE UP (ref 3.5–5.3)
POTASSIUM SERPL-SCNC: 3.6 MMOL/L — SIGNIFICANT CHANGE UP (ref 3.5–5.3)
RBC # BLD: 4.18 M/UL — SIGNIFICANT CHANGE UP (ref 3.8–5.2)
RBC # FLD: 12.2 % — SIGNIFICANT CHANGE UP (ref 10.3–14.5)
SODIUM SERPL-SCNC: 139 MMOL/L — SIGNIFICANT CHANGE UP (ref 135–145)
WBC # BLD: 10.4 K/UL — SIGNIFICANT CHANGE UP (ref 3.8–10.5)
WBC # FLD AUTO: 10.4 K/UL — SIGNIFICANT CHANGE UP (ref 3.8–10.5)

## 2018-02-13 PROCEDURE — 80053 COMPREHEN METABOLIC PANEL: CPT

## 2018-02-13 PROCEDURE — 96375 TX/PRO/DX INJ NEW DRUG ADDON: CPT

## 2018-02-13 PROCEDURE — 96367 TX/PROPH/DG ADDL SEQ IV INF: CPT

## 2018-02-13 PROCEDURE — 36415 COLL VENOUS BLD VENIPUNCTURE: CPT

## 2018-02-13 PROCEDURE — 85379 FIBRIN DEGRADATION QUANT: CPT

## 2018-02-13 PROCEDURE — 87798 DETECT AGENT NOS DNA AMP: CPT

## 2018-02-13 PROCEDURE — 93970 EXTREMITY STUDY: CPT

## 2018-02-13 PROCEDURE — 87449 NOS EACH ORGANISM AG IA: CPT

## 2018-02-13 PROCEDURE — 94640 AIRWAY INHALATION TREATMENT: CPT

## 2018-02-13 PROCEDURE — 71045 X-RAY EXAM CHEST 1 VIEW: CPT

## 2018-02-13 PROCEDURE — 82550 ASSAY OF CK (CPK): CPT

## 2018-02-13 PROCEDURE — 84484 ASSAY OF TROPONIN QUANT: CPT

## 2018-02-13 PROCEDURE — 85730 THROMBOPLASTIN TIME PARTIAL: CPT

## 2018-02-13 PROCEDURE — 84443 ASSAY THYROID STIM HORMONE: CPT

## 2018-02-13 PROCEDURE — 85610 PROTHROMBIN TIME: CPT

## 2018-02-13 PROCEDURE — 87641 MR-STAPH DNA AMP PROBE: CPT

## 2018-02-13 PROCEDURE — 93005 ELECTROCARDIOGRAM TRACING: CPT

## 2018-02-13 PROCEDURE — 81001 URINALYSIS AUTO W/SCOPE: CPT

## 2018-02-13 PROCEDURE — 84145 PROCALCITONIN (PCT): CPT

## 2018-02-13 PROCEDURE — 99232 SBSQ HOSP IP/OBS MODERATE 35: CPT

## 2018-02-13 PROCEDURE — 82553 CREATINE MB FRACTION: CPT

## 2018-02-13 PROCEDURE — 87486 CHLMYD PNEUM DNA AMP PROBE: CPT

## 2018-02-13 PROCEDURE — 83880 ASSAY OF NATRIURETIC PEPTIDE: CPT

## 2018-02-13 PROCEDURE — 87581 M.PNEUMON DNA AMP PROBE: CPT

## 2018-02-13 PROCEDURE — 97116 GAIT TRAINING THERAPY: CPT

## 2018-02-13 PROCEDURE — 82962 GLUCOSE BLOOD TEST: CPT

## 2018-02-13 PROCEDURE — 96365 THER/PROPH/DIAG IV INF INIT: CPT

## 2018-02-13 PROCEDURE — 96372 THER/PROPH/DIAG INJ SC/IM: CPT | Mod: 59

## 2018-02-13 PROCEDURE — 84100 ASSAY OF PHOSPHORUS: CPT

## 2018-02-13 PROCEDURE — 80061 LIPID PANEL: CPT

## 2018-02-13 PROCEDURE — 36600 WITHDRAWAL OF ARTERIAL BLOOD: CPT

## 2018-02-13 PROCEDURE — 87150 DNA/RNA AMPLIFIED PROBE: CPT

## 2018-02-13 PROCEDURE — 83735 ASSAY OF MAGNESIUM: CPT

## 2018-02-13 PROCEDURE — 83605 ASSAY OF LACTIC ACID: CPT

## 2018-02-13 PROCEDURE — 94660 CPAP INITIATION&MGMT: CPT

## 2018-02-13 PROCEDURE — 99233 SBSQ HOSP IP/OBS HIGH 50: CPT | Mod: GC

## 2018-02-13 PROCEDURE — 84436 ASSAY OF TOTAL THYROXINE: CPT

## 2018-02-13 PROCEDURE — 82803 BLOOD GASES ANY COMBINATION: CPT

## 2018-02-13 PROCEDURE — 93306 TTE W/DOPPLER COMPLETE: CPT

## 2018-02-13 PROCEDURE — 94760 N-INVAS EAR/PLS OXIMETRY 1: CPT

## 2018-02-13 PROCEDURE — 87040 BLOOD CULTURE FOR BACTERIA: CPT

## 2018-02-13 PROCEDURE — 97530 THERAPEUTIC ACTIVITIES: CPT

## 2018-02-13 PROCEDURE — 87633 RESP VIRUS 12-25 TARGETS: CPT

## 2018-02-13 PROCEDURE — 99231 SBSQ HOSP IP/OBS SF/LOW 25: CPT

## 2018-02-13 PROCEDURE — 85027 COMPLETE CBC AUTOMATED: CPT

## 2018-02-13 PROCEDURE — 97112 NEUROMUSCULAR REEDUCATION: CPT

## 2018-02-13 PROCEDURE — 97162 PT EVAL MOD COMPLEX 30 MIN: CPT

## 2018-02-13 PROCEDURE — 99285 EMERGENCY DEPT VISIT HI MDM: CPT | Mod: 25

## 2018-02-13 PROCEDURE — 87640 STAPH A DNA AMP PROBE: CPT

## 2018-02-13 PROCEDURE — 80048 BASIC METABOLIC PNL TOTAL CA: CPT

## 2018-02-13 RX ORDER — HYDRALAZINE HCL 50 MG
25 TABLET ORAL ONCE
Qty: 0 | Refills: 0 | Status: COMPLETED | OUTPATIENT
Start: 2018-02-13 | End: 2018-02-13

## 2018-02-13 RX ORDER — HYDRALAZINE HCL 50 MG
75 TABLET ORAL EVERY 8 HOURS
Qty: 0 | Refills: 0 | Status: DISCONTINUED | OUTPATIENT
Start: 2018-02-13 | End: 2018-02-13

## 2018-02-13 RX ORDER — POTASSIUM CHLORIDE 20 MEQ
40 PACKET (EA) ORAL ONCE
Qty: 0 | Refills: 0 | Status: COMPLETED | OUTPATIENT
Start: 2018-02-13 | End: 2018-02-13

## 2018-02-13 RX ADMIN — Medication 325 MILLIGRAM(S): at 12:06

## 2018-02-13 RX ADMIN — Medication 50 MILLIGRAM(S): at 05:08

## 2018-02-13 RX ADMIN — Medication 100 MILLIGRAM(S): at 13:36

## 2018-02-13 RX ADMIN — HEPARIN SODIUM 5000 UNIT(S): 5000 INJECTION INTRAVENOUS; SUBCUTANEOUS at 05:09

## 2018-02-13 RX ADMIN — Medication 1 TABLET(S): at 17:46

## 2018-02-13 RX ADMIN — Medication 50 MILLIGRAM(S): at 05:09

## 2018-02-13 RX ADMIN — TIOTROPIUM BROMIDE 1 CAPSULE(S): 18 CAPSULE ORAL; RESPIRATORY (INHALATION) at 06:06

## 2018-02-13 RX ADMIN — Medication 40 MILLIEQUIVALENT(S): at 07:41

## 2018-02-13 RX ADMIN — Medication 50 MILLIGRAM(S): at 17:45

## 2018-02-13 RX ADMIN — ALBUTEROL 4 PUFF(S): 90 AEROSOL, METERED ORAL at 06:05

## 2018-02-13 RX ADMIN — Medication 100 MILLIGRAM(S): at 13:37

## 2018-02-13 RX ADMIN — Medication 2: at 12:06

## 2018-02-13 RX ADMIN — BUDESONIDE AND FORMOTEROL FUMARATE DIHYDRATE 2 PUFF(S): 160; 4.5 AEROSOL RESPIRATORY (INHALATION) at 10:47

## 2018-02-13 RX ADMIN — Medication 100 MILLIGRAM(S): at 05:08

## 2018-02-13 RX ADMIN — Medication 1 TABLET(S): at 12:06

## 2018-02-13 RX ADMIN — HEPARIN SODIUM 5000 UNIT(S): 5000 INJECTION INTRAVENOUS; SUBCUTANEOUS at 17:46

## 2018-02-13 RX ADMIN — Medication 1 TABLET(S): at 07:41

## 2018-02-13 RX ADMIN — Medication 20 MILLIGRAM(S): at 05:08

## 2018-02-13 RX ADMIN — Medication 100 MILLIGRAM(S): at 05:09

## 2018-02-13 RX ADMIN — Medication 75 MILLIGRAM(S): at 14:48

## 2018-02-13 RX ADMIN — ALBUTEROL 4 PUFF(S): 90 AEROSOL, METERED ORAL at 13:36

## 2018-02-13 RX ADMIN — Medication 25 MILLIGRAM(S): at 07:41

## 2018-02-13 RX ADMIN — ALBUTEROL 4 PUFF(S): 90 AEROSOL, METERED ORAL at 05:11

## 2018-02-13 NOTE — PROGRESS NOTE ADULT - ASSESSMENT
89F PMH COPD (not on home oxygen), former smoker, HTN, and falls who was recently admitted with COPD exacerbation due to human metapneumovirus discharged on 2/6, now presents with worsening dyspnea, wheezing, and malaise, found to be in new onset A-Fib with RVR with respiratory distress and diffuse wheezing with low grade fever 100.9, improved with steroids, nebs, bipap, and abx.    Neuro- mildly anxious-ready for rehab  CV-HD in NSR continue with diltiazem, metoprolol, hydralazine dose increased, c/w   Resp- Breathing improved-still w/ cough and mild wheezing. Satting >90 on RA. Prednisone 20 today. Continue with Spiriva, symbicort, albuterol, Medrol dose pack for discharge  ID: Cultures negative thus far, no need for abx at this time  GI- Diet- DASH/TLC  Endo: Insulin Sliding scale for pre-DM  DVT PPX: HSQ

## 2018-02-13 NOTE — PROGRESS NOTE ADULT - ASSESSMENT
89 year old woman with a history of hypertension who presents to the ED BIBEMS for increased respiratory distress and dyspnea, acute respiratory failure, atrial flutter with RVR, mildly positive cardiac enzymes with ischemic ST depressions on her EKG. Likely secondary to demand ischemia and not true coronary atherothrombosis. She is now somewhat improved, currently in sinus rhythm with a controlled heart rate.  She continues to wheeze.      - no acute ischemia  - maintaining sr  - cont metoprolol 50 BID.  Can change to Toprol Xl 100 QD on dc  - cont diltiazem 30 6h.  Can change to  QD  - Aspirin 325 QD  - Hold off on full AC for now, though will need to reconsider pending her rhythm while hospitalized and her clinical course  - bp has improved on increased dose of hydralazine.  Can increase to 100 q8 if BP remains elevated.  - no volume overload  - normal lvef by echo 2/10  - Nebs and steroids per primary team  - Monitor and replete electrolytes  - d/c planning, pending case management and PT. She is very worried about a safe discharge

## 2018-02-13 NOTE — PROGRESS NOTE ADULT - SUBJECTIVE AND OBJECTIVE BOX
Patient is a 89y old  Female who presents with a chief complaint of difficulty breathing  and wheezing as well as cough (08 Feb 2018 15:19)    24 hour events: ***    REVIEW OF SYSTEMS  Constitutional: No fever, chills, fatigue  Neuro: No headache, numbness, weakness  Resp: No cough, wheezing, shortness of breath  CVS: No chest pain, palpitations, leg swelling  GI: No abdominal pain, nausea, vomiting, diarrhea   : No dysuria, frequency, incontinence  Skin: No itching, burning, rashes, or lesions   Msk: No joint pain or swelling  Psych: No depression, anxiety, mood swings    T(F): 97.2 (02-13-18 @ 12:24), Max: 98.3 (02-12-18 @ 15:35)  HR: 66 (02-13-18 @ 11:00) (56 - 79)  BP: 141/90 (02-13-18 @ 11:00) (134/65 - 205/109)  RR: 28 (02-13-18 @ 11:00) (18 - 42)  SpO2: 97% (02-13-18 @ 11:00) (94% - 99%)  Wt(kg): --        CAPILLARY BLOOD GLUCOSE      I&O's Summary    02-12 @ 07:01  -  02-13 @ 07:00  --------------------------------------------------------  IN: 1040 mL / OUT: 1150 mL / NET: -110 mL    02-13 @ 07:01  -  02-13 @ 13:18  --------------------------------------------------------  IN: 120 mL / OUT: 100 mL / NET: 20 mL      PHYSICAL EXAM  General:   CNS:   HEENT:   Resp:   CVS:   Abd:   Ext:   Skin:     MEDICATIONS    diltiazem    Tablet Oral  hydrALAZINE Oral  metoprolol     tartrate Oral    insulin lispro (HumaLOG) corrective regimen sliding scale SubCutaneous  predniSONE   Tablet Oral    ALBUTerol    90 MICROgram(s) HFA Inhaler Inhalation  benzonatate Oral  buDESOnide 160 MICROgram(s)/formoterol 4.5 MICROgram(s) Inhaler Inhalation  tiotropium 18 MICROgram(s) Capsule Inhalation    acetaminophen   Tablet. Oral PRN      aspirin enteric coated Oral  heparin  Injectable SubCutaneous    docusate sodium Oral  senna Oral            lactobacillus acidophilus Oral                          12.6   10.4  )-----------( 209      ( 13 Feb 2018 06:40 )             37.4       02-13    139  |  104  |  25<H>  ----------------------------<  165<H>  3.6   |  25  |  0.81    Ca    8.6      13 Feb 2018 06:40  Phos  2.6     02-13  Mg     2.2     02-13                      Radiology: ***  Bedside lung ultrasound: ***  Bedside ECHO: ***    CENTRAL LINE: Y/N          DATE INSERTED:              REMOVE: Y/N  RIVERA: Y/N                        DATE INSERTED:              REMOVE: Y/N  A-LINE: Y/N                       DATE INSERTED:              REMOVE: Y/N    GLOBAL ISSUE/BEST PRACTICE  Analgesia:   Sedation:   CAM-ICU:   HOB elevation: yes  Stress ulcer prophylaxis:   VTE prophylaxis:   Glycemic control:   Nutrition:     CODE STATUS: ***  [facility] discussion: Y Patient is a 89y old  Female who presents with a chief complaint of difficulty breathing  and wheezing as well as cough (08 Feb 2018 15:19)    24 hour events: Patient's SBP to 205 overnight, given 10 hydralazine over night, 25 this morning. Hydralazine dose increased TID. Patient seen and examined at bedside, anxious, wants to be discharged, agreed for rehab.     REVIEW OF SYSTEMS  Constitutional: No fever, chills, fatigue  Neuro: +anxiety, No headache, numbness, weakness  Resp: +cough, No wheezing, shortness of breath  CVS: No chest pain, palpitations, leg swelling  GI: No abdominal pain, nausea, vomiting, diarrhea   : No dysuria, frequency, incontinence  Skin: No itching, burning, rashes, or lesions   Msk: No joint pain or swelling  Psych: No depression, anxiety, mood swings    T(F): 97.2 (02-13-18 @ 12:24), Max: 98.3 (02-12-18 @ 15:35)  HR: 66 (02-13-18 @ 11:00) (56 - 79)  BP: 141/90 (02-13-18 @ 11:00) (134/65 - 205/109)  RR: 28 (02-13-18 @ 11:00) (18 - 42)  SpO2: 97% (02-13-18 @ 11:00) (94% - 99%)  Wt(kg): --        CAPILLARY BLOOD GLUCOSE      I&O's Summary    02-12 @ 07:01  -  02-13 @ 07:00  --------------------------------------------------------  IN: 1040 mL / OUT: 1150 mL / NET: -110 mL    02-13 @ 07:01  -  02-13 @ 13:18  --------------------------------------------------------  IN: 120 mL / OUT: 100 mL / NET: 20 mL      PHYSICAL EXAM  General: NAD  CNS: AAO x 3, no focal deficits,  anxious  HEENT: PERRL  Resp: minimal wheezing heard b/l  CVS: S1S2, regular  Abd: soft, NT, +BS  Ext: no edema  Skin: warm    MEDICATIONS    diltiazem    Tablet Oral  hydrALAZINE Oral  metoprolol     tartrate Oral    insulin lispro (HumaLOG) corrective regimen sliding scale SubCutaneous  predniSONE   Tablet Oral    ALBUTerol    90 MICROgram(s) HFA Inhaler Inhalation  benzonatate Oral  buDESOnide 160 MICROgram(s)/formoterol 4.5 MICROgram(s) Inhaler Inhalation  tiotropium 18 MICROgram(s) Capsule Inhalation    acetaminophen   Tablet. Oral PRN      aspirin enteric coated Oral  heparin  Injectable SubCutaneous    docusate sodium Oral  senna Oral            lactobacillus acidophilus Oral                          12.6   10.4  )-----------( 209      ( 13 Feb 2018 06:40 )             37.4       02-13    139  |  104  |  25<H>  ----------------------------<  165<H>  3.6   |  25  |  0.81    Ca    8.6      13 Feb 2018 06:40  Phos  2.6     02-13  Mg     2.2     02-13                CENTRAL LINE: N            RIVERA: N                         A-LINE: N              GLOBAL ISSUE/BEST PRACTICE  Analgesia:  NA  Sedation: NA  CAM-ICU: neg  HOB elevation: yes  Stress ulcer prophylaxis: NA  VTE prophylaxis: Y  Glycemic control: Y  Nutrition: Y    CODE STATUS: full  GO discussion: Y

## 2018-02-13 NOTE — PROGRESS NOTE ADULT - SUBJECTIVE AND OBJECTIVE BOX
PULMONARY/CRITICAL CARE      INTERVAL HPI/OVERNIGHT EVENTS:  Pt improved, less sob. Some wheeze.  89y FemaleHPI:  This is a 89 year old woman with a history of hypertension, asthma/COPD who presents to the ED BIBEMS for increased respiratory distress and dyspnea.  She was treated for bronchitis as an inpatient, and was discharged yesterday with PO antibiotics and a steroid taper.  She developed overnight increased wheezing, dyspnea, and respiratory distress, and was brought back.  She was found to be in atrial flutter with RVR.  She is reporting mild chest heaviness.  She is denying fevers, chills, PND, orthopnea, new LE swelling, dizziness, or syncope.  She is being admitted to the ICU for respiratory failure and rapid atrial flutter.  She has mildly positive cardiac enzymes.     On her previous admission (2/5 -2/6) she was diagnosed bronchitis secondary to human metapneumovirus. CT chest yesterday did not show an pneumonia. Her cultures were negative prior to discharge. She has a nebulizer machine at home which she does not use. She is a former smoker -- she quit 40 y/a. (07 Feb 2018 12:58)        PAST MEDICAL & SURGICAL HISTORY:  Pelvic fracture  Fall  Fall  Hypertension  S/P ankle joint replacement, left        ICU Vital Signs Last 24 Hrs  T(C): 36.6 (13 Feb 2018 08:01), Max: 36.8 (12 Feb 2018 15:35)  T(F): 97.9 (13 Feb 2018 08:01), Max: 98.3 (12 Feb 2018 15:35)  HR: 64 (13 Feb 2018 08:00) (56 - 79)  BP: 159/100 (13 Feb 2018 08:00) (127/60 - 205/109)  BP(mean): 120 (13 Feb 2018 08:00) (87 - 150)  ABP: --  ABP(mean): --  RR: 18 (13 Feb 2018 08:00) (18 - 50)  SpO2: 96% (13 Feb 2018 08:00) (94% - 100%)    Qtts:     I&O's Summary    12 Feb 2018 07:01  -  13 Feb 2018 07:00  --------------------------------------------------------  IN: 1040 mL / OUT: 1150 mL / NET: -110 mL        REVIEW OF SYSTEMS:    CONSTITUTIONAL: No fever, weight loss, or fatigue  EYES: No eye pain, visual disturbances, or discharge  ENMT:  No difficulty hearing, tinnitus, vertigo; No sinus or throat pain  NECK: No pain or stiffness  BREASTS: No pain, masses, or nipple discharge  RESPIRATORY: has cough, wheezing, No chills or hemoptysis; mild   shortness of breath  CARDIOVASCULAR: No chest pain, palpitations, dizziness, or leg swelling  GASTROINTESTINAL: No abdominal or epigastric pain. No nausea, vomiting, or hematemesis; No diarrhea or constipation. No melena or hematochezia.  GENITOURINARY: No dysuria, frequency, hematuria, or incontinence  NEUROLOGICAL: No headaches, memory loss, loss of strength, numbness, or tremors  SKIN: No itching, burning, rashes, or lesions   LYMPH NODES: No enlarged glands  ENDOCRINE: No heat or cold intolerance; No hair loss  MUSCULOSKELETAL: No joint pain or swelling; No muscle, back, or extremity pain, no calf tenderness  PSYCHIATRIC: No depression, anxiety, mood swings, or difficulty sleeping  HEME/LYMPH: No easy bruising, or bleeding gums  ALLERGY AND IMMUNOLOGIC: No hives or eczema      PHYSICAL EXAM:    GENERAL: no  respiratory distress on O2, well-groomed, well-developed,  HEAD:  Atraumatic, Normocephalic  EYES: EOMI, PERRLA, conjunctiva and sclera clear  ENMT: No tonsillar erythema, exudates, or enlargement; Moist mucous membranes, Good dentition, No lesions  NECK: Supple, No JVD, Normal thyroid  NERVOUS SYSTEM:  Alert & Oriented X3, Good concentration; Motor Strength 5/5 B/L upper and lower extremities  CHEST/LUNG: Wheezing, rhonchi bilat. Good excursion  HEART: irregular rate and rhythm; No murmurs, rubs, or gallops  ABDOMEN: Soft, Nontender, Nondistended; Bowel sounds present  EXTREMITIES:  2+ Peripheral Pulses, No clubbing, cyanosis, or edema  LYMPH: No lymphadenopathy noted  SKIN: No rashes or lesions          LABS:                        12.6   10.4  )-----------( 209      ( 13 Feb 2018 06:40 )             37.4     02-13    139  |  104  |  25<H>  ----------------------------<  165<H>  3.6   |  25  |  0.81    Ca    8.6      13 Feb 2018 06:40  Phos  2.6     02-13  Mg     2.2     02-13            vanco through     RADIOLOGY & ADDITIONAL STUDIES:      CRITICAL CARE TIME SPENT:

## 2018-02-13 NOTE — PROGRESS NOTE ADULT - PROBLEM SELECTOR PLAN 5
Pan-culture  Empiric iv antibiotics -- zosyn/vanco  Trend CBC and lactate  Check procalcitonin  Check urine legionella  Further work-up/management pending clinical course.
PELONN
OFF ABX  MONITOR OFF ABX
Pan-culture  Empiric iv antibiotics -- zosyn  Trend CBC and lactate
Pan-culture  Empiric iv antibiotics -- zosyn/vanco  Trend CBC and lactate  Check procalcitonin  Check urine legionella  Further work-up/management pending clinical course.
Pan-culture  Empiric iv antibiotics -- zosyn/vanco  Trend CBC and lactate  Check procalcitonin  Check urine legionella  Further work-up/management pending clinical course.

## 2018-02-13 NOTE — PROGRESS NOTE ADULT - PROBLEM SELECTOR PROBLEM 1
Acute respiratory failure
Acute respiratory failure with hypoxia
Acute respiratory failure
Acute respiratory failure with hypoxia

## 2018-02-13 NOTE — PROGRESS NOTE ADULT - PROBLEM SELECTOR PLAN 4
Likely secondary to demand ischemia due to LORRI  Trend enzymes  Will start full dose lovenox/plavix only if sharp increase in troponin level  Continue ASA 325mg for now  Check ECHO  Cardiology consult  Further work-up/management pending clinical course.
Cardizem  Cardiology eval
ECHO NOTED  CARDIO EVAL NOTED  CONTINUE MEDICAL MANAGMENT
ECHO NOTED  CARDIO EVAL NOTED  CONTINUE MEDICAL MANAGMENT
Likely secondary to demand ischemia due to LORRI  Trend enzymes  Will start full dose lovenox/plavix only if sharp increase in troponin level  Continue ASA 325mg for now  Check ECHO  Cardiology consult  Further work-up/management pending clinical course.
Likely secondary to demand ischemia due to LORRI  Trend enzymes  Will start full dose lovenox/plavix only if sharp increase in troponin level  Continue ASA 325mg for now  Check ECHO  Cardiology consult with Dr. HERNÁNDEZ  Further work-up/management pending clinical course.

## 2018-02-13 NOTE — PROGRESS NOTE ADULT - ATTENDING COMMENTS
89F PMH COPD (not on home oxygen), former smoker, HTN, and falls who was recently admitted with COPD exacerbation due to human metapneumovirus discharged on 2/6, now presents with worsening dyspnea, wheezing, and malaise, found to be in new onset A-Fib with RVR with respiratory distress and diffuse wheezing with low grade fever 100.9, improved with steroids, nebs, bipap, and abx.    - HD stable, remains in NSR, continue diltiazem and metoprolol  - Increase Hydralazine for uncontrolled HTN  - Mild demand ischemia due to A-Fib with RVR, continue aspirin  - Doing well off BiPAP, continue duonebs, spiriva, symbicort  - Continue prednisone 20 mg daily, wheezing improved  - Cultures all negative, off zosyn   - Diet as tolerated  - Hypokalemia - replaced  - DVT ppx with HSQ  - HISS for pre-DM  - Stable resp status off oxygen  - Patient has been stable for discharge however daughter didn't want her to go home (she believes patient won't be safe at home), she will be discharged to rehab instead

## 2018-02-13 NOTE — PROGRESS NOTE ADULT - SUBJECTIVE AND OBJECTIVE BOX
Date of Service: 03/28/2017    Neurology     REFERRING PHYSICIAN:  Dr. June and Ketty Boyd.    Thank you for giving me the opportunity of participating in this patient's care.      REASON FOR CONSULTATION:  Back pain with radiation to bilateral lower extremity, abnormal MRI of lumbar spine.  Patient has lumbar radicular pain.  He reported gait difficulty, and he also reported a history of intermittent headaches.    He saw prior neurology and neurosurgery.    HISTORY OF PRESENT ILLNESS:  The patient is a 49-year-old with a past medical history that is significant but not only for including history of COPD, emphysema, smoking cigarettes, vitamin D deficiency and recently finished supplementation and history of intermittent chronic headaches, history of sleep apnea and not using the CPAP, family history of brain aneurysm, intermittent also superimposed headaches.  He saw Dr. Medrano at [facility] in 2014, classified as chronic mixed type of headaches, and he was prescribed Depakote and Amitriptyline, but the patient is no longer taking those.  He reported a family history of a brain aneurysm with sister.  MRI of brain and MRA at [facility] performed in 2014 came back negative.  The patient had a CT scan that also was done at [facility], and there was a dot noted on the right parietal occipital junction of white matter disease reported as chronic ischemic changes, demyelinating disease.  Sequelae of migraine or vasculitis could not entirely be excluded.  Reported to have a need for MRI, read on 08/06/2015.  The patient has also an MRI of the lumbar spine at [facility] that came back positive for multiple level of disk bulge with mild canal stenosis including L2-L3, L3-L4, L4-L5 that noted to have disk protrusion with moderate spinal canal stenosis,  At L5-S1, there was bilateral mild to moderate also neural foraminal stenosis.    We went over  [facility] - Cristy Infante, Suraj, Twila, Matty, Tommy Connor  Office Number:  [phone]    Patient resting comfortably in bed in NAD.  Laying flat with no respiratory distress.  No complaints of chest pain, dyspnea, palpitations, PND, or orthopnea.  Worried about going home. Elevated BP overnight.    ROS: negative unless otherwise mentioned.    Telemetry:  SR    MEDICATIONS  (STANDING):  ALBUTerol    90 MICROgram(s) HFA Inhaler 4 Puff(s) Inhalation every 6 hours  aspirin enteric coated 325 milliGRAM(s) Oral daily  benzonatate 100 milliGRAM(s) Oral three times a day  buDESOnide 160 MICROgram(s)/formoterol 4.5 MICROgram(s) Inhaler 2 Puff(s) Inhalation two times a day  diltiazem    Tablet 30 milliGRAM(s) Oral every 6 hours  docusate sodium 100 milliGRAM(s) Oral three times a day  heparin  Injectable 5000 Unit(s) SubCutaneous every 12 hours  hydrALAZINE 75 milliGRAM(s) Oral every 8 hours  hydrALAZINE 25 milliGRAM(s) Oral once  insulin lispro (HumaLOG) corrective regimen sliding scale   SubCutaneous three times a day before meals  lactobacillus acidophilus 1 Tablet(s) Oral three times a day with meals  metoprolol     tartrate 50 milliGRAM(s) Oral two times a day  predniSONE   Tablet 20 milliGRAM(s) Oral daily  senna 2 Tablet(s) Oral at bedtime  tiotropium 18 MICROgram(s) Capsule 1 Capsule(s) Inhalation daily    MEDICATIONS  (PRN):  acetaminophen   Tablet. 650 milliGRAM(s) Oral every 6 hours PRN Mild Pain (1 - 3)      Allergies    codeine (Nausea)  Percocet 5/325 (Vomiting)    Intolerances        Vital Signs Last 24 Hrs  T(C): 36.4 (13 Feb 2018 03:47), Max: 36.8 (12 Feb 2018 15:35)  T(F): 97.5 (13 Feb 2018 03:47), Max: 98.3 (12 Feb 2018 15:35)  HR: 56 (13 Feb 2018 07:00) (56 - 79)  BP: 170/75 (13 Feb 2018 07:00) (127/60 - 205/109)  BP(mean): 108 (13 Feb 2018 07:00) (87 - 150)  RR: 23 (13 Feb 2018 07:00) (18 - 53)  SpO2: 97% (13 Feb 2018 07:00) (94% - 100%)    I&O's Summary    12 Feb 2018 07:01  -  13 Feb 2018 07:00  --------------------------------------------------------  IN: 1040 mL / OUT: 1150 mL / NET: -110 mL        ON EXAM:    General: NAD, awake and alert, oriented x 3  HEENT: Mucous membranes are moist, anicteric  Lungs: Non-labored, breath sounds are clear bilaterally, b/l soft end expiratory wheezes  Cardiovascular: Regular, S1 and S2, no murmurs, rubs, or gallops  Gastrointestinal: Bowel Sounds present, soft, nontender.   Lymph: No peripheral edema. No lymphadenopathy.  Skin: No rashes or ulcers  Psych:  Mood & affect appropriate    LABS: All Labs Reviewed:                        12.6   10.4  )-----------( 209      ( 13 Feb 2018 06:40 )             37.4                         11.7   6.2   )-----------( 186      ( 12 Feb 2018 05:55 )             35.3                         12.0   8.1   )-----------( 192      ( 11 Feb 2018 06:48 )             35.5     13 Feb 2018 06:40    139    |  104    |  25     ----------------------------<  165    3.6     |  25     |  0.81   12 Feb 2018 05:55    142    |  105    |  23     ----------------------------<  107    4.1     |  28     |  0.87   11 Feb 2018 06:48    142    |  104    |  23     ----------------------------<  136    3.6     |  28     |  0.79     Ca    8.6        13 Feb 2018 06:40  Ca    8.5        12 Feb 2018 05:55  Ca    8.3        11 Feb 2018 06:48  Phos  2.8       12 Feb 2018 05:55  Phos  2.1       11 Feb 2018 06:48  Phos  2.0       10 Feb 2018 07:25  Mg     2.4       12 Feb 2018 05:55  Mg     2.3       11 Feb 2018 06:48  Mg     2.2       10 Feb 2018 07:25            Blood Culture: the results with that.  His lab workup is done through Dr. June.  Vitamin D was significantly low and it was 8.  The patient stated that he finished a course of Vitamin D and there was no refill.  He asking me to do the refill.  He also has mild depression.    The patient reported there is an everyday headache that he will woke up with that and also he will have intermittent severe headaches.  These occur 2 times in a week that last about 15 minutes.    In terms of his back pain, he reported history started 2 years ago with progressive back pain radiating to bilateral lower extremities, started on the right and now in both including up to the buttocks area and to the lateral aspect of both lower extremities, also at times to his calf area.  It is not improving with Percocet.  He had a history of an epidural injection, and he was seeing Dr. Murrell with a consultation on 04/27/2016.  He mentioned that he is not interested in doing the surgery.  He was told that there is no guarantee that is going to help his condition, and he saw Dr. Murrell for pain management.  He had 2 epidural injections with no improvement.    He also reported that insurance stopped paying for his physical therapy; however, he is feeling better with physical therapy.    He is drinking beer, but he denies any Cocaine abuse or heroin abuse.    He stated he could not work.    He had recent cut on his middle finger on the left that he was evaluated for that wound and got a tetanus shot.    The patient recently started on Gabapentin as he is verbally reporting to me with 100 mg t.i.d. and he just started about a couple days ago.    PAST MEDICAL HISTORY:  As above.    REVIEW OF SYSTEMS:  As above and patient denies any frequent falling, any dizziness.  He is walking with a cane.  He denies any new bowel or bladder disturbances.  Denies any visual disturbances.  Denies any numbness in his face.  He denies any change in his speech.  Denies any admission  for a stroke, and the rest of 12-point review of systems is as above.    ALLERGIES:  No known drug allergies.    MEDICATIONS:  As in Epic.    PHYSICAL EXAMINATION:  GENERAL:  He is awake, oriented x3.  HEENT:  Head is  normocephalic.  The patient wears glasses.  Extraocular movement was intact.  NEUROLOGIC:  Cranial nerves 2-12 are grossly intact; however, I was not able to see the full funduscopic exam.  HEART:  S1, S2, regular.    Romberg is negative; however, patient had difficulty in his gait.  When he stood up, he complained of pain.  He is walking with a cane.  Finger-to-nose intact bilaterally.  Muscle strength is 5/5.  I did not find any focal motor deficit.  His sensation is abnormal on the right lower extremity with reduced sensation on the lateral aspect of the right lower extremity compared to the left.    Reflexes are absent in bilateral ankle jerks and +1 in bilateral knee jerk, +1 in bilateral brachioradialis.    His speech and attention and memory look intact.    IMPRESSION AND PLAN:   1.  Back pain with moderate central canal stenosis at L4-L5 and also moderate neural foraminal stenosis at L5-S1.  The patient's symptoms are consistent with that and with radicular pain mainly including L5 and S1 on both sides, right more than left.  The patient has had conservative management before with no improvement, and I informed him to consider surgery.  He saw already neurosurgery with Dr. Murrell.  He got an epidural injection with no improvement, and he could not tolerate Lyrica and he was prescribed Gabapentin 100 mg.  I have informed him after 1 week to increase Gabapentin to 200 mg t.i.d.    That will also help as preventative of his headaches.    2.  He stated that he finished only 1 month course of Vitamin D and he does not have any refill.  I will facilitate the patient's care and refill his Vitamin D with 50,000 units every week for 12 weeks and then he needs to check the level.  3.  Will refer the  patient for physical therapy again.  Hopefully that will help.  4.  I have discussed with the patient the nerve conduction study, EMG.  He is not in agreement to do the study.  He will think about it and let me know.  5.  He brought up chronic headaches, and the patient noted to have chronic mixed type of headache including chronic daily headache due to his sleep apnea and mild depression, superimposed on chronic migraine.  Hopefully, the Gabapentin will be working well as a headache preventative as well.    He is not taking Depakote or Amitriptyline as was prescribed before by Dr. Medrano.    6.  He brought up a dot in the brain that was found on that CT scan in 2015, not reported on the MRI in 2014.  Therefore, I will follow up with an MRI brain with and without contrast.  7.  Further recommendation will be pending and followup is needed.  Again, the patient is not interested in doing any kind of surgery.    Thank you for giving me the opportunity in participating in this patient's care.  The patient understands and wants to follow the plan.    Copy to:  [facility], [address] [place] [address]      Dictated By: Herminia Virk MD  Signing Provider: Herminia Virk MD    MH/SP2 ([id number])  DD: 03/28/2017 11:12:40 TD: 03/28/2017 12:01:17    Copy Sent To:     cc: Nonsystem Provider    Jagruti June MD

## 2018-02-13 NOTE — CHART NOTE - NSCHARTNOTEFT_GEN_A_CORE

## 2018-02-13 NOTE — PROGRESS NOTE ADULT - ASSESSMENT
Pt recently treated for Human Metapneumovirus bronchitis now has worsening sob, bronchospasm, fever, hi lactate as well at rapid At Fib. Hi troponin.  R/o secondary bacterial pneumonia, sepsis less likely  Now off antibiotics  Should be ready for d/c soon

## 2018-02-13 NOTE — PROGRESS NOTE ADULT - PROBLEM SELECTOR PLAN 1
HHN  Steroids  F/u cxr  BNP  Procalcitonin  D Dimer
Patient is critically ill -- ADMIT TO ICU, now stable  Start BIPAP for respiratory support  IV solumedrol 40mg q8h, now on PO steroids  Duo nebs q6h  Oxygen as needed  Check urine legionella  Check LE u/s  Pulmonary/critical care consult  Further work-up/management pending clinical course.
HHN  Steroid held due to anxiety  Transfer Tele
HHN  Steroid held due to anxiety  Transfer Tele
HHN  Steroid taper    Transfer Tele?
HHN  Steroid taper  Transfer Tele
HHN  Steroids    BNP  Procalcitonin  D Dimer
HHN  Steroids  F/u cxr  BNP  Procalcitonin  D Dimer
IMPROVING SLOWLY  CONTINUE CURRENT MANAGEMENT
IMPROVING SLOWLY  CONTINUE CURRENT MANAGEMENT  DC PLANNING
Patient is critically ill -- ADMIT TO ICU  Start BIPAP for respiratory support  IV solumedrol 40mg q8h  Duo nebs q6h  Oxygen as needed  Check urine legionella  Check LE u/s  Pulmonary/critical care consult  Further work-up/management pending clinical course.
Patient is critically ill -- ADMIT TO ICU, now stable  Start BIPAP for respiratory support  IV solumedrol 40mg q8h  Duo nebs q6h  Oxygen as needed  Check urine legionella  Check LE u/s  Pulmonary/critical care consult  Further work-up/management pending clinical course.

## 2018-02-13 NOTE — PROGRESS NOTE ADULT - PROBLEM SELECTOR PROBLEM 2
Rapid atrial fibrillation
Sepsis
Rapid atrial fibrillation
Sepsis
Rapid atrial fibrillation

## 2018-02-13 NOTE — PROGRESS NOTE ADULT - PROBLEM SELECTOR PROBLEM 4
Troponin level elevated
Rapid atrial fibrillation
Troponin level elevated

## 2018-02-13 NOTE — PROGRESS NOTE ADULT - PROBLEM SELECTOR PROBLEM 5
R/O Sepsis
Acute bronchitis
R/O Sepsis

## 2018-02-13 NOTE — PROGRESS NOTE ADULT - PROBLEM SELECTOR PLAN 2
Empiric antibiotics
Start iv cardizem drip, switch to PO  Check TFTs  Check 2D ECHO  Hold full dose AC for now  Start ASA 325mg qd  Serial EKG/enzymes  Cardiology consult with Dr. DUMONTLA  Further work-up/management pending clinical course.
Empiric antibiotics
Empiric antibiotics--check procalcitonin
Off ab
Off ab
RATE CONTROLLED NOW  ECHO NOTED  Hold full dose AC for now AS PER CARDIO  CONTINUE ASA DAILY  Further work-up/management pending clinical course.
RATE CONTROLLED NOW  ECHO NOTED  Hold full dose AC for now AS PER CARDIO  CONTINUE ASA DAILY  Further work-up/management pending clinical course.
Start iv cardizem drip  Check TFTs  Check 2D ECHO  Hold full dose AC for now  Start ASA 325mg qd  Serial EKG/enzymes  Cardiology consult with Dr. DUMONTLA  Further work-up/management pending clinical course.
Start iv cardizem drip, switch to PO  Check TFTs  Check 2D ECHO  Hold full dose AC for now  Start ASA 325mg qd  Serial EKG/enzymes  Cardiology consult with Dr. DUMONTLA  Further work-up/management pending clinical course.

## 2018-02-13 NOTE — PROGRESS NOTE ADULT - NSHPATTENDINGPLANDISCUSS_GEN_ALL_CORE
patient
daughter at bedside
daughter at bedside
patient and dtr at bedside
Dr. Bloom in detail
Dr. Apple in detail
Dr. hauser
pa in detail
pa in detail
patient and RN

## 2018-11-30 NOTE — PHYSICAL THERAPY INITIAL EVALUATION ADULT - PLANNED THERAPY INTERVENTIONS, PT EVAL
----- Message from Rosaura Sheridan sent at 11/30/2018  1:09 PM CST -----  Contact: Pt  Name of Who is Calling:BRAYAN PERKINS [[id number]]    What is the request in detail: patient would l;ludin a call back for antibiotics for sinus infection Please contact to further discuss and advise     Can the clinic reply by [facility]:     What Number to Call Back if not in [facility]: ext [phone]                                  
Spoke with pt and informed her that Dr. Raygoza will be sending an antibiotic into her pharmacy on file. Pt verbalized her understanding  
gait training/bed mobility training/Assess stairs in 1-2 sessions/balance training/transfer training

## 2019-01-18 NOTE — ED PROVIDER NOTE - CADM POA CENTRAL LINE
AMI Week 2 Survey      Responses   Facility patient discharged from?  [facility]   Does the patient have one of the following disease processes/diagnoses(primary or secondary)?  Acute MI (STEMI,NSTEMI)   Week 2 attempt successful?  Yes   Call start time  1715   Call end time  1719   Discharge diagnosis  MI   Meds reviewed with patient/caregiver?  Yes   Is the patient having any side effects they believe may be caused by any medication additions or changes?  No   Does the patient have all prescriptions related to this admission filled (includes statins,anticoagulants,HTN meds,anti-arrhythmia meds)  Yes   Is the patient taking all medications as directed (includes completed medication regime)?  Yes   Does the patient have a primary care provider?   Yes   Does the patient have an appointment with their PCP,cardiologist,or clinic within 7 days of discharge?  Yes   Has the patient kept scheduled appointments due by today?  Yes   Has home health visited the patient within 72 hours of discharge?  N/A   Psychosocial issues?  No   Did the patient receive a copy of their discharge instructions?  Yes   Nursing interventions  Reviewed instructions with patient   What is the patient's perception of their health status since discharge?  Improving   Nursing interventions  Nurse provided patient education   Is the patient/caregiver able to teach back signs and symptoms of when to call for help immediately:  Sudden chest discomfort, Sudden discomfort in arms, back, neck or jaw, Shortness of breath at any time, Irregular or rapid heart rate, Dizziness or lightheadedness, Sudden sweating or clammy skin   Nursing interventions  Nurse provided patient education   Is the pateint /caregiver able to teach back the importance of cardiac rehab?  Yes   Nursing interventions  Provided education on importance of cardiac rehab   Is the patient/caregiver able to teach back lifestyle changes to help prevent MIs  Heart healthy diet, Maintaining a  healthy weight, Regular exercise as approved by provider, Reducing stress   Is the patient/caregiver able to teach back ways to prevent a second heart attack:  Take medications, Participate in Cardiac Rehab, Get support (AHA website:supportnetwork.heart.org), Follow up with MD, Manage risk factors   Is the patient/caregiver able to teach back the hierarchy of who to call/visit for symptoms/problems? PCP, Specialist, Home health nurse, Urgent Care, ED, 911  Yes   Additional teach back comments  Still under lifting restrictions, has low back pain and has pain management.  She is planning on seeing the surgeon in February.   Week 2 call completed?  Yes          Sonya Silverman RN   No

## 2020-05-31 ENCOUNTER — EMERGENCY (EMERGENCY)
Facility: HOSPITAL | Age: 85
LOS: 1 days | Discharge: HOME | End: 2020-05-31
Attending: STUDENT IN AN ORGANIZED HEALTH CARE EDUCATION/TRAINING PROGRAM | Admitting: STUDENT IN AN ORGANIZED HEALTH CARE EDUCATION/TRAINING PROGRAM
Payer: MEDICARE

## 2020-05-31 VITALS
HEART RATE: 70 BPM | RESPIRATION RATE: 17 BRPM | TEMPERATURE: 98 F | DIASTOLIC BLOOD PRESSURE: 65 MMHG | SYSTOLIC BLOOD PRESSURE: 169 MMHG | OXYGEN SATURATION: 98 %

## 2020-05-31 VITALS
HEIGHT: 61 IN | WEIGHT: 125 LBS | HEART RATE: 74 BPM | DIASTOLIC BLOOD PRESSURE: 67 MMHG | SYSTOLIC BLOOD PRESSURE: 145 MMHG | RESPIRATION RATE: 16 BRPM | OXYGEN SATURATION: 97 % | TEMPERATURE: 98 F

## 2020-05-31 DIAGNOSIS — Z96.662 PRESENCE OF LEFT ARTIFICIAL ANKLE JOINT: Chronic | ICD-10-CM

## 2020-05-31 PROBLEM — S32.9XXA FRACTURE OF UNSPECIFIED PARTS OF LUMBOSACRAL SPINE AND PELVIS, INITIAL ENCOUNTER FOR CLOSED FRACTURE: Chronic | Status: ACTIVE | Noted: 2018-02-05
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
## 2020-05-31 PROCEDURE — 73090 X-RAY EXAM OF FOREARM: CPT | Mod: 26,LT

## 2020-05-31 PROCEDURE — 99284 EMERGENCY DEPT VISIT MOD MDM: CPT | Mod: 25

## 2020-05-31 PROCEDURE — 73080 X-RAY EXAM OF ELBOW: CPT | Mod: 26,RT

## 2020-05-31 PROCEDURE — 73080 X-RAY EXAM OF ELBOW: CPT

## 2020-05-31 PROCEDURE — 73100 X-RAY EXAM OF WRIST: CPT

## 2020-05-31 PROCEDURE — 99283 EMERGENCY DEPT VISIT LOW MDM: CPT

## 2020-05-31 PROCEDURE — 73110 X-RAY EXAM OF WRIST: CPT

## 2020-05-31 PROCEDURE — 73090 X-RAY EXAM OF FOREARM: CPT

## 2020-05-31 PROCEDURE — 73110 X-RAY EXAM OF WRIST: CPT | Mod: 26,RT

## 2020-05-31 NOTE — CONSULT NOTE ADULT - SUBJECTIVE AND OBJECTIVE BOX
89y Female presents with RIGHT distal radius fracture s/p mechanical fall yesterday. Denies numbness/tingling. Denies head strike/LOC/other orthopedic injuries at this time. Patient is RIGHT hand dominant. Patient ambulates with cane at baseline.    PAST MEDICAL & SURGICAL HISTORY:  Pelvic fracture  Fall  Fall  Hypertension  S/P ankle joint replacement, left    Home Medications:  aspirin 325 mg oral delayed release tablet: 1 tab(s) orally once a day (31 May 2020 11:27)  lactobacillus acidophilus oral capsule: 1  orally once a day (31 May 2020 11:27)  Spiriva Respimat 1.25 mcg/inh inhalation aerosol: 2 puff(s) inhaled once a day (31 May 2020 11:27)    Allergies    codeine (Nausea)  Percocet 5/325 (Vomiting)    Intolerances          Vital Signs Last 24 Hrs  T(C): 36.8 (31 May 2020 11:21), Max: 36.8 (31 May 2020 11:21)  T(F): 98.3 (31 May 2020 11:21), Max: 98.3 (31 May 2020 11:21)  HR: 74 (31 May 2020 11:21) (74 - 74)  BP: 145/67 (31 May 2020 11:21) (145/67 - 145/67)  BP(mean): --  RR: 16 (31 May 2020 11:21) (16 - 16)  SpO2: 97% (31 May 2020 11:21) (97% - 97%)    PHYSICAL EXAM  General: NAD, Awake and Alert    RIGHT Upper Extremity:  Skin intact  + Swelling and significant ecchymosis of the wrist and forearm volarly and dorsally  TTP over the distal radius  No snuffbox tenderness  NTTP over the bony prominences of the shoulder/elbow/hand/fingers  Pain with active/passive ROM of the wrist  Painless active/passive ROM of the shoulder/elbow/hand/fingers  C5-T1 SILT  Motor grossly intact   Wiggles all fingers  + AIN/PIN/Median/Radial/Ulnar/Musculocutaneous/Axillary nerves intact  + Radial pulse  Compartments soft and compressible      SECONDARY EXAM: Benign, Skin intact, SILT throughout, motor grossly intact throughout, no other orthopedic injuries at this time, compartments soft and compressible    SPINE: Skin intact, no bony tenderness or step-offs appreciated throughout cervical/thoracic/lumbar/sacral spine    LUE: Skin intact, no erythema, ecchymosis, edema, gross deformity, NTTP over the bony prominences of the shoulder/elbow/wrist/hand, painless passive/active ROM of the shoulder/elbow/wrist/hand, C5-T1 SILT, motor grossly intact throughout axillary/musculocutaneous/radial/median/ulnar nerves, + radial pulse    B/l LE: Skin intact, no erythema, ecchymosis, edema, gross deformity, NTTP over the bony prominences of the hip/knee/ankle/foot, painless passive/active ROM of the hip/knee/ankle/foot, L2-S1 SILT, motor grossly intact throughout Hip Flexors/Quadriceps/Hamstrings/TA/EHL/FHL/GSC, + DP/PT pulses, no pain with log roll, no pain on axial loading, compartments soft and compressible, calf nontender      IMAGING  XR RIGHT Wrist: Distal radius fracture  XR RIGHT Forearm: No evidence of fracture or dislocation.  XR RIGHT Elbow: No evidence of fracture or dislocation. 89y Female presents with RIGHT distal radius fracture s/p mechanical fall yesterday. Denies numbness/tingling. Denies head strike/LOC/other orthopedic injuries at this time. Patient is RIGHT hand dominant. Patient ambulates with cane at baseline.    PAST MEDICAL & SURGICAL HISTORY:  Pelvic fracture  Fall  Fall  Hypertension  S/P ankle joint replacement, left    Home Medications:  aspirin 325 mg oral delayed release tablet: 1 tab(s) orally once a day (31 May 2020 11:27)  lactobacillus acidophilus oral capsule: 1  orally once a day (31 May 2020 11:27)  Spiriva Respimat 1.25 mcg/inh inhalation aerosol: 2 puff(s) inhaled once a day (31 May 2020 11:27)    Allergies    codeine (Nausea)  Percocet 5/325 (Vomiting)    Intolerances          Vital Signs Last 24 Hrs  T(C): 36.8 (31 May 2020 11:21), Max: 36.8 (31 May 2020 11:21)  T(F): 98.3 (31 May 2020 11:21), Max: 98.3 (31 May 2020 11:21)  HR: 74 (31 May 2020 11:21) (74 - 74)  BP: 145/67 (31 May 2020 11:21) (145/67 - 145/67)  BP(mean): --  RR: 16 (31 May 2020 11:21) (16 - 16)  SpO2: 97% (31 May 2020 11:21) (97% - 97%)    PHYSICAL EXAM  General: NAD, Awake and Alert    RIGHT Upper Extremity:  Skin intact  Soft tissue masses in the proximal volar forearm and lateral upper arm (pt reports she has had both for a long time and her PCP is aware of them, however, she does not know what they are)  + Swelling and significant ecchymosis of the wrist and forearm volarly and dorsally  TTP over the distal radius  No snuffbox tenderness  NTTP over the bony prominences of the shoulder/elbow/hand/fingers  Pain with active/passive ROM of the wrist  Painless active/passive ROM of the shoulder/elbow/hand/fingers  C5-T1 SILT  Motor grossly intact   Wiggles all fingers  + AIN/PIN/Median/Radial/Ulnar/Musculocutaneous/Axillary nerves intact  + Radial pulse  Compartments soft and compressible      SECONDARY EXAM: Benign, Skin intact, SILT throughout, motor grossly intact throughout, no other orthopedic injuries at this time, compartments soft and compressible    SPINE: Skin intact, no bony tenderness or step-offs appreciated throughout cervical/thoracic/lumbar/sacral spine    LUE: Skin intact, no erythema, ecchymosis, edema, gross deformity, NTTP over the bony prominences of the shoulder/elbow/wrist/hand, painless passive/active ROM of the shoulder/elbow/wrist/hand, C5-T1 SILT, motor grossly intact throughout axillary/musculocutaneous/radial/median/ulnar nerves, + radial pulse    B/l LE: Skin intact, no erythema, ecchymosis, edema, gross deformity, NTTP over the bony prominences of the hip/knee/ankle/foot, painless passive/active ROM of the hip/knee/ankle/foot, L2-S1 SILT, motor grossly intact throughout Hip Flexors/Quadriceps/Hamstrings/TA/EHL/FHL/GSC, + DP/PT pulses, no pain with log roll, no pain on axial loading, compartments soft and compressible, calf nontender      IMAGING  XR RIGHT Wrist: Distal radius fracture  XR RIGHT Forearm: No evidence of fracture or dislocation.  XR RIGHT Elbow: No evidence of fracture or dislocation. 89y Female presents with RIGHT distal radius fracture s/p mechanical fall yesterday. Denies numbness/tingling. Denies head strike/LOC/other orthopedic injuries at this time. Patient is RIGHT hand dominant. Patient ambulates with cane at baseline.    PAST MEDICAL & SURGICAL HISTORY:  Pelvic fracture  Fall  Fall  Hypertension  S/P ankle joint replacement, left    Home Medications:  aspirin 325 mg oral delayed release tablet: 1 tab(s) orally once a day (31 May 2020 11:27)  lactobacillus acidophilus oral capsule: 1  orally once a day (31 May 2020 11:27)  Spiriva Respimat 1.25 mcg/inh inhalation aerosol: 2 puff(s) inhaled once a day (31 May 2020 11:27)    Allergies    codeine (Nausea)  Percocet 5/325 (Vomiting)    Intolerances          Vital Signs Last 24 Hrs  T(C): 36.8 (31 May 2020 11:21), Max: 36.8 (31 May 2020 11:21)  T(F): 98.3 (31 May 2020 11:21), Max: 98.3 (31 May 2020 11:21)  HR: 74 (31 May 2020 11:21) (74 - 74)  BP: 145/67 (31 May 2020 11:21) (145/67 - 145/67)  BP(mean): --  RR: 16 (31 May 2020 11:21) (16 - 16)  SpO2: 97% (31 May 2020 11:21) (97% - 97%)    PHYSICAL EXAM  General: NAD, Awake and Alert    RIGHT Upper Extremity:  Skin intact  2 nontender soft tissue masses (possible lipomas) in the proximal volar forearm and lateral upper arm (pt reports she has had both for a long time and her PCP is aware of them, however, she does not know what they are)  + Swelling and significant ecchymosis of the wrist and forearm volarly and dorsally  TTP over the distal radius  No snuffbox tenderness  NTTP over the bony prominences of the shoulder/elbow/hand/fingers  Pain with active/passive ROM of the wrist  Painless active/passive ROM of the shoulder/elbow/hand/fingers  C5-T1 SILT  Motor grossly intact   Wiggles all fingers  + AIN/PIN/Median/Radial/Ulnar/Musculocutaneous/Axillary nerves intact  + Radial pulse  Compartments soft and compressible      SECONDARY EXAM: Benign, Skin intact, SILT throughout, motor grossly intact throughout, no other orthopedic injuries at this time, compartments soft and compressible    SPINE: Skin intact, no bony tenderness or step-offs appreciated throughout cervical/thoracic/lumbar/sacral spine    LUE: Skin intact, no erythema, ecchymosis, edema, gross deformity, NTTP over the bony prominences of the shoulder/elbow/wrist/hand, painless passive/active ROM of the shoulder/elbow/wrist/hand, C5-T1 SILT, motor grossly intact throughout axillary/musculocutaneous/radial/median/ulnar nerves, + radial pulse    B/l LE: Skin intact, no erythema, ecchymosis, edema, gross deformity, NTTP over the bony prominences of the hip/knee/ankle/foot, painless passive/active ROM of the hip/knee/ankle/foot, L2-S1 SILT, motor grossly intact throughout Hip Flexors/Quadriceps/Hamstrings/TA/EHL/FHL/GSC, + DP/PT pulses, no pain with log roll, no pain on axial loading, compartments soft and compressible, calf nontender      IMAGING  XR RIGHT Wrist: Distal radius fracture  XR RIGHT Forearm: No evidence of fracture or dislocation.  XR RIGHT Elbow: No evidence of fracture or dislocation.

## 2020-05-31 NOTE — ED PROVIDER NOTE - CARE PROVIDER_API CALL
Zhen Alanis  ORTHOPAEDIC SURGERY  [address] [place] [address]  Phone: [phone]  Fax: [phone]  Follow Up Time: 1-3 Days

## 2020-05-31 NOTE — ED PROVIDER NOTE - PATIENT PORTAL LINK FT
You can access the FollowMyHealth Patient Portal offered by [facility] by registering at the following website: [email or web address] By joining [email or web address]’s FollowMyHealth portal, you will also be able to view your health information using other applications (apps) compatible with our system.

## 2020-05-31 NOTE — ED ADULT NURSE NOTE - OBJECTIVE STATEMENT
89 year old year old female presents with right lower arm and wrist pain s/p mechanical fall yesterday. Denies head injury/LOC. States tripped and fell, bracing self with arms outward. Presents a&ox3 in no acute distress with ecchymosis to right anterior lower arm, wrist and hand. Also has laceration to right elbow and left thumb. No obvious deformities. Extremity neurovascularly intact. Denies other injuries/complaints.

## 2020-05-31 NOTE — ED ADULT NURSE NOTE - NSIMPLEMENTINTERV_GEN_ALL_ED
Implemented All Fall with Harm Risk Interventions:  [place] to call system. Call bell, personal items and telephone within reach. Instruct patient to call for assistance. Room bathroom lighting operational. Non-slip footwear when patient is off stretcher. Physically safe environment: no spills, clutter or unnecessary equipment. Stretcher in lowest position, wheels locked, appropriate side rails in place. Provide visual cue, wrist band, yellow gown, etc. Monitor gait and stability. Monitor for mental status changes and reorient to person, place, and time. Review medications for side effects contributing to fall risk. Reinforce activity limits and safety measures with patient and family. Provide visual clues: red socks.

## 2020-05-31 NOTE — ED PROVIDER NOTE - OBJECTIVE STATEMENT
89 yo female present to ED s/p mechanical fall yesterday with injury to right/dominant wrist. Denies proceeding symptoms, states she tripped over a garden hose. Denies head trauma. Deneis chest pain or SOB. Tetanus UTD. Denies LOC, denies n/v.

## 2020-05-31 NOTE — CONSULT NOTE ADULT - ASSESSMENT
Assessment/Plan:  89y Female with RIGHT Distal Radius fracture.     -Pain control  -NWB RIGHT Upper Extremity in sugar tong splint  -Keep splint clean, dry, and intact  -Ice and elevation encouraged  -Wiggle fingers periodically   -Pt educated on signs/symptoms of acute carpal tunnel and compartment syndrome, instructed to return to ER if s/s present, patient expresses full understanding  -No acute/emergent orthopedic surgical intervention needed at this time  -Follow up with Dr. Alanis tomorrow, please call office for appointment  -Discussed with attending, who agrees with plan, and will advise if plan changes   -Ortho stable for discharge

## 2020-05-31 NOTE — ED PROVIDER NOTE - ATTENDING CONTRIBUTION TO CARE
89yo F with htn pw right wrist pain. yesterday was gardening and tripped over her hose landing on right side. no head injury, no loc, no neck pain, no nausea, no vomiting, no change in mental status. noted her wrist to be more swollen today so came to ed. minimal pain. no numbness, no tingling   on exam + swelling and tenderness in right wrist extending to forearm. no swelling or tendenress in fingers  likely radial fx, will get xr, splint, ortho follow up   + skin tear right elbow, tetanus UTD

## 2020-05-31 NOTE — ED PROVIDER NOTE - NSFOLLOWUPINSTRUCTIONS_ED_ALL_ED_FT
Follow up with orthopedics in 1-2 days. Take tylenol for pain. Return to ED if worsening symptoms, fever, chills or any other concerns.

## 2020-05-31 NOTE — ED PROVIDER NOTE - MUSCULOSKELETAL, MLM
right wrist and forearm swelling and ecchymosis. + right wrist painful ROM, right elbow abrasion. Right elbow FROM non-painful. Left thumb skin flap.

## 2020-05-31 NOTE — ED ADULT NURSE NOTE - CAS EDN DISCHARGE ASSESSMENT
Patient baseline mental status/Alert and oriented to person, place and time/Neuro vascular intact post splinting/Awake

## 2020-11-08 NOTE — PHYSICAL THERAPY INITIAL EVALUATION ADULT - SKIN WDL, MLM
[address] [address] [address]  OPERATIVE REPORT  PATIENT NAME: Thad Campoverde  MR#: [id number]  PMD: Elisha Workman  DATE OF PROCEDURE: 11/3/2020  PREOPERATIVE DIAGNOSIS: Degenerative Arthritis left knee  POSTOPER same day surgery program on Tuesday, November 3, 2020. Following proper identification in the preoperative holding area with confirmation of the above-stated procedure, the patient was brought to the main operating room suite. Procedure was confirmed.  He r appropriate. The rotation was based between the medial and middle third of the tibial tubercle. This was provisionally pinned. A size 10 left EMPOWR 3D Non-Porous Femur femoral component was placed along with a 10 mm polyethylene spacer.  With this in place WDL

## 2020-12-29 ENCOUNTER — RESULT REVIEW (OUTPATIENT)
Age: 85
End: 2020-12-29

## 2020-12-29 ENCOUNTER — APPOINTMENT (OUTPATIENT)
Dept: WOUND CARE | Facility: HOSPITAL | Age: 85
End: 2020-12-29
Payer: MEDICARE

## 2020-12-29 ENCOUNTER — OUTPATIENT (OUTPATIENT)
Dept: OUTPATIENT SERVICES | Facility: HOSPITAL | Age: 85
LOS: 1 days | Discharge: HOME | End: 2020-12-29
Payer: MEDICARE

## 2020-12-29 VITALS
BODY MASS INDEX: 23.56 KG/M2 | HEART RATE: 67 BPM | SYSTOLIC BLOOD PRESSURE: 163 MMHG | HEIGHT: 60 IN | TEMPERATURE: 97.8 F | OXYGEN SATURATION: 99 % | WEIGHT: 120 LBS | RESPIRATION RATE: 20 BRPM | DIASTOLIC BLOOD PRESSURE: 68 MMHG

## 2020-12-29 DIAGNOSIS — Z78.9 OTHER SPECIFIED HEALTH STATUS: ICD-10-CM

## 2020-12-29 DIAGNOSIS — Z87.39 PERSONAL HISTORY OF OTHER DISEASES OF THE MUSCULOSKELETAL SYSTEM AND CONNECTIVE TISSUE: ICD-10-CM

## 2020-12-29 DIAGNOSIS — L97.301 NON-PRESSURE CHRONIC ULCER OF UNSPECIFIED ANKLE LIMITED TO BREAKDOWN OF SKIN: ICD-10-CM

## 2020-12-29 DIAGNOSIS — Z83.3 FAMILY HISTORY OF DIABETES MELLITUS: ICD-10-CM

## 2020-12-29 DIAGNOSIS — Z87.891 PERSONAL HISTORY OF NICOTINE DEPENDENCE: ICD-10-CM

## 2020-12-29 DIAGNOSIS — I83.93 ASYMPTOMATIC VARICOSE VEINS OF BILATERAL LOWER EXTREMITIES: ICD-10-CM

## 2020-12-29 DIAGNOSIS — Z96.662 PRESENCE OF LEFT ARTIFICIAL ANKLE JOINT: Chronic | ICD-10-CM

## 2020-12-29 DIAGNOSIS — Z80.0 FAMILY HISTORY OF MALIGNANT NEOPLASM OF DIGESTIVE ORGANS: ICD-10-CM

## 2020-12-29 DIAGNOSIS — Z86.39 PERSONAL HISTORY OF OTHER ENDOCRINE, NUTRITIONAL AND METABOLIC DISEASE: ICD-10-CM

## 2020-12-29 DIAGNOSIS — Z86.79 PERSONAL HISTORY OF OTHER DISEASES OF THE CIRCULATORY SYSTEM: ICD-10-CM

## 2020-12-29 DIAGNOSIS — Z87.81 PERSONAL HISTORY OF (HEALED) TRAUMATIC FRACTURE: ICD-10-CM

## 2020-12-29 DIAGNOSIS — Z82.3 FAMILY HISTORY OF STROKE: ICD-10-CM

## 2020-12-29 LAB
ALBUMIN SERPL ELPH-MCNC: 4.2 G/DL — SIGNIFICANT CHANGE UP (ref 3.3–5)
ALP SERPL-CCNC: 114 U/L — SIGNIFICANT CHANGE UP (ref 40–120)
ALT FLD-CCNC: 26 U/L — SIGNIFICANT CHANGE UP (ref 12–78)
ANION GAP SERPL CALC-SCNC: 5 MMOL/L — SIGNIFICANT CHANGE UP (ref 5–17)
AST SERPL-CCNC: 27 U/L — SIGNIFICANT CHANGE UP (ref 15–37)
BASOPHILS # BLD AUTO: 0.03 K/UL — SIGNIFICANT CHANGE UP (ref 0–0.2)
BASOPHILS NFR BLD AUTO: 0.4 % — SIGNIFICANT CHANGE UP (ref 0–2)
BILIRUB SERPL-MCNC: 0.4 MG/DL — SIGNIFICANT CHANGE UP (ref 0.2–1.2)
BUN SERPL-MCNC: 30 MG/DL — HIGH (ref 7–23)
CALCIUM SERPL-MCNC: 9.7 MG/DL — SIGNIFICANT CHANGE UP (ref 8.5–10.1)
CHLORIDE SERPL-SCNC: 107 MMOL/L — SIGNIFICANT CHANGE UP (ref 96–108)
CO2 SERPL-SCNC: 30 MMOL/L — SIGNIFICANT CHANGE UP (ref 22–31)
CREAT SERPL-MCNC: 1 MG/DL — SIGNIFICANT CHANGE UP (ref 0.5–1.3)
EOSINOPHIL # BLD AUTO: 0.09 K/UL — SIGNIFICANT CHANGE UP (ref 0–0.5)
EOSINOPHIL NFR BLD AUTO: 1.2 % — SIGNIFICANT CHANGE UP (ref 0–6)
ERYTHROCYTE [SEDIMENTATION RATE] IN BLOOD: 33 MM/HR — HIGH (ref 0–20)
GLUCOSE SERPL-MCNC: 91 MG/DL — SIGNIFICANT CHANGE UP (ref 70–99)
HCT VFR BLD CALC: 38.7 % — SIGNIFICANT CHANGE UP (ref 34.5–45)
HGB BLD-MCNC: 12.6 G/DL — SIGNIFICANT CHANGE UP (ref 11.5–15.5)
IMM GRANULOCYTES NFR BLD AUTO: 0.4 % — SIGNIFICANT CHANGE UP (ref 0–1.5)
LYMPHOCYTES # BLD AUTO: 2.07 K/UL — SIGNIFICANT CHANGE UP (ref 1–3.3)
LYMPHOCYTES # BLD AUTO: 28.3 % — SIGNIFICANT CHANGE UP (ref 13–44)
MCHC RBC-ENTMCNC: 30.7 PG — SIGNIFICANT CHANGE UP (ref 27–34)
MCHC RBC-ENTMCNC: 32.6 GM/DL — SIGNIFICANT CHANGE UP (ref 32–36)
MCV RBC AUTO: 94.4 FL — SIGNIFICANT CHANGE UP (ref 80–100)
MONOCYTES # BLD AUTO: 0.72 K/UL — SIGNIFICANT CHANGE UP (ref 0–0.9)
MONOCYTES NFR BLD AUTO: 9.8 % — SIGNIFICANT CHANGE UP (ref 2–14)
NEUTROPHILS # BLD AUTO: 4.38 K/UL — SIGNIFICANT CHANGE UP (ref 1.8–7.4)
NEUTROPHILS NFR BLD AUTO: 59.9 % — SIGNIFICANT CHANGE UP (ref 43–77)
NRBC # BLD: 0 /100 WBCS — SIGNIFICANT CHANGE UP (ref 0–0)
PLATELET # BLD AUTO: 215 K/UL — SIGNIFICANT CHANGE UP (ref 150–400)
POTASSIUM SERPL-MCNC: 5 MMOL/L — SIGNIFICANT CHANGE UP (ref 3.5–5.3)
POTASSIUM SERPL-SCNC: 5 MMOL/L — SIGNIFICANT CHANGE UP (ref 3.5–5.3)
PROT SERPL-MCNC: 7.9 G/DL — SIGNIFICANT CHANGE UP (ref 6–8.3)
RBC # BLD: 4.1 M/UL — SIGNIFICANT CHANGE UP (ref 3.8–5.2)
RBC # FLD: 12.5 % — SIGNIFICANT CHANGE UP (ref 10.3–14.5)
SODIUM SERPL-SCNC: 142 MMOL/L — SIGNIFICANT CHANGE UP (ref 135–145)
WBC # BLD: 7.32 K/UL — SIGNIFICANT CHANGE UP (ref 3.8–10.5)
WBC # FLD AUTO: 7.32 K/UL — SIGNIFICANT CHANGE UP (ref 3.8–10.5)

## 2020-12-29 PROCEDURE — 73610 X-RAY EXAM OF ANKLE: CPT

## 2020-12-29 PROCEDURE — 85652 RBC SED RATE AUTOMATED: CPT

## 2020-12-29 PROCEDURE — 99203 OFFICE O/P NEW LOW 30 MIN: CPT

## 2020-12-29 PROCEDURE — 36415 COLL VENOUS BLD VENIPUNCTURE: CPT

## 2020-12-29 PROCEDURE — 73610 X-RAY EXAM OF ANKLE: CPT | Mod: 26,LT

## 2020-12-29 PROCEDURE — 86140 C-REACTIVE PROTEIN: CPT

## 2020-12-29 PROCEDURE — G0463: CPT

## 2020-12-29 PROCEDURE — 80053 COMPREHEN METABOLIC PANEL: CPT

## 2020-12-29 PROCEDURE — 85025 COMPLETE CBC W/AUTO DIFF WBC: CPT

## 2020-12-29 RX ORDER — SOFT LENS ADJUNCTIVE SOLUTIONS
AEROSOL (ML) MISCELLANEOUS
Qty: 10 | Refills: 5 | Status: COMPLETED | COMMUNITY
Start: 2020-12-29 | End: 2021-06-27

## 2020-12-29 RX ORDER — LEVOTHYROXINE SODIUM 0.05 MG/1
50 TABLET ORAL
Qty: 30 | Refills: 0 | Status: ACTIVE | COMMUNITY
Start: 2020-12-26

## 2020-12-29 RX ORDER — DICLOFENAC SODIUM 1% 10 MG/G
1 GEL TOPICAL
Qty: 100 | Refills: 0 | Status: ACTIVE | COMMUNITY
Start: 2020-12-18

## 2020-12-29 RX ORDER — BISOPROLOL FUMARATE AND HYDROCHLOROTHIAZIDE 2.5; 6.25 MG/1; MG/1
2.5-6.25 TABLET, FILM COATED ORAL
Qty: 90 | Refills: 0 | Status: ACTIVE | COMMUNITY
Start: 2020-12-10

## 2020-12-29 RX ORDER — IBUPROFEN 200 MG/1
200 TABLET, COATED ORAL 4 TIMES DAILY
Refills: 0 | Status: ACTIVE | COMMUNITY

## 2020-12-29 RX ORDER — AMLODIPINE BESYLATE 5 MG/1
5 TABLET ORAL
Qty: 90 | Refills: 0 | Status: ACTIVE | COMMUNITY
Start: 2020-12-10

## 2020-12-29 RX ORDER — TIOTROPIUM BROMIDE 18 UG/1
18 CAPSULE ORAL; RESPIRATORY (INHALATION)
Qty: 90 | Refills: 0 | Status: ACTIVE | COMMUNITY
Start: 2020-12-10

## 2020-12-29 NOTE — HISTORY OF PRESENT ILLNESS
[FreeTextEntry1] : The wound is located on patient's left lateral ankle.  The wound appeared gradually about 3 months ago.  Patient has a hx of fracture to left ankle and has hardware in place.  Patient ws seen by Dr. Hamilton, Orthopedic Surgeon,  about 3 times.  Patient recently discontinued Neosporin.   Xray done.   Dr. Hamilton referred patient to a Plastic Surgeon in [place].  Patient decided to be tx at [facility].

## 2020-12-29 NOTE — PHYSICAL EXAM
[2 x 2] : 2 x 2  [0] : left 0 [Varicose Veins Of Lower Extremities] : bilaterally [Ankle Swelling Bilaterally] : severe [Skin Ulcer] : ulcer [Calm] : calm [] : not present [de-identified] : A&Ox3, NAD [de-identified] : 4/5 strength in all quadrants bilaterally, s/p left ankle orif [de-identified] : left lateral ankle ulcer down to skin, subcutaneous tissue, fat [de-identified] : Light touch sensation intact bilaterally [de-identified] : DPM lifted dry eschar revealing open area with 100% slough - 0.9 x 0.9 x 0.2 cm [FreeTextEntry1] : Left lateral ankle [FreeTextEntry2] : 0.9 [FreeTextEntry3] : 0.9 [FreeTextEntry4] : dry eschar [de-identified] : none [de-identified] : mild erythema [de-identified] : 100% [de-identified] : Medihoney [de-identified] : NSC [de-identified] : Every other day

## 2020-12-29 NOTE — VITALS
[] : Yes [FreeTextEntry3] : Left latearl ankle (3/10) dull pain [FreeTextEntry1] : Advil [FreeTextEntry2] : lying in bed [FreeTextEntry4] : Advil

## 2020-12-29 NOTE — REVIEW OF SYSTEMS
[Skin Wound] : skin wound [Fever] : no fever [Eye Pain] : no eye pain [Earache] : no earache [Chest Pain] : no chest pain [Shortness Of Breath] : no shortness of breath [Cough] : no cough [Abdominal Pain] : no abdominal pain [FreeTextEntry5] : htn, varicose veins [FreeTextEntry9] : s/p left ankle fracture orif [de-identified] : left lateral ankle ulcer down to skin, subcutaneous tissue, fat [de-identified] : hypothyroidism

## 2020-12-29 NOTE — ASSESSMENT
[Verbal] : Verbal [Patient] : Patient [Other: ___] : [unfilled] [Good - alert, interested, motivated] : Good - alert, interested, motivated [Verbalizes knowledge/Understanding] : Verbalizes knowledge/understanding [Dressing changes] : dressing changes [Foot Care] : foot care [Skin Care] : skin care [Pressure relief] : pressure relief [Signs and symptoms of infection] : sign and symptoms of infection [Venous Disease] : venous disease [How and When to Call] : how and when to call [Labs and Tests] : labs and tests [Pain Management] : pain management [Off-loading] : off-loading [Compression Therapy] : compression therapy [Patient responsibility to plan of care] : patient responsibility to plan of care [] : Yes [Stable] : stable [Home] : Home [Cane] : Cane [Not Applicable - Long Term Care/Home Health Agency] : Long Term Care/Home Health Agency: Not Applicable [Home Health] : home health [FreeTextEntry2] : Promote optimal skin integrity, offloading, infection prevention, pain management [FreeTextEntry4] : Circulation:\par \par Dorsalis Pedis:  bilateral unable to palpate\par Posterior Tibialis:  bilateral unable to palpate\par Doppler Pulses:  bilateral  present \par Extremity Color:  bilateral pink\par Extremity Temperature:  bilateral  cool\par Capillary Refill:  bilateral <3 sec  \par ROBERT:  Non-invasive vascular studies (venous and arterial) ordered - submitted for authorization\par \par Xray and bloodwork to be done today at [facility]\par MRI ordered - submitted for authorization\par SN home care to be initiated by Nurse Manager\par PCP:  Dr. Gaytan, [place], [place]  [phone]\par \par F/U 1 week

## 2020-12-29 NOTE — PLAN
[FreeTextEntry1] : Patient examined and evaluated at this time.\par Continue local wound care and offloading.\par Ordered radiographs and bloodwork.\par Will auth for mri and vascular studies.\par Pt to return in 1 week.

## 2020-12-30 DIAGNOSIS — Z94.7 CORNEAL TRANSPLANT STATUS: ICD-10-CM

## 2020-12-30 DIAGNOSIS — Z87.891 PERSONAL HISTORY OF NICOTINE DEPENDENCE: ICD-10-CM

## 2020-12-30 DIAGNOSIS — Z83.3 FAMILY HISTORY OF DIABETES MELLITUS: ICD-10-CM

## 2020-12-30 DIAGNOSIS — Z87.81 PERSONAL HISTORY OF (HEALED) TRAUMATIC FRACTURE: ICD-10-CM

## 2020-12-30 DIAGNOSIS — I10 ESSENTIAL (PRIMARY) HYPERTENSION: ICD-10-CM

## 2020-12-30 DIAGNOSIS — L97.322 NON-PRESSURE CHRONIC ULCER OF LEFT ANKLE WITH FAT LAYER EXPOSED: ICD-10-CM

## 2020-12-30 DIAGNOSIS — Z82.3 FAMILY HISTORY OF STROKE: ICD-10-CM

## 2020-12-30 DIAGNOSIS — I83.023 VARICOSE VEINS OF LEFT LOWER EXTREMITY WITH ULCER OF ANKLE: ICD-10-CM

## 2020-12-30 DIAGNOSIS — E03.9 HYPOTHYROIDISM, UNSPECIFIED: ICD-10-CM

## 2020-12-30 DIAGNOSIS — M19.90 UNSPECIFIED OSTEOARTHRITIS, UNSPECIFIED SITE: ICD-10-CM

## 2020-12-30 DIAGNOSIS — Z79.899 OTHER LONG TERM (CURRENT) DRUG THERAPY: ICD-10-CM

## 2020-12-30 DIAGNOSIS — Z80.0 FAMILY HISTORY OF MALIGNANT NEOPLASM OF DIGESTIVE ORGANS: ICD-10-CM

## 2020-12-30 DIAGNOSIS — Z98.1 ARTHRODESIS STATUS: ICD-10-CM

## 2020-12-30 LAB — CRP SERPL-MCNC: 0.34 MG/DL — SIGNIFICANT CHANGE UP (ref 0–0.4)

## 2021-01-05 ENCOUNTER — OUTPATIENT (OUTPATIENT)
Dept: OUTPATIENT SERVICES | Facility: HOSPITAL | Age: 86
LOS: 1 days | Discharge: HOME | End: 2021-01-05
Payer: MEDICARE

## 2021-01-05 ENCOUNTER — APPOINTMENT (OUTPATIENT)
Dept: WOUND CARE | Facility: HOSPITAL | Age: 86
End: 2021-01-05
Payer: MEDICARE

## 2021-01-05 VITALS
TEMPERATURE: 97.5 F | BODY MASS INDEX: 23.56 KG/M2 | HEART RATE: 64 BPM | SYSTOLIC BLOOD PRESSURE: 154 MMHG | WEIGHT: 120 LBS | OXYGEN SATURATION: 98 % | HEIGHT: 60 IN | DIASTOLIC BLOOD PRESSURE: 85 MMHG | RESPIRATION RATE: 20 BRPM

## 2021-01-05 DIAGNOSIS — L97.301 NON-PRESSURE CHRONIC ULCER OF UNSPECIFIED ANKLE LIMITED TO BREAKDOWN OF SKIN: ICD-10-CM

## 2021-01-05 DIAGNOSIS — Z96.662 PRESENCE OF LEFT ARTIFICIAL ANKLE JOINT: Chronic | ICD-10-CM

## 2021-01-05 PROCEDURE — G0463: CPT

## 2021-01-05 PROCEDURE — 99213 OFFICE O/P EST LOW 20 MIN: CPT

## 2021-01-05 NOTE — PLAN
[FreeTextEntry1] : Patient examined and evaluated at this time.\par Continue local wound care and offloading.\par Reviewed bloodwork and radiographs with patient and daughter.\par Advised regarding possible necessity for surgical intervention including removal of hardware to help aid in wound healing. Pt currently refusing at this time.\par Pt to obtain mri and vascular studies.\par Referral to vascular surgery evaluation with Dr. Limon.\par Pt to return in 1 week.\par Spent 20 minutes for patient care and medical decision making.\par

## 2021-01-05 NOTE — PHYSICAL EXAM
[2 x 2] : 2 x 2  [0] : left 0 [Varicose Veins Of Lower Extremities] : bilaterally [Ankle Swelling Bilaterally] : severe [] : not present [Skin Ulcer] : ulcer [Calm] : calm [de-identified] : A&Ox3, NAD [de-identified] : 4/5 strength in all quadrants bilaterally, s/p left ankle orif [de-identified] : left lateral ankle ulcer down to skin, subcutaneous tissue, fat [de-identified] : Light touch sensation intact bilaterally [FreeTextEntry1] : Left lateral ankle [FreeTextEntry2] : 0.9 [FreeTextEntry3] : 0.9 [FreeTextEntry4] : 0.2 [de-identified] : serosanguineous [de-identified] : 100% [de-identified] : Medihoney [de-identified] : Cleansed with NS\par Cloth tape\par \par  [TWNoteComboBox4] : Moderate [TWNoteComboBox5] : No [de-identified] : No [de-identified] : Normal [de-identified] : None [de-identified] : None [de-identified] : None [de-identified] : Yes [de-identified] : Every other day [de-identified] : Primary Dressing

## 2021-01-05 NOTE — REVIEW OF SYSTEMS
[Fever] : no fever [Eye Pain] : no eye pain [Earache] : no earache [Chest Pain] : no chest pain [Shortness Of Breath] : no shortness of breath [Cough] : no cough [Abdominal Pain] : no abdominal pain [Skin Wound] : skin wound [FreeTextEntry5] : htn, varicose veins [FreeTextEntry9] : s/p left ankle fracture orif [de-identified] : left lateral ankle ulcer down to skin, subcutaneous tissue, fat [de-identified] : hypothyroidism

## 2021-01-05 NOTE — HISTORY OF PRESENT ILLNESS
[FreeTextEntry1] : Pt seen for left lateral ankle ulcer. Pt relates that she was able to obtain bloodwork and radiographs as advised. Relates that she will be obtaining vascular studies and an mri of the left ankle. Pt refusing any any surgical intervention at this time.

## 2021-01-05 NOTE — ASSESSMENT
[Verbal] : Verbal [Patient] : Patient [Other: ___] : [unfilled] [Good - alert, interested, motivated] : Good - alert, interested, motivated [Verbalizes knowledge/Understanding] : Verbalizes knowledge/understanding [Dressing changes] : dressing changes [Foot Care] : foot care [Skin Care] : skin care [Pressure relief] : pressure relief [Signs and symptoms of infection] : sign and symptoms of infection [How and When to Call] : how and when to call [Pain Management] : pain management [Off-loading] : off-loading [Home Health] : home health [Patient responsibility to plan of care] : patient responsibility to plan of care [Stable] : stable [Home] : Home [Cane] : Cane [Not Applicable - Long Term Care/Home Health Agency] : Long Term Care/Home Health Agency: Not Applicable [Nutrition] : nutrition [Labs and Tests] : labs and tests [Demo] : Demo [Venous Disease] : venous disease [] : No [FreeTextEntry2] : Promote  skin integrity\par Infection prevention\par Offloading\par pressure relief\par localized wound care\par demonstrates use of both pharmacological and non pharmacological pain management interventions\par maintain acceptable levels of pain [FreeTextEntry3] : Wound remains the same size [FreeTextEntry4] : F/U 1 week for assessment\par Awaiting MRI and vascular studies appointments \par Preauthorization for Vascular consult with Dr. Madison submitted for next assessment\par Pt awaiting Home Care to be approved

## 2021-01-06 DIAGNOSIS — Z82.3 FAMILY HISTORY OF STROKE: ICD-10-CM

## 2021-01-06 DIAGNOSIS — I83.023 VARICOSE VEINS OF LEFT LOWER EXTREMITY WITH ULCER OF ANKLE: ICD-10-CM

## 2021-01-06 DIAGNOSIS — L97.322 NON-PRESSURE CHRONIC ULCER OF LEFT ANKLE WITH FAT LAYER EXPOSED: ICD-10-CM

## 2021-01-06 DIAGNOSIS — E03.9 HYPOTHYROIDISM, UNSPECIFIED: ICD-10-CM

## 2021-01-06 DIAGNOSIS — Z87.891 PERSONAL HISTORY OF NICOTINE DEPENDENCE: ICD-10-CM

## 2021-01-06 DIAGNOSIS — Z83.3 FAMILY HISTORY OF DIABETES MELLITUS: ICD-10-CM

## 2021-01-06 DIAGNOSIS — M19.90 UNSPECIFIED OSTEOARTHRITIS, UNSPECIFIED SITE: ICD-10-CM

## 2021-01-06 DIAGNOSIS — Z87.81 PERSONAL HISTORY OF (HEALED) TRAUMATIC FRACTURE: ICD-10-CM

## 2021-01-06 DIAGNOSIS — I10 ESSENTIAL (PRIMARY) HYPERTENSION: ICD-10-CM

## 2021-01-06 DIAGNOSIS — Z79.899 OTHER LONG TERM (CURRENT) DRUG THERAPY: ICD-10-CM

## 2021-01-06 DIAGNOSIS — Z94.7 CORNEAL TRANSPLANT STATUS: ICD-10-CM

## 2021-01-06 DIAGNOSIS — Z80.0 FAMILY HISTORY OF MALIGNANT NEOPLASM OF DIGESTIVE ORGANS: ICD-10-CM

## 2021-01-07 ENCOUNTER — RESULT REVIEW (OUTPATIENT)
Age: 86
End: 2021-01-07

## 2021-01-07 ENCOUNTER — OUTPATIENT (OUTPATIENT)
Dept: OUTPATIENT SERVICES | Facility: HOSPITAL | Age: 86
LOS: 1 days | End: 2021-01-07
Payer: MEDICARE

## 2021-01-07 DIAGNOSIS — M86.9 OSTEOMYELITIS, UNSPECIFIED: ICD-10-CM

## 2021-01-07 DIAGNOSIS — I80.13 PHLEBITIS AND THROMBOPHLEBITIS OF FEMORAL VEIN, BILATERAL: ICD-10-CM

## 2021-01-07 DIAGNOSIS — I50.9 HEART FAILURE, UNSPECIFIED: ICD-10-CM

## 2021-01-07 DIAGNOSIS — Z96.662 PRESENCE OF LEFT ARTIFICIAL ANKLE JOINT: Chronic | ICD-10-CM

## 2021-01-07 PROCEDURE — 93970 EXTREMITY STUDY: CPT | Mod: 26

## 2021-01-07 PROCEDURE — 93970 EXTREMITY STUDY: CPT

## 2021-01-07 PROCEDURE — 73723 MRI JOINT LWR EXTR W/O&W/DYE: CPT

## 2021-01-07 PROCEDURE — 93923 UPR/LXTR ART STDY 3+ LVLS: CPT

## 2021-01-07 PROCEDURE — 73723 MRI JOINT LWR EXTR W/O&W/DYE: CPT | Mod: 26,LT

## 2021-01-07 PROCEDURE — 93923 UPR/LXTR ART STDY 3+ LVLS: CPT | Mod: 26

## 2021-01-07 PROCEDURE — A9579: CPT

## 2021-01-13 ENCOUNTER — OUTPATIENT (OUTPATIENT)
Dept: OUTPATIENT SERVICES | Facility: HOSPITAL | Age: 86
LOS: 1 days | Discharge: HOME | End: 2021-01-13
Payer: MEDICARE

## 2021-01-13 ENCOUNTER — APPOINTMENT (OUTPATIENT)
Dept: WOUND CARE | Facility: HOSPITAL | Age: 86
End: 2021-01-13
Payer: MEDICARE

## 2021-01-13 VITALS
HEIGHT: 60 IN | HEART RATE: 63 BPM | DIASTOLIC BLOOD PRESSURE: 65 MMHG | WEIGHT: 120 LBS | RESPIRATION RATE: 20 BRPM | OXYGEN SATURATION: 99 % | TEMPERATURE: 97.2 F | BODY MASS INDEX: 23.56 KG/M2 | SYSTOLIC BLOOD PRESSURE: 144 MMHG

## 2021-01-13 DIAGNOSIS — Z96.662 PRESENCE OF LEFT ARTIFICIAL ANKLE JOINT: Chronic | ICD-10-CM

## 2021-01-13 DIAGNOSIS — L97.301 NON-PRESSURE CHRONIC ULCER OF UNSPECIFIED ANKLE LIMITED TO BREAKDOWN OF SKIN: ICD-10-CM

## 2021-01-13 PROCEDURE — 99213 OFFICE O/P EST LOW 20 MIN: CPT

## 2021-01-13 PROCEDURE — G0463: CPT

## 2021-01-13 NOTE — PLAN
[FreeTextEntry1] : Patient examined and evaluated at this time.\par Continue local wound care and offloading.\par Reviewed mri and vascular studies with patient and daughter.\par Referral to vascular surgery evaluation with Dr. Limon.\par Advised regarding possible necessity for surgical intervention including removal of hardware to help aid in wound healing. Pt currently refusing at this time.\par Pt and daughter advised regarding necessary procedures in order to aid in wound healing to prevent worsening of the wound, infection, sepsis, loss of limb, and death.\par Pt to return in 1 week.\par Spent 20 minutes for patient care and medical decision making.\par

## 2021-01-13 NOTE — ASSESSMENT
[Verbal] : Verbal [Demo] : Demo [Patient] : Patient [Good - alert, interested, motivated] : Good - alert, interested, motivated [Verbalizes knowledge/Understanding] : Verbalizes knowledge/understanding [Dressing changes] : dressing changes [Skin Care] : skin care [Signs and symptoms of infection] : sign and symptoms of infection [How and When to Call] : how and when to call [Patient responsibility to plan of care] : patient responsibility to plan of care [Stable] : stable [Home] : Home [Ambulatory] : Ambulatory [Not Applicable - Long Term Care/Home Health Agency] : Long Term Care/Home Health Agency: Not Applicable [] : No [FreeTextEntry2] : Restore Skin Integrity\par Infection Control\par Localized wound care\par Maintain acceptable pain levels at satisfactory relief.\par Demonstrates use of both nonpharmacological and pharmacological pain relief strategies [FreeTextEntry3] : unchanged [FreeTextEntry4] : Photos Taken\par F/U to [facility]: patient to see Dr. De La Torre on Monday 1/18/2020 along with assessment\par DPM Reviewed and discussed MRI, Vascular Studies,BW with Patient and Daughter stated understanding.

## 2021-01-13 NOTE — HISTORY OF PRESENT ILLNESS
[FreeTextEntry1] : Pt seen for left lateral ankle ulcer. Relates that she was able to obtain the vascular studies and the mri of the left ankle. Pt refusing any any surgical intervention at this time.

## 2021-01-13 NOTE — REVIEW OF SYSTEMS
[Skin Wound] : skin wound [Fever] : no fever [Eye Pain] : no eye pain [Earache] : no earache [Chest Pain] : no chest pain [Shortness Of Breath] : no shortness of breath [Cough] : no cough [Abdominal Pain] : no abdominal pain [FreeTextEntry5] : htn, varicose veins [FreeTextEntry9] : s/p left ankle fracture orif [de-identified] : left lateral ankle ulcer down to skin, subcutaneous tissue, fat [de-identified] : hypothyroidism

## 2021-01-14 DIAGNOSIS — E03.9 HYPOTHYROIDISM, UNSPECIFIED: ICD-10-CM

## 2021-01-14 DIAGNOSIS — Z82.3 FAMILY HISTORY OF STROKE: ICD-10-CM

## 2021-01-14 DIAGNOSIS — M19.90 UNSPECIFIED OSTEOARTHRITIS, UNSPECIFIED SITE: ICD-10-CM

## 2021-01-14 DIAGNOSIS — Z79.899 OTHER LONG TERM (CURRENT) DRUG THERAPY: ICD-10-CM

## 2021-01-14 DIAGNOSIS — Z87.81 PERSONAL HISTORY OF (HEALED) TRAUMATIC FRACTURE: ICD-10-CM

## 2021-01-14 DIAGNOSIS — Z80.0 FAMILY HISTORY OF MALIGNANT NEOPLASM OF DIGESTIVE ORGANS: ICD-10-CM

## 2021-01-14 DIAGNOSIS — Z94.7 CORNEAL TRANSPLANT STATUS: ICD-10-CM

## 2021-01-14 DIAGNOSIS — L97.322 NON-PRESSURE CHRONIC ULCER OF LEFT ANKLE WITH FAT LAYER EXPOSED: ICD-10-CM

## 2021-01-14 DIAGNOSIS — I83.023 VARICOSE VEINS OF LEFT LOWER EXTREMITY WITH ULCER OF ANKLE: ICD-10-CM

## 2021-01-14 DIAGNOSIS — I10 ESSENTIAL (PRIMARY) HYPERTENSION: ICD-10-CM

## 2021-01-14 DIAGNOSIS — Z83.3 FAMILY HISTORY OF DIABETES MELLITUS: ICD-10-CM

## 2021-01-14 DIAGNOSIS — Z87.891 PERSONAL HISTORY OF NICOTINE DEPENDENCE: ICD-10-CM

## 2021-01-18 ENCOUNTER — APPOINTMENT (OUTPATIENT)
Dept: WOUND CARE | Facility: HOSPITAL | Age: 86
End: 2021-01-18
Payer: MEDICARE

## 2021-01-18 ENCOUNTER — OUTPATIENT (OUTPATIENT)
Dept: OUTPATIENT SERVICES | Facility: HOSPITAL | Age: 86
LOS: 1 days | Discharge: HOME | End: 2021-01-18
Payer: MEDICARE

## 2021-01-18 ENCOUNTER — APPOINTMENT (OUTPATIENT)
Dept: VASCULAR SURGERY | Facility: CLINIC | Age: 86
End: 2021-01-18
Payer: MEDICARE

## 2021-01-18 VITALS
SYSTOLIC BLOOD PRESSURE: 145 MMHG | HEART RATE: 75 BPM | RESPIRATION RATE: 20 BRPM | WEIGHT: 120 LBS | DIASTOLIC BLOOD PRESSURE: 66 MMHG | OXYGEN SATURATION: 99 % | TEMPERATURE: 97.3 F | BODY MASS INDEX: 23.56 KG/M2 | HEIGHT: 60 IN

## 2021-01-18 DIAGNOSIS — L97.301 NON-PRESSURE CHRONIC ULCER OF UNSPECIFIED ANKLE LIMITED TO BREAKDOWN OF SKIN: ICD-10-CM

## 2021-01-18 DIAGNOSIS — Z96.662 PRESENCE OF LEFT ARTIFICIAL ANKLE JOINT: Chronic | ICD-10-CM

## 2021-01-18 PROCEDURE — G0463: CPT

## 2021-01-18 PROCEDURE — 99213 OFFICE O/P EST LOW 20 MIN: CPT

## 2021-01-18 PROCEDURE — 99203 OFFICE O/P NEW LOW 30 MIN: CPT

## 2021-01-18 NOTE — PHYSICAL EXAM
[JVD] : no jugular venous distention  [2+] : left 2+ [1+] : left 1+ [0] : left 0 [Ankle Swelling (On Exam)] : not present [Ankle Swelling On The Left] : moderate [Varicose Veins Of Lower Extremities] : bilaterally [] : not present [Skin Ulcer] : ulcer [Alert] : alert [Oriented to Place] : oriented to place [Oriented to Time] : oriented to time [Calm] : calm [de-identified] : L lateral malleolar round wound fibrin filled, elevated edges, no odor non tender, 1 cm  [FreeTextEntry1] : L AT and peroneal absent\par PT mono

## 2021-01-18 NOTE — PLAN
[FreeTextEntry1] : Patient examined and evaluated at this time.\par Continue local wound care and offloading.\par Advised regarding possible necessity for surgical intervention including removal of hardware to help aid in wound healing. Pt currently refusing at this time.\par Pt and daughter advised regarding necessary procedures in order to aid in wound healing to prevent worsening of the wound, infection, sepsis, loss of limb, and death.\par Pt to return in 1 week.\par Spent 20 minutes for patient care and medical decision making.\par

## 2021-01-18 NOTE — HISTORY OF PRESENT ILLNESS
[FreeTextEntry1] : Pt seen for left lateral ankle ulcer. Relates that she will be seeing Dr. Limon for vascuar consult. Relates that she has had her dressings changed as advised since the last visit.

## 2021-01-18 NOTE — REVIEW OF SYSTEMS
[Leg Claudication] : intermittent leg claudication [As Noted in HPI] : as noted in HPI [Skin Wound] : skin wound [Negative] : Heme/Lymph

## 2021-01-18 NOTE — ASSESSMENT
[FreeTextEntry1] : 89 yo lateral malleolar wound. No clear mechanism but it is now larger and deeper than in October. Has hx of BL:E rest pain and abnormal waveforms on ABIS.

## 2021-01-18 NOTE — PHYSICAL EXAM
[2 x 2] : 2 x 2  [0] : left 0 [Varicose Veins Of Lower Extremities] : bilaterally [Ankle Swelling Bilaterally] : severe [] : not present [Skin Ulcer] : ulcer [Calm] : calm [de-identified] : A&Ox3, NAD [de-identified] : 4/5 strength in all quadrants bilaterally, s/p left ankle orif [de-identified] : left lateral ankle ulcer down to skin, subcutaneous tissue, fat [FreeTextEntry1] : Left Lateral Ankle [de-identified] : Light touch sensation intact bilaterally [FreeTextEntry2] : 0.9 [FreeTextEntry3] : 0.7 [FreeTextEntry4] : 0.3 [de-identified] : Serosanguineous [de-identified] : 100% [de-identified] : Medihoney [de-identified] : Cleansed with Normal Saline\par Cloth tape [TWNoteComboBox4] : Small [TWNoteComboBox5] : No [de-identified] : No [de-identified] : Erythema [de-identified] : None [de-identified] : None [de-identified] : None [de-identified] : Yes [de-identified] : 3x Weekly

## 2021-01-18 NOTE — REASON FOR VISIT
[Consultation] : a consultation visit [Family Member] : family member [FreeTextEntry1] : PVD in the setting of L lateral malleolar wound

## 2021-01-18 NOTE — REVIEW OF SYSTEMS
[Fever] : no fever [Eye Pain] : no eye pain [Earache] : no earache [Chest Pain] : no chest pain [Shortness Of Breath] : no shortness of breath [Cough] : no cough [Abdominal Pain] : no abdominal pain [Skin Wound] : skin wound [FreeTextEntry5] : htn, varicose veins [FreeTextEntry9] : s/p left ankle fracture orif [de-identified] : left lateral ankle ulcer down to skin, subcutaneous tissue, fat [de-identified] : hypothyroidism

## 2021-01-18 NOTE — ASSESSMENT
[Verbal] : Verbal [Patient] : Patient [Good - alert, interested, motivated] : Good - alert, interested, motivated [Verbalizes knowledge/Understanding] : Verbalizes knowledge/understanding [Dressing changes] : dressing changes [Foot Care] : foot care [Skin Care] : skin care [Signs and symptoms of infection] : sign and symptoms of infection [How and When to Call] : how and when to call [Patient responsibility to plan of care] : patient responsibility to plan of care [] : Yes [Stable] : stable [Home] : Home [Cane] : Cane [Not Applicable - Long Term Care/Home Health Agency] : Long Term Care/Home Health Agency: Not Applicable [FreeTextEntry2] : Restore Skin Integrity\par Infection Control\par Localized wound care\par Maintain acceptable pain levels at satisfactory relief.\par Demonstrates use of both nonpharmacological and pharmacological pain relief strategies [FreeTextEntry4] : Seen by Dr. De La Torre for Vascular Consult Today.

## 2021-01-19 DIAGNOSIS — Z79.899 OTHER LONG TERM (CURRENT) DRUG THERAPY: ICD-10-CM

## 2021-01-19 DIAGNOSIS — Z83.3 FAMILY HISTORY OF DIABETES MELLITUS: ICD-10-CM

## 2021-01-19 DIAGNOSIS — I83.023 VARICOSE VEINS OF LEFT LOWER EXTREMITY WITH ULCER OF ANKLE: ICD-10-CM

## 2021-01-19 DIAGNOSIS — Z87.891 PERSONAL HISTORY OF NICOTINE DEPENDENCE: ICD-10-CM

## 2021-01-19 DIAGNOSIS — Z87.81 PERSONAL HISTORY OF (HEALED) TRAUMATIC FRACTURE: ICD-10-CM

## 2021-01-19 DIAGNOSIS — M19.90 UNSPECIFIED OSTEOARTHRITIS, UNSPECIFIED SITE: ICD-10-CM

## 2021-01-19 DIAGNOSIS — Z80.0 FAMILY HISTORY OF MALIGNANT NEOPLASM OF DIGESTIVE ORGANS: ICD-10-CM

## 2021-01-19 DIAGNOSIS — Z82.3 FAMILY HISTORY OF STROKE: ICD-10-CM

## 2021-01-19 DIAGNOSIS — I10 ESSENTIAL (PRIMARY) HYPERTENSION: ICD-10-CM

## 2021-01-19 DIAGNOSIS — Z94.7 CORNEAL TRANSPLANT STATUS: ICD-10-CM

## 2021-01-19 DIAGNOSIS — L97.322 NON-PRESSURE CHRONIC ULCER OF LEFT ANKLE WITH FAT LAYER EXPOSED: ICD-10-CM

## 2021-01-19 DIAGNOSIS — E03.9 HYPOTHYROIDISM, UNSPECIFIED: ICD-10-CM

## 2021-02-05 ENCOUNTER — OUTPATIENT (OUTPATIENT)
Dept: OUTPATIENT SERVICES | Facility: HOSPITAL | Age: 86
LOS: 1 days | Discharge: HOME | End: 2021-02-05
Payer: MEDICARE

## 2021-02-05 ENCOUNTER — APPOINTMENT (OUTPATIENT)
Dept: WOUND CARE | Facility: HOSPITAL | Age: 86
End: 2021-02-05
Payer: MEDICARE

## 2021-02-05 VITALS
RESPIRATION RATE: 20 BRPM | DIASTOLIC BLOOD PRESSURE: 69 MMHG | HEIGHT: 60 IN | HEART RATE: 64 BPM | WEIGHT: 120 LBS | BODY MASS INDEX: 23.56 KG/M2 | TEMPERATURE: 97.4 F | OXYGEN SATURATION: 98 % | SYSTOLIC BLOOD PRESSURE: 188 MMHG

## 2021-02-05 DIAGNOSIS — Z96.662 PRESENCE OF LEFT ARTIFICIAL ANKLE JOINT: Chronic | ICD-10-CM

## 2021-02-05 DIAGNOSIS — L97.301 NON-PRESSURE CHRONIC ULCER OF UNSPECIFIED ANKLE LIMITED TO BREAKDOWN OF SKIN: ICD-10-CM

## 2021-02-05 PROCEDURE — 99213 OFFICE O/P EST LOW 20 MIN: CPT

## 2021-02-05 PROCEDURE — G0463: CPT

## 2021-02-05 NOTE — PHYSICAL EXAM
[2 x 2] : 2 x 2  [0] : left 0 [Varicose Veins Of Lower Extremities] : bilaterally [Ankle Swelling Bilaterally] : severe [] : not present [Skin Ulcer] : ulcer [Calm] : calm [de-identified] : A&Ox3, NAD [de-identified] : 4/5 strength in all quadrants bilaterally, s/p left ankle orif [de-identified] : left lateral ankle ulcer down to skin, subcutaneous tissue, fat [de-identified] : Light touch sensation intact bilaterally [FreeTextEntry1] : Left Lateral Ankle [FreeTextEntry2] : 0.9 [FreeTextEntry3] : 0.6 [FreeTextEntry4] : 0.3 [de-identified] : Serosanguineous [de-identified] : 100% [de-identified] : Medihoney [de-identified] : Cleansed with Normal Saline\par Cloth tape [TWNoteComboBox4] : Small [TWNoteComboBox5] : No [de-identified] : No [de-identified] : Erythema [de-identified] : None [de-identified] : None [de-identified] : None [de-identified] : Yes [de-identified] : 3x Weekly

## 2021-02-05 NOTE — REVIEW OF SYSTEMS
[Fever] : no fever [Eye Pain] : no eye pain [Earache] : no earache [Chest Pain] : no chest pain [Shortness Of Breath] : no shortness of breath [Cough] : no cough [Abdominal Pain] : no abdominal pain [Skin Wound] : skin wound [FreeTextEntry5] : htn, varicose veins [FreeTextEntry9] : s/p left ankle fracture orif [de-identified] : left lateral ankle ulcer down to skin, subcutaneous tissue, fat [de-identified] : hypothyroidism

## 2021-02-05 NOTE — HISTORY OF PRESENT ILLNESS
[FreeTextEntry1] : Pt seen for left lateral ankle ulcer. Relates that she has not decided on whether she will have the vascular procedure. Relates that she has had her dressings changed as advised since the last visit.

## 2021-02-05 NOTE — ASSESSMENT
[Verbal] : Verbal [Patient] : Patient [Good - alert, interested, motivated] : Good - alert, interested, motivated [Verbalizes knowledge/Understanding] : Verbalizes knowledge/understanding [Dressing changes] : dressing changes [Foot Care] : foot care [Skin Care] : skin care [Signs and symptoms of infection] : sign and symptoms of infection [How and When to Call] : how and when to call [Patient responsibility to plan of care] : patient responsibility to plan of care [] : Yes [Stable] : stable [Home] : Home [Cane] : Cane [Not Applicable - Long Term Care/Home Health Agency] : Long Term Care/Home Health Agency: Not Applicable [FreeTextEntry2] : Restore Skin Integrity\par Infection Control\par Localized wound care\par Maintain acceptable pain levels at satisfactory relief.\par Demonstrates use of both nonpharmacological and pharmacological pain relief strategies [FreeTextEntry4] : Photos Taken\par Submitted for consult with Sarbjit ([facility])\par Patient discussed benefits of vascular surgery with patient and patient's daughter.\par Submitted for follow up with Dr. Madison.\par Patient to f/u to [facility] in 1 Week

## 2021-02-05 NOTE — PLAN
[FreeTextEntry1] : Patient examined and evaluated at this time.\par Continue local wound care and offloading.\par Advised regarding possible necessity for surgical intervention including removal of hardware to help aid in wound healing. Pt currently refusing at this time.\par Patient to see Dr. Limon for vascular follow up. Patient and daughter advised regarding the necessity in vascular intervention to aid in wound healing to prevent worsening of the wound, infection, sepsis, loss of limb, and death.\par Spent 20 minutes for patient care and medical decision making.\par

## 2021-02-07 DIAGNOSIS — L97.322 NON-PRESSURE CHRONIC ULCER OF LEFT ANKLE WITH FAT LAYER EXPOSED: ICD-10-CM

## 2021-02-07 DIAGNOSIS — I83.023 VARICOSE VEINS OF LEFT LOWER EXTREMITY WITH ULCER OF ANKLE: ICD-10-CM

## 2021-02-07 DIAGNOSIS — Z80.0 FAMILY HISTORY OF MALIGNANT NEOPLASM OF DIGESTIVE ORGANS: ICD-10-CM

## 2021-02-07 DIAGNOSIS — E03.9 HYPOTHYROIDISM, UNSPECIFIED: ICD-10-CM

## 2021-02-07 DIAGNOSIS — I10 ESSENTIAL (PRIMARY) HYPERTENSION: ICD-10-CM

## 2021-02-07 DIAGNOSIS — Z79.899 OTHER LONG TERM (CURRENT) DRUG THERAPY: ICD-10-CM

## 2021-02-07 DIAGNOSIS — Z87.891 PERSONAL HISTORY OF NICOTINE DEPENDENCE: ICD-10-CM

## 2021-02-07 DIAGNOSIS — M19.90 UNSPECIFIED OSTEOARTHRITIS, UNSPECIFIED SITE: ICD-10-CM

## 2021-02-07 DIAGNOSIS — Z82.3 FAMILY HISTORY OF STROKE: ICD-10-CM

## 2021-02-07 DIAGNOSIS — Z87.81 PERSONAL HISTORY OF (HEALED) TRAUMATIC FRACTURE: ICD-10-CM

## 2021-02-07 DIAGNOSIS — Z94.7 CORNEAL TRANSPLANT STATUS: ICD-10-CM

## 2021-02-07 DIAGNOSIS — Z98.1 ARTHRODESIS STATUS: ICD-10-CM

## 2021-02-07 DIAGNOSIS — Z83.3 FAMILY HISTORY OF DIABETES MELLITUS: ICD-10-CM

## 2021-02-08 ENCOUNTER — NON-APPOINTMENT (OUTPATIENT)
Age: 86
End: 2021-02-08

## 2021-02-09 ENCOUNTER — NON-APPOINTMENT (OUTPATIENT)
Age: 86
End: 2021-02-09

## 2021-02-10 ENCOUNTER — APPOINTMENT (OUTPATIENT)
Dept: VASCULAR SURGERY | Facility: CLINIC | Age: 86
End: 2021-02-10
Payer: MEDICARE

## 2021-02-10 ENCOUNTER — OUTPATIENT (OUTPATIENT)
Dept: OUTPATIENT SERVICES | Facility: HOSPITAL | Age: 86
LOS: 1 days | Discharge: HOME | End: 2021-02-10
Payer: MEDICARE

## 2021-02-10 VITALS
DIASTOLIC BLOOD PRESSURE: 71 MMHG | SYSTOLIC BLOOD PRESSURE: 158 MMHG | TEMPERATURE: 97.6 F | WEIGHT: 120 LBS | BODY MASS INDEX: 23.56 KG/M2 | OXYGEN SATURATION: 97 % | HEIGHT: 60 IN | HEART RATE: 61 BPM | RESPIRATION RATE: 20 BRPM

## 2021-02-10 DIAGNOSIS — Z79.899 OTHER LONG TERM (CURRENT) DRUG THERAPY: ICD-10-CM

## 2021-02-10 DIAGNOSIS — I83.023 VARICOSE VEINS OF LEFT LOWER EXTREMITY WITH ULCER OF ANKLE: ICD-10-CM

## 2021-02-10 DIAGNOSIS — E11.621 TYPE 2 DIABETES MELLITUS WITH FOOT ULCER: ICD-10-CM

## 2021-02-10 DIAGNOSIS — L97.322 NON-PRESSURE CHRONIC ULCER OF LEFT ANKLE WITH FAT LAYER EXPOSED: ICD-10-CM

## 2021-02-10 DIAGNOSIS — E03.9 HYPOTHYROIDISM, UNSPECIFIED: ICD-10-CM

## 2021-02-10 DIAGNOSIS — Z80.0 FAMILY HISTORY OF MALIGNANT NEOPLASM OF DIGESTIVE ORGANS: ICD-10-CM

## 2021-02-10 DIAGNOSIS — I83.93 ASYMPTOMATIC VARICOSE VEINS OF BILATERAL LOWER EXTREMITIES: ICD-10-CM

## 2021-02-10 DIAGNOSIS — Z87.81 PERSONAL HISTORY OF (HEALED) TRAUMATIC FRACTURE: ICD-10-CM

## 2021-02-10 DIAGNOSIS — Z87.891 PERSONAL HISTORY OF NICOTINE DEPENDENCE: ICD-10-CM

## 2021-02-10 DIAGNOSIS — Z82.3 FAMILY HISTORY OF STROKE: ICD-10-CM

## 2021-02-10 DIAGNOSIS — M19.90 UNSPECIFIED OSTEOARTHRITIS, UNSPECIFIED SITE: ICD-10-CM

## 2021-02-10 DIAGNOSIS — Z83.3 FAMILY HISTORY OF DIABETES MELLITUS: ICD-10-CM

## 2021-02-10 DIAGNOSIS — I10 ESSENTIAL (PRIMARY) HYPERTENSION: ICD-10-CM

## 2021-02-10 DIAGNOSIS — Z96.662 PRESENCE OF LEFT ARTIFICIAL ANKLE JOINT: Chronic | ICD-10-CM

## 2021-02-10 DIAGNOSIS — I70.223 ATHEROSCLEROSIS OF NATIVE ARTERIES OF EXTREMITIES WITH REST PAIN, BILATERAL LEGS: ICD-10-CM

## 2021-02-10 PROCEDURE — G0463: CPT

## 2021-02-10 PROCEDURE — 99213 OFFICE O/P EST LOW 20 MIN: CPT

## 2021-02-10 NOTE — HISTORY OF PRESENT ILLNESS
[FreeTextEntry1] : 89 yo F with L lateral malleolar wound since October 2020. Does not remember mechanism. It started as a scab and eventually dell off and a larger and deeper wound developed. Patient is active and is tired of dealing with wound for so long. No evidence of osteo on MRI. She does not have LE pain on ambulation but does get B calve cramping at night that improves with ambulation (going on for many months). [de-identified] : Today to go over potential angiogram and answer questions. Wound is stable but slightly enlarged.

## 2021-02-10 NOTE — REASON FOR VISIT
[Follow-Up: _____] : a [unfilled] follow-up visit [Consultation] : a consultation visit [Family Member] : family member [FreeTextEntry1] : PVD in the setting of L lateral malleolar wound

## 2021-02-10 NOTE — ASSESSMENT
[FreeTextEntry1] : 89 yo lateral malleolar wound. No clear mechanism but it is now larger and deeper than in October. Has hx of BL:E rest pain and abnormal waveforms on ABIS. \par Patient does have orthopedic hardware at the joint. No clinical evidence of infection.  no

## 2021-02-10 NOTE — PHYSICAL EXAM
[JVD] : no jugular venous distention  [2+] : left 2+ [1+] : left 1+ [0] : left 0 [Ankle Swelling (On Exam)] : not present [Varicose Veins Of Lower Extremities] : bilaterally [Ankle Swelling On The Left] : moderate [] : not present [Skin Ulcer] : ulcer [Alert] : alert [Oriented to Place] : oriented to place [Oriented to Time] : oriented to time [Calm] : calm [FreeTextEntry1] : L AT and peroneal absent\par PT mono [de-identified] : L lateral malleolar round wound fibrin filled, elevated edges, no odor non tender, 1 cm

## 2021-02-12 ENCOUNTER — APPOINTMENT (OUTPATIENT)
Dept: WOUND CARE | Facility: HOSPITAL | Age: 86
End: 2021-02-12

## 2021-02-17 ENCOUNTER — APPOINTMENT (OUTPATIENT)
Dept: WOUND CARE | Facility: HOSPITAL | Age: 86
End: 2021-02-17
Payer: MEDICARE

## 2021-02-17 ENCOUNTER — OUTPATIENT (OUTPATIENT)
Dept: OUTPATIENT SERVICES | Facility: HOSPITAL | Age: 86
LOS: 1 days | Discharge: HOME | End: 2021-02-17
Payer: MEDICARE

## 2021-02-17 VITALS
SYSTOLIC BLOOD PRESSURE: 158 MMHG | HEART RATE: 66 BPM | BODY MASS INDEX: 23.56 KG/M2 | HEIGHT: 60 IN | RESPIRATION RATE: 20 BRPM | WEIGHT: 120 LBS | DIASTOLIC BLOOD PRESSURE: 71 MMHG | TEMPERATURE: 97.6 F | OXYGEN SATURATION: 99 %

## 2021-02-17 DIAGNOSIS — I83.023 VARICOSE VEINS OF LEFT LOWER EXTREMITY WITH ULCER OF ANKLE: ICD-10-CM

## 2021-02-17 DIAGNOSIS — Z96.662 PRESENCE OF LEFT ARTIFICIAL ANKLE JOINT: Chronic | ICD-10-CM

## 2021-02-17 PROCEDURE — 99213 OFFICE O/P EST LOW 20 MIN: CPT

## 2021-02-17 PROCEDURE — G0463: CPT

## 2021-02-17 NOTE — PHYSICAL EXAM
[2 x 2] : 2 x 2  [0] : left 0 [Varicose Veins Of Lower Extremities] : bilaterally [Ankle Swelling Bilaterally] : severe [] : not present [Skin Ulcer] : ulcer [Calm] : calm [de-identified] : A&Ox3, NAD [de-identified] : 4/5 strength in all quadrants bilaterally, s/p left ankle orif [de-identified] : left lateral ankle ulcer down to skin, subcutaneous tissue, fat [de-identified] : Light touch sensation intact bilaterally [FreeTextEntry1] : Left Lateral Ankle [FreeTextEntry2] : 0.9 [FreeTextEntry3] : 0.6 [FreeTextEntry4] : 0.3 [de-identified] : Serosanguineous [de-identified] : 100% [de-identified] : Medihoney [de-identified] : Cleansed with Normal Saline\par Cloth tape [TWNoteComboBox4] : Small [TWNoteComboBox5] : No [de-identified] : No [de-identified] : Erythema [de-identified] : None [de-identified] : None [de-identified] : None [de-identified] : Yes [de-identified] : 3x Weekly

## 2021-02-17 NOTE — PLAN
[FreeTextEntry1] : continue off loading and wound care . Patient to have vascular procedure with Dr Limon Spent 20 minutes for patient care and medical decision making.\par

## 2021-02-17 NOTE — VITALS
[Aching] : aching [] : No [de-identified] : Patient reports pain is 2/10 [FreeTextEntry3] : Left Lateral [FreeTextEntry1] : Advil PM [FreeTextEntry4] : Advil PM at home [FreeTextEntry2] : Night time

## 2021-02-17 NOTE — REVIEW OF SYSTEMS
[Fever] : no fever [Eye Pain] : no eye pain [Earache] : no earache [Chest Pain] : no chest pain [Shortness Of Breath] : no shortness of breath [Cough] : no cough [Abdominal Pain] : no abdominal pain [Skin Wound] : skin wound [Anxiety] : no anxiety [Easy Bleeding] : no tendency for easy bleeding [Negative] : Endocrine [FreeTextEntry5] : htn, varicose veins , PVD [FreeTextEntry9] : s/p left ankle fracture orif [de-identified] : left lateral ankle ulcer down to skin, subcutaneous tissue, fat , associated with PVD , patient is former smoker  [de-identified] : hypothyroidism

## 2021-02-17 NOTE — ASSESSMENT
[Verbal] : Verbal [Patient] : Patient [Good - alert, interested, motivated] : Good - alert, interested, motivated [Verbalizes knowledge/Understanding] : Verbalizes knowledge/understanding [Dressing changes] : dressing changes [Foot Care] : foot care [Skin Care] : skin care [Signs and symptoms of infection] : sign and symptoms of infection [How and When to Call] : how and when to call [Patient responsibility to plan of care] : patient responsibility to plan of care [] : Yes [Stable] : stable [Home] : Home [Cane] : Cane [Not Applicable - Long Term Care/Home Health Agency] : Long Term Care/Home Health Agency: Not Applicable [FreeTextEntry2] : Restore Skin Integrity\par Infection Control\par Localized wound care\par Maintain acceptable pain levels at satisfactory relief.\par Demonstrates use of both nonpharmacological and pharmacological pain relief strategies [FreeTextEntry4] : Photos Taken\par DPM educated patient on benefits and risks regarding vascular procedure as well as risks if patient does not have procedure done. Patient verbalized understanding. Patient reports she will set up procedure with Dr. Madison.\par Patient to f/u to [facility] in 1 Week

## 2021-02-17 NOTE — HISTORY OF PRESENT ILLNESS
[FreeTextEntry1] : ischemic vascular ulcer lateral left malleolus , pain at night , patient scheduled to have angiogram with Dr Limon

## 2021-02-18 DIAGNOSIS — Z82.3 FAMILY HISTORY OF STROKE: ICD-10-CM

## 2021-02-18 DIAGNOSIS — Z83.3 FAMILY HISTORY OF DIABETES MELLITUS: ICD-10-CM

## 2021-02-18 DIAGNOSIS — Z87.81 PERSONAL HISTORY OF (HEALED) TRAUMATIC FRACTURE: ICD-10-CM

## 2021-02-18 DIAGNOSIS — Z80.0 FAMILY HISTORY OF MALIGNANT NEOPLASM OF DIGESTIVE ORGANS: ICD-10-CM

## 2021-02-18 DIAGNOSIS — Z79.899 OTHER LONG TERM (CURRENT) DRUG THERAPY: ICD-10-CM

## 2021-02-18 DIAGNOSIS — E03.9 HYPOTHYROIDISM, UNSPECIFIED: ICD-10-CM

## 2021-02-18 DIAGNOSIS — L97.322 NON-PRESSURE CHRONIC ULCER OF LEFT ANKLE WITH FAT LAYER EXPOSED: ICD-10-CM

## 2021-02-18 DIAGNOSIS — I83.023 VARICOSE VEINS OF LEFT LOWER EXTREMITY WITH ULCER OF ANKLE: ICD-10-CM

## 2021-02-18 DIAGNOSIS — Z87.891 PERSONAL HISTORY OF NICOTINE DEPENDENCE: ICD-10-CM

## 2021-02-18 DIAGNOSIS — M19.90 UNSPECIFIED OSTEOARTHRITIS, UNSPECIFIED SITE: ICD-10-CM

## 2021-02-18 DIAGNOSIS — I10 ESSENTIAL (PRIMARY) HYPERTENSION: ICD-10-CM

## 2021-02-18 DIAGNOSIS — I70.223 ATHEROSCLEROSIS OF NATIVE ARTERIES OF EXTREMITIES WITH REST PAIN, BILATERAL LEGS: ICD-10-CM

## 2021-02-18 DIAGNOSIS — Z98.1 ARTHRODESIS STATUS: ICD-10-CM

## 2021-03-03 ENCOUNTER — APPOINTMENT (OUTPATIENT)
Dept: WOUND CARE | Facility: HOSPITAL | Age: 86
End: 2021-03-03
Payer: MEDICARE

## 2021-03-03 ENCOUNTER — OUTPATIENT (OUTPATIENT)
Dept: OUTPATIENT SERVICES | Facility: HOSPITAL | Age: 86
LOS: 1 days | Discharge: HOME | End: 2021-03-03
Payer: MEDICARE

## 2021-03-03 VITALS
WEIGHT: 120 LBS | DIASTOLIC BLOOD PRESSURE: 63 MMHG | RESPIRATION RATE: 20 BRPM | TEMPERATURE: 97.6 F | OXYGEN SATURATION: 100 % | SYSTOLIC BLOOD PRESSURE: 171 MMHG | HEART RATE: 57 BPM | BODY MASS INDEX: 23.56 KG/M2 | HEIGHT: 60 IN

## 2021-03-03 DIAGNOSIS — E03.9 HYPOTHYROIDISM, UNSPECIFIED: ICD-10-CM

## 2021-03-03 DIAGNOSIS — Z79.899 OTHER LONG TERM (CURRENT) DRUG THERAPY: ICD-10-CM

## 2021-03-03 DIAGNOSIS — Z80.0 FAMILY HISTORY OF MALIGNANT NEOPLASM OF DIGESTIVE ORGANS: ICD-10-CM

## 2021-03-03 DIAGNOSIS — Z96.662 PRESENCE OF LEFT ARTIFICIAL ANKLE JOINT: Chronic | ICD-10-CM

## 2021-03-03 DIAGNOSIS — I10 ESSENTIAL (PRIMARY) HYPERTENSION: ICD-10-CM

## 2021-03-03 DIAGNOSIS — M19.90 UNSPECIFIED OSTEOARTHRITIS, UNSPECIFIED SITE: ICD-10-CM

## 2021-03-03 DIAGNOSIS — I83.023 VARICOSE VEINS OF LEFT LOWER EXTREMITY WITH ULCER OF ANKLE: ICD-10-CM

## 2021-03-03 DIAGNOSIS — L97.322 NON-PRESSURE CHRONIC ULCER OF LEFT ANKLE WITH FAT LAYER EXPOSED: ICD-10-CM

## 2021-03-03 DIAGNOSIS — Z98.1 ARTHRODESIS STATUS: ICD-10-CM

## 2021-03-03 DIAGNOSIS — Z82.3 FAMILY HISTORY OF STROKE: ICD-10-CM

## 2021-03-03 DIAGNOSIS — Z83.3 FAMILY HISTORY OF DIABETES MELLITUS: ICD-10-CM

## 2021-03-03 DIAGNOSIS — Z87.891 PERSONAL HISTORY OF NICOTINE DEPENDENCE: ICD-10-CM

## 2021-03-03 DIAGNOSIS — Z87.81 PERSONAL HISTORY OF (HEALED) TRAUMATIC FRACTURE: ICD-10-CM

## 2021-03-03 DIAGNOSIS — I70.233 ATHEROSCLEROSIS OF NATIVE ARTERIES OF RIGHT LEG WITH ULCERATION OF ANKLE: ICD-10-CM

## 2021-03-03 PROCEDURE — 99213 OFFICE O/P EST LOW 20 MIN: CPT

## 2021-03-03 PROCEDURE — G0463: CPT

## 2021-03-03 NOTE — HISTORY OF PRESENT ILLNESS
[FreeTextEntry1] : Pt seen for left lateral ankle ulcer. Relates that she is scheduled to have the vascular procedure with Dr. Limon. Relates that she has had her dressings changed as advised since the last visit.

## 2021-03-03 NOTE — ASSESSMENT
[Verbal] : Verbal [Patient] : Patient [Good - alert, interested, motivated] : Good - alert, interested, motivated [Verbalizes knowledge/Understanding] : Verbalizes knowledge/understanding [Dressing changes] : dressing changes [Foot Care] : foot care [Skin Care] : skin care [Signs and symptoms of infection] : sign and symptoms of infection [How and When to Call] : how and when to call [Patient responsibility to plan of care] : patient responsibility to plan of care [] : Yes [Stable] : stable [Home] : Home [Cane] : Cane [Not Applicable - Long Term Care/Home Health Agency] : Long Term Care/Home Health Agency: Not Applicable [FreeTextEntry2] : Restore Skin Integrity\par Infection Control\par Localized wound care\par Enzymatic Debridement \par Maintain acceptable pain levels at satisfactory relief.\par Demonstrates use of both nonpharmacological and pharmacological pain relief strategies [FreeTextEntry4] : Pt has a Angiogram on 3/23/ 2021 with Dr. Madison.\par Pt has appointment on 3//13/21 at [place] for 1st Covid Vaccination.\par Patient to f/u to [facility] in 2 Weeks

## 2021-03-03 NOTE — PLAN
[FreeTextEntry1] : Patient examined and evaluated at this time.\par Continue local wound care and offloading.\par Advised regarding possible necessity for surgical intervention including removal of hardware to help aid in wound healing. Pt still refusing at this time.\par Patient to have vascular intervention with Dr. Limon. Patient and daughter advised regarding the necessity in vascular intervention to aid in wound healing to prevent worsening of the wound, infection, sepsis, loss of limb, and death.\par Spent 20 minutes for patient care and medical decision making.\par

## 2021-03-03 NOTE — REVIEW OF SYSTEMS
[Fever] : no fever [Eye Pain] : no eye pain [Earache] : no earache [Chest Pain] : no chest pain [Shortness Of Breath] : no shortness of breath [Cough] : no cough [Abdominal Pain] : no abdominal pain [Skin Wound] : skin wound [FreeTextEntry5] : htn, varicose veins [FreeTextEntry9] : s/p left ankle fracture orif [de-identified] : left lateral ankle ulcer down to skin, subcutaneous tissue, fat [de-identified] : hypothyroidism

## 2021-03-12 DIAGNOSIS — Z01.818 ENCOUNTER FOR OTHER PREPROCEDURAL EXAMINATION: ICD-10-CM

## 2021-03-13 ENCOUNTER — APPOINTMENT (OUTPATIENT)
Dept: DISASTER EMERGENCY | Facility: CLINIC | Age: 86
End: 2021-03-13

## 2021-03-14 LAB — SARS-COV-2 N GENE NPH QL NAA+PROBE: NOT DETECTED

## 2021-03-15 ENCOUNTER — TRANSCRIPTION ENCOUNTER (OUTPATIENT)
Age: 86
End: 2021-03-15

## 2021-03-15 RX ORDER — SODIUM CHLORIDE 9 MG/ML
1000 INJECTION INTRAMUSCULAR; INTRAVENOUS; SUBCUTANEOUS
Refills: 0 | Status: DISCONTINUED | OUTPATIENT
Start: 2021-03-16 | End: 2021-03-30

## 2021-03-16 ENCOUNTER — OUTPATIENT (OUTPATIENT)
Dept: OUTPATIENT SERVICES | Facility: HOSPITAL | Age: 86
LOS: 1 days | End: 2021-03-16
Payer: MEDICARE

## 2021-03-16 ENCOUNTER — APPOINTMENT (OUTPATIENT)
Dept: VASCULAR SURGERY | Facility: HOSPITAL | Age: 86
End: 2021-03-16

## 2021-03-16 DIAGNOSIS — I73.9 PERIPHERAL VASCULAR DISEASE, UNSPECIFIED: ICD-10-CM

## 2021-03-16 DIAGNOSIS — Z96.662 PRESENCE OF LEFT ARTIFICIAL ANKLE JOINT: Chronic | ICD-10-CM

## 2021-03-16 PROCEDURE — 75625 CONTRAST EXAM ABDOMINL AORTA: CPT

## 2021-03-16 PROCEDURE — C1769: CPT

## 2021-03-16 PROCEDURE — C1760: CPT

## 2021-03-16 PROCEDURE — 76000 FLUOROSCOPY <1 HR PHYS/QHP: CPT

## 2021-03-16 PROCEDURE — 37228: CPT

## 2021-03-16 PROCEDURE — 37232: CPT

## 2021-03-16 PROCEDURE — 76937 US GUIDE VASCULAR ACCESS: CPT

## 2021-03-16 PROCEDURE — C1887: CPT

## 2021-03-16 PROCEDURE — C1894: CPT

## 2021-03-16 PROCEDURE — 37224: CPT | Mod: LT

## 2021-03-16 PROCEDURE — G0269: CPT

## 2021-03-16 PROCEDURE — 37228: CPT | Mod: LT

## 2021-03-16 PROCEDURE — 36200 PLACE CATHETER IN AORTA: CPT

## 2021-03-16 PROCEDURE — 75630 X-RAY AORTA LEG ARTERIES: CPT

## 2021-03-16 PROCEDURE — 37224: CPT

## 2021-03-16 PROCEDURE — 75710 ARTERY X-RAYS ARM/LEG: CPT | Mod: XU

## 2021-03-16 PROCEDURE — 76937 US GUIDE VASCULAR ACCESS: CPT | Mod: 26

## 2021-03-16 PROCEDURE — C1726: CPT

## 2021-03-16 PROCEDURE — 36140 INTRO NDL ICATH UPR/LXTR ART: CPT

## 2021-03-16 PROCEDURE — 75630 X-RAY AORTA LEG ARTERIES: CPT | Mod: 26,59

## 2021-03-16 RX ORDER — CEFAZOLIN SODIUM 1 G
1000 VIAL (EA) INJECTION ONCE
Refills: 0 | Status: COMPLETED | OUTPATIENT
Start: 2021-03-16 | End: 2021-03-16

## 2021-03-16 RX ORDER — MIDAZOLAM HYDROCHLORIDE 1 MG/ML
5 INJECTION, SOLUTION INTRAMUSCULAR; INTRAVENOUS ONCE
Refills: 0 | Status: DISCONTINUED | OUTPATIENT
Start: 2021-03-16 | End: 2021-03-16

## 2021-03-16 RX ORDER — ASPIRIN/CALCIUM CARB/MAGNESIUM 324 MG
325 TABLET ORAL DAILY
Refills: 0 | Status: DISCONTINUED | OUTPATIENT
Start: 2021-03-16 | End: 2021-03-30

## 2021-03-16 RX ORDER — CLOPIDOGREL BISULFATE 75 MG/1
1 TABLET, FILM COATED ORAL
Qty: 90 | Refills: 0
Start: 2021-03-16 | End: 2021-06-13

## 2021-03-16 RX ORDER — ASPIRIN/CALCIUM CARB/MAGNESIUM 324 MG
1 TABLET ORAL
Qty: 90 | Refills: 0
Start: 2021-03-16 | End: 2021-04-14

## 2021-03-16 RX ORDER — FENTANYL CITRATE 50 UG/ML
100 INJECTION INTRAVENOUS ONCE
Refills: 0 | Status: DISCONTINUED | OUTPATIENT
Start: 2021-03-16 | End: 2021-03-16

## 2021-03-16 RX ORDER — CLOPIDOGREL BISULFATE 75 MG/1
75 TABLET, FILM COATED ORAL DAILY
Refills: 0 | Status: DISCONTINUED | OUTPATIENT
Start: 2021-03-16 | End: 2021-03-30

## 2021-03-16 RX ADMIN — SODIUM CHLORIDE 75 MILLILITER(S): 9 INJECTION INTRAMUSCULAR; INTRAVENOUS; SUBCUTANEOUS at 08:30

## 2021-03-16 RX ADMIN — Medication 100 MILLIGRAM(S): at 08:28

## 2021-03-16 RX ADMIN — Medication 325 MILLIGRAM(S): at 11:17

## 2021-03-16 RX ADMIN — CLOPIDOGREL BISULFATE 75 MILLIGRAM(S): 75 TABLET, FILM COATED ORAL at 11:18

## 2021-03-16 RX ADMIN — FENTANYL CITRATE 100 MICROGRAM(S): 50 INJECTION INTRAVENOUS at 09:16

## 2021-03-16 RX ADMIN — FENTANYL CITRATE 100 MICROGRAM(S): 50 INJECTION INTRAVENOUS at 08:44

## 2021-03-16 RX ADMIN — MIDAZOLAM HYDROCHLORIDE 5 MILLIGRAM(S): 1 INJECTION, SOLUTION INTRAMUSCULAR; INTRAVENOUS at 08:45

## 2021-03-17 ENCOUNTER — APPOINTMENT (OUTPATIENT)
Dept: WOUND CARE | Facility: HOSPITAL | Age: 86
End: 2021-03-17

## 2021-03-20 ENCOUNTER — APPOINTMENT (OUTPATIENT)
Dept: DISASTER EMERGENCY | Facility: CLINIC | Age: 86
End: 2021-03-20

## 2021-04-05 ENCOUNTER — APPOINTMENT (OUTPATIENT)
Dept: WOUND CARE | Facility: HOSPITAL | Age: 86
End: 2021-04-05
Payer: MEDICARE

## 2021-04-05 ENCOUNTER — OUTPATIENT (OUTPATIENT)
Dept: OUTPATIENT SERVICES | Facility: HOSPITAL | Age: 86
LOS: 1 days | Discharge: HOME | End: 2021-04-05
Payer: MEDICARE

## 2021-04-05 ENCOUNTER — APPOINTMENT (OUTPATIENT)
Dept: VASCULAR SURGERY | Facility: CLINIC | Age: 86
End: 2021-04-05
Payer: MEDICARE

## 2021-04-05 VITALS
OXYGEN SATURATION: 100 % | WEIGHT: 120 LBS | DIASTOLIC BLOOD PRESSURE: 64 MMHG | SYSTOLIC BLOOD PRESSURE: 158 MMHG | RESPIRATION RATE: 18 BRPM | TEMPERATURE: 96.8 F | HEIGHT: 60 IN | BODY MASS INDEX: 23.56 KG/M2 | HEART RATE: 61 BPM

## 2021-04-05 DIAGNOSIS — L97.322 NON-PRESSURE CHRONIC ULCER OF LEFT ANKLE WITH FAT LAYER EXPOSED: ICD-10-CM

## 2021-04-05 DIAGNOSIS — Z96.662 PRESENCE OF LEFT ARTIFICIAL ANKLE JOINT: Chronic | ICD-10-CM

## 2021-04-05 PROCEDURE — G0463: CPT

## 2021-04-05 PROCEDURE — 99212 OFFICE O/P EST SF 10 MIN: CPT

## 2021-04-05 PROCEDURE — 99213 OFFICE O/P EST LOW 20 MIN: CPT

## 2021-04-05 NOTE — REASON FOR VISIT
[Follow-Up: _____] : a [unfilled] follow-up visit [de-identified] : LLE angio SFA PT PTA [de-identified] : 3/16/21 [de-identified] : Post LLE angio and intervention for L lateral malleolar chronic open wound.  [Family Member] : family member

## 2021-04-05 NOTE — VITALS
[Tender] : tender [] : No [FreeTextEntry3] : Left Ankle [FreeTextEntry1] : Not Touching [FreeTextEntry2] : Tender To Touch

## 2021-04-05 NOTE — REASON FOR VISIT
[Follow-Up: _____] : a [unfilled] follow-up visit [de-identified] : LLE angio SFA PT PTA [de-identified] : 3/16/21 [de-identified] : Post LLE angio and intervention for L lateral malleolar chronic open wound.  [Family Member] : family member

## 2021-04-05 NOTE — PHYSICAL EXAM
[0] : left 0 [Varicose Veins Of Lower Extremities] : bilaterally [Ankle Swelling Bilaterally] : severe [Skin Ulcer] : ulcer [Calm] : calm [] : not present [de-identified] : A&Ox3, NAD [de-identified] : 4/5 strength in all quadrants bilaterally, s/p left ankle orif [de-identified] : left lateral ankle ulcer down to skin, subcutaneous tissue, fat [de-identified] : Light touch sensation intact bilaterally [FreeTextEntry1] : Left Lateral Ankle [de-identified] : Serosanguineous [de-identified] : Slight Erythema [de-identified] : 100% [de-identified] : Medihoney [de-identified] : Cleansed with Normal Saline\par Cloth Tape [TWNoteComboBox4] : Small [TWNoteComboBox5] : No [de-identified] : No [de-identified] : None [de-identified] : None [de-identified] : None [de-identified] : Yes [de-identified] : 3x Weekly

## 2021-04-05 NOTE — PHYSICAL EXAM
[2 x 2] : 2 x 2  [2+] : left 2+ [1+] : left 1+ [Ankle Swelling (On Exam)] : not present [Varicose Veins Of Lower Extremities] : bilaterally [Ankle Swelling On The Left] : moderate [] : not present [Skin Ulcer] : ulcer [Alert] : alert [Oriented to Person] : oriented to person [Oriented to Place] : oriented to place [Oriented to Time] : oriented to time [FreeTextEntry1] : L Peroneal biphasic signal [de-identified] : lateral malleolar wound 5 mm round wound covered with fibrin, thin layer covering hardware, no erythema

## 2021-04-05 NOTE — DISCUSSION/SUMMARY
[FreeTextEntry1] : LLE is well perfused.  R-sided back pain, R upper abdominal pain, increased urinary frequency

## 2021-04-05 NOTE — ASSESSMENT
[] : Yes [Faxed - Long Term Care/Home Health Agency] : Long Term Care/Home Health Agency: Faxed [FreeTextEntry2] : Restore Skin Integrity\par Infection Control\par Localized wound care\par Maintain acceptable pain levels at satisfactory relief.\par Demonstrates use of both nonpharmacological and pharmacological pain relief strategies [FreeTextEntry4] : Photos Taken\par Seen By Vascular Surgeon Dr. De La Torre, Script given for Vascular Studies, Auth Submitted. Patient Stated understanding, F/U Post Studies with Vascular Surgeon.\par F/U to [facility] in 1 week [FreeTextEntry1] : [facility]

## 2021-04-05 NOTE — REVIEW OF SYSTEMS
[Skin Wound] : skin wound [Fever] : no fever [Eye Pain] : no eye pain [Earache] : no earache [Chest Pain] : no chest pain [Shortness Of Breath] : no shortness of breath [Cough] : no cough [Abdominal Pain] : no abdominal pain [FreeTextEntry5] : htn, varicose veins [FreeTextEntry9] : s/p left ankle fracture orif [de-identified] : left lateral ankle ulcer down to skin, subcutaneous tissue, fat [de-identified] : hypothyroidism

## 2021-04-05 NOTE — ASSESSMENT
[Verbal] : Verbal [Patient] : Patient [Good - alert, interested, motivated] : Good - alert, interested, motivated [Verbalizes knowledge/Understanding] : Verbalizes knowledge/understanding [Dressing changes] : dressing changes [Foot Care] : foot care [Skin Care] : skin care [Signs and symptoms of infection] : sign and symptoms of infection [How and When to Call] : how and when to call [Patient responsibility to plan of care] : patient responsibility to plan of care [] : Yes [Stable] : stable [Home] : Home [Ambulatory] : Ambulatory

## 2021-04-05 NOTE — PLAN
[FreeTextEntry1] : Patient examined and evaluated at this time.\par Continue local wound care and offloading.\par Advised regarding possible necessity for surgical intervention including removal of hardware to help aid in wound healing. Pt still refusing at this time.\par Spent 20 minutes for patient care and medical decision making.\par Pt to follow up in 1 week.

## 2021-04-05 NOTE — PHYSICAL EXAM
[2 x 2] : 2 x 2  [2+] : left 2+ [1+] : left 1+ [Ankle Swelling (On Exam)] : not present [Varicose Veins Of Lower Extremities] : bilaterally [Ankle Swelling On The Left] : moderate [] : not present [Skin Ulcer] : ulcer [Alert] : alert [Oriented to Person] : oriented to person [Oriented to Place] : oriented to place [Oriented to Time] : oriented to time [FreeTextEntry1] : L Peroneal biphasic signal [de-identified] : lateral malleolar wound 5 mm round wound covered with fibrin, thin layer covering hardware, no erythema

## 2021-04-06 DIAGNOSIS — Z98.1 ARTHRODESIS STATUS: ICD-10-CM

## 2021-04-06 DIAGNOSIS — L97.322 NON-PRESSURE CHRONIC ULCER OF LEFT ANKLE WITH FAT LAYER EXPOSED: ICD-10-CM

## 2021-04-06 DIAGNOSIS — Z87.81 PERSONAL HISTORY OF (HEALED) TRAUMATIC FRACTURE: ICD-10-CM

## 2021-04-06 DIAGNOSIS — I70.223 ATHEROSCLEROSIS OF NATIVE ARTERIES OF EXTREMITIES WITH REST PAIN, BILATERAL LEGS: ICD-10-CM

## 2021-04-06 DIAGNOSIS — Z87.891 PERSONAL HISTORY OF NICOTINE DEPENDENCE: ICD-10-CM

## 2021-04-06 DIAGNOSIS — I83.023 VARICOSE VEINS OF LEFT LOWER EXTREMITY WITH ULCER OF ANKLE: ICD-10-CM

## 2021-04-06 DIAGNOSIS — Z80.0 FAMILY HISTORY OF MALIGNANT NEOPLASM OF DIGESTIVE ORGANS: ICD-10-CM

## 2021-04-06 DIAGNOSIS — Z82.3 FAMILY HISTORY OF STROKE: ICD-10-CM

## 2021-04-06 DIAGNOSIS — I10 ESSENTIAL (PRIMARY) HYPERTENSION: ICD-10-CM

## 2021-04-06 DIAGNOSIS — E03.9 HYPOTHYROIDISM, UNSPECIFIED: ICD-10-CM

## 2021-04-06 DIAGNOSIS — M19.90 UNSPECIFIED OSTEOARTHRITIS, UNSPECIFIED SITE: ICD-10-CM

## 2021-04-06 DIAGNOSIS — Z83.3 FAMILY HISTORY OF DIABETES MELLITUS: ICD-10-CM

## 2021-04-06 DIAGNOSIS — Z79.899 OTHER LONG TERM (CURRENT) DRUG THERAPY: ICD-10-CM

## 2021-04-12 ENCOUNTER — APPOINTMENT (OUTPATIENT)
Dept: SURGERY | Facility: HOSPITAL | Age: 86
End: 2021-04-12
Payer: MEDICARE

## 2021-04-12 ENCOUNTER — APPOINTMENT (OUTPATIENT)
Dept: WOUND CARE | Facility: HOSPITAL | Age: 86
End: 2021-04-12

## 2021-04-12 ENCOUNTER — OUTPATIENT (OUTPATIENT)
Dept: OUTPATIENT SERVICES | Facility: HOSPITAL | Age: 86
LOS: 1 days | Discharge: HOME | End: 2021-04-12
Payer: MEDICARE

## 2021-04-12 VITALS
BODY MASS INDEX: 23.56 KG/M2 | RESPIRATION RATE: 18 BRPM | TEMPERATURE: 97.1 F | SYSTOLIC BLOOD PRESSURE: 147 MMHG | WEIGHT: 120 LBS | HEART RATE: 68 BPM | DIASTOLIC BLOOD PRESSURE: 59 MMHG | HEIGHT: 60 IN | OXYGEN SATURATION: 98 %

## 2021-04-12 DIAGNOSIS — I83.023 VARICOSE VEINS OF LEFT LOWER EXTREMITY WITH ULCER OF ANKLE: ICD-10-CM

## 2021-04-12 DIAGNOSIS — Z96.662 PRESENCE OF LEFT ARTIFICIAL ANKLE JOINT: Chronic | ICD-10-CM

## 2021-04-12 PROCEDURE — G0463: CPT

## 2021-04-12 PROCEDURE — 99213 OFFICE O/P EST LOW 20 MIN: CPT

## 2021-04-12 NOTE — ASSESSMENT
[Verbal] : Verbal [Written] : Written [Demo] : Demo [Patient] : Patient [Fair - mild discomfort, physical impairment, low acceptance] : Fair - mild discomfort, physical impairment, low acceptance [Needs reinforcement] : needs reinforcement [Dressing changes] : dressing changes [Skin Care] : skin care [Pressure relief] : pressure relief [Signs and symptoms of infection] : sign and symptoms of infection [Nutrition] : nutrition [How and When to Call] : how and when to call [Patient responsibility to plan of care] : patient responsibility to plan of care [Stable] : stable [Home] : Home [Ambulatory] : Ambulatory [Not Applicable - Long Term Care/Home Health Agency] : Long Term Care/Home Health Agency: Not Applicable [] : No [FreeTextEntry2] : Infection Prevention\par Localized Wound Care\par Offloading / Pressure Relief\par Promote Optimal Skin Integrity\par Maintain acceptable pain level with use of pharmacological and nonpharmacological interventions  [FreeTextEntry3] : No change [FreeTextEntry4] : F/U to [facility] in One Week for assessment\par  [FreeTextEntry1] : Left lateral ankle ulcer is healing well, no acute infection\par

## 2021-04-12 NOTE — VITALS
[] : No [de-identified] : Pt rates pain 0/10.  Patient denies any pain or discomfort at the present

## 2021-04-12 NOTE — PHYSICAL EXAM
[2 x 2] : 2 x 2  [Normal Breath Sounds] : Normal breath sounds [Normal Heart Sounds] : normal heart sounds [1+] : left 1+ [0] : left 0 [Ankle Swelling (On Exam)] : present [Ankle Swelling Bilaterally] : bilaterally  [Ankle Swelling On The Right] : mild [Varicose Veins Of Lower Extremities] : bilaterally [] : of the left leg [Ankle Swelling On The Left] : moderate [Alert] : alert [Oriented to Person] : oriented to person [Calm] : calm [de-identified] : WD/WN in no acute distress. [de-identified] : WNL [de-identified] : QINGL [de-identified] : WNL [de-identified] : Left lateral ankle ulcer is almost healed, no drainage, no odor, no acute infection, periwound skin is intact with no cellulitis. [FreeTextEntry1] : Left Lateral Ankle [FreeTextEntry2] : 0.9 [FreeTextEntry3] : 0.7 [FreeTextEntry4] : 0.3 [de-identified] : Serosanguineous [de-identified] : Slight Erythema [de-identified] : 100% [de-identified] : Silver Alginate [de-identified] : Cleansed with Normal Saline\par Cloth Tape [TWNoteComboBox4] : Small [TWNoteComboBox5] : No [de-identified] : No [de-identified] : None [de-identified] : None [de-identified] : None [de-identified] : Yes [de-identified] : 3x Weekly

## 2021-04-12 NOTE — PLAN
[FreeTextEntry1] : Silver Alginate, dry dressing, return to office in two weeks.\par 25 minutes spent for patient care and medical decision making.\par

## 2021-04-13 DIAGNOSIS — L97.322 NON-PRESSURE CHRONIC ULCER OF LEFT ANKLE WITH FAT LAYER EXPOSED: ICD-10-CM

## 2021-04-13 DIAGNOSIS — Z79.899 OTHER LONG TERM (CURRENT) DRUG THERAPY: ICD-10-CM

## 2021-04-13 DIAGNOSIS — Z87.81 PERSONAL HISTORY OF (HEALED) TRAUMATIC FRACTURE: ICD-10-CM

## 2021-04-13 DIAGNOSIS — Z80.0 FAMILY HISTORY OF MALIGNANT NEOPLASM OF DIGESTIVE ORGANS: ICD-10-CM

## 2021-04-13 DIAGNOSIS — E03.9 HYPOTHYROIDISM, UNSPECIFIED: ICD-10-CM

## 2021-04-13 DIAGNOSIS — Z82.3 FAMILY HISTORY OF STROKE: ICD-10-CM

## 2021-04-13 DIAGNOSIS — I83.023 VARICOSE VEINS OF LEFT LOWER EXTREMITY WITH ULCER OF ANKLE: ICD-10-CM

## 2021-04-13 DIAGNOSIS — I10 ESSENTIAL (PRIMARY) HYPERTENSION: ICD-10-CM

## 2021-04-13 DIAGNOSIS — Z83.3 FAMILY HISTORY OF DIABETES MELLITUS: ICD-10-CM

## 2021-04-13 DIAGNOSIS — Z87.891 PERSONAL HISTORY OF NICOTINE DEPENDENCE: ICD-10-CM

## 2021-04-13 DIAGNOSIS — M19.90 UNSPECIFIED OSTEOARTHRITIS, UNSPECIFIED SITE: ICD-10-CM

## 2021-04-13 DIAGNOSIS — I70.223 ATHEROSCLEROSIS OF NATIVE ARTERIES OF EXTREMITIES WITH REST PAIN, BILATERAL LEGS: ICD-10-CM

## 2021-04-13 DIAGNOSIS — Z98.1 ARTHRODESIS STATUS: ICD-10-CM

## 2021-04-16 ENCOUNTER — NON-APPOINTMENT (OUTPATIENT)
Age: 86
End: 2021-04-16

## 2021-04-19 ENCOUNTER — OUTPATIENT (OUTPATIENT)
Dept: OUTPATIENT SERVICES | Facility: HOSPITAL | Age: 86
LOS: 1 days | Discharge: HOME | End: 2021-04-19
Payer: MEDICARE

## 2021-04-19 ENCOUNTER — APPOINTMENT (OUTPATIENT)
Dept: SURGERY | Facility: HOSPITAL | Age: 86
End: 2021-04-19
Payer: MEDICARE

## 2021-04-19 ENCOUNTER — APPOINTMENT (OUTPATIENT)
Dept: WOUND CARE | Facility: HOSPITAL | Age: 86
End: 2021-04-19

## 2021-04-19 VITALS
WEIGHT: 120 LBS | BODY MASS INDEX: 23.56 KG/M2 | HEIGHT: 60 IN | SYSTOLIC BLOOD PRESSURE: 146 MMHG | OXYGEN SATURATION: 100 % | HEART RATE: 62 BPM | TEMPERATURE: 97.1 F | DIASTOLIC BLOOD PRESSURE: 55 MMHG | RESPIRATION RATE: 18 BRPM

## 2021-04-19 DIAGNOSIS — I83.023 VARICOSE VEINS OF LEFT LOWER EXTREMITY WITH ULCER OF ANKLE: ICD-10-CM

## 2021-04-19 DIAGNOSIS — Z96.662 PRESENCE OF LEFT ARTIFICIAL ANKLE JOINT: Chronic | ICD-10-CM

## 2021-04-19 PROCEDURE — 99213 OFFICE O/P EST LOW 20 MIN: CPT

## 2021-04-19 PROCEDURE — G0463: CPT

## 2021-04-19 NOTE — PHYSICAL EXAM
[Normal Breath Sounds] : Normal breath sounds [Normal Heart Sounds] : normal heart sounds [1+] : left 1+ [0] : left 0 [Ankle Swelling (On Exam)] : present [Ankle Swelling Bilaterally] : bilaterally  [Ankle Swelling On The Right] : mild [Varicose Veins Of Lower Extremities] : bilaterally [] : of the left leg [Ankle Swelling On The Left] : moderate [Alert] : alert [Oriented to Person] : oriented to person [Calm] : calm [de-identified] : WD/WN in no acute distress. [de-identified] : WNL [de-identified] : QINGL [de-identified] : WNL [de-identified] : Left lateral ankle ulcer is clean, few granulation buds noted, slogh is starting to soften, no drainage, no odor, no acute infection, periwound skin is intact with no cellulitis. [FreeTextEntry1] : Left Lateral Ankle [FreeTextEntry2] : 0.8 [FreeTextEntry3] : 0.6 [FreeTextEntry4] : 0.3 [de-identified] : Scant Serosanguineous [de-identified] : Intact [de-identified] : 1-25% [de-identified] : % [de-identified] : Allevyn Ag [de-identified] : Cleansed with Normal saline\par  [de-identified] : None [de-identified] : None [de-identified] : Yes

## 2021-04-19 NOTE — PLAN
[FreeTextEntry1] : Khalif , return to office in two weeks.\par 25 minutes spent for patient care and medical decision making.\par

## 2021-04-19 NOTE — ASSESSMENT
[Verbal] : Verbal [Demo] : Demo [Patient] : Patient [Caregiver] : Caregiver [Good - alert, interested, motivated] : Good - alert, interested, motivated [Verbalizes knowledge/Understanding] : Verbalizes knowledge/understanding [Dressing changes] : dressing changes [Foot Care] : foot care [Skin Care] : skin care [Pressure relief] : pressure relief [Signs and symptoms of infection] : sign and symptoms of infection [How and When to Call] : how and when to call [Labs and Tests] : labs and tests [Off-loading] : off-loading [Patient responsibility to plan of care] : patient responsibility to plan of care [] : Yes [Stable] : stable [Home] : Home [Cane] : Cane [Faxed - Long Term Care/Home Health Agency] : Long Term Care/Home Health Agency: Faxed [FreeTextEntry2] : Alteration in skin integrity- promote optimal skin integrity\par  [FreeTextEntry4] : Dr Barrera/ Photo taken\par Pt scheduled for Vascular studies on 05/03/21 at 2pm\par F/U to [facility] in 2 weeks\par  \par  [FreeTextEntry1] : Stable left lateral ankle ulcer, no acute infection\par

## 2021-04-21 DIAGNOSIS — Z79.899 OTHER LONG TERM (CURRENT) DRUG THERAPY: ICD-10-CM

## 2021-04-21 DIAGNOSIS — Z87.891 PERSONAL HISTORY OF NICOTINE DEPENDENCE: ICD-10-CM

## 2021-04-21 DIAGNOSIS — Z98.1 ARTHRODESIS STATUS: ICD-10-CM

## 2021-04-21 DIAGNOSIS — E03.9 HYPOTHYROIDISM, UNSPECIFIED: ICD-10-CM

## 2021-04-21 DIAGNOSIS — I10 ESSENTIAL (PRIMARY) HYPERTENSION: ICD-10-CM

## 2021-04-21 DIAGNOSIS — I83.023 VARICOSE VEINS OF LEFT LOWER EXTREMITY WITH ULCER OF ANKLE: ICD-10-CM

## 2021-04-21 DIAGNOSIS — M19.90 UNSPECIFIED OSTEOARTHRITIS, UNSPECIFIED SITE: ICD-10-CM

## 2021-04-21 DIAGNOSIS — I70.223 ATHEROSCLEROSIS OF NATIVE ARTERIES OF EXTREMITIES WITH REST PAIN, BILATERAL LEGS: ICD-10-CM

## 2021-04-21 DIAGNOSIS — Z83.3 FAMILY HISTORY OF DIABETES MELLITUS: ICD-10-CM

## 2021-04-21 DIAGNOSIS — L97.322 NON-PRESSURE CHRONIC ULCER OF LEFT ANKLE WITH FAT LAYER EXPOSED: ICD-10-CM

## 2021-04-21 DIAGNOSIS — Z82.3 FAMILY HISTORY OF STROKE: ICD-10-CM

## 2021-04-21 DIAGNOSIS — Z80.0 FAMILY HISTORY OF MALIGNANT NEOPLASM OF DIGESTIVE ORGANS: ICD-10-CM

## 2021-04-21 DIAGNOSIS — Z87.81 PERSONAL HISTORY OF (HEALED) TRAUMATIC FRACTURE: ICD-10-CM

## 2021-04-30 ENCOUNTER — NON-APPOINTMENT (OUTPATIENT)
Age: 86
End: 2021-04-30

## 2021-05-03 ENCOUNTER — OUTPATIENT (OUTPATIENT)
Dept: OUTPATIENT SERVICES | Facility: HOSPITAL | Age: 86
LOS: 1 days | Discharge: HOME | End: 2021-05-03
Payer: MEDICARE

## 2021-05-03 ENCOUNTER — APPOINTMENT (OUTPATIENT)
Dept: SURGERY | Facility: HOSPITAL | Age: 86
End: 2021-05-03
Payer: MEDICARE

## 2021-05-03 VITALS
HEART RATE: 69 BPM | OXYGEN SATURATION: 99 % | BODY MASS INDEX: 23.56 KG/M2 | SYSTOLIC BLOOD PRESSURE: 158 MMHG | DIASTOLIC BLOOD PRESSURE: 65 MMHG | HEIGHT: 60 IN | WEIGHT: 120 LBS | TEMPERATURE: 97 F | RESPIRATION RATE: 18 BRPM

## 2021-05-03 DIAGNOSIS — Z80.0 FAMILY HISTORY OF MALIGNANT NEOPLASM OF DIGESTIVE ORGANS: ICD-10-CM

## 2021-05-03 DIAGNOSIS — I83.023 VARICOSE VEINS OF LEFT LOWER EXTREMITY WITH ULCER OF ANKLE: ICD-10-CM

## 2021-05-03 DIAGNOSIS — E03.9 HYPOTHYROIDISM, UNSPECIFIED: ICD-10-CM

## 2021-05-03 DIAGNOSIS — Z96.662 PRESENCE OF LEFT ARTIFICIAL ANKLE JOINT: Chronic | ICD-10-CM

## 2021-05-03 DIAGNOSIS — Z87.81 PERSONAL HISTORY OF (HEALED) TRAUMATIC FRACTURE: ICD-10-CM

## 2021-05-03 DIAGNOSIS — Z82.3 FAMILY HISTORY OF STROKE: ICD-10-CM

## 2021-05-03 DIAGNOSIS — I70.223 ATHEROSCLEROSIS OF NATIVE ARTERIES OF EXTREMITIES WITH REST PAIN, BILATERAL LEGS: ICD-10-CM

## 2021-05-03 DIAGNOSIS — Z98.1 ARTHRODESIS STATUS: ICD-10-CM

## 2021-05-03 DIAGNOSIS — Z83.3 FAMILY HISTORY OF DIABETES MELLITUS: ICD-10-CM

## 2021-05-03 DIAGNOSIS — M19.90 UNSPECIFIED OSTEOARTHRITIS, UNSPECIFIED SITE: ICD-10-CM

## 2021-05-03 DIAGNOSIS — Z87.891 PERSONAL HISTORY OF NICOTINE DEPENDENCE: ICD-10-CM

## 2021-05-03 DIAGNOSIS — I10 ESSENTIAL (PRIMARY) HYPERTENSION: ICD-10-CM

## 2021-05-03 DIAGNOSIS — L97.322 NON-PRESSURE CHRONIC ULCER OF LEFT ANKLE WITH FAT LAYER EXPOSED: ICD-10-CM

## 2021-05-03 DIAGNOSIS — Z79.899 OTHER LONG TERM (CURRENT) DRUG THERAPY: ICD-10-CM

## 2021-05-03 PROCEDURE — 99213 OFFICE O/P EST LOW 20 MIN: CPT

## 2021-05-03 NOTE — PLAN
[FreeTextEntry1] : Vascular study next week, Silver Alginate, dry dressing, return to office in two weeks.\par 25 minutes spent for patient care and medical decision making.\par

## 2021-05-03 NOTE — PHYSICAL EXAM
[4 x 4] : 4 x 4  [Normal Breath Sounds] : Normal breath sounds [Normal Heart Sounds] : normal heart sounds [1+] : left 1+ [0] : left 0 [Ankle Swelling (On Exam)] : present [Ankle Swelling Bilaterally] : bilaterally  [Ankle Swelling On The Right] : mild [Varicose Veins Of Lower Extremities] : bilaterally [] : of the left leg [Ankle Swelling On The Left] : moderate [Alert] : alert [Oriented to Person] : oriented to person [Calm] : calm [de-identified] : WD/WN in no acute distress. [de-identified] : QINGL [de-identified] : WNL [de-identified] : WNL [de-identified] : Left lateral ankle ulcer is clean, few granulation buds noted, slough is starting to soften, no drainage, no odor, no acute infection, periwound skin is intact with no cellulitis. [FreeTextEntry1] : Left Lateral Ankle [FreeTextEntry2] : 0.8 [FreeTextEntry3] : 0.6 [FreeTextEntry4] : 0.3 [de-identified] : small serosanguineous  [de-identified] : 5-25% [de-identified] : 75-95% [de-identified] : silver alginate [de-identified] : Cleansed with Normal saline\par  [TWNoteComboBox5] : No [TWNoteComboBox6] : Pressure [de-identified] : No [de-identified] : Normal [de-identified] : None [de-identified] : None [de-identified] : Yes [de-identified] : 3x Weekly [de-identified] : Primary Dressing

## 2021-05-03 NOTE — ASSESSMENT
[Verbal] : Verbal [Demo] : Demo [Patient] : Patient [Caregiver] : Caregiver [Good - alert, interested, motivated] : Good - alert, interested, motivated [Verbalizes knowledge/Understanding] : Verbalizes knowledge/understanding [Dressing changes] : dressing changes [Foot Care] : foot care [Skin Care] : skin care [Pressure relief] : pressure relief [Signs and symptoms of infection] : sign and symptoms of infection [How and When to Call] : how and when to call [Labs and Tests] : labs and tests [Off-loading] : off-loading [Patient responsibility to plan of care] : patient responsibility to plan of care [Stable] : stable [Home] : Home [Cane] : Cane [Faxed - Long Term Care/Home Health Agency] : Long Term Care/Home Health Agency: Faxed [Nutrition] : nutrition [Arterial Disease] : arterial disease [] : No [FreeTextEntry2] : Promote skin integrity\par infection prevention\par pressure relief\par offloading\par localized wound care\par  [FreeTextEntry3] : Wound remains the same size [FreeTextEntry4] : F/U 2 weeks for assessment\par Vascular studies rescheduled for 5/10/21 at 1330 [FreeTextEntry1] : Left lateral ankle ulcer is stable, no acute infection\par

## 2021-05-06 ENCOUNTER — NON-APPOINTMENT (OUTPATIENT)
Age: 86
End: 2021-05-06

## 2021-05-10 ENCOUNTER — RESULT REVIEW (OUTPATIENT)
Age: 86
End: 2021-05-10

## 2021-05-10 ENCOUNTER — OUTPATIENT (OUTPATIENT)
Dept: OUTPATIENT SERVICES | Facility: HOSPITAL | Age: 86
LOS: 1 days | End: 2021-05-10
Payer: MEDICARE

## 2021-05-10 DIAGNOSIS — Z96.662 PRESENCE OF LEFT ARTIFICIAL ANKLE JOINT: Chronic | ICD-10-CM

## 2021-05-10 DIAGNOSIS — I73.9 PERIPHERAL VASCULAR DISEASE, UNSPECIFIED: ICD-10-CM

## 2021-05-10 PROCEDURE — 93926 LOWER EXTREMITY STUDY: CPT

## 2021-05-10 PROCEDURE — 93926 LOWER EXTREMITY STUDY: CPT | Mod: 26,LT

## 2021-05-10 PROCEDURE — G0463: CPT

## 2021-05-17 ENCOUNTER — APPOINTMENT (OUTPATIENT)
Dept: VASCULAR SURGERY | Facility: CLINIC | Age: 86
End: 2021-05-17
Payer: MEDICARE

## 2021-05-17 ENCOUNTER — APPOINTMENT (OUTPATIENT)
Dept: PLASTIC SURGERY | Facility: HOSPITAL | Age: 86
End: 2021-05-17
Payer: MEDICARE

## 2021-05-17 ENCOUNTER — OUTPATIENT (OUTPATIENT)
Dept: OUTPATIENT SERVICES | Facility: HOSPITAL | Age: 86
LOS: 1 days | Discharge: HOME | End: 2021-05-17
Payer: MEDICARE

## 2021-05-17 ENCOUNTER — APPOINTMENT (OUTPATIENT)
Dept: SURGERY | Facility: HOSPITAL | Age: 86
End: 2021-05-17

## 2021-05-17 VITALS
DIASTOLIC BLOOD PRESSURE: 57 MMHG | TEMPERATURE: 97.1 F | HEART RATE: 66 BPM | SYSTOLIC BLOOD PRESSURE: 142 MMHG | RESPIRATION RATE: 18 BRPM | BODY MASS INDEX: 23.56 KG/M2 | HEIGHT: 60 IN | WEIGHT: 120 LBS | OXYGEN SATURATION: 97 %

## 2021-05-17 DIAGNOSIS — Z87.81 PERSONAL HISTORY OF (HEALED) TRAUMATIC FRACTURE: ICD-10-CM

## 2021-05-17 DIAGNOSIS — M19.90 UNSPECIFIED OSTEOARTHRITIS, UNSPECIFIED SITE: ICD-10-CM
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
## 2021-05-17 DIAGNOSIS — I10 ESSENTIAL (PRIMARY) HYPERTENSION: ICD-10-CM
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
## 2021-05-17 DIAGNOSIS — S51.012A LACERATION W/OUT FOREIGN BODY OF LEFT ELBOW, INITIAL ENCOUNTER: ICD-10-CM

## 2021-05-17 DIAGNOSIS — Z96.662 PRESENCE OF LEFT ARTIFICIAL ANKLE JOINT: Chronic | ICD-10-CM

## 2021-05-17 DIAGNOSIS — I83.023 VARICOSE VEINS OF LEFT LOWER EXTREMITY WITH ULCER OF ANKLE: ICD-10-CM

## 2021-05-17 DIAGNOSIS — Z83.3 FAMILY HISTORY OF DIABETES MELLITUS: ICD-10-CM
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
## 2021-05-17 DIAGNOSIS — E03.9 HYPOTHYROIDISM, UNSPECIFIED: ICD-10-CM

## 2021-05-17 DIAGNOSIS — S51.012A LACERATION WITHOUT FOREIGN BODY OF LEFT ELBOW, INITIAL ENCOUNTER: ICD-10-CM

## 2021-05-17 DIAGNOSIS — I70.223 ATHEROSCLEROSIS OF NATIVE ARTERIES OF EXTREMITIES WITH REST PAIN, BILATERAL LEGS: ICD-10-CM

## 2021-05-17 DIAGNOSIS — Z82.3 FAMILY HISTORY OF STROKE: ICD-10-CM

## 2021-05-17 DIAGNOSIS — Z87.891 PERSONAL HISTORY OF NICOTINE DEPENDENCE: ICD-10-CM

## 2021-05-17 DIAGNOSIS — Z98.1 ARTHRODESIS STATUS: ICD-10-CM

## 2021-05-17 DIAGNOSIS — Z79.899 OTHER LONG TERM (CURRENT) DRUG THERAPY: ICD-10-CM

## 2021-05-17 DIAGNOSIS — Z80.0 FAMILY HISTORY OF MALIGNANT NEOPLASM OF DIGESTIVE ORGANS: ICD-10-CM
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
## 2021-05-17 DIAGNOSIS — L97.322 NON-PRESSURE CHRONIC ULCER OF LEFT ANKLE WITH FAT LAYER EXPOSED: ICD-10-CM

## 2021-05-17 PROCEDURE — G0463: CPT

## 2021-05-17 PROCEDURE — 99213 OFFICE O/P EST LOW 20 MIN: CPT

## 2021-05-17 PROCEDURE — 99214 OFFICE O/P EST MOD 30 MIN: CPT

## 2021-05-17 NOTE — HISTORY OF PRESENT ILLNESS
[FreeTextEntry1] : Left lateral ankle ulcer is stable, no C/O\par 5/17/21 new problem left lateral elbow skin tear. Left lateral ankle ulcer with slough and area of metallic show consistent with screw.

## 2021-05-17 NOTE — ASSESSMENT
[Verbal] : Verbal [Patient] : Patient [Good - alert, interested, motivated] : Good - alert, interested, motivated [Verbalizes knowledge/Understanding] : Verbalizes knowledge/understanding [Dressing changes] : dressing changes [Skin Care] : skin care [Signs and symptoms of infection] : sign and symptoms of infection [How and When to Call] : how and when to call [Patient responsibility to plan of care] : patient responsibility to plan of care [Stable] : stable [Home] : Home [Ambulatory] : Ambulatory [Faxed - Long Term Care/Home Health Agency] : Long Term Care/Home Health Agency: Faxed [] : No [FreeTextEntry2] : Restore Skin Integrity\par Infection Control\par Localized wound care\par Maintain acceptable pain levels at satisfactory relief.\par Demonstrates use of both nonpharmacological and pharmacological pain relief strategies [FreeTextEntry4] : Seen by Vascular Dr. De La Torre for Angiogram Follow Up, follow up in 3 months with US prior too, script given. Patient stated understanding\par F/U to [facility] in 2 weeks [FreeTextEntry1] : [facility]

## 2021-05-17 NOTE — HISTORY OF PRESENT ILLNESS
[FreeTextEntry1] : 89 yo F with L lateral malleolar wound since October 2020. Does not remember mechanism. It started as a scab and eventually dell off and a larger and deeper wound developed. Patient is active and is tired of dealing with wound for so long. No evidence of osteo on MRI. She does not have LE pain on ambulation but does get B calve cramping at night that improves with ambulation (going on for many months). \par \par  [de-identified] : SANDEE PEARSON is status post LLE angio SFA PT PTA and she is here for a post-op visit. \par Surgery Date: 3/16/21 Post LLE angio and intervention for L lateral malleolar chronic open wound. \par Patient accompanied by family member.

## 2021-05-17 NOTE — REASON FOR VISIT
[Follow-Up: _____] : a [unfilled] follow-up visit [Family Member] : family member [FreeTextEntry1] : LLE revasc for L lateral malleolar wound [de-identified] : LLE angio SFA PT PTA [de-identified] : 3/16/21 [de-identified] : Post LLE angio and intervention for L lateral malleolar chronic open wound.

## 2021-05-17 NOTE — PLAN
[FreeTextEntry1] : new problem skin tear left elbow with skin present\par a screw can be seen in lateral ankle wound\par will start medihoney to left lateral ankle as per Dr Cuello's recommendation\par 35 minutes spent for patient care and medical decision making.\par

## 2021-05-17 NOTE — PHYSICAL EXAM
[2+] : left 2+ [1+] : left 1+ [Varicose Veins Of Lower Extremities] : bilaterally [Ankle Swelling On The Left] : moderate [Skin Ulcer] : ulcer [Alert] : alert [Oriented to Person] : oriented to person [Oriented to Place] : oriented to place [Oriented to Time] : oriented to time [Ankle Swelling (On Exam)] : not present [] : not present [FreeTextEntry1] : L Peroneal biphasic signal [de-identified] : lateral malleolar wound 5 mm round wound covered with fibrin, elevated edges, no erythema

## 2021-05-17 NOTE — ASSESSMENT
[FreeTextEntry1] : 89 yo F s/p L SFA and PT PTA for lateral malleolar wound-chronic. Hardware underneath wound but patient does not want it removed.

## 2021-05-17 NOTE — PHYSICAL EXAM
[Normal Breath Sounds] : Normal breath sounds [Normal Heart Sounds] : normal heart sounds [1+] : left 1+ [0] : left 0 [Ankle Swelling (On Exam)] : present [Ankle Swelling Bilaterally] : bilaterally  [Ankle Swelling On The Right] : mild [Varicose Veins Of Lower Extremities] : bilaterally [] : of the left leg [Ankle Swelling On The Left] : moderate [Alert] : alert [Oriented to Person] : oriented to person [Calm] : calm [de-identified] : WD/WN in no acute distress. [de-identified] : QINGL [de-identified] : WNL [de-identified] : Left lateral ankle ulcer is clean, few granulation buds noted, slough is starting to soften, no drainage, no odor, no acute infection, periwound skin is intact with no cellulitis. [FreeTextEntry1] : Left Lateral Ankle [FreeTextEntry2] : 0.6 [FreeTextEntry3] : 0.6 [FreeTextEntry4] : 0.3 [de-identified] : Serosanguineous [de-identified] : Intact [de-identified] : 75% [de-identified] : Medihoney [de-identified] : Cleansed with Normal Saline\par  [FreeTextEntry7] : Left Elbow [FreeTextEntry8] : 0.8 [FreeTextEntry9] : 0.3 [de-identified] : 0.1 [de-identified] : Serosanguineous [de-identified] : Intact [de-identified] : Xeroform [de-identified] : Cleansed with Normal Saline\par  [TWNoteComboBox4] : Moderate [TWNoteComboBox5] : No [de-identified] : No [de-identified] : None [de-identified] : None [de-identified] : <20% [de-identified] : Yes [de-identified] : 3x Weekly [de-identified] : Small [de-identified] : No [de-identified] : No [de-identified] : None [de-identified] : None [de-identified] : 100% [de-identified] : No [de-identified] : 3x Weekly

## 2021-06-01 ENCOUNTER — APPOINTMENT (OUTPATIENT)
Dept: SURGERY | Facility: HOSPITAL | Age: 86
End: 2021-06-01
Payer: MEDICARE

## 2021-06-01 ENCOUNTER — OUTPATIENT (OUTPATIENT)
Dept: OUTPATIENT SERVICES | Facility: HOSPITAL | Age: 86
LOS: 1 days | Discharge: HOME | End: 2021-06-01
Payer: MEDICARE

## 2021-06-01 VITALS
SYSTOLIC BLOOD PRESSURE: 147 MMHG | OXYGEN SATURATION: 100 % | HEART RATE: 64 BPM | BODY MASS INDEX: 23.56 KG/M2 | HEIGHT: 60 IN | DIASTOLIC BLOOD PRESSURE: 60 MMHG | TEMPERATURE: 97 F | RESPIRATION RATE: 16 BRPM | WEIGHT: 120 LBS

## 2021-06-01 DIAGNOSIS — I83.023 VARICOSE VEINS OF LEFT LOWER EXTREMITY WITH ULCER OF ANKLE: ICD-10-CM

## 2021-06-01 DIAGNOSIS — Z96.662 PRESENCE OF LEFT ARTIFICIAL ANKLE JOINT: Chronic | ICD-10-CM

## 2021-06-01 PROCEDURE — 99213 OFFICE O/P EST LOW 20 MIN: CPT

## 2021-06-01 PROCEDURE — G0463: CPT

## 2021-06-01 NOTE — PHYSICAL EXAM
[2 x 2] : 2 x 2  [Normal Breath Sounds] : Normal breath sounds [Normal Heart Sounds] : normal heart sounds [1+] : left 1+ [0] : left 0 [Ankle Swelling (On Exam)] : present [Ankle Swelling Bilaterally] : bilaterally  [Ankle Swelling On The Right] : mild [Varicose Veins Of Lower Extremities] : bilaterally [] : of the left leg [Ankle Swelling On The Left] : moderate [Alert] : alert [Oriented to Person] : oriented to person [Calm] : calm [de-identified] : WD/WN in no acute distress. [de-identified] : QINGL [de-identified] : WNL [de-identified] : Left lateral ankle ulcer is clean, smaller, base with granulation tissue, no drainage, no odor, no acute infection, periwound skin is intact with no cellulitis. [FreeTextEntry1] : Left Lateral Ankle [FreeTextEntry2] : 0.3 [FreeTextEntry3] : 0.5 [FreeTextEntry4] : 0.3 [de-identified] : Serosanguineous [de-identified] : Intact [de-identified] : 100% [de-identified] : Cleansed with Normal Saline\par Cloth Tape  [de-identified] : Medihoney [FreeTextEntry7] : Left Elbow - CLOSED  [FreeTextEntry8] : 0.1 [FreeTextEntry9] : 0.5 [de-identified] : 0.1 [de-identified] : Intact [de-identified] : 100% [de-identified] : No Product  [de-identified] : Cleansed with Normal Saline\par  [TWNoteComboBox4] : Moderate [TWNoteComboBox5] : No [de-identified] : No [de-identified] : None [de-identified] : None [de-identified] : None [de-identified] : Yes [de-identified] : 3x Weekly [de-identified] : None [de-identified] : No [de-identified] : No [de-identified] : None [de-identified] : None [de-identified] : None [de-identified] : Yes [de-identified] : 3x Weekly

## 2021-06-01 NOTE — ASSESSMENT
[Verbal] : Verbal [Written] : Written [Patient] : Patient [Fair - mild discomfort, physical impairment, low acceptance] : Fair - mild discomfort, physical impairment, low acceptance [Verbalizes knowledge/Understanding] : Verbalizes knowledge/understanding [Dressing changes] : dressing changes [Skin Care] : skin care [Pressure relief] : pressure relief [Signs and symptoms of infection] : sign and symptoms of infection [Nutrition] : nutrition [How and When to Call] : how and when to call [Patient responsibility to plan of care] : patient responsibility to plan of care [] : Yes [Stable] : stable [Home] : Home [Ambulatory] : Ambulatory [Faxed - Long Term Care/Home Health Agency] : Long Term Care/Home Health Agency: Faxed [FreeTextEntry2] : Infection Prevention\par Localized Wound Care\par Offloading / Pressure Relief\par Enzymatic Debridement \par Maintain acceptable pain level with use of pharmacological and nonpharmacological interventions\par Promote Optimal Skin Integrity\par  [FreeTextEntry4] : F/U to [facility] for an assessment in Two Weeks  [FreeTextEntry1] : Left ankle ulcer is healing well, no acute infection\par

## 2021-06-01 NOTE — PLAN
[FreeTextEntry1] : Continue Medihoney, dry dressing, return to office in two weeks.\par 25 minutes spent for patient care and medical decision making.\par

## 2021-06-02 DIAGNOSIS — E03.9 HYPOTHYROIDISM, UNSPECIFIED: ICD-10-CM

## 2021-06-02 DIAGNOSIS — L97.322 NON-PRESSURE CHRONIC ULCER OF LEFT ANKLE WITH FAT LAYER EXPOSED: ICD-10-CM

## 2021-06-02 DIAGNOSIS — Z79.899 OTHER LONG TERM (CURRENT) DRUG THERAPY: ICD-10-CM

## 2021-06-02 DIAGNOSIS — I70.223 ATHEROSCLEROSIS OF NATIVE ARTERIES OF EXTREMITIES WITH REST PAIN, BILATERAL LEGS: ICD-10-CM

## 2021-06-02 DIAGNOSIS — Z87.891 PERSONAL HISTORY OF NICOTINE DEPENDENCE: ICD-10-CM

## 2021-06-02 DIAGNOSIS — Z98.1 ARTHRODESIS STATUS: ICD-10-CM

## 2021-06-02 DIAGNOSIS — Z83.3 FAMILY HISTORY OF DIABETES MELLITUS: ICD-10-CM

## 2021-06-02 DIAGNOSIS — I10 ESSENTIAL (PRIMARY) HYPERTENSION: ICD-10-CM

## 2021-06-02 DIAGNOSIS — Z80.0 FAMILY HISTORY OF MALIGNANT NEOPLASM OF DIGESTIVE ORGANS: ICD-10-CM

## 2021-06-02 DIAGNOSIS — M19.90 UNSPECIFIED OSTEOARTHRITIS, UNSPECIFIED SITE: ICD-10-CM

## 2021-06-02 DIAGNOSIS — Z82.3 FAMILY HISTORY OF STROKE: ICD-10-CM

## 2021-06-02 DIAGNOSIS — I83.023 VARICOSE VEINS OF LEFT LOWER EXTREMITY WITH ULCER OF ANKLE: ICD-10-CM

## 2021-06-02 DIAGNOSIS — Z87.81 PERSONAL HISTORY OF (HEALED) TRAUMATIC FRACTURE: ICD-10-CM

## 2021-06-14 ENCOUNTER — APPOINTMENT (OUTPATIENT)
Dept: SURGERY | Facility: HOSPITAL | Age: 86
End: 2021-06-14
Payer: MEDICARE

## 2021-06-14 ENCOUNTER — OUTPATIENT (OUTPATIENT)
Dept: OUTPATIENT SERVICES | Facility: HOSPITAL | Age: 86
LOS: 1 days | Discharge: HOME | End: 2021-06-14
Payer: MEDICARE

## 2021-06-14 VITALS
TEMPERATURE: 97.2 F | BODY MASS INDEX: 23.56 KG/M2 | WEIGHT: 120 LBS | DIASTOLIC BLOOD PRESSURE: 58 MMHG | SYSTOLIC BLOOD PRESSURE: 154 MMHG | HEART RATE: 76 BPM | RESPIRATION RATE: 20 BRPM | OXYGEN SATURATION: 100 % | HEIGHT: 60 IN

## 2021-06-14 DIAGNOSIS — E11.621 TYPE 2 DIABETES MELLITUS WITH FOOT ULCER: ICD-10-CM

## 2021-06-14 DIAGNOSIS — Z96.662 PRESENCE OF LEFT ARTIFICIAL ANKLE JOINT: Chronic | ICD-10-CM

## 2021-06-14 PROCEDURE — G0463: CPT

## 2021-06-14 PROCEDURE — 99213 OFFICE O/P EST LOW 20 MIN: CPT

## 2021-06-14 NOTE — ASSESSMENT
[Verbal] : Verbal [Written] : Written [Patient] : Patient [Fair - mild discomfort, physical impairment, low acceptance] : Fair - mild discomfort, physical impairment, low acceptance [Verbalizes knowledge/Understanding] : Verbalizes knowledge/understanding [Dressing changes] : dressing changes [Skin Care] : skin care [Pressure relief] : pressure relief [Signs and symptoms of infection] : sign and symptoms of infection [Nutrition] : nutrition [How and When to Call] : how and when to call [Patient responsibility to plan of care] : patient responsibility to plan of care [] : Yes [Stable] : stable [Home] : Home [Ambulatory] : Ambulatory [Faxed - Long Term Care/Home Health Agency] : Long Term Care/Home Health Agency: Faxed [FreeTextEntry4] : F/U to [facility] for an assessment in Two Weeks  [FreeTextEntry2] : Infection Prevention\par Localized Wound Care\par Offloading / Pressure Relief\par Enzymatic Debridement \par Maintain acceptable pain level with use of pharmacological and nonpharmacological interventions\par Promote Optimal Skin Integrity\par  [FreeTextEntry1] : Left lateral ankle ulcer is clean, no acute infection\par

## 2021-06-14 NOTE — REASON FOR VISIT
NURSE NOTES:

Dr Casillas and Miladis at bed side. I unit of blood ordered by Dr. Casillas. [Follow-Up: _____] : a [unfilled] follow-up visit [Friend] : friend [Other: _____] : [unfilled]

## 2021-06-14 NOTE — PLAN
[FreeTextEntry1] : Medihoney, dry dressing, return to office in two weeks.\par 25 minutes spent for patient care and medical decision making.\par

## 2021-06-14 NOTE — PHYSICAL EXAM
[2 x 2] : 2 x 2  [Normal Breath Sounds] : Normal breath sounds [Normal Heart Sounds] : normal heart sounds [1+] : left 1+ [0] : left 0 [Ankle Swelling (On Exam)] : present [Ankle Swelling Bilaterally] : bilaterally  [Ankle Swelling On The Right] : mild [Varicose Veins Of Lower Extremities] : bilaterally [] : of the left leg [Ankle Swelling On The Left] : moderate [Alert] : alert [Oriented to Person] : oriented to person [Calm] : calm [de-identified] : WD/WN in no acute distress. [de-identified] : QINGL [de-identified] : WNL [de-identified] : Left lateral ankle ulcer is clean, smaller, base with granulation tissue, no drainage, no odor, no acute infection, periwound skin is intact with no cellulitis. [FreeTextEntry1] : Left Lateral Ankle [FreeTextEntry2] : 0.3 [FreeTextEntry3] : 0.4 [FreeTextEntry4] : 0.3 [de-identified] : Serosanguineous [de-identified] : Intact [de-identified] : 100% [de-identified] : Medihoney [de-identified] : Cleansed with Normal Saline\par Cloth Tape  [FreeTextEntry7] : Left Elbow - CLOSED  [FreeTextEntry8] : 0.1 [FreeTextEntry9] : 0.5 [de-identified] : 0.1 [de-identified] : Intact [de-identified] : 100% [de-identified] : No Product  [de-identified] : Cleansed with Normal Saline\par  [TWNoteComboBox4] : Small [TWNoteComboBox5] : No [de-identified] : No [de-identified] : None [de-identified] : None [de-identified] : None [de-identified] : Yes [de-identified] : 3x Weekly [de-identified] : None [de-identified] : No [de-identified] : No [de-identified] : None [de-identified] : None [de-identified] : Yes [de-identified] : None

## 2021-06-15 DIAGNOSIS — L97.322 NON-PRESSURE CHRONIC ULCER OF LEFT ANKLE WITH FAT LAYER EXPOSED: ICD-10-CM

## 2021-06-15 DIAGNOSIS — Z80.0 FAMILY HISTORY OF MALIGNANT NEOPLASM OF DIGESTIVE ORGANS: ICD-10-CM

## 2021-06-15 DIAGNOSIS — I83.023 VARICOSE VEINS OF LEFT LOWER EXTREMITY WITH ULCER OF ANKLE: ICD-10-CM

## 2021-06-15 DIAGNOSIS — Z82.3 FAMILY HISTORY OF STROKE: ICD-10-CM

## 2021-06-15 DIAGNOSIS — E03.9 HYPOTHYROIDISM, UNSPECIFIED: ICD-10-CM

## 2021-06-15 DIAGNOSIS — Z87.81 PERSONAL HISTORY OF (HEALED) TRAUMATIC FRACTURE: ICD-10-CM

## 2021-06-15 DIAGNOSIS — Z83.3 FAMILY HISTORY OF DIABETES MELLITUS: ICD-10-CM

## 2021-06-15 DIAGNOSIS — Z98.1 ARTHRODESIS STATUS: ICD-10-CM

## 2021-06-15 DIAGNOSIS — Z87.891 PERSONAL HISTORY OF NICOTINE DEPENDENCE: ICD-10-CM

## 2021-06-15 DIAGNOSIS — I10 ESSENTIAL (PRIMARY) HYPERTENSION: ICD-10-CM

## 2021-06-15 DIAGNOSIS — Z79.899 OTHER LONG TERM (CURRENT) DRUG THERAPY: ICD-10-CM

## 2021-06-15 DIAGNOSIS — M19.90 UNSPECIFIED OSTEOARTHRITIS, UNSPECIFIED SITE: ICD-10-CM

## 2021-06-15 DIAGNOSIS — I70.223 ATHEROSCLEROSIS OF NATIVE ARTERIES OF EXTREMITIES WITH REST PAIN, BILATERAL LEGS: ICD-10-CM

## 2021-06-28 ENCOUNTER — APPOINTMENT (OUTPATIENT)
Dept: WOUND CARE | Facility: HOSPITAL | Age: 86
End: 2021-06-28
Payer: MEDICARE

## 2021-06-28 ENCOUNTER — OUTPATIENT (OUTPATIENT)
Dept: OUTPATIENT SERVICES | Facility: HOSPITAL | Age: 86
LOS: 1 days | Discharge: HOME | End: 2021-06-28
Payer: MEDICARE

## 2021-06-28 VITALS
HEIGHT: 60 IN | WEIGHT: 120 LBS | OXYGEN SATURATION: 98 % | TEMPERATURE: 97.6 F | BODY MASS INDEX: 23.56 KG/M2 | HEART RATE: 62 BPM | RESPIRATION RATE: 20 BRPM | DIASTOLIC BLOOD PRESSURE: 63 MMHG | SYSTOLIC BLOOD PRESSURE: 132 MMHG

## 2021-06-28 DIAGNOSIS — Z80.0 FAMILY HISTORY OF MALIGNANT NEOPLASM OF DIGESTIVE ORGANS: ICD-10-CM

## 2021-06-28 DIAGNOSIS — Z98.1 ARTHRODESIS STATUS: ICD-10-CM

## 2021-06-28 DIAGNOSIS — L97.322 NON-PRESSURE CHRONIC ULCER OF LEFT ANKLE WITH FAT LAYER EXPOSED: ICD-10-CM

## 2021-06-28 DIAGNOSIS — Z83.3 FAMILY HISTORY OF DIABETES MELLITUS: ICD-10-CM

## 2021-06-28 DIAGNOSIS — I70.223 ATHEROSCLEROSIS OF NATIVE ARTERIES OF EXTREMITIES WITH REST PAIN, BILATERAL LEGS: ICD-10-CM

## 2021-06-28 DIAGNOSIS — I10 ESSENTIAL (PRIMARY) HYPERTENSION: ICD-10-CM

## 2021-06-28 DIAGNOSIS — Z96.662 PRESENCE OF LEFT ARTIFICIAL ANKLE JOINT: Chronic | ICD-10-CM

## 2021-06-28 DIAGNOSIS — E11.621 TYPE 2 DIABETES MELLITUS WITH FOOT ULCER: ICD-10-CM

## 2021-06-28 DIAGNOSIS — M19.90 UNSPECIFIED OSTEOARTHRITIS, UNSPECIFIED SITE: ICD-10-CM

## 2021-06-28 DIAGNOSIS — Z79.899 OTHER LONG TERM (CURRENT) DRUG THERAPY: ICD-10-CM

## 2021-06-28 DIAGNOSIS — E03.9 HYPOTHYROIDISM, UNSPECIFIED: ICD-10-CM

## 2021-06-28 DIAGNOSIS — I83.023 VARICOSE VEINS OF LEFT LOWER EXTREMITY WITH ULCER OF ANKLE: ICD-10-CM

## 2021-06-28 DIAGNOSIS — Z82.3 FAMILY HISTORY OF STROKE: ICD-10-CM

## 2021-06-28 DIAGNOSIS — Z87.891 PERSONAL HISTORY OF NICOTINE DEPENDENCE: ICD-10-CM

## 2021-06-28 DIAGNOSIS — Z87.81 PERSONAL HISTORY OF (HEALED) TRAUMATIC FRACTURE: ICD-10-CM

## 2021-06-28 PROCEDURE — 99213 OFFICE O/P EST LOW 20 MIN: CPT

## 2021-06-28 PROCEDURE — G0463: CPT

## 2021-06-29 NOTE — ASSESSMENT
[Verbal] : Verbal [Written] : Written [Demo] : Demo [Patient] : Patient [Fair - mild discomfort, physical impairment, low acceptance] : Fair - mild discomfort, physical impairment, low acceptance [Needs reinforcement] : needs reinforcement [Dressing changes] : dressing changes [Skin Care] : skin care [Signs and symptoms of infection] : sign and symptoms of infection [Nutrition] : nutrition [How and When to Call] : how and when to call [Pain Management] : pain management [Home Health] : home health [Patient responsibility to plan of care] : patient responsibility to plan of care [] : Yes [Stable] : stable [Home] : Home [Ambulatory] : Ambulatory [Faxed - Long Term Care/Home Health Agency] : Long Term Care/Home Health Agency: Faxed [FreeTextEntry2] : Infection prevention\par Localized wound care \par Goal of remaining pain free regarding wounds.\par Autolytic debridement  [FreeTextEntry4] : Follow up in 3 weeks  [FreeTextEntry1] : Left ankle ulcer is stable, no acute infection\par

## 2021-06-29 NOTE — PHYSICAL EXAM
[Normal Breath Sounds] : Normal breath sounds [Normal Heart Sounds] : normal heart sounds [1+] : left 1+ [0] : left 0 [Ankle Swelling (On Exam)] : present [Ankle Swelling Bilaterally] : bilaterally  [Ankle Swelling On The Right] : mild [Varicose Veins Of Lower Extremities] : bilaterally [] : of the left leg [Ankle Swelling On The Left] : moderate [Alert] : alert [Oriented to Person] : oriented to person [Calm] : calm [2 x 2] : 2 x 2  [de-identified] : WD/WN in no acute distress. [de-identified] : WNL [de-identified] : QINGL [de-identified] : WNL [de-identified] : Left lateral ankle ulcer is clean, smaller, base is pale red, no drainage, no odor, no acute infection, periwound skin is intact with no cellulitis. [FreeTextEntry1] : Left lateral ankle  [FreeTextEntry2] : 0.6 [FreeTextEntry3] : 0.3 [FreeTextEntry4] : 0.3 [de-identified] : Serous/sanguinous [de-identified] : Medihoney  [de-identified] : Cleansed with NS\par Cloth tape  [TWNoteComboBox4] : Small [de-identified] : Normal [de-identified] : None [de-identified] : None [de-identified] : 100% [de-identified] : No [de-identified] : 3x Weekly [de-identified] : Primary Dressing

## 2021-06-29 NOTE — PLAN
[FreeTextEntry1] : Medihoney, dry dressing, return to office in three weeks.\par 25 minutes spent for patient care and medical decision making.\par

## 2021-07-19 ENCOUNTER — APPOINTMENT (OUTPATIENT)
Dept: WOUND CARE | Facility: HOSPITAL | Age: 86
End: 2021-07-19
Payer: MEDICARE

## 2021-07-19 ENCOUNTER — OUTPATIENT (OUTPATIENT)
Dept: OUTPATIENT SERVICES | Facility: HOSPITAL | Age: 86
LOS: 1 days | Discharge: HOME | End: 2021-07-19
Payer: MEDICARE

## 2021-07-19 VITALS
BODY MASS INDEX: 23.56 KG/M2 | WEIGHT: 120 LBS | TEMPERATURE: 97.7 F | HEART RATE: 64 BPM | HEIGHT: 60 IN | SYSTOLIC BLOOD PRESSURE: 179 MMHG | RESPIRATION RATE: 20 BRPM | DIASTOLIC BLOOD PRESSURE: 65 MMHG | OXYGEN SATURATION: 98 %

## 2021-07-19 DIAGNOSIS — Z96.662 PRESENCE OF LEFT ARTIFICIAL ANKLE JOINT: Chronic | ICD-10-CM

## 2021-07-19 DIAGNOSIS — I70.223 ATHEROSCLEROSIS OF NATIVE ARTERIES OF EXTREMITIES WITH REST PAIN, BILATERAL LEGS: ICD-10-CM

## 2021-07-19 DIAGNOSIS — E11.621 TYPE 2 DIABETES MELLITUS WITH FOOT ULCER: ICD-10-CM

## 2021-07-19 PROCEDURE — G0463: CPT

## 2021-07-19 PROCEDURE — 99214 OFFICE O/P EST MOD 30 MIN: CPT

## 2021-07-19 NOTE — HISTORY OF PRESENT ILLNESS
[FreeTextEntry1] : Left ankle ulcer is clean, no C/O\par 7/19/21 left lower leg wound almost closed and right lower leg 2 new lacerations

## 2021-07-19 NOTE — ASSESSMENT
[Verbal] : Verbal [Written] : Written [Demo] : Demo [Patient] : Patient [Fair - mild discomfort, physical impairment, low acceptance] : Fair - mild discomfort, physical impairment, low acceptance [Needs reinforcement] : needs reinforcement [Dressing changes] : dressing changes [Skin Care] : skin care [Signs and symptoms of infection] : sign and symptoms of infection [Nutrition] : nutrition [How and When to Call] : how and when to call [Pain Management] : pain management [Home Health] : home health [Patient responsibility to plan of care] : patient responsibility to plan of care [Stable] : stable [Home] : Home [Ambulatory] : Ambulatory [Faxed - Long Term Care/Home Health Agency] : Long Term Care/Home Health Agency: Faxed [] : No [FreeTextEntry2] : Infection prevention\par Localized wound care \par Goal of remaining pain free regarding wounds.\par Autolytic debridement  [FreeTextEntry3] : New lacerations to right leg, otherwise left ankle wound is improving [FreeTextEntry4] : Follow up in 3 weeks  [FreeTextEntry1] : [facility]

## 2021-07-19 NOTE — PHYSICAL EXAM
[2 x 2] : 2 x 2  [4 x 4] : 4 x 4  [Normal Breath Sounds] : Normal breath sounds [Normal Heart Sounds] : normal heart sounds [1+] : left 1+ [0] : left 0 [Ankle Swelling (On Exam)] : present [Ankle Swelling Bilaterally] : bilaterally  [Ankle Swelling On The Right] : mild [Varicose Veins Of Lower Extremities] : bilaterally [] : of the left leg [Ankle Swelling On The Left] : moderate [Alert] : alert [Oriented to Person] : oriented to person [Calm] : calm [de-identified] : WD/WN in no acute distress. [de-identified] : WNL [de-identified] : Left lateral ankle ulcer is clean, smaller, base is pale red, no drainage, no odor, no acute infection, periwound skin is intact with no cellulitis. [FreeTextEntry1] : Left lateral ankle  [FreeTextEntry2] : 0.3 [FreeTextEntry3] : 0.3 [FreeTextEntry4] : 0.2 [de-identified] : serosanguineous [de-identified] : Dry Dressing [de-identified] : Cleansed with Normal Saline\par Cloth tape  [FreeTextEntry7] : Anterior Lower Leg NEW [FreeTextEntry8] : 2.0 [FreeTextEntry9] : 0.3 [de-identified] : 0.1 [de-identified] : Ecchymosis [de-identified] : <25% [de-identified] : Xeroform [de-identified] : Cleansed with Normal Saline\par Cloth Tape [de-identified] : Medial Lower Leg [de-identified] : 0.6 [de-identified] : 1.7 [de-identified] : 0.1 [de-identified] : Ecchymosis [de-identified] : <25% [de-identified] : Xeroform [de-identified] : Cleansed with Normal Saline\par Cloth Tape [TWNoteComboBox4] : Small [de-identified] : Normal [de-identified] : None [de-identified] : None [de-identified] : 100% [de-identified] : No [de-identified] : 3x Weekly [de-identified] : Primary Dressing [TWNoteComboBox9] : Right [de-identified] : other [de-identified] : >75% [de-identified] : Yes [de-identified] : 3x Weekly [de-identified] : Right [de-identified] : other [de-identified] : >75% [de-identified] : Yes [de-identified] : 3x Weekly

## 2021-07-19 NOTE — PLAN
[FreeTextEntry1] : allevyn to left lateral ankle  3x/week\par ,xeroform, dry dressing, to right lower leg wounds 3x/week\par , return to office in three weeks.\par 35 minutes spent for patient care and medical decision making.\par

## 2021-07-21 DIAGNOSIS — M19.90 UNSPECIFIED OSTEOARTHRITIS, UNSPECIFIED SITE: ICD-10-CM

## 2021-07-21 DIAGNOSIS — Z80.0 FAMILY HISTORY OF MALIGNANT NEOPLASM OF DIGESTIVE ORGANS: ICD-10-CM

## 2021-07-21 DIAGNOSIS — I83.023 VARICOSE VEINS OF LEFT LOWER EXTREMITY WITH ULCER OF ANKLE: ICD-10-CM
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
## 2021-07-21 DIAGNOSIS — I10 ESSENTIAL (PRIMARY) HYPERTENSION: ICD-10-CM

## 2021-07-21 DIAGNOSIS — Z87.891 PERSONAL HISTORY OF NICOTINE DEPENDENCE: ICD-10-CM

## 2021-07-21 DIAGNOSIS — Z82.3 FAMILY HISTORY OF STROKE: ICD-10-CM
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
## 2021-07-21 DIAGNOSIS — L97.322 NON-PRESSURE CHRONIC ULCER OF LEFT ANKLE WITH FAT LAYER EXPOSED: ICD-10-CM

## 2021-07-21 DIAGNOSIS — Z79.899 OTHER LONG TERM (CURRENT) DRUG THERAPY: ICD-10-CM

## 2021-07-21 DIAGNOSIS — Z83.3 FAMILY HISTORY OF DIABETES MELLITUS: ICD-10-CM

## 2021-07-21 DIAGNOSIS — S81.811A LACERATION WITHOUT FOREIGN BODY, RIGHT LOWER LEG, INITIAL ENCOUNTER: ICD-10-CM

## 2021-07-21 DIAGNOSIS — Z98.1 ARTHRODESIS STATUS: ICD-10-CM
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
## 2021-07-21 DIAGNOSIS — I70.223 ATHEROSCLEROSIS OF NATIVE ARTERIES OF EXTREMITIES WITH REST PAIN, BILATERAL LEGS: ICD-10-CM

## 2021-07-21 DIAGNOSIS — E03.9 HYPOTHYROIDISM, UNSPECIFIED: ICD-10-CM

## 2021-07-21 DIAGNOSIS — Z87.81 PERSONAL HISTORY OF (HEALED) TRAUMATIC FRACTURE: ICD-10-CM

## 2021-08-09 ENCOUNTER — APPOINTMENT (OUTPATIENT)
Dept: WOUND CARE | Facility: HOSPITAL | Age: 86
End: 2021-08-09
Payer: MEDICARE

## 2021-08-09 ENCOUNTER — OUTPATIENT (OUTPATIENT)
Dept: OUTPATIENT SERVICES | Facility: HOSPITAL | Age: 86
LOS: 1 days | Discharge: HOME | End: 2021-08-09
Payer: MEDICARE

## 2021-08-09 VITALS
SYSTOLIC BLOOD PRESSURE: 156 MMHG | WEIGHT: 120 LBS | BODY MASS INDEX: 23.56 KG/M2 | OXYGEN SATURATION: 98 % | TEMPERATURE: 98.5 F | HEIGHT: 60 IN | DIASTOLIC BLOOD PRESSURE: 82 MMHG | RESPIRATION RATE: 18 BRPM | HEART RATE: 71 BPM

## 2021-08-09 DIAGNOSIS — E11.621 TYPE 2 DIABETES MELLITUS WITH FOOT ULCER: ICD-10-CM

## 2021-08-09 DIAGNOSIS — Z96.662 PRESENCE OF LEFT ARTIFICIAL ANKLE JOINT: Chronic | ICD-10-CM

## 2021-08-09 PROCEDURE — 99213 OFFICE O/P EST LOW 20 MIN: CPT

## 2021-08-09 PROCEDURE — G0463: CPT

## 2021-08-09 NOTE — PLAN
[FreeTextEntry1] : DDto left lateral ankle  3x/week\par , return to office in 1 month\par 25 minutes spent for patient care and medical decision making.\par

## 2021-08-09 NOTE — ASSESSMENT
[Verbal] : Verbal [Patient] : Patient [Good - alert, interested, motivated] : Good - alert, interested, motivated [Verbalizes knowledge/Understanding] : Verbalizes knowledge/understanding [Dressing changes] : dressing changes [Skin Care] : skin care [Signs and symptoms of infection] : sign and symptoms of infection [How and When to Call] : how and when to call [Patient responsibility to plan of care] : patient responsibility to plan of care [Stable] : stable [Home] : Home [Walker] : Walker [Not Applicable - Long Term Care/Home Health Agency] : Long Term Care/Home Health Agency: Not Applicable [] : No [FreeTextEntry2] : Restore Skin Integrity\par Infection Control\par Localized wound care\par Maintain acceptable pain levels at satisfactory relief.\par Demonstrates use of both nonpharmacological and pharmacological pain relief strategies [FreeTextEntry4] : F/U to [facility] in 1 month

## 2021-08-09 NOTE — PHYSICAL EXAM
[2 x 2] : 2 x 2  [4 x 4] : 4 x 4  [Normal Breath Sounds] : Normal breath sounds [Normal Heart Sounds] : normal heart sounds [1+] : left 1+ [0] : left 0 [Ankle Swelling (On Exam)] : present [Ankle Swelling Bilaterally] : bilaterally  [Ankle Swelling On The Right] : mild [Varicose Veins Of Lower Extremities] : bilaterally [] : of the left leg [Ankle Swelling On The Left] : moderate [Alert] : alert [Oriented to Person] : oriented to person [Calm] : calm [de-identified] : WD/WN in no acute distress. [de-identified] : WNL [de-identified] : WNL [de-identified] : Left lateral ankle ulcer is clean, smaller, base is pale red, no drainage, no odor, no acute infection, periwound skin is intact with no cellulitis. [FreeTextEntry1] : Left lateral ankle  [FreeTextEntry2] : 0.5 [FreeTextEntry3] : 0.4 [FreeTextEntry4] : 0.2 [de-identified] : serosanguineous [de-identified] : 100% [de-identified] : Dry Dressing [de-identified] : Cleansed with Normal Saline\par Cloth tape  [FreeTextEntry7] : Right Anterior Lower Leg- Closed [de-identified] : No Treatment [de-identified] : Right Medial Lower Leg- Closed [de-identified] : No Treatment [TWNoteComboBox4] : Small [de-identified] : Normal [de-identified] : None [de-identified] : None [de-identified] : None [de-identified] : Yes [de-identified] : 3x Weekly [de-identified] : False [TWNoteComboBox9] : False [de-identified] : Normal [de-identified] : >75% [de-identified] : Yes [de-identified] : 3x Weekly [de-identified] : False [de-identified] : Normal [de-identified] : >75% [de-identified] : Yes [de-identified] : 3x Weekly

## 2021-08-09 NOTE — HISTORY OF PRESENT ILLNESS
[FreeTextEntry1] : Left ankle ulcer is clean, no C/O\par 7/19/21 left lower leg wound almost closed and right lower leg 2 new lacerations\par 8/9/21 lacerations have closed. Left lateral ankle wound stable

## 2021-08-10 DIAGNOSIS — Z87.81 PERSONAL HISTORY OF (HEALED) TRAUMATIC FRACTURE: ICD-10-CM

## 2021-08-10 DIAGNOSIS — Z80.0 FAMILY HISTORY OF MALIGNANT NEOPLASM OF DIGESTIVE ORGANS: ICD-10-CM
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
## 2021-08-10 DIAGNOSIS — Z87.891 PERSONAL HISTORY OF NICOTINE DEPENDENCE: ICD-10-CM

## 2021-08-10 DIAGNOSIS — I10 ESSENTIAL (PRIMARY) HYPERTENSION: ICD-10-CM

## 2021-08-10 DIAGNOSIS — I83.023 VARICOSE VEINS OF LEFT LOWER EXTREMITY WITH ULCER OF ANKLE: ICD-10-CM
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
## 2021-08-10 DIAGNOSIS — Z79.899 OTHER LONG TERM (CURRENT) DRUG THERAPY: ICD-10-CM
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
## 2021-08-10 DIAGNOSIS — Z82.3 FAMILY HISTORY OF STROKE: ICD-10-CM
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
## 2021-08-10 DIAGNOSIS — E03.9 HYPOTHYROIDISM, UNSPECIFIED: ICD-10-CM

## 2021-08-10 DIAGNOSIS — Z98.1 ARTHRODESIS STATUS: ICD-10-CM

## 2021-08-10 DIAGNOSIS — M19.90 UNSPECIFIED OSTEOARTHRITIS, UNSPECIFIED SITE: ICD-10-CM

## 2021-08-10 DIAGNOSIS — Z83.3 FAMILY HISTORY OF DIABETES MELLITUS: ICD-10-CM

## 2021-08-10 DIAGNOSIS — L97.322 NON-PRESSURE CHRONIC ULCER OF LEFT ANKLE WITH FAT LAYER EXPOSED: ICD-10-CM

## 2021-08-10 DIAGNOSIS — I70.223 ATHEROSCLEROSIS OF NATIVE ARTERIES OF EXTREMITIES WITH REST PAIN, BILATERAL LEGS: ICD-10-CM

## 2021-08-10 DIAGNOSIS — S81.811D LACERATION WITHOUT FOREIGN BODY, RIGHT LOWER LEG, SUBSEQUENT ENCOUNTER: ICD-10-CM

## 2021-09-13 ENCOUNTER — APPOINTMENT (OUTPATIENT)
Dept: WOUND CARE | Facility: HOSPITAL | Age: 86
End: 2021-09-13
Payer: MEDICARE

## 2021-09-13 ENCOUNTER — OUTPATIENT (OUTPATIENT)
Dept: OUTPATIENT SERVICES | Facility: HOSPITAL | Age: 86
LOS: 1 days | Discharge: HOME | End: 2021-09-13
Payer: MEDICARE

## 2021-09-13 VITALS
BODY MASS INDEX: 23.56 KG/M2 | TEMPERATURE: 97 F | SYSTOLIC BLOOD PRESSURE: 169 MMHG | DIASTOLIC BLOOD PRESSURE: 73 MMHG | HEIGHT: 60 IN | HEART RATE: 69 BPM | WEIGHT: 120 LBS | OXYGEN SATURATION: 97 % | RESPIRATION RATE: 18 BRPM

## 2021-09-13 DIAGNOSIS — I83.023 VARICOSE VEINS OF LEFT LOWER EXTREMITY WITH ULCER OF ANKLE: ICD-10-CM

## 2021-09-13 DIAGNOSIS — Z96.662 PRESENCE OF LEFT ARTIFICIAL ANKLE JOINT: Chronic | ICD-10-CM

## 2021-09-13 PROCEDURE — 99213 OFFICE O/P EST LOW 20 MIN: CPT

## 2021-09-13 PROCEDURE — G0463: CPT

## 2021-09-13 NOTE — PHYSICAL EXAM
[2 x 2] : 2 x 2  [4 x 4] : 4 x 4  [0] : left 0 [Varicose Veins Of Lower Extremities] : bilaterally [Ankle Swelling Bilaterally] : severe [] : not present [Skin Ulcer] : ulcer [Calm] : calm [de-identified] : A&Ox3, NAD [de-identified] : 4/5 strength in all quadrants bilaterally, s/p left ankle orif [de-identified] : left lateral ankle ulcer down to skin, subcutaneous tissue, fat, healed [de-identified] : Light touch sensation intact bilaterally [FreeTextEntry1] : Lateral Ankle - Dry eschar [de-identified] : 100% [de-identified] : Dry Dressing [de-identified] : Cleansed with Normal Saline\par  [FreeTextEntry7] : Anterior Lower Leg- Closed [de-identified] : No Treatment [de-identified] : Medial Lower Leg- Closed [de-identified] : No Treatment [TWNoteComboBox1] : Left [TWNoteComboBox4] : None [de-identified] : Normal [de-identified] : None [de-identified] : None [de-identified] : None [de-identified] : Yes [de-identified] : 3x Weekly [de-identified] : Primary Dressing [TWNoteComboBox9] : Right [de-identified] : Normal [de-identified] : >75% [de-identified] : Right [de-identified] : Normal [de-identified] : >75%

## 2021-09-13 NOTE — HISTORY OF PRESENT ILLNESS
[FreeTextEntry1] : Pt seen for left lateral ankle ulcer, healing well. Pt is s/p angiogram with Dr. Limon. Pat relates that she has not dressed the wound as it has scabbed over. Denies any other complaints at this time.

## 2021-09-13 NOTE — ASSESSMENT
[Verbal] : Verbal [Patient] : Patient [Good - alert, interested, motivated] : Good - alert, interested, motivated [Verbalizes knowledge/Understanding] : Verbalizes knowledge/understanding [Dressing changes] : dressing changes [Skin Care] : skin care [Signs and symptoms of infection] : sign and symptoms of infection [How and When to Call] : how and when to call [Patient responsibility to plan of care] : patient responsibility to plan of care [Stable] : stable [Home] : Home [Walker] : Walker [Not Applicable - Long Term Care/Home Health Agency] : Long Term Care/Home Health Agency: Not Applicable [] : No [FreeTextEntry2] : Restore Skin Integrity\par Infection Control\par Localized wound care\par Maintain acceptable pain levels at satisfactory relief.\par Demonstrates use of both nonpharmacological and pharmacological pain relief strategies F/U 1 month [FreeTextEntry4] : F/U 1 month

## 2021-09-13 NOTE — VITALS
[Pain related to present condition?] : The patient's  pain is not related to present condition. [] : No [de-identified] : 0

## 2021-09-13 NOTE — REVIEW OF SYSTEMS
[Fever] : no fever [Eye Pain] : no eye pain [Earache] : no earache [Chest Pain] : no chest pain [Shortness Of Breath] : no shortness of breath [Cough] : no cough [Abdominal Pain] : no abdominal pain [Skin Wound] : skin wound [FreeTextEntry5] : htn, varicose veins [FreeTextEntry9] : s/p left ankle fracture orif [de-identified] : left lateral ankle ulcer down to skin, subcutaneous tissue, fat, healed [de-identified] : hypothyroidism

## 2021-09-13 NOTE — PLAN
[FreeTextEntry1] : Patient examined and evaluated at this time.\par Pt happy with outcome of treatment.\par Continue local wound care and offloading.\par Spent 20 minutes for patient care and medical decision making.\par Pt to follow up in 4 weeks.

## 2021-09-14 DIAGNOSIS — Z80.0 FAMILY HISTORY OF MALIGNANT NEOPLASM OF DIGESTIVE ORGANS: ICD-10-CM

## 2021-09-14 DIAGNOSIS — L97.322 NON-PRESSURE CHRONIC ULCER OF LEFT ANKLE WITH FAT LAYER EXPOSED: ICD-10-CM

## 2021-09-14 DIAGNOSIS — I10 ESSENTIAL (PRIMARY) HYPERTENSION: ICD-10-CM

## 2021-09-14 DIAGNOSIS — M19.90 UNSPECIFIED OSTEOARTHRITIS, UNSPECIFIED SITE: ICD-10-CM

## 2021-09-14 DIAGNOSIS — E03.9 HYPOTHYROIDISM, UNSPECIFIED: ICD-10-CM

## 2021-09-14 DIAGNOSIS — Z82.3 FAMILY HISTORY OF STROKE: ICD-10-CM

## 2021-09-14 DIAGNOSIS — Z87.891 PERSONAL HISTORY OF NICOTINE DEPENDENCE: ICD-10-CM

## 2021-09-14 DIAGNOSIS — I83.023 VARICOSE VEINS OF LEFT LOWER EXTREMITY WITH ULCER OF ANKLE: ICD-10-CM

## 2021-09-14 DIAGNOSIS — Z87.81 PERSONAL HISTORY OF (HEALED) TRAUMATIC FRACTURE: ICD-10-CM

## 2021-09-14 DIAGNOSIS — Z79.899 OTHER LONG TERM (CURRENT) DRUG THERAPY: ICD-10-CM

## 2021-09-14 DIAGNOSIS — I70.223 ATHEROSCLEROSIS OF NATIVE ARTERIES OF EXTREMITIES WITH REST PAIN, BILATERAL LEGS: ICD-10-CM

## 2021-09-14 DIAGNOSIS — Z83.3 FAMILY HISTORY OF DIABETES MELLITUS: ICD-10-CM

## 2021-10-04 ENCOUNTER — OUTPATIENT (OUTPATIENT)
Dept: OUTPATIENT SERVICES | Facility: HOSPITAL | Age: 86
LOS: 1 days | Discharge: HOME | End: 2021-10-04
Payer: MEDICARE

## 2021-10-04 ENCOUNTER — APPOINTMENT (OUTPATIENT)
Dept: WOUND CARE | Facility: HOSPITAL | Age: 86
End: 2021-10-04
Payer: MEDICARE

## 2021-10-04 VITALS
SYSTOLIC BLOOD PRESSURE: 165 MMHG | BODY MASS INDEX: 23.56 KG/M2 | DIASTOLIC BLOOD PRESSURE: 71 MMHG | WEIGHT: 120 LBS | HEIGHT: 60 IN | RESPIRATION RATE: 18 BRPM | OXYGEN SATURATION: 99 % | TEMPERATURE: 97.6 F | HEART RATE: 71 BPM

## 2021-10-04 DIAGNOSIS — Z96.662 PRESENCE OF LEFT ARTIFICIAL ANKLE JOINT: Chronic | ICD-10-CM

## 2021-10-04 DIAGNOSIS — I83.023 VARICOSE VEINS OF LEFT LOWER EXTREMITY WITH ULCER OF ANKLE: ICD-10-CM

## 2021-10-04 PROCEDURE — G0463: CPT

## 2021-10-04 PROCEDURE — 99213 OFFICE O/P EST LOW 20 MIN: CPT

## 2021-10-05 DIAGNOSIS — I83.023 VARICOSE VEINS OF LEFT LOWER EXTREMITY WITH ULCER OF ANKLE: ICD-10-CM

## 2021-10-05 DIAGNOSIS — Z82.3 FAMILY HISTORY OF STROKE: ICD-10-CM

## 2021-10-05 DIAGNOSIS — Z83.3 FAMILY HISTORY OF DIABETES MELLITUS: ICD-10-CM

## 2021-10-05 DIAGNOSIS — Z79.899 OTHER LONG TERM (CURRENT) DRUG THERAPY: ICD-10-CM

## 2021-10-05 DIAGNOSIS — I70.223 ATHEROSCLEROSIS OF NATIVE ARTERIES OF EXTREMITIES WITH REST PAIN, BILATERAL LEGS: ICD-10-CM

## 2021-10-05 DIAGNOSIS — M19.90 UNSPECIFIED OSTEOARTHRITIS, UNSPECIFIED SITE: ICD-10-CM

## 2021-10-05 DIAGNOSIS — I10 ESSENTIAL (PRIMARY) HYPERTENSION: ICD-10-CM

## 2021-10-05 DIAGNOSIS — E03.9 HYPOTHYROIDISM, UNSPECIFIED: ICD-10-CM

## 2021-10-05 DIAGNOSIS — Z98.1 ARTHRODESIS STATUS: ICD-10-CM

## 2021-10-05 DIAGNOSIS — Z87.81 PERSONAL HISTORY OF (HEALED) TRAUMATIC FRACTURE: ICD-10-CM

## 2021-10-05 DIAGNOSIS — Z87.891 PERSONAL HISTORY OF NICOTINE DEPENDENCE: ICD-10-CM

## 2021-10-05 DIAGNOSIS — L97.322 NON-PRESSURE CHRONIC ULCER OF LEFT ANKLE WITH FAT LAYER EXPOSED: ICD-10-CM

## 2021-10-05 DIAGNOSIS — Z80.0 FAMILY HISTORY OF MALIGNANT NEOPLASM OF DIGESTIVE ORGANS: ICD-10-CM

## 2021-10-05 NOTE — PLAN
[FreeTextEntry1] : AgAlginate left lateral ankle  3x/week\par , return to office in 3 weeks\par 25 minutes spent for patient care and medical decision making.\par

## 2021-10-05 NOTE — HISTORY OF PRESENT ILLNESS
[FreeTextEntry1] : Left ankle ulcer is clean, no C/O\par 7/19/21 left lower leg wound almost closed and right lower leg 2 new lacerations\par 8/9/21 lacerations have closed. Left lateral ankle wound stable\par 10/4/21 left lateral ankle stable with out exposure of hardware

## 2021-10-05 NOTE — PHYSICAL EXAM
[2 x 2] : 2 x 2  [4 x 4] : 4 x 4  [Normal Breath Sounds] : Normal breath sounds [Normal Heart Sounds] : normal heart sounds [1+] : left 1+ [0] : left 0 [Ankle Swelling (On Exam)] : present [Ankle Swelling On The Right] : mild [Ankle Swelling Bilaterally] : bilaterally  [Varicose Veins Of Lower Extremities] : bilaterally [] : of the left leg [Ankle Swelling On The Left] : moderate [Alert] : alert [Oriented to Person] : oriented to person [Calm] : calm [de-identified] : WD/WN in no acute distress. [de-identified] : WNL [de-identified] : QINGL [de-identified] : WNL [de-identified] : Left lateral ankle ulcer is clean, smaller, base is pale red, no drainage, no odor, no acute infection, periwound skin is intact with no cellulitis. [FreeTextEntry1] : Lateral Ankle  [FreeTextEntry2] : 0.4 [FreeTextEntry3] : 0.2 [FreeTextEntry4] : 0.1 [de-identified] : 7 [de-identified] : silver alginate [de-identified] : Cleansed with NS\par Cloth Tape [TWNoteComboBox1] : Left [TWNoteComboBox4] : None [TWNoteComboBox5] : No [de-identified] : No [de-identified] : Normal [de-identified] : None [de-identified] : None [de-identified] : None [de-identified] : Yes [de-identified] : 3x Weekly [de-identified] : Primary Dressing [TWNoteComboBox9] : Right [de-identified] : Normal [de-identified] : >75% [de-identified] : Right [de-identified] : Normal [de-identified] : >75%

## 2021-10-05 NOTE — ASSESSMENT
[Verbal] : Verbal [Patient] : Patient [Good - alert, interested, motivated] : Good - alert, interested, motivated [Verbalizes knowledge/Understanding] : Verbalizes knowledge/understanding [Dressing changes] : dressing changes [Skin Care] : skin care [Signs and symptoms of infection] : sign and symptoms of infection [Nutrition] : nutrition [Arterial Disease] : arterial disease [How and When to Call] : how and when to call [Off-loading] : off-loading [Patient responsibility to plan of care] : patient responsibility to plan of care [Stable] : stable [Home] : Home [Walker] : Walker [Not Applicable - Long Term Care/Home Health Agency] : Long Term Care/Home Health Agency: Not Applicable [] : No [FreeTextEntry2] : Infection Prevention\par Promote Skin Integrity\par Pressure Relief\par  [FreeTextEntry3] : Wound remains the same [FreeTextEntry4] : F/U 3 weeks

## 2021-10-25 ENCOUNTER — OUTPATIENT (OUTPATIENT)
Dept: OUTPATIENT SERVICES | Facility: HOSPITAL | Age: 86
LOS: 1 days | Discharge: HOME | End: 2021-10-25
Payer: MEDICARE

## 2021-10-25 ENCOUNTER — APPOINTMENT (OUTPATIENT)
Dept: WOUND CARE | Facility: HOSPITAL | Age: 86
End: 2021-10-25
Payer: MEDICARE

## 2021-10-25 VITALS
SYSTOLIC BLOOD PRESSURE: 141 MMHG | HEART RATE: 67 BPM | TEMPERATURE: 97.8 F | HEIGHT: 60 IN | OXYGEN SATURATION: 100 % | WEIGHT: 120 LBS | RESPIRATION RATE: 18 BRPM | DIASTOLIC BLOOD PRESSURE: 75 MMHG | BODY MASS INDEX: 23.56 KG/M2

## 2021-10-25 DIAGNOSIS — Z80.0 FAMILY HISTORY OF MALIGNANT NEOPLASM OF DIGESTIVE ORGANS: ICD-10-CM

## 2021-10-25 DIAGNOSIS — I70.233 ATHEROSCLEROSIS OF NATIVE ARTERIES OF RIGHT LEG WITH ULCERATION OF ANKLE: ICD-10-CM

## 2021-10-25 DIAGNOSIS — Z79.899 OTHER LONG TERM (CURRENT) DRUG THERAPY: ICD-10-CM

## 2021-10-25 DIAGNOSIS — Z87.81 PERSONAL HISTORY OF (HEALED) TRAUMATIC FRACTURE: ICD-10-CM

## 2021-10-25 DIAGNOSIS — L97.322 NON-PRESSURE CHRONIC ULCER OF LEFT ANKLE WITH FAT LAYER EXPOSED: ICD-10-CM

## 2021-10-25 DIAGNOSIS — I83.023 VARICOSE VEINS OF LEFT LOWER EXTREMITY WITH ULCER OF ANKLE: ICD-10-CM

## 2021-10-25 DIAGNOSIS — Z87.891 PERSONAL HISTORY OF NICOTINE DEPENDENCE: ICD-10-CM

## 2021-10-25 DIAGNOSIS — Z83.3 FAMILY HISTORY OF DIABETES MELLITUS: ICD-10-CM

## 2021-10-25 DIAGNOSIS — M19.90 UNSPECIFIED OSTEOARTHRITIS, UNSPECIFIED SITE: ICD-10-CM

## 2021-10-25 DIAGNOSIS — Z96.662 PRESENCE OF LEFT ARTIFICIAL ANKLE JOINT: Chronic | ICD-10-CM

## 2021-10-25 DIAGNOSIS — E03.9 HYPOTHYROIDISM, UNSPECIFIED: ICD-10-CM

## 2021-10-25 DIAGNOSIS — Z82.3 FAMILY HISTORY OF STROKE: ICD-10-CM

## 2021-10-25 PROCEDURE — 99213 OFFICE O/P EST LOW 20 MIN: CPT

## 2021-10-25 PROCEDURE — G0463: CPT

## 2021-10-25 NOTE — ASSESSMENT
[Verbal] : Verbal [Written] : Written [Demo] : Demo [Patient] : Patient [Good - alert, interested, motivated] : Good - alert, interested, motivated [Verbalizes knowledge/Understanding] : Verbalizes knowledge/understanding [Dressing changes] : dressing changes [Foot Care] : foot care [Skin Care] : skin care [Signs and symptoms of infection] : sign and symptoms of infection [Nutrition] : nutrition [How and When to Call] : how and when to call [Patient responsibility to plan of care] : patient responsibility to plan of care [] : Yes [Stable] : stable [Home] : Home [Cane] : Cane [Not Applicable - Long Term Care/Home Health Agency] : Long Term Care/Home Health Agency: Not Applicable [FreeTextEntry2] : Infection prevention\par Localized wound care \par Goal of remaining pain free regarding wounds.\par Promote optimal nutrition \par Promote optimal skin integrity  [FreeTextEntry4] : FOllow up in 2 weeks

## 2021-10-25 NOTE — HISTORY OF PRESENT ILLNESS
[FreeTextEntry1] : Left ankle ulcer is clean, no C/O\par 7/19/21 left lower leg wound almost closed and right lower leg 2 new lacerations\par 8/9/21 lacerations have closed. Left lateral ankle wound stable\par 10/4/21 left lateral ankle stable with out exposure of hardware\par scab removed. Very small opening in skin with small amount of serous drainage

## 2021-10-25 NOTE — PLAN
[FreeTextEntry1] : AgAlginate left lateral ankle  3x/week\par , return to office in 2 weeks\par 25 minutes spent for patient care and medical decision making.\par

## 2021-10-25 NOTE — PHYSICAL EXAM
[Normal Breath Sounds] : Normal breath sounds [Normal Heart Sounds] : normal heart sounds [1+] : left 1+ [0] : left 0 [Ankle Swelling (On Exam)] : present [Ankle Swelling Bilaterally] : bilaterally  [Ankle Swelling On The Right] : mild [Varicose Veins Of Lower Extremities] : bilaterally [] : of the left leg [Ankle Swelling On The Left] : moderate [Alert] : alert [Oriented to Person] : oriented to person [Calm] : calm [2 x 2] : 2 x 2  [de-identified] : WD/WN in no acute distress. [de-identified] : WNL [de-identified] : QINGL [de-identified] : WNL [de-identified] : Left lateral ankle ulcer is clean, smaller, base is pale red, no drainage, no odor, no acute infection, periwound skin is intact with no cellulitis. [FreeTextEntry1] : Left lateral ankle  [FreeTextEntry2] : 0.1 [FreeTextEntry3] : 0.1 [FreeTextEntry4] : 0.1 [de-identified] : Scant Serous/sanguinous [de-identified] : Silver alginate [de-identified] : Cleansed with NS\par Cloth tape  [de-identified] : Normal [de-identified] : None [de-identified] : None [de-identified] : 100% [de-identified] : No [de-identified] : Every other day [de-identified] : Primary Dressing

## 2021-11-08 ENCOUNTER — APPOINTMENT (OUTPATIENT)
Dept: WOUND CARE | Facility: HOSPITAL | Age: 86
End: 2021-11-08
Payer: MEDICARE

## 2021-11-08 ENCOUNTER — OUTPATIENT (OUTPATIENT)
Dept: OUTPATIENT SERVICES | Facility: HOSPITAL | Age: 86
LOS: 1 days | Discharge: HOME | End: 2021-11-08
Payer: MEDICARE

## 2021-11-08 VITALS
SYSTOLIC BLOOD PRESSURE: 161 MMHG | BODY MASS INDEX: 21.99 KG/M2 | RESPIRATION RATE: 20 BRPM | HEIGHT: 60 IN | OXYGEN SATURATION: 100 % | HEART RATE: 64 BPM | TEMPERATURE: 97.7 F | DIASTOLIC BLOOD PRESSURE: 65 MMHG | WEIGHT: 112 LBS

## 2021-11-08 DIAGNOSIS — I83.023 VARICOSE VEINS OF LEFT LOWER EXTREMITY WITH ULCER OF ANKLE: ICD-10-CM

## 2021-11-08 DIAGNOSIS — Z83.3 FAMILY HISTORY OF DIABETES MELLITUS: ICD-10-CM

## 2021-11-08 DIAGNOSIS — I10 ESSENTIAL (PRIMARY) HYPERTENSION: ICD-10-CM

## 2021-11-08 DIAGNOSIS — Z80.0 FAMILY HISTORY OF MALIGNANT NEOPLASM OF DIGESTIVE ORGANS: ICD-10-CM

## 2021-11-08 DIAGNOSIS — Z79.899 OTHER LONG TERM (CURRENT) DRUG THERAPY: ICD-10-CM

## 2021-11-08 DIAGNOSIS — Z87.81 PERSONAL HISTORY OF (HEALED) TRAUMATIC FRACTURE: ICD-10-CM

## 2021-11-08 DIAGNOSIS — I70.233 ATHEROSCLEROSIS OF NATIVE ARTERIES OF RIGHT LEG WITH ULCERATION OF ANKLE: ICD-10-CM

## 2021-11-08 DIAGNOSIS — Z96.662 PRESENCE OF LEFT ARTIFICIAL ANKLE JOINT: Chronic | ICD-10-CM

## 2021-11-08 DIAGNOSIS — M19.90 UNSPECIFIED OSTEOARTHRITIS, UNSPECIFIED SITE: ICD-10-CM

## 2021-11-08 DIAGNOSIS — E03.9 HYPOTHYROIDISM, UNSPECIFIED: ICD-10-CM

## 2021-11-08 DIAGNOSIS — L97.322 NON-PRESSURE CHRONIC ULCER OF LEFT ANKLE WITH FAT LAYER EXPOSED: ICD-10-CM

## 2021-11-08 DIAGNOSIS — Z87.891 PERSONAL HISTORY OF NICOTINE DEPENDENCE: ICD-10-CM

## 2021-11-08 DIAGNOSIS — Z82.3 FAMILY HISTORY OF STROKE: ICD-10-CM

## 2021-11-08 PROCEDURE — 99213 OFFICE O/P EST LOW 20 MIN: CPT

## 2021-11-08 PROCEDURE — G0463: CPT

## 2021-11-08 NOTE — HISTORY OF PRESENT ILLNESS
[FreeTextEntry1] : Left ankle ulcer is clean, no C/O\par 7/19/21 left lower leg wound almost closed and right lower leg 2 new lacerations\par 8/9/21 lacerations have closed. Left lateral ankle wound stable\par 10/4/21 left lateral ankle stable with out exposure of hardware\par scab removed. Very small opening in skin with small amount of serous drainage\par \par 11/8/21 left lateral ankle wound appears closed, will use dry protective dressing

## 2021-11-08 NOTE — ASSESSMENT
[Verbal] : Verbal [Written] : Written [Demo] : Demo [Patient] : Patient [Verbalizes knowledge/Understanding] : Verbalizes knowledge/understanding [Good - alert, interested, motivated] : Good - alert, interested, motivated [Dressing changes] : dressing changes [Foot Care] : foot care [Skin Care] : skin care [Signs and symptoms of infection] : sign and symptoms of infection [Nutrition] : nutrition [How and When to Call] : how and when to call [Patient responsibility to plan of care] : patient responsibility to plan of care [] : Yes [Stable] : stable [Home] : Home [Cane] : Cane [Not Applicable - Long Term Care/Home Health Agency] : Long Term Care/Home Health Agency: Not Applicable [FreeTextEntry2] : Goal of remaining pain free regarding wounds.\par Infection prevention\par Promote optimal skin integrity  [FreeTextEntry4] : Follow up in 2 weeks

## 2021-11-08 NOTE — PLAN
[FreeTextEntry1] : Allevyn border left lateral ankle  3x/week\par , return to office in 2 weeks\par 25 minutes spent for patient care and medical decision making.\par

## 2021-11-08 NOTE — PHYSICAL EXAM
[Normal Breath Sounds] : Normal breath sounds [Normal Heart Sounds] : normal heart sounds [1+] : left 1+ [0] : left 0 [Ankle Swelling Bilaterally] : bilaterally  [Ankle Swelling (On Exam)] : present [Ankle Swelling On The Right] : mild [Varicose Veins Of Lower Extremities] : bilaterally [] : of the left leg [Ankle Swelling On The Left] : moderate [Alert] : alert [Oriented to Person] : oriented to person [Calm] : calm [de-identified] : WD/WN in no acute distress. [de-identified] : WNL [de-identified] : QINGL [de-identified] : WNL [de-identified] : Left lateral ankle ulcer is clean, smaller, base is pale red, no drainage, no odor, no acute infection, periwound skin is intact with no cellulitis. [FreeTextEntry1] : Left lateral ankle  - scab [de-identified] : Allevyn Boarder [de-identified] : Cleansed with Normal Saline\par  [TWNoteComboBox4] : None [de-identified] : Normal [de-identified] : None [de-identified] : None [de-identified] : 100% [de-identified] : No [de-identified] : Every other day [de-identified] : Primary Dressing

## 2021-11-22 ENCOUNTER — OUTPATIENT (OUTPATIENT)
Dept: OUTPATIENT SERVICES | Facility: HOSPITAL | Age: 86
LOS: 1 days | Discharge: HOME | End: 2021-11-22
Payer: MEDICARE

## 2021-11-22 ENCOUNTER — APPOINTMENT (OUTPATIENT)
Dept: WOUND CARE | Facility: HOSPITAL | Age: 86
End: 2021-11-22
Payer: MEDICARE

## 2021-11-22 VITALS
WEIGHT: 112 LBS | OXYGEN SATURATION: 100 % | DIASTOLIC BLOOD PRESSURE: 92 MMHG | TEMPERATURE: 97 F | SYSTOLIC BLOOD PRESSURE: 162 MMHG | HEIGHT: 60 IN | BODY MASS INDEX: 21.99 KG/M2 | HEART RATE: 70 BPM | RESPIRATION RATE: 18 BRPM

## 2021-11-22 DIAGNOSIS — Z96.662 PRESENCE OF LEFT ARTIFICIAL ANKLE JOINT: Chronic | ICD-10-CM

## 2021-11-22 DIAGNOSIS — S81.811A LACERATION W/OUT FOREIGN BODY, RIGHT LOWER LEG, INITIAL ENCOUNTER: ICD-10-CM

## 2021-11-22 DIAGNOSIS — E11.621 TYPE 2 DIABETES MELLITUS WITH FOOT ULCER: ICD-10-CM

## 2021-11-22 DIAGNOSIS — I83.023 VARICOSE VEINS OF LEFT LOWER EXTREMITY WITH ULCER OF ANKLE: ICD-10-CM

## 2021-11-22 PROCEDURE — 99214 OFFICE O/P EST MOD 30 MIN: CPT

## 2021-11-22 PROCEDURE — G0463: CPT

## 2021-11-22 NOTE — ASSESSMENT
[Verbal] : Verbal [Written] : Written [Demo] : Demo [Patient] : Patient [Good - alert, interested, motivated] : Good - alert, interested, motivated [Verbalizes knowledge/Understanding] : Verbalizes knowledge/understanding [Dressing changes] : dressing changes [Foot Care] : foot care [Skin Care] : skin care [Signs and symptoms of infection] : sign and symptoms of infection [Nutrition] : nutrition [How and When to Call] : how and when to call [Patient responsibility to plan of care] : patient responsibility to plan of care [Stable] : stable [Home] : Home [Cane] : Cane [Not Applicable - Long Term Care/Home Health Agency] : Long Term Care/Home Health Agency: Not Applicable [Off-loading] : off-loading [Home Health] : home health [] : No [FreeTextEntry3] : New Wound (Right Anterior Leg Skin Tear) [FreeTextEntry2] : Goal of remaining pain free regarding wounds.\par Infection prevention\par Promote optimal skin integrity \par Localized Wound Care [FreeTextEntry4] : Follow up in 2 weeks

## 2021-11-22 NOTE — PLAN
[FreeTextEntry1] : Allevyn border left lateral ankle  3x/week\par xeroform and allevyn 30 right shin wound 3X/week\par , return to office in 2 weeks\par 35 minutes spent for patient care and medical decision making.\par

## 2021-11-22 NOTE — PHYSICAL EXAM
[Normal Breath Sounds] : Normal breath sounds [Normal Heart Sounds] : normal heart sounds [1+] : left 1+ [0] : left 0 [Ankle Swelling (On Exam)] : present [Ankle Swelling Bilaterally] : bilaterally  [Ankle Swelling On The Right] : mild [Varicose Veins Of Lower Extremities] : bilaterally [] : of the left leg [Ankle Swelling On The Left] : moderate [Alert] : alert [Oriented to Person] : oriented to person [Calm] : calm [2 x 2] : 2 x 2  [de-identified] : WD/WN in no acute distress. [de-identified] : QINGL [de-identified] : WNL [de-identified] : Left lateral ankle ulcer is clean, smaller, base is pale red, no drainage, no odor, no acute infection, periwound skin is intact with no cellulitis. [FreeTextEntry1] : Left lateral ankle  - Scab [FreeTextEntry2] : 0.1 [FreeTextEntry3] : 0.2 [de-identified] : Allevyn Boarder [de-identified] : Cleansed with Normal Saline\par  [FreeTextEntry7] : Right Anterior Leg- Skin Tear- Flap in place (NEW) [FreeTextEntry8] : 0.6 [FreeTextEntry9] : 0.8 [de-identified] : <0.1 [de-identified] : mild ecchymosis [de-identified] : scant serous/sanguineous [de-identified] : Xeroform [de-identified] : Cleansed with NS\par Allevyn Border\par  [TWNoteComboBox4] : None [TWNoteComboBox5] : No [de-identified] : Normal [de-identified] : None [de-identified] : None [de-identified] : No [de-identified] : Every other day [de-identified] : Primary Dressing [de-identified] : No [de-identified] : Traumatic [de-identified] : No [de-identified] : other [de-identified] : None [de-identified] : None [de-identified] : 100% [de-identified] : No [de-identified] : 3x Weekly [de-identified] : Primary Dressing

## 2021-11-22 NOTE — HISTORY OF PRESENT ILLNESS
[FreeTextEntry1] : Left ankle ulcer is clean, no C/O\par 7/19/21 left lower leg wound almost closed and right lower leg 2 new lacerations\par 8/9/21 lacerations have closed. Left lateral ankle wound stable\par 10/4/21 left lateral ankle stable with out exposure of hardware\par scab removed. Very small opening in skin with small amount of serous drainage\par \par 11/8/21 left lateral ankle wound appears closed, will use dry protective dressing\par 11/22/21 left lateral ankle still appears closed and new skin tear right shin

## 2021-11-23 DIAGNOSIS — L97.322 NON-PRESSURE CHRONIC ULCER OF LEFT ANKLE WITH FAT LAYER EXPOSED: ICD-10-CM

## 2021-11-23 DIAGNOSIS — I10 ESSENTIAL (PRIMARY) HYPERTENSION: ICD-10-CM

## 2021-11-23 DIAGNOSIS — E03.9 HYPOTHYROIDISM, UNSPECIFIED: ICD-10-CM

## 2021-11-23 DIAGNOSIS — Z87.891 PERSONAL HISTORY OF NICOTINE DEPENDENCE: ICD-10-CM

## 2021-11-23 DIAGNOSIS — Z82.3 FAMILY HISTORY OF STROKE: ICD-10-CM

## 2021-11-23 DIAGNOSIS — I70.223 ATHEROSCLEROSIS OF NATIVE ARTERIES OF EXTREMITIES WITH REST PAIN, BILATERAL LEGS: ICD-10-CM

## 2021-11-23 DIAGNOSIS — Z87.81 PERSONAL HISTORY OF (HEALED) TRAUMATIC FRACTURE: ICD-10-CM

## 2021-11-23 DIAGNOSIS — I83.023 VARICOSE VEINS OF LEFT LOWER EXTREMITY WITH ULCER OF ANKLE: ICD-10-CM

## 2021-11-23 DIAGNOSIS — Z80.0 FAMILY HISTORY OF MALIGNANT NEOPLASM OF DIGESTIVE ORGANS: ICD-10-CM

## 2021-11-23 DIAGNOSIS — Z83.3 FAMILY HISTORY OF DIABETES MELLITUS: ICD-10-CM

## 2021-11-23 DIAGNOSIS — Z79.899 OTHER LONG TERM (CURRENT) DRUG THERAPY: ICD-10-CM

## 2021-11-23 DIAGNOSIS — S81.811A LACERATION WITHOUT FOREIGN BODY, RIGHT LOWER LEG, INITIAL ENCOUNTER: ICD-10-CM

## 2021-11-23 DIAGNOSIS — M19.90 UNSPECIFIED OSTEOARTHRITIS, UNSPECIFIED SITE: ICD-10-CM
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
## 2021-12-05 ENCOUNTER — NON-APPOINTMENT (OUTPATIENT)
Age: 86
End: 2021-12-05

## 2021-12-06 ENCOUNTER — APPOINTMENT (OUTPATIENT)
Dept: WOUND CARE | Facility: HOSPITAL | Age: 86
End: 2021-12-06
Payer: MEDICARE

## 2021-12-06 ENCOUNTER — OUTPATIENT (OUTPATIENT)
Dept: OUTPATIENT SERVICES | Facility: HOSPITAL | Age: 86
LOS: 1 days | Discharge: HOME | End: 2021-12-06
Payer: MEDICARE

## 2021-12-06 VITALS
HEART RATE: 66 BPM | DIASTOLIC BLOOD PRESSURE: 79 MMHG | BODY MASS INDEX: 21.99 KG/M2 | HEIGHT: 60 IN | RESPIRATION RATE: 18 BRPM | WEIGHT: 112 LBS | SYSTOLIC BLOOD PRESSURE: 149 MMHG | TEMPERATURE: 97.3 F | OXYGEN SATURATION: 98 %

## 2021-12-06 DIAGNOSIS — I83.023 VARICOSE VEINS OF LEFT LOWER EXTREMITY WITH ULCER OF ANKLE: ICD-10-CM

## 2021-12-06 DIAGNOSIS — Z96.662 PRESENCE OF LEFT ARTIFICIAL ANKLE JOINT: Chronic | ICD-10-CM

## 2021-12-06 PROCEDURE — G0463: CPT

## 2021-12-06 PROCEDURE — 99213 OFFICE O/P EST LOW 20 MIN: CPT

## 2021-12-06 NOTE — PHYSICAL EXAM
[Normal Breath Sounds] : Normal breath sounds [Normal Heart Sounds] : normal heart sounds [1+] : left 1+ [0] : left 0 [Ankle Swelling (On Exam)] : present [Ankle Swelling Bilaterally] : bilaterally  [Ankle Swelling On The Right] : mild [Varicose Veins Of Lower Extremities] : bilaterally [] : of the left leg [Ankle Swelling On The Left] : moderate [Alert] : alert [Oriented to Person] : oriented to person [Calm] : calm [de-identified] : WD/WN in no acute distress. [de-identified] : QINGL [de-identified] : WNL [de-identified] : Left lateral ankle ulcer is clean, smaller, base is pale red, no drainage, no odor, no acute infection, periwound skin is intact with no cellulitis. [FreeTextEntry1] : Left lateral ankle [FreeTextEntry2] : 0.4 [FreeTextEntry3] : 0.4 [FreeTextEntry4] : dry eschar [de-identified] : none [de-identified] : intact [de-identified] : Khalif Gonzalez [de-identified] : NSC [FreeTextEntry7] : Right anterior leg [FreeTextEntry8] : 0.4 [FreeTextEntry9] : 0.8 [de-identified] : <0.1 [de-identified] : none [de-identified] : intact [de-identified] : 100% [de-identified] : Allevyn Border [de-identified] : NSC

## 2021-12-06 NOTE — PLAN
[FreeTextEntry1] : Allevyn border left lateral ankle  3x/we\par  allevyn border to right shin wound 3X/week\par , return to office in 2 weeks\par 25 minutes spent for patient care and medical decision making.\par

## 2021-12-06 NOTE — ASSESSMENT
[Verbal] : Verbal [Patient] : Patient [Good - alert, interested, motivated] : Good - alert, interested, motivated [Verbalizes knowledge/Understanding] : Verbalizes knowledge/understanding [Dressing changes] : dressing changes [Skin Care] : skin care [Signs and symptoms of infection] : sign and symptoms of infection [How and When to Call] : how and when to call [Patient responsibility to plan of care] : patient responsibility to plan of care [] : Yes [Stable] : stable [Home] : Home [Cane] : Cane [Not Applicable - Long Term Care/Home Health Agency] : Long Term Care/Home Health Agency: Not Applicable [FreeTextEntry2] : Promote optimal skin integrity, offloading, infection prevention\par  [FreeTextEntry4] : f/u

## 2021-12-06 NOTE — VITALS
[] : No [de-identified] : Pain scale:  0/10 - Patient reports no c/o pains or discomforts at present.

## 2021-12-06 NOTE — HISTORY OF PRESENT ILLNESS
[FreeTextEntry1] : Left ankle ulcer is clean, no C/O\par 7/19/21 left lower leg wound almost closed and right lower leg 2 new lacerations\par 8/9/21 lacerations have closed. Left lateral ankle wound stable\par 10/4/21 left lateral ankle stable with out exposure of hardware\par scab removed. Very small opening in skin with small amount of serous drainage\par \par 11/8/21 left lateral ankle wound appears closed, will use dry protective dressing\par 11/22/21 left lateral ankle still appears closed and new skin tear right shin\par 12/6/21 right shin and left lateral ankle closed

## 2021-12-07 DIAGNOSIS — Z82.3 FAMILY HISTORY OF STROKE: ICD-10-CM

## 2021-12-07 DIAGNOSIS — S81.811D LACERATION WITHOUT FOREIGN BODY, RIGHT LOWER LEG, SUBSEQUENT ENCOUNTER: ICD-10-CM

## 2021-12-07 DIAGNOSIS — Z87.81 PERSONAL HISTORY OF (HEALED) TRAUMATIC FRACTURE: ICD-10-CM
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
## 2021-12-07 DIAGNOSIS — I70.223 ATHEROSCLEROSIS OF NATIVE ARTERIES OF EXTREMITIES WITH REST PAIN, BILATERAL LEGS: ICD-10-CM
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
## 2021-12-07 DIAGNOSIS — Z80.0 FAMILY HISTORY OF MALIGNANT NEOPLASM OF DIGESTIVE ORGANS: ICD-10-CM

## 2021-12-07 DIAGNOSIS — Z83.3 FAMILY HISTORY OF DIABETES MELLITUS: ICD-10-CM
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
## 2021-12-07 DIAGNOSIS — Z87.891 PERSONAL HISTORY OF NICOTINE DEPENDENCE: ICD-10-CM

## 2021-12-07 DIAGNOSIS — M19.90 UNSPECIFIED OSTEOARTHRITIS, UNSPECIFIED SITE: ICD-10-CM
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
## 2021-12-07 DIAGNOSIS — I83.023 VARICOSE VEINS OF LEFT LOWER EXTREMITY WITH ULCER OF ANKLE: ICD-10-CM

## 2021-12-07 DIAGNOSIS — I10 ESSENTIAL (PRIMARY) HYPERTENSION: ICD-10-CM

## 2021-12-07 DIAGNOSIS — L97.322 NON-PRESSURE CHRONIC ULCER OF LEFT ANKLE WITH FAT LAYER EXPOSED: ICD-10-CM

## 2021-12-07 DIAGNOSIS — Z79.899 OTHER LONG TERM (CURRENT) DRUG THERAPY: ICD-10-CM

## 2021-12-07 DIAGNOSIS — E03.9 HYPOTHYROIDISM, UNSPECIFIED: ICD-10-CM

## 2021-12-26 NOTE — PATIENT PROFILE ADULT. - TEACHING/LEARNING DEVELOPMENTAL CONSIDERATIONS
Cough    Coughing is a reflex that clears your throat and your airways. Coughing helps to heal and protect your lungs. It is normal to cough occasionally, but a cough that happens with other symptoms or lasts a long time may be a sign of a condition that needs treatment. Coughing may be caused by infections, asthma or COPD, smoking, postnasal drip, gastroesophageal reflux, as well as other medical conditions. Take medicines only as instructed by your health care provider. Avoid anything that causes you to cough at work or at home including smoking.    SEEK IMMEDIATE MEDICAL CARE IF YOU HAVE THE FOLLOWING SYMPTOMS: coughing up blood, shortness of breath, rapid heart rate, chest pain, unexplained weight loss or night sweats. none

## 2022-01-03 ENCOUNTER — APPOINTMENT (OUTPATIENT)
Dept: WOUND CARE | Facility: HOSPITAL | Age: 87
End: 2022-01-03
Payer: MEDICARE

## 2022-01-03 ENCOUNTER — OUTPATIENT (OUTPATIENT)
Dept: OUTPATIENT SERVICES | Facility: HOSPITAL | Age: 87
LOS: 1 days | Discharge: HOME | End: 2022-01-03
Payer: MEDICARE

## 2022-01-03 VITALS
WEIGHT: 112 LBS | TEMPERATURE: 97 F | HEART RATE: 68 BPM | RESPIRATION RATE: 20 BRPM | BODY MASS INDEX: 21.99 KG/M2 | HEIGHT: 60 IN | DIASTOLIC BLOOD PRESSURE: 67 MMHG | SYSTOLIC BLOOD PRESSURE: 181 MMHG | OXYGEN SATURATION: 98 %

## 2022-01-03 DIAGNOSIS — I83.023 VARICOSE VEINS OF LEFT LOWER EXTREMITY WITH ULCER OF ANKLE: ICD-10-CM

## 2022-01-03 DIAGNOSIS — Z96.662 PRESENCE OF LEFT ARTIFICIAL ANKLE JOINT: Chronic | ICD-10-CM

## 2022-01-03 DIAGNOSIS — I70.233 ATHEROSCLEROSIS OF NATIVE ARTERIES OF RIGHT LEG WITH ULCERATION OF ANKLE: ICD-10-CM

## 2022-01-03 DIAGNOSIS — I70.243 ATHEROSCLEROSIS OF NATIVE ARTERIES OF RIGHT LEG WITH ULCERATION OF ANKLE: ICD-10-CM

## 2022-01-03 PROCEDURE — G0463: CPT

## 2022-01-03 PROCEDURE — 99213 OFFICE O/P EST LOW 20 MIN: CPT

## 2022-01-03 NOTE — PLAN
[FreeTextEntry1] : Allevyn border left lateral ankle  2x/wk\par , return to office in 1 month\par 25 minutes spent for patient care and medical decision making.\par

## 2022-01-03 NOTE — HISTORY OF PRESENT ILLNESS
[FreeTextEntry1] : Left ankle ulcer is clean, no C/O\par 7/19/21 left lower leg wound almost closed and right lower leg 2 new lacerations\par 8/9/21 lacerations have closed. Left lateral ankle wound stable\par 10/4/21 left lateral ankle stable with out exposure of hardware\par scab removed. Very small opening in skin with small amount of serous drainage\par \par 11/8/21 left lateral ankle wound appears closed, will use dry protective dressing\par 11/22/21 left lateral ankle still appears closed and new skin tear right shin\par 12/6/21 right shin and left lateral ankle closed\par 1/3/22 all wounds closed

## 2022-01-03 NOTE — ASSESSMENT
[Verbal] : Verbal [Patient] : Patient [Good - alert, interested, motivated] : Good - alert, interested, motivated [Verbalizes knowledge/Understanding] : Verbalizes knowledge/understanding [Dressing changes] : dressing changes [Skin Care] : skin care [Signs and symptoms of infection] : sign and symptoms of infection [How and When to Call] : how and when to call [Patient responsibility to plan of care] : patient responsibility to plan of care [Stable] : stable [Home] : Home [Cane] : Cane [Not Applicable - Long Term Care/Home Health Agency] : Long Term Care/Home Health Agency: Not Applicable [] : No [FreeTextEntry2] : Promote optimal skin integrity, offloading, infection prevention\par  [FreeTextEntry4] : Rapid assessment performed today due to infection control measures related to COVID protocols. No photos or wound measurements were taken.\par F/U 1 month

## 2022-01-03 NOTE — PHYSICAL EXAM
[Normal Breath Sounds] : Normal breath sounds [Normal Heart Sounds] : normal heart sounds [1+] : left 1+ [0] : left 0 [Ankle Swelling (On Exam)] : present [Ankle Swelling Bilaterally] : bilaterally  [Ankle Swelling On The Right] : mild [Varicose Veins Of Lower Extremities] : bilaterally [] : of the left leg [Ankle Swelling On The Left] : moderate [Alert] : alert [Oriented to Person] : oriented to person [Calm] : calm [de-identified] : WD/WN in no acute distress. [de-identified] : WNL [de-identified] : QINGL [de-identified] : WNL [de-identified] : Left lateral ankle ulcer is clean, smaller, base is pale red, no drainage, no odor, no acute infection, periwound skin is intact with no cellulitis. [FreeTextEntry1] : Left lateral ankle- fragile epithelium [de-identified] : none [de-identified] : Khalif Gonzalez [de-identified] : Cleansed with NS\par  [FreeTextEntry7] : Right anterior leg- CLOSED [de-identified] : none [de-identified] : intact [de-identified] : NONE [de-identified] : Cleansed with NS\par  [TWNoteComboBox5] : No [de-identified] : No [de-identified] : Normal [de-identified] : None [de-identified] : None [de-identified] : None [de-identified] : No [de-identified] : 3x Weekly [de-identified] : Primary Dressing [de-identified] : No [de-identified] : Traumatic [de-identified] : No [de-identified] : None [de-identified] : None [de-identified] : None [de-identified] : No

## 2022-01-03 NOTE — PROGRESS NOTE ADULT - ASSESSMENT
89 year old woman with a history of hypertension who presents to the ED BIBEMS for increased respiratory distress and dyspnea, acute respiratory failure, atrial flutter with RVR, mildly positive cardiac enzymes with ischemic ST depressions on her EKG. Likely secondary to demand ischemia and not true coronary atherothrombosis. She is now somewhat improved, currently in sinus rhythm at 60 beats per minute. She did have some evidence of bronchospasm last night, now improved.    -no acute ischemia  -maintaining sr  -cont bb  -cont ccb  - Aspirin 325 QD  - Hold off on full AC for now, though will need to reconsider pending her rhythm while hospitalized and her clinical course  - bp has remained elevated, and would increase hydralazine  - consider changing metoprolol and diltiazem to long acting preparations, as not modifications to the doses have been made for several days  - no volume overload  - normal lvef by echo 2/10  - DVT prophylaxis  - Abx per primary team  - Nebs and steroids per primary team  - Monitor and replete electrolytes  - To follow closely with you Siliq Counseling:  I discussed with the patient the risks of Siliq including but not limited to new or worsening depression, suicidal thoughts and behavior, immunosuppression, malignancy, posterior leukoencephalopathy syndrome, and serious infections.  The patient understands that monitoring is required including a PPD at baseline and must alert us or the primary physician if symptoms of infection or other concerning signs are noted. There is also a special program designed to monitor depression which is required with Siliq.

## 2022-01-04 DIAGNOSIS — Z82.3 FAMILY HISTORY OF STROKE: ICD-10-CM
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
## 2022-01-04 DIAGNOSIS — I83.023 VARICOSE VEINS OF LEFT LOWER EXTREMITY WITH ULCER OF ANKLE: ICD-10-CM

## 2022-01-04 DIAGNOSIS — E03.9 HYPOTHYROIDISM, UNSPECIFIED: ICD-10-CM

## 2022-01-04 DIAGNOSIS — I70.223 ATHEROSCLEROSIS OF NATIVE ARTERIES OF EXTREMITIES WITH REST PAIN, BILATERAL LEGS: ICD-10-CM

## 2022-01-04 DIAGNOSIS — Z83.3 FAMILY HISTORY OF DIABETES MELLITUS: ICD-10-CM

## 2022-01-04 DIAGNOSIS — L97.322 NON-PRESSURE CHRONIC ULCER OF LEFT ANKLE WITH FAT LAYER EXPOSED: ICD-10-CM
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
## 2022-01-04 DIAGNOSIS — Z80.0 FAMILY HISTORY OF MALIGNANT NEOPLASM OF DIGESTIVE ORGANS: ICD-10-CM

## 2022-01-04 DIAGNOSIS — Z87.891 PERSONAL HISTORY OF NICOTINE DEPENDENCE: ICD-10-CM

## 2022-01-04 DIAGNOSIS — Z87.81 PERSONAL HISTORY OF (HEALED) TRAUMATIC FRACTURE: ICD-10-CM

## 2022-01-04 DIAGNOSIS — Z79.899 OTHER LONG TERM (CURRENT) DRUG THERAPY: ICD-10-CM

## 2022-01-04 DIAGNOSIS — I10 ESSENTIAL (PRIMARY) HYPERTENSION: ICD-10-CM
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
## 2022-01-04 DIAGNOSIS — M19.90 UNSPECIFIED OSTEOARTHRITIS, UNSPECIFIED SITE: ICD-10-CM

## 2022-01-04 DIAGNOSIS — S81.811D LACERATION WITHOUT FOREIGN BODY, RIGHT LOWER LEG, SUBSEQUENT ENCOUNTER: ICD-10-CM

## 2022-01-30 ENCOUNTER — NON-APPOINTMENT (OUTPATIENT)
Age: 87
End: 2022-01-30

## 2022-01-31 ENCOUNTER — OUTPATIENT (OUTPATIENT)
Dept: OUTPATIENT SERVICES | Facility: HOSPITAL | Age: 87
LOS: 1 days | Discharge: HOME | End: 2022-01-31
Payer: MEDICARE

## 2022-01-31 ENCOUNTER — APPOINTMENT (OUTPATIENT)
Dept: WOUND CARE | Facility: HOSPITAL | Age: 87
End: 2022-01-31
Payer: MEDICARE

## 2022-01-31 VITALS
TEMPERATURE: 96.9 F | RESPIRATION RATE: 18 BRPM | HEIGHT: 60 IN | HEART RATE: 70 BPM | OXYGEN SATURATION: 99 % | WEIGHT: 110 LBS | SYSTOLIC BLOOD PRESSURE: 172 MMHG | DIASTOLIC BLOOD PRESSURE: 68 MMHG | BODY MASS INDEX: 21.6 KG/M2

## 2022-01-31 DIAGNOSIS — I83.023 VARICOSE VEINS OF LEFT LOWER EXTREMITY WITH ULCER OF ANKLE: ICD-10-CM

## 2022-01-31 DIAGNOSIS — Z96.662 PRESENCE OF LEFT ARTIFICIAL ANKLE JOINT: Chronic | ICD-10-CM

## 2022-01-31 DIAGNOSIS — L97.322 NON-PRESSURE CHRONIC ULCER OF LEFT ANKLE WITH FAT LAYER EXPOSED: ICD-10-CM

## 2022-01-31 DIAGNOSIS — L97.321 VARICOSE VEINS OF LEFT LOWER EXTREMITY WITH ULCER OF ANKLE: ICD-10-CM

## 2022-01-31 PROCEDURE — 99213 OFFICE O/P EST LOW 20 MIN: CPT

## 2022-01-31 PROCEDURE — G0463: CPT

## 2022-01-31 NOTE — HISTORY OF PRESENT ILLNESS
[FreeTextEntry1] : Left ankle ulcer is clean, no C/O\par 7/19/21 left lower leg wound almost closed and right lower leg 2 new lacerations\par 8/9/21 lacerations have closed. Left lateral ankle wound stable\par 10/4/21 left lateral ankle stable with out exposure of hardware\par scab removed. Very small opening in skin with small amount of serous drainage\par \par 11/8/21 left lateral ankle wound appears closed, will use dry protective dressing\par 11/22/21 left lateral ankle still appears closed and new skin tear right shin\par 12/6/21 right shin and left lateral ankle closed\par 1/3/22 all wounds closed  \par 1/31/22 wound left lateral ankle closed

## 2022-01-31 NOTE — PLAN
[FreeTextEntry1] : dry protective dressing left lateral ankle\par discharged\par 20 minutes spent for patient care and medical decision making.\par

## 2022-01-31 NOTE — VITALS
[Pain related to present condition?] : The patient's  pain is not related to present condition. [] : No [de-identified] : 0/10

## 2022-01-31 NOTE — ASSESSMENT
[Verbal] : Verbal [Patient] : Patient [Good - alert, interested, motivated] : Good - alert, interested, motivated [Verbalizes knowledge/Understanding] : Verbalizes knowledge/understanding [Dressing changes] : dressing changes [Skin Care] : skin care [Signs and symptoms of infection] : sign and symptoms of infection [How and When to Call] : how and when to call [Patient responsibility to plan of care] : patient responsibility to plan of care [Stable] : stable [Home] : Home [Cane] : Cane [Not Applicable - Long Term Care/Home Health Agency] : Long Term Care/Home Health Agency: Not Applicable [] : Yes [FreeTextEntry2] : Promote optimal skin integrity, offloading, infection prevention\par Discharge\par  [FreeTextEntry4] : Patient dsichared

## 2022-01-31 NOTE — PHYSICAL EXAM
[Normal Breath Sounds] : Normal breath sounds [Normal Heart Sounds] : normal heart sounds [1+] : left 1+ [0] : left 0 [Ankle Swelling (On Exam)] : present [Ankle Swelling Bilaterally] : bilaterally  [Ankle Swelling On The Right] : mild [Varicose Veins Of Lower Extremities] : bilaterally [] : of the left leg [Ankle Swelling On The Left] : moderate [Alert] : alert [Oriented to Person] : oriented to person [Calm] : calm [de-identified] : WD/WN in no acute distress. [de-identified] : WNL [de-identified] : QINGL [de-identified] : WNL [de-identified] : Left lateral ankle ulcer is clean, smaller, base is pale red, no drainage, no odor, no acute infection, periwound skin is intact with no cellulitis. [FreeTextEntry1] : Lateral Ankle- CLOSED [de-identified] : none [de-identified] : No treatment required [de-identified] : Cleansed with Normal saline\par \par  [FreeTextEntry7] : Anterior Leg- CLOSED [de-identified] : none [de-identified] : intact [de-identified] : NONE [de-identified] : Cleansed with Normal saline\par \par  [TWNoteComboBox1] : Left [TWNoteComboBox5] : No [de-identified] : No [de-identified] : Normal [de-identified] : None [de-identified] : None [de-identified] : None [de-identified] : No [de-identified] : 3x Weekly [de-identified] : Primary Dressing [TWNoteComboBox9] : Right [de-identified] : No [de-identified] : Traumatic [de-identified] : No [de-identified] : None [de-identified] : None [de-identified] : None [de-identified] : No

## 2022-02-01 DIAGNOSIS — M19.90 UNSPECIFIED OSTEOARTHRITIS, UNSPECIFIED SITE: ICD-10-CM

## 2022-02-01 DIAGNOSIS — Z87.81 PERSONAL HISTORY OF (HEALED) TRAUMATIC FRACTURE: ICD-10-CM

## 2022-02-01 DIAGNOSIS — Z79.899 OTHER LONG TERM (CURRENT) DRUG THERAPY: ICD-10-CM

## 2022-02-01 DIAGNOSIS — Z82.3 FAMILY HISTORY OF STROKE: ICD-10-CM
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
## 2022-02-01 DIAGNOSIS — I10 ESSENTIAL (PRIMARY) HYPERTENSION: ICD-10-CM

## 2022-02-01 DIAGNOSIS — Z87.891 PERSONAL HISTORY OF NICOTINE DEPENDENCE: ICD-10-CM

## 2022-02-01 DIAGNOSIS — L97.322 NON-PRESSURE CHRONIC ULCER OF LEFT ANKLE WITH FAT LAYER EXPOSED: ICD-10-CM

## 2022-02-01 DIAGNOSIS — Z83.3 FAMILY HISTORY OF DIABETES MELLITUS: ICD-10-CM
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
## 2022-02-01 DIAGNOSIS — S81.811D LACERATION WITHOUT FOREIGN BODY, RIGHT LOWER LEG, SUBSEQUENT ENCOUNTER: ICD-10-CM

## 2022-02-01 DIAGNOSIS — Z80.0 FAMILY HISTORY OF MALIGNANT NEOPLASM OF DIGESTIVE ORGANS: ICD-10-CM

## 2022-02-01 DIAGNOSIS — I70.223 ATHEROSCLEROSIS OF NATIVE ARTERIES OF EXTREMITIES WITH REST PAIN, BILATERAL LEGS: ICD-10-CM

## 2022-02-01 DIAGNOSIS — I83.023 VARICOSE VEINS OF LEFT LOWER EXTREMITY WITH ULCER OF ANKLE: ICD-10-CM

## 2022-02-01 DIAGNOSIS — E03.9 HYPOTHYROIDISM, UNSPECIFIED: ICD-10-CM

## 2022-08-03 ENCOUNTER — NON-APPOINTMENT (OUTPATIENT)
Age: 87
End: 2022-08-03

## 2022-08-12 ENCOUNTER — NON-APPOINTMENT (OUTPATIENT)
Age: 87
End: 2022-08-12

## 2022-08-13 ENCOUNTER — OUTPATIENT (OUTPATIENT)
Dept: OUTPATIENT SERVICES | Facility: HOSPITAL | Age: 87
LOS: 1 days | End: 2022-08-13
Payer: MEDICARE

## 2022-08-13 ENCOUNTER — APPOINTMENT (OUTPATIENT)
Dept: RADIOLOGY | Facility: CLINIC | Age: 87
End: 2022-08-13

## 2022-08-13 DIAGNOSIS — S20.211D CONTUSION OF RIGHT FRONT WALL OF THORAX, SUBSEQUENT ENCOUNTER: ICD-10-CM

## 2022-08-13 DIAGNOSIS — Z96.662 PRESENCE OF LEFT ARTIFICIAL ANKLE JOINT: Chronic | ICD-10-CM

## 2022-08-13 PROCEDURE — 71100 X-RAY EXAM RIBS UNI 2 VIEWS: CPT | Mod: 26,RT

## 2022-08-13 PROCEDURE — 71100 X-RAY EXAM RIBS UNI 2 VIEWS: CPT

## 2022-08-25 NOTE — ED ADULT TRIAGE NOTE - NS ED NURSE DIRECT TO ROOM YN
1. Probiotic  2. OTC Monistat  3. Urine culture sent  4. Follow up with PCP. Patient has upcoming appointment   5.  If worse to ER Yes

## 2022-09-09 NOTE — PROGRESS NOTE ADULT - PROVIDER SPECIALTY LIST ADULT
Cardiology
Critical Care
Family Medicine
Internal Medicine
Internal Medicine
Pulmonology
Critical Care
Cardiology
Family Medicine
fair balance
Family Medicine
Pulmonology

## 2022-09-23 ENCOUNTER — NON-APPOINTMENT (OUTPATIENT)
Age: 87
End: 2022-09-23

## 2022-09-24 NOTE — HISTORY OF PRESENT ILLNESS
[employer] dose 1 and 2
---
[FreeTextEntry1] : Left ankle ulcer is clean, left elbow wound has healed, no C/O

## 2023-02-06 ENCOUNTER — NON-APPOINTMENT (OUTPATIENT)
Age: 88
End: 2023-02-06

## 2023-02-07 ENCOUNTER — EMERGENCY (EMERGENCY)
Facility: HOSPITAL | Age: 88
LOS: 1 days | End: 2023-02-07
Payer: SELF-PAY

## 2023-02-07 ENCOUNTER — EMERGENCY (EMERGENCY)
Facility: HOSPITAL | Age: 88
LOS: 1 days | Discharge: HOME | End: 2023-02-07
Attending: EMERGENCY MEDICINE | Admitting: EMERGENCY MEDICINE
Payer: MEDICARE

## 2023-02-07 VITALS
RESPIRATION RATE: 18 BRPM | HEART RATE: 68 BPM | OXYGEN SATURATION: 97 % | SYSTOLIC BLOOD PRESSURE: 149 MMHG | DIASTOLIC BLOOD PRESSURE: 75 MMHG

## 2023-02-07 VITALS
DIASTOLIC BLOOD PRESSURE: 72 MMHG | HEIGHT: 59 IN | RESPIRATION RATE: 16 BRPM | SYSTOLIC BLOOD PRESSURE: 185 MMHG | HEART RATE: 64 BPM | TEMPERATURE: 98 F | WEIGHT: 110.01 LBS | OXYGEN SATURATION: 95 %

## 2023-02-07 DIAGNOSIS — Z96.662 PRESENCE OF LEFT ARTIFICIAL ANKLE JOINT: Chronic | ICD-10-CM

## 2023-02-07 LAB
ALBUMIN SERPL ELPH-MCNC: 3.7 G/DL — SIGNIFICANT CHANGE UP (ref 3.3–5)
ALP SERPL-CCNC: 148 U/L — HIGH (ref 40–120)
ALT FLD-CCNC: 23 U/L — SIGNIFICANT CHANGE UP (ref 12–78)
ANION GAP SERPL CALC-SCNC: 4 MMOL/L — LOW (ref 5–17)
AST SERPL-CCNC: 24 U/L — SIGNIFICANT CHANGE UP (ref 15–37)
BASOPHILS # BLD AUTO: 0.02 K/UL — SIGNIFICANT CHANGE UP (ref 0–0.2)
BASOPHILS NFR BLD AUTO: 0.2 % — SIGNIFICANT CHANGE UP (ref 0–2)
BILIRUB SERPL-MCNC: 0.5 MG/DL — SIGNIFICANT CHANGE UP (ref 0.2–1.2)
BUN SERPL-MCNC: 34 MG/DL — HIGH (ref 7–23)
CALCIUM SERPL-MCNC: 9.3 MG/DL — SIGNIFICANT CHANGE UP (ref 8.5–10.1)
CHLORIDE SERPL-SCNC: 107 MMOL/L — SIGNIFICANT CHANGE UP (ref 96–108)
CO2 SERPL-SCNC: 28 MMOL/L — SIGNIFICANT CHANGE UP (ref 22–31)
CREAT SERPL-MCNC: 1.2 MG/DL — SIGNIFICANT CHANGE UP (ref 0.5–1.3)
EGFR: 41 ML/MIN/1.73M2 — LOW
EOSINOPHIL # BLD AUTO: 0.05 K/UL — SIGNIFICANT CHANGE UP (ref 0–0.5)
EOSINOPHIL NFR BLD AUTO: 0.6 % — SIGNIFICANT CHANGE UP (ref 0–6)
GLUCOSE SERPL-MCNC: 94 MG/DL — SIGNIFICANT CHANGE UP (ref 70–99)
HCT VFR BLD CALC: 36.9 % — SIGNIFICANT CHANGE UP (ref 34.5–45)
HGB BLD-MCNC: 11.7 G/DL — SIGNIFICANT CHANGE UP (ref 11.5–15.5)
IMM GRANULOCYTES NFR BLD AUTO: 0.4 % — SIGNIFICANT CHANGE UP (ref 0–0.9)
LYMPHOCYTES # BLD AUTO: 1.39 K/UL — SIGNIFICANT CHANGE UP (ref 1–3.3)
LYMPHOCYTES # BLD AUTO: 16.4 % — SIGNIFICANT CHANGE UP (ref 13–44)
MCHC RBC-ENTMCNC: 30.1 PG — SIGNIFICANT CHANGE UP (ref 27–34)
MCHC RBC-ENTMCNC: 31.7 GM/DL — LOW (ref 32–36)
MCV RBC AUTO: 94.9 FL — SIGNIFICANT CHANGE UP (ref 80–100)
MONOCYTES # BLD AUTO: 0.68 K/UL — SIGNIFICANT CHANGE UP (ref 0–0.9)
MONOCYTES NFR BLD AUTO: 8 % — SIGNIFICANT CHANGE UP (ref 2–14)
NEUTROPHILS # BLD AUTO: 6.32 K/UL — SIGNIFICANT CHANGE UP (ref 1.8–7.4)
NEUTROPHILS NFR BLD AUTO: 74.4 % — SIGNIFICANT CHANGE UP (ref 43–77)
NRBC # BLD: 0 /100 WBCS — SIGNIFICANT CHANGE UP (ref 0–0)
NT-PROBNP SERPL-SCNC: 1160 PG/ML — HIGH (ref 0–450)
PLATELET # BLD AUTO: 181 K/UL — SIGNIFICANT CHANGE UP (ref 150–400)
POTASSIUM SERPL-MCNC: 5.1 MMOL/L — SIGNIFICANT CHANGE UP (ref 3.5–5.3)
POTASSIUM SERPL-SCNC: 5.1 MMOL/L — SIGNIFICANT CHANGE UP (ref 3.5–5.3)
PROT SERPL-MCNC: 7.3 G/DL — SIGNIFICANT CHANGE UP (ref 6–8.3)
RBC # BLD: 3.89 M/UL — SIGNIFICANT CHANGE UP (ref 3.8–5.2)
RBC # FLD: 13.7 % — SIGNIFICANT CHANGE UP (ref 10.3–14.5)
SODIUM SERPL-SCNC: 139 MMOL/L — SIGNIFICANT CHANGE UP (ref 135–145)
TROPONIN I, HIGH SENSITIVITY RESULT: 15 NG/L — SIGNIFICANT CHANGE UP
WBC # BLD: 8.49 K/UL — SIGNIFICANT CHANGE UP (ref 3.8–10.5)
WBC # FLD AUTO: 8.49 K/UL — SIGNIFICANT CHANGE UP (ref 3.8–10.5)

## 2023-02-07 PROCEDURE — 93010 ELECTROCARDIOGRAM REPORT: CPT

## 2023-02-07 PROCEDURE — 84484 ASSAY OF TROPONIN QUANT: CPT

## 2023-02-07 PROCEDURE — 99285 EMERGENCY DEPT VISIT HI MDM: CPT

## 2023-02-07 PROCEDURE — 99285 EMERGENCY DEPT VISIT HI MDM: CPT | Mod: 25

## 2023-02-07 PROCEDURE — 93005 ELECTROCARDIOGRAM TRACING: CPT

## 2023-02-07 PROCEDURE — 71045 X-RAY EXAM CHEST 1 VIEW: CPT | Mod: 26

## 2023-02-07 PROCEDURE — 71045 X-RAY EXAM CHEST 1 VIEW: CPT

## 2023-02-07 PROCEDURE — 80053 COMPREHEN METABOLIC PANEL: CPT

## 2023-02-07 PROCEDURE — 85025 COMPLETE CBC W/AUTO DIFF WBC: CPT

## 2023-02-07 PROCEDURE — 83880 ASSAY OF NATRIURETIC PEPTIDE: CPT

## 2023-02-07 PROCEDURE — 36415 COLL VENOUS BLD VENIPUNCTURE: CPT

## 2023-02-07 PROCEDURE — L9981: CPT

## 2023-02-07 RX ORDER — ASPIRIN/CALCIUM CARB/MAGNESIUM 324 MG
325 TABLET ORAL ONCE
Refills: 0 | Status: COMPLETED | OUTPATIENT
Start: 2023-02-07 | End: 2023-02-07

## 2023-02-07 RX ADMIN — Medication 325 MILLIGRAM(S): at 11:59

## 2023-02-07 NOTE — ED PROVIDER NOTE - PATIENT PORTAL LINK FT
You can access the FollowMyHealth Patient Portal offered by [facility] by registering at the following website: [email or web address]. By joining [employer]’s FollowMyHealth portal, you will also be able to view your health information using other applications (apps) compatible with our system.

## 2023-02-07 NOTE — ED PROVIDER NOTE - OBJECTIVE STATEMENT
89yo female bib ems from urgent care with chest pain on and off for the last several weeks, worse with taking a deep breath, no cough, fever, chills, no dizziness or sob, pt did not take anything for the pain

## 2023-02-07 NOTE — ED ADULT TRIAGE NOTE - CHIEF COMPLAINT QUOTE
pt states she has been having pain in the middle of her chest for 2 weeks. Pt denies SOB and CP at this time

## 2023-02-07 NOTE — ED ADULT NURSE NOTE - OBJECTIVE STATEMENT
Patient received complaining of nonradiating mid intermittent chest pain while at  today, patient is AOx4, safety precautions in place, interventions implemented, awaiting evaluation.

## 2023-10-10 NOTE — PHYSICAL EXAM
[2 x 2] : 2 x 2  [0] : left 0 Patient requests all Lab, Cardiology, and Radiology Results on their Discharge Instructions [Varicose Veins Of Lower Extremities] : bilaterally [Ankle Swelling Bilaterally] : severe [] : not present [Skin Ulcer] : ulcer [Calm] : calm [de-identified] : A&Ox3, NAD [de-identified] : 4/5 strength in all quadrants bilaterally, s/p left ankle orif [de-identified] : left lateral ankle ulcer down to skin, subcutaneous tissue, fat [de-identified] : Light touch sensation intact bilaterally [FreeTextEntry1] : Left Lateral Ankle [FreeTextEntry2] : 0.8 [FreeTextEntry3] : 0.6 [FreeTextEntry4] : 0.3 [de-identified] : Serosanguineous [de-identified] : 100% [de-identified] : Medihoney [de-identified] : Cleansed with Normal Saline\par Cloth tape [TWNoteComboBox4] : Small [TWNoteComboBox5] : No [de-identified] : No [de-identified] : Erythema [de-identified] : None [de-identified] : None [de-identified] : None [de-identified] : Yes [de-identified] : 3x Weekly

## 2023-10-23 ENCOUNTER — NON-APPOINTMENT (OUTPATIENT)
Age: 88
End: 2023-10-23

## 2023-10-27 ENCOUNTER — NON-APPOINTMENT (OUTPATIENT)
Age: 88
End: 2023-10-27

## 2023-10-28 NOTE — PROGRESS NOTE ADULT - PROBLEM SELECTOR PROBLEM 3
middle
Human metapneumovirus (hMPV) as cause of disease classified elsewhere

## 2023-10-31 ENCOUNTER — APPOINTMENT (OUTPATIENT)
Dept: WOUND CARE | Facility: HOSPITAL | Age: 88
End: 2023-10-31
Payer: MEDICARE

## 2023-10-31 ENCOUNTER — NON-APPOINTMENT (OUTPATIENT)
Age: 88
End: 2023-10-31

## 2023-10-31 ENCOUNTER — OUTPATIENT (OUTPATIENT)
Dept: OUTPATIENT SERVICES | Facility: HOSPITAL | Age: 88
LOS: 1 days | Discharge: HOME | End: 2023-10-31
Payer: MEDICARE

## 2023-10-31 VITALS
OXYGEN SATURATION: 97 % | HEIGHT: 63 IN | BODY MASS INDEX: 19.49 KG/M2 | RESPIRATION RATE: 12 BRPM | HEART RATE: 64 BPM | TEMPERATURE: 98.2 F | WEIGHT: 110 LBS | SYSTOLIC BLOOD PRESSURE: 180 MMHG | DIASTOLIC BLOOD PRESSURE: 61 MMHG

## 2023-10-31 DIAGNOSIS — L97.919 VARICOSE VEINS OF RIGHT LOWER EXTREMITY WITH ULCER OTHER PART OF LOWER LEG: ICD-10-CM

## 2023-10-31 DIAGNOSIS — I83.018 VARICOSE VEINS OF RIGHT LOWER EXTREMITY WITH ULCER OTHER PART OF LOWER LEG: ICD-10-CM

## 2023-10-31 DIAGNOSIS — L97.801 NON-PRESSURE CHRONIC ULCER OF OTHER PART OF UNSPECIFIED LOWER LEG LIMITED TO BREAKDOWN OF SKIN: ICD-10-CM

## 2023-10-31 DIAGNOSIS — Z96.662 PRESENCE OF LEFT ARTIFICIAL ANKLE JOINT: Chronic | ICD-10-CM

## 2023-10-31 PROCEDURE — G0463: CPT

## 2023-10-31 PROCEDURE — 99214 OFFICE O/P EST MOD 30 MIN: CPT

## 2023-10-31 RX ORDER — MELOXICAM 7.5 MG/1
7.5 TABLET ORAL
Qty: 30 | Refills: 0 | Status: COMPLETED | COMMUNITY
Start: 2020-12-26 | End: 2023-10-31

## 2023-10-31 RX ORDER — DICLOFENAC SODIUM 1 %
KIT TOPICAL
Refills: 0 | Status: ACTIVE | COMMUNITY

## 2023-10-31 RX ORDER — PREDNISOLONE ACETATE 10 MG/ML
1 SUSPENSION/ DROPS OPHTHALMIC
Qty: 5 | Refills: 0 | Status: COMPLETED | COMMUNITY
Start: 2020-10-08 | End: 2023-10-31

## 2023-10-31 RX ORDER — PREDNISOLONE/GATIFLOX/NEPAFEN 1-0.5-0.1%
1 SUSPENSION, DROPS(FINAL DOSAGE FORM)(ML) OPHTHALMIC (EYE)
Refills: 0 | Status: ACTIVE | COMMUNITY

## 2023-10-31 RX ORDER — CLOPIDOGREL BISULFATE 75 MG/1
75 TABLET, FILM COATED ORAL
Qty: 90 | Refills: 0 | Status: COMPLETED | COMMUNITY
Start: 2021-03-16 | End: 2023-10-31

## 2023-11-02 DIAGNOSIS — Z82.3 FAMILY HISTORY OF STROKE: ICD-10-CM

## 2023-11-02 DIAGNOSIS — Z80.0 FAMILY HISTORY OF MALIGNANT NEOPLASM OF DIGESTIVE ORGANS: ICD-10-CM

## 2023-11-02 DIAGNOSIS — M19.90 UNSPECIFIED OSTEOARTHRITIS, UNSPECIFIED SITE: ICD-10-CM

## 2023-11-02 DIAGNOSIS — I73.9 PERIPHERAL VASCULAR DISEASE, UNSPECIFIED: ICD-10-CM

## 2023-11-02 DIAGNOSIS — Z83.3 FAMILY HISTORY OF DIABETES MELLITUS: ICD-10-CM

## 2023-11-02 DIAGNOSIS — Z87.81 PERSONAL HISTORY OF (HEALED) TRAUMATIC FRACTURE: ICD-10-CM

## 2023-11-02 DIAGNOSIS — I10 ESSENTIAL (PRIMARY) HYPERTENSION: ICD-10-CM

## 2023-11-02 DIAGNOSIS — Z79.899 OTHER LONG TERM (CURRENT) DRUG THERAPY: ICD-10-CM

## 2023-11-02 DIAGNOSIS — I83.218 VARICOSE VEINS OF RIGHT LOWER EXTREMITY WITH BOTH ULCER OF OTHER PART OF LOWER EXTREMITY AND INFLAMMATION: ICD-10-CM

## 2023-11-02 DIAGNOSIS — L97.812 NON-PRESSURE CHRONIC ULCER OF OTHER PART OF RIGHT LOWER LEG WITH FAT LAYER EXPOSED: ICD-10-CM

## 2023-11-02 DIAGNOSIS — E03.9 HYPOTHYROIDISM, UNSPECIFIED: ICD-10-CM

## 2023-11-02 DIAGNOSIS — Z87.891 PERSONAL HISTORY OF NICOTINE DEPENDENCE: ICD-10-CM

## 2023-11-07 ENCOUNTER — OUTPATIENT (OUTPATIENT)
Dept: OUTPATIENT SERVICES | Facility: HOSPITAL | Age: 88
LOS: 1 days | Discharge: HOME | End: 2023-11-07
Payer: MEDICARE

## 2023-11-07 ENCOUNTER — APPOINTMENT (OUTPATIENT)
Dept: WOUND CARE | Facility: HOSPITAL | Age: 88
End: 2023-11-07
Payer: MEDICARE

## 2023-11-07 ENCOUNTER — NON-APPOINTMENT (OUTPATIENT)
Age: 88
End: 2023-11-07

## 2023-11-07 VITALS
TEMPERATURE: 97.8 F | BODY MASS INDEX: 18.96 KG/M2 | OXYGEN SATURATION: 95 % | SYSTOLIC BLOOD PRESSURE: 186 MMHG | HEART RATE: 68 BPM | DIASTOLIC BLOOD PRESSURE: 65 MMHG | HEIGHT: 63 IN | WEIGHT: 107 LBS | RESPIRATION RATE: 12 BRPM

## 2023-11-07 DIAGNOSIS — L97.801 NON-PRESSURE CHRONIC ULCER OF OTHER PART OF UNSPECIFIED LOWER LEG LIMITED TO BREAKDOWN OF SKIN: ICD-10-CM

## 2023-11-07 DIAGNOSIS — Z96.662 PRESENCE OF LEFT ARTIFICIAL ANKLE JOINT: Chronic | ICD-10-CM

## 2023-11-07 PROCEDURE — G0463: CPT

## 2023-11-07 PROCEDURE — 99213 OFFICE O/P EST LOW 20 MIN: CPT

## 2023-11-09 DIAGNOSIS — Z79.899 OTHER LONG TERM (CURRENT) DRUG THERAPY: ICD-10-CM

## 2023-11-09 DIAGNOSIS — I83.218 VARICOSE VEINS OF RIGHT LOWER EXTREMITY WITH BOTH ULCER OF OTHER PART OF LOWER EXTREMITY AND INFLAMMATION: ICD-10-CM

## 2023-11-09 DIAGNOSIS — Z87.891 PERSONAL HISTORY OF NICOTINE DEPENDENCE: ICD-10-CM

## 2023-11-09 DIAGNOSIS — Z87.81 PERSONAL HISTORY OF (HEALED) TRAUMATIC FRACTURE: ICD-10-CM

## 2023-11-09 DIAGNOSIS — L97.812 NON-PRESSURE CHRONIC ULCER OF OTHER PART OF RIGHT LOWER LEG WITH FAT LAYER EXPOSED: ICD-10-CM

## 2023-11-09 DIAGNOSIS — I10 ESSENTIAL (PRIMARY) HYPERTENSION: ICD-10-CM

## 2023-11-09 DIAGNOSIS — Z82.3 FAMILY HISTORY OF STROKE: ICD-10-CM

## 2023-11-09 DIAGNOSIS — E03.9 HYPOTHYROIDISM, UNSPECIFIED: ICD-10-CM

## 2023-11-09 DIAGNOSIS — Z80.0 FAMILY HISTORY OF MALIGNANT NEOPLASM OF DIGESTIVE ORGANS: ICD-10-CM

## 2023-11-09 DIAGNOSIS — I73.9 PERIPHERAL VASCULAR DISEASE, UNSPECIFIED: ICD-10-CM

## 2023-11-09 DIAGNOSIS — Z83.3 FAMILY HISTORY OF DIABETES MELLITUS: ICD-10-CM

## 2023-11-09 DIAGNOSIS — M19.90 UNSPECIFIED OSTEOARTHRITIS, UNSPECIFIED SITE: ICD-10-CM

## 2023-11-14 ENCOUNTER — APPOINTMENT (OUTPATIENT)
Dept: WOUND CARE | Facility: HOSPITAL | Age: 88
End: 2023-11-14
Payer: MEDICARE

## 2023-11-14 ENCOUNTER — OUTPATIENT (OUTPATIENT)
Dept: OUTPATIENT SERVICES | Facility: HOSPITAL | Age: 88
LOS: 1 days | Discharge: HOME | End: 2023-11-14
Payer: MEDICARE

## 2023-11-14 VITALS
HEART RATE: 71 BPM | TEMPERATURE: 98.1 F | HEIGHT: 63 IN | BODY MASS INDEX: 18.96 KG/M2 | OXYGEN SATURATION: 96 % | SYSTOLIC BLOOD PRESSURE: 178 MMHG | WEIGHT: 107 LBS | DIASTOLIC BLOOD PRESSURE: 78 MMHG | RESPIRATION RATE: 12 BRPM

## 2023-11-14 DIAGNOSIS — Z96.662 PRESENCE OF LEFT ARTIFICIAL ANKLE JOINT: Chronic | ICD-10-CM

## 2023-11-14 DIAGNOSIS — L97.801 NON-PRESSURE CHRONIC ULCER OF OTHER PART OF UNSPECIFIED LOWER LEG LIMITED TO BREAKDOWN OF SKIN: ICD-10-CM

## 2023-11-14 PROCEDURE — G0463: CPT

## 2023-11-14 PROCEDURE — 99213 OFFICE O/P EST LOW 20 MIN: CPT

## 2023-11-15 DIAGNOSIS — Z87.891 PERSONAL HISTORY OF NICOTINE DEPENDENCE: ICD-10-CM

## 2023-11-15 DIAGNOSIS — I83.218 VARICOSE VEINS OF RIGHT LOWER EXTREMITY WITH BOTH ULCER OF OTHER PART OF LOWER EXTREMITY AND INFLAMMATION: ICD-10-CM

## 2023-11-15 DIAGNOSIS — Z87.81 PERSONAL HISTORY OF (HEALED) TRAUMATIC FRACTURE: ICD-10-CM

## 2023-11-15 DIAGNOSIS — L97.812 NON-PRESSURE CHRONIC ULCER OF OTHER PART OF RIGHT LOWER LEG WITH FAT LAYER EXPOSED: ICD-10-CM

## 2023-11-15 DIAGNOSIS — M19.90 UNSPECIFIED OSTEOARTHRITIS, UNSPECIFIED SITE: ICD-10-CM

## 2023-11-15 DIAGNOSIS — Z82.3 FAMILY HISTORY OF STROKE: ICD-10-CM

## 2023-11-15 DIAGNOSIS — Z79.899 OTHER LONG TERM (CURRENT) DRUG THERAPY: ICD-10-CM

## 2023-11-15 DIAGNOSIS — I73.9 PERIPHERAL VASCULAR DISEASE, UNSPECIFIED: ICD-10-CM

## 2023-11-15 DIAGNOSIS — I10 ESSENTIAL (PRIMARY) HYPERTENSION: ICD-10-CM

## 2023-11-15 DIAGNOSIS — Z80.0 FAMILY HISTORY OF MALIGNANT NEOPLASM OF DIGESTIVE ORGANS: ICD-10-CM

## 2023-11-15 DIAGNOSIS — E03.9 HYPOTHYROIDISM, UNSPECIFIED: ICD-10-CM

## 2023-11-15 DIAGNOSIS — Z83.3 FAMILY HISTORY OF DIABETES MELLITUS: ICD-10-CM

## 2023-11-21 NOTE — ED ADULT NURSE NOTE - CARDIO ASSESSMENT
REFERRING PHYSICIAN: Ramon Means MD    PREOPERATIVE DIAGNOSIS:  Adenoid hypertrophy.    POSTOPERATIVE DIAGNOSIS:  Adenoid hypertrophy.    PROCEDURE PERFORMED:  Adenoidectomy.    ANESTHESIA:  General anesthesia, LMA.    COMPLICATIONS:  None.    SPECIMENS:  None.    DISPOSITION:  Extubated and transferred to the recovery room in stable condition.    DESCRIPTION OF PROCEDURE:  The patient was brought to the operating room.  Appropriate monitors were placed.  The patient underwent general anesthesia with LMA.  Laura-Mariano retractor was inserted into the oral cavity.  Red rubber catheter was inserted in nose, withdrawn through the mouth in order to suspend the soft palate.  Suction cautery was used to perform adenoidectomy.  Once the adenoids were removed, the choanae were widely patent.  The stomach was suctioned.  Laura-Mariano retractor was removed.  The patient was extubated and transferred to the recovery room in stable condition.        Dictated By:  Ramon Means MD        D:  11/21/2023 09:36:27  T:  11/21/2023 10:24:07  [facility]  Job:  [id number]      cc:  Ramon Means MD   ---

## 2023-11-22 ENCOUNTER — APPOINTMENT (OUTPATIENT)
Dept: WOUND CARE | Facility: HOSPITAL | Age: 88
End: 2023-11-22
Payer: MEDICARE

## 2023-11-22 ENCOUNTER — OUTPATIENT (OUTPATIENT)
Dept: OUTPATIENT SERVICES | Facility: HOSPITAL | Age: 88
LOS: 1 days | Discharge: HOME | End: 2023-11-22
Payer: MEDICARE

## 2023-11-22 VITALS
WEIGHT: 107 LBS | HEART RATE: 66 BPM | TEMPERATURE: 98.1 F | SYSTOLIC BLOOD PRESSURE: 166 MMHG | DIASTOLIC BLOOD PRESSURE: 64 MMHG | OXYGEN SATURATION: 98 % | BODY MASS INDEX: 18.96 KG/M2 | HEIGHT: 63 IN | RESPIRATION RATE: 18 BRPM

## 2023-11-22 DIAGNOSIS — Z96.662 PRESENCE OF LEFT ARTIFICIAL ANKLE JOINT: Chronic | ICD-10-CM

## 2023-11-22 DIAGNOSIS — L97.801 NON-PRESSURE CHRONIC ULCER OF OTHER PART OF UNSPECIFIED LOWER LEG LIMITED TO BREAKDOWN OF SKIN: ICD-10-CM

## 2023-11-22 PROCEDURE — G0463: CPT

## 2023-11-22 PROCEDURE — 99213 OFFICE O/P EST LOW 20 MIN: CPT

## 2023-11-23 ENCOUNTER — EMERGENCY (EMERGENCY)
Facility: HOSPITAL | Age: 88
LOS: 1 days | Discharge: HOME | End: 2023-11-23
Attending: STUDENT IN AN ORGANIZED HEALTH CARE EDUCATION/TRAINING PROGRAM | Admitting: STUDENT IN AN ORGANIZED HEALTH CARE EDUCATION/TRAINING PROGRAM
Payer: MEDICARE

## 2023-11-23 VITALS
HEART RATE: 74 BPM | TEMPERATURE: 98 F | SYSTOLIC BLOOD PRESSURE: 155 MMHG | RESPIRATION RATE: 18 BRPM | OXYGEN SATURATION: 96 % | DIASTOLIC BLOOD PRESSURE: 74 MMHG

## 2023-11-23 VITALS
RESPIRATION RATE: 18 BRPM | HEART RATE: 75 BPM | WEIGHT: 106.04 LBS | SYSTOLIC BLOOD PRESSURE: 167 MMHG | HEIGHT: 60 IN | DIASTOLIC BLOOD PRESSURE: 77 MMHG | TEMPERATURE: 98 F

## 2023-11-23 DIAGNOSIS — I83.218 VARICOSE VEINS OF RIGHT LOWER EXTREMITY WITH BOTH ULCER OF OTHER PART OF LOWER EXTREMITY AND INFLAMMATION: ICD-10-CM

## 2023-11-23 DIAGNOSIS — I73.9 PERIPHERAL VASCULAR DISEASE, UNSPECIFIED: ICD-10-CM

## 2023-11-23 DIAGNOSIS — I10 ESSENTIAL (PRIMARY) HYPERTENSION: ICD-10-CM

## 2023-11-23 DIAGNOSIS — Z96.662 PRESENCE OF LEFT ARTIFICIAL ANKLE JOINT: Chronic | ICD-10-CM

## 2023-11-23 DIAGNOSIS — Z79.899 OTHER LONG TERM (CURRENT) DRUG THERAPY: ICD-10-CM

## 2023-11-23 DIAGNOSIS — Z87.891 PERSONAL HISTORY OF NICOTINE DEPENDENCE: ICD-10-CM

## 2023-11-23 DIAGNOSIS — E03.9 HYPOTHYROIDISM, UNSPECIFIED: ICD-10-CM

## 2023-11-23 DIAGNOSIS — Z82.3 FAMILY HISTORY OF STROKE: ICD-10-CM

## 2023-11-23 DIAGNOSIS — Z80.0 FAMILY HISTORY OF MALIGNANT NEOPLASM OF DIGESTIVE ORGANS: ICD-10-CM

## 2023-11-23 DIAGNOSIS — L97.812 NON-PRESSURE CHRONIC ULCER OF OTHER PART OF RIGHT LOWER LEG WITH FAT LAYER EXPOSED: ICD-10-CM

## 2023-11-23 DIAGNOSIS — Z83.3 FAMILY HISTORY OF DIABETES MELLITUS: ICD-10-CM

## 2023-11-23 DIAGNOSIS — Z87.81 PERSONAL HISTORY OF (HEALED) TRAUMATIC FRACTURE: ICD-10-CM

## 2023-11-23 DIAGNOSIS — M19.90 UNSPECIFIED OSTEOARTHRITIS, UNSPECIFIED SITE: ICD-10-CM

## 2023-11-23 LAB
ALBUMIN SERPL ELPH-MCNC: 3.7 G/DL — SIGNIFICANT CHANGE UP (ref 3.3–5)
ALBUMIN SERPL ELPH-MCNC: 3.7 G/DL — SIGNIFICANT CHANGE UP (ref 3.3–5)
ALP SERPL-CCNC: 125 U/L — HIGH (ref 40–120)
ALP SERPL-CCNC: 125 U/L — HIGH (ref 40–120)
ALT FLD-CCNC: 28 U/L — SIGNIFICANT CHANGE UP (ref 12–78)
ALT FLD-CCNC: 28 U/L — SIGNIFICANT CHANGE UP (ref 12–78)
ANION GAP SERPL CALC-SCNC: 10 MMOL/L — SIGNIFICANT CHANGE UP (ref 5–17)
ANION GAP SERPL CALC-SCNC: 10 MMOL/L — SIGNIFICANT CHANGE UP (ref 5–17)
APTT BLD: 32.8 SEC — SIGNIFICANT CHANGE UP (ref 24.5–35.6)
APTT BLD: 32.8 SEC — SIGNIFICANT CHANGE UP (ref 24.5–35.6)
AST SERPL-CCNC: 32 U/L — SIGNIFICANT CHANGE UP (ref 15–37)
AST SERPL-CCNC: 32 U/L — SIGNIFICANT CHANGE UP (ref 15–37)
BASOPHILS # BLD AUTO: 0.03 K/UL — SIGNIFICANT CHANGE UP (ref 0–0.2)
BASOPHILS # BLD AUTO: 0.03 K/UL — SIGNIFICANT CHANGE UP (ref 0–0.2)
BASOPHILS NFR BLD AUTO: 0.3 % — SIGNIFICANT CHANGE UP (ref 0–2)
BASOPHILS NFR BLD AUTO: 0.3 % — SIGNIFICANT CHANGE UP (ref 0–2)
BILIRUB SERPL-MCNC: 0.2 MG/DL — SIGNIFICANT CHANGE UP (ref 0.2–1.2)
BILIRUB SERPL-MCNC: 0.2 MG/DL — SIGNIFICANT CHANGE UP (ref 0.2–1.2)
BUN SERPL-MCNC: 30 MG/DL — HIGH (ref 7–23)
BUN SERPL-MCNC: 30 MG/DL — HIGH (ref 7–23)
CALCIUM SERPL-MCNC: 9.1 MG/DL — SIGNIFICANT CHANGE UP (ref 8.5–10.1)
CALCIUM SERPL-MCNC: 9.1 MG/DL — SIGNIFICANT CHANGE UP (ref 8.5–10.1)
CHLORIDE SERPL-SCNC: 106 MMOL/L — SIGNIFICANT CHANGE UP (ref 96–108)
CHLORIDE SERPL-SCNC: 106 MMOL/L — SIGNIFICANT CHANGE UP (ref 96–108)
CO2 SERPL-SCNC: 27 MMOL/L — SIGNIFICANT CHANGE UP (ref 22–31)
CO2 SERPL-SCNC: 27 MMOL/L — SIGNIFICANT CHANGE UP (ref 22–31)
CREAT SERPL-MCNC: 1.1 MG/DL — SIGNIFICANT CHANGE UP (ref 0.5–1.3)
CREAT SERPL-MCNC: 1.1 MG/DL — SIGNIFICANT CHANGE UP (ref 0.5–1.3)
EGFR: 45 ML/MIN/1.73M2 — LOW
EGFR: 45 ML/MIN/1.73M2 — LOW
EOSINOPHIL # BLD AUTO: 0.07 K/UL — SIGNIFICANT CHANGE UP (ref 0–0.5)
EOSINOPHIL # BLD AUTO: 0.07 K/UL — SIGNIFICANT CHANGE UP (ref 0–0.5)
EOSINOPHIL NFR BLD AUTO: 0.7 % — SIGNIFICANT CHANGE UP (ref 0–6)
EOSINOPHIL NFR BLD AUTO: 0.7 % — SIGNIFICANT CHANGE UP (ref 0–6)
GLUCOSE SERPL-MCNC: 158 MG/DL — HIGH (ref 70–99)
GLUCOSE SERPL-MCNC: 158 MG/DL — HIGH (ref 70–99)
HCT VFR BLD CALC: 33.5 % — LOW (ref 34.5–45)
HCT VFR BLD CALC: 33.5 % — LOW (ref 34.5–45)
HGB BLD-MCNC: 11.6 G/DL — SIGNIFICANT CHANGE UP (ref 11.5–15.5)
HGB BLD-MCNC: 11.6 G/DL — SIGNIFICANT CHANGE UP (ref 11.5–15.5)
IMM GRANULOCYTES NFR BLD AUTO: 0.6 % — SIGNIFICANT CHANGE UP (ref 0–0.9)
IMM GRANULOCYTES NFR BLD AUTO: 0.6 % — SIGNIFICANT CHANGE UP (ref 0–0.9)
INR BLD: 0.92 RATIO — SIGNIFICANT CHANGE UP (ref 0.85–1.18)
INR BLD: 0.92 RATIO — SIGNIFICANT CHANGE UP (ref 0.85–1.18)
LYMPHOCYTES # BLD AUTO: 1.42 K/UL — SIGNIFICANT CHANGE UP (ref 1–3.3)
LYMPHOCYTES # BLD AUTO: 1.42 K/UL — SIGNIFICANT CHANGE UP (ref 1–3.3)
LYMPHOCYTES # BLD AUTO: 13.3 % — SIGNIFICANT CHANGE UP (ref 13–44)
LYMPHOCYTES # BLD AUTO: 13.3 % — SIGNIFICANT CHANGE UP (ref 13–44)
MCHC RBC-ENTMCNC: 32.2 PG — SIGNIFICANT CHANGE UP (ref 27–34)
MCHC RBC-ENTMCNC: 32.2 PG — SIGNIFICANT CHANGE UP (ref 27–34)
MCHC RBC-ENTMCNC: 34.6 GM/DL — SIGNIFICANT CHANGE UP (ref 32–36)
MCHC RBC-ENTMCNC: 34.6 GM/DL — SIGNIFICANT CHANGE UP (ref 32–36)
MCV RBC AUTO: 93.1 FL — SIGNIFICANT CHANGE UP (ref 80–100)
MCV RBC AUTO: 93.1 FL — SIGNIFICANT CHANGE UP (ref 80–100)
MONOCYTES # BLD AUTO: 0.86 K/UL — SIGNIFICANT CHANGE UP (ref 0–0.9)
MONOCYTES # BLD AUTO: 0.86 K/UL — SIGNIFICANT CHANGE UP (ref 0–0.9)
MONOCYTES NFR BLD AUTO: 8 % — SIGNIFICANT CHANGE UP (ref 2–14)
MONOCYTES NFR BLD AUTO: 8 % — SIGNIFICANT CHANGE UP (ref 2–14)
NEUTROPHILS # BLD AUTO: 8.26 K/UL — HIGH (ref 1.8–7.4)
NEUTROPHILS # BLD AUTO: 8.26 K/UL — HIGH (ref 1.8–7.4)
NEUTROPHILS NFR BLD AUTO: 77.1 % — HIGH (ref 43–77)
NEUTROPHILS NFR BLD AUTO: 77.1 % — HIGH (ref 43–77)
NRBC # BLD: 0 /100 WBCS — SIGNIFICANT CHANGE UP (ref 0–0)
NRBC # BLD: 0 /100 WBCS — SIGNIFICANT CHANGE UP (ref 0–0)
PLATELET # BLD AUTO: 213 K/UL — SIGNIFICANT CHANGE UP (ref 150–400)
PLATELET # BLD AUTO: 213 K/UL — SIGNIFICANT CHANGE UP (ref 150–400)
POTASSIUM SERPL-MCNC: 4.1 MMOL/L — SIGNIFICANT CHANGE UP (ref 3.5–5.3)
POTASSIUM SERPL-MCNC: 4.1 MMOL/L — SIGNIFICANT CHANGE UP (ref 3.5–5.3)
POTASSIUM SERPL-SCNC: 4.1 MMOL/L — SIGNIFICANT CHANGE UP (ref 3.5–5.3)
POTASSIUM SERPL-SCNC: 4.1 MMOL/L — SIGNIFICANT CHANGE UP (ref 3.5–5.3)
PROT SERPL-MCNC: 7.4 G/DL — SIGNIFICANT CHANGE UP (ref 6–8.3)
PROT SERPL-MCNC: 7.4 G/DL — SIGNIFICANT CHANGE UP (ref 6–8.3)
PROTHROM AB SERPL-ACNC: 10.8 SEC — SIGNIFICANT CHANGE UP (ref 9.5–13)
PROTHROM AB SERPL-ACNC: 10.8 SEC — SIGNIFICANT CHANGE UP (ref 9.5–13)
RBC # BLD: 3.6 M/UL — LOW (ref 3.8–5.2)
RBC # BLD: 3.6 M/UL — LOW (ref 3.8–5.2)
RBC # FLD: 12.9 % — SIGNIFICANT CHANGE UP (ref 10.3–14.5)
RBC # FLD: 12.9 % — SIGNIFICANT CHANGE UP (ref 10.3–14.5)
SODIUM SERPL-SCNC: 143 MMOL/L — SIGNIFICANT CHANGE UP (ref 135–145)
SODIUM SERPL-SCNC: 143 MMOL/L — SIGNIFICANT CHANGE UP (ref 135–145)
TSH SERPL-MCNC: 3.67 UIU/ML — SIGNIFICANT CHANGE UP (ref 0.36–3.74)
TSH SERPL-MCNC: 3.67 UIU/ML — SIGNIFICANT CHANGE UP (ref 0.36–3.74)
WBC # BLD: 10.7 K/UL — HIGH (ref 3.8–10.5)
WBC # BLD: 10.7 K/UL — HIGH (ref 3.8–10.5)
WBC # FLD AUTO: 10.7 K/UL — HIGH (ref 3.8–10.5)
WBC # FLD AUTO: 10.7 K/UL — HIGH (ref 3.8–10.5)

## 2023-11-23 PROCEDURE — 85730 THROMBOPLASTIN TIME PARTIAL: CPT

## 2023-11-23 PROCEDURE — 72125 CT NECK SPINE W/O DYE: CPT | Mod: 26,QQ

## 2023-11-23 PROCEDURE — 99284 EMERGENCY DEPT VISIT MOD MDM: CPT

## 2023-11-23 PROCEDURE — 85610 PROTHROMBIN TIME: CPT

## 2023-11-23 PROCEDURE — 99284 EMERGENCY DEPT VISIT MOD MDM: CPT | Mod: 25

## 2023-11-23 PROCEDURE — 72170 X-RAY EXAM OF PELVIS: CPT

## 2023-11-23 PROCEDURE — 71045 X-RAY EXAM CHEST 1 VIEW: CPT

## 2023-11-23 PROCEDURE — 90471 IMMUNIZATION ADMIN: CPT

## 2023-11-23 PROCEDURE — 84443 ASSAY THYROID STIM HORMONE: CPT

## 2023-11-23 PROCEDURE — 71045 X-RAY EXAM CHEST 1 VIEW: CPT | Mod: 26

## 2023-11-23 PROCEDURE — 72125 CT NECK SPINE W/O DYE: CPT | Mod: QQ

## 2023-11-23 PROCEDURE — 72170 X-RAY EXAM OF PELVIS: CPT | Mod: 26

## 2023-11-23 PROCEDURE — 90715 TDAP VACCINE 7 YRS/> IM: CPT

## 2023-11-23 PROCEDURE — 70450 CT HEAD/BRAIN W/O DYE: CPT | Mod: QQ

## 2023-11-23 PROCEDURE — 70450 CT HEAD/BRAIN W/O DYE: CPT | Mod: 26,QQ

## 2023-11-23 PROCEDURE — 36415 COLL VENOUS BLD VENIPUNCTURE: CPT

## 2023-11-23 PROCEDURE — 80053 COMPREHEN METABOLIC PANEL: CPT

## 2023-11-23 PROCEDURE — 85025 COMPLETE CBC W/AUTO DIFF WBC: CPT

## 2023-11-23 RX ORDER — TETANUS TOXOID, REDUCED DIPHTHERIA TOXOID AND ACELLULAR PERTUSSIS VACCINE, ADSORBED 5; 2.5; 8; 8; 2.5 [IU]/.5ML; [IU]/.5ML; UG/.5ML; UG/.5ML; UG/.5ML
0.5 SUSPENSION INTRAMUSCULAR ONCE
Refills: 0 | Status: COMPLETED | OUTPATIENT
Start: 2023-11-23 | End: 2023-11-23

## 2023-11-23 RX ADMIN — TETANUS TOXOID, REDUCED DIPHTHERIA TOXOID AND ACELLULAR PERTUSSIS VACCINE, ADSORBED 0.5 MILLILITER(S): 5; 2.5; 8; 8; 2.5 SUSPENSION INTRAMUSCULAR at 21:41

## 2023-11-23 NOTE — ED PROVIDER NOTE - CLINICAL SUMMARY MEDICAL DECISION MAKING FREE TEXT BOX
Sherwin Jasso MD (Attending Physician): The patient is a 89y Female with pmhx of pelvic fracture, HTN, hypothyroidism p/w head injury after mechanical fall. DDx includes, but not limited to: intracranial bleed, c-spine injury, rib fractures, pelvic fracture. Labs, CT head/c-spine, cxr, pelvic x-ray, tetanus shot. Pt doesn't want any pain meds right now. Dispo pending w/u.

## 2023-11-23 NOTE — ED PROVIDER NOTE - NSFOLLOWUPINSTRUCTIONS_ED_ALL_ED_FT
You can use 500-1000mg Tylenol every 6 hours for pain - as needed.  This is an over-the-counter medications - please respect the warnings on the label. This medication come with certain risks and side effects that you need to discuss with your doctor, especially if you are taking it for a prolonged period.    You can use 400-600mg Ibuprofen (such as motrin or advil) every 6 to 8 hours as needed for pain control.  Take ibuprofen with food or milk to lessen stomach upset.  This is an over-the-counter medication please respect the warnings on the label. All medications come with certain risks and side effects that you need to discuss with your doctor, especially if you are taking them for a prolonged period.    Head Injury    WHAT YOU NEED TO KNOW:    What do I need to know about a head injury? A head injury can include your scalp, face, skull, or brain and range from mild to severe. Effects can appear immediately after the injury or develop later. The effects may last a short time or be permanent. Healthcare providers may want to check your recovery over time. Treatment may change as you recover or develop new health problems from the head injury.    What are the signs and symptoms of a head injury?    An open scalp or skin wound, swelling, or bruising    Mild to moderate headache    Dizziness or loss of balance    Nausea or vomiting    Ringing in the ears or neck pain    Confusion, especially right after the injury    Change in mood, such as feeling restless or irritable    Trouble thinking, remembering, or concentrating    Drowsiness or decreased amount of energy    Trouble sleeping  How is a head injury diagnosed?    Tell your healthcare provider about your injury and symptoms. The provider will do an exam to check your brain function. He or she will check how your pupils react to light. He or she will check your memory, hand grasp, and balance.    You may need x-rays, a CT scan, or an MRI to check for bleeding or major damage to your skull or brain. You may be given contrast liquid to help the pictures show up better. Tell the healthcare provider if you have ever had an allergic reaction to contrast liquid. Do not enter the MRI room with anything metal. Metal can cause serious injury. Tell the provider if you have any metal in or on your body.  How is a head injury treated? A mild head injury may not need to be treated. You may be given medicine to decrease pain. Other treatments may depend on how severe your head injury is. A concussion, hematoma (collection of blood), or traumatic brain injury may need both immediate and long-term treatment.    How can I manage my symptoms?    Rest or do quiet activities. Limit your time watching TV, using the computer, or doing tasks that require a lot of thinking. Slowly return to your normal activities as directed. Do not play sports or do activities that may cause you to get hit in the head. Ask your healthcare provider when you can return to sports.    Apply ice on your head for 15 to 20 minutes every hour or as directed. Use an ice pack, or put crushed ice in a plastic bag. Cover it with a towel before you apply it to your skin. Ice helps prevent tissue damage and decreases swelling and pain.    Have someone stay with you for 24 hours , or as directed. This person can monitor you for problems and call for help if needed. When you are awake, the person should ask you a few questions every few hours to see if you are thinking clearly. An example is to ask your name or address.  What can I do to prevent another head injury?    Wear a helmet that fits properly. Do this when you play sports, or ride a bike, scooter, or skateboard. Helmets help decrease your risk for a serious head injury. Talk to your healthcare provider about other ways you can protect yourself if you play sports.    Wear your seatbelt every time you are in a car. This helps lower your risk for a head injury if you are in a car accident.  Call your local emergency number (911 in the ), or have someone else call if:    You cannot be woken.    You have a seizure.    You stop responding to others or you faint.    You have blurry or double vision.    Your speech becomes slurred or confused.    You have arm or leg weakness, loss of feeling, or new problems with coordination.    Your pupils are larger than usual, or one pupil is a different size than the other.    You have blood or clear fluid coming out of your ears or nose.  When should I seek immediate care?    You have repeated or forceful vomiting.    You feel confused.    Your headache gets worse or becomes severe.    You or someone caring for you notices that you are harder to wake than usual.  When should I call my doctor?    Your symptoms last longer than 6 weeks after the injury.    You have questions or concerns about your condition or care.  CARE AGREEMENT:    You have the right to help plan your care. Learn about your health condition and how it may be treated. Discuss treatment options with your healthcare providers to decide what care you want to receive. You always have the right to refuse treatment.

## 2023-11-23 NOTE — ED ADULT NURSE NOTE - OBJECTIVE STATEMENT
Pt presents to the Ed s/p missed  a step leaving her sunken living room, fell backwards hit her head. Small amt of bleeding noted from an abrasion at the back of her head.

## 2023-11-23 NOTE — ED PROVIDER NOTE - OBJECTIVE STATEMENT
The patient is a 89y Female with pmhx of pelvic fracture, HTN, hypothyroidism p/w head injury after mechanical fall. Pt's daughter at bedside. Patient had a witnessed fall while using a step stool in her home. Patient fell backward landing on buttocks and hit back of head on a wooden chair. Endorsing abrasion/bleeding to back of head. Denies LOC. Not on any blood thinners. Doesn't remember last tetanus shot. Pt endorsing mild pain to back of head, otherwise has no acute complaints. Denies fever, vision changes, cp, sob, abd pain, n/v/d, rib pain, dizziness, urinary symptoms, extremity pain/weakness/sensory changes. Pt was able to ambulate normally after fall. Bleeding from scalp controlled prior to arrival to ED.

## 2023-11-23 NOTE — ED PROVIDER NOTE - PATIENT PORTAL LINK FT
You can access the FollowMyHealth Patient Portal offered by [facility] by registering at the following website: [email or web address]. By joining [employer]’s FollowMyHealth portal, you will also be able to view your health information using other applications (apps) compatible with our system.

## 2023-11-23 NOTE — ED ADULT NURSE REASSESSMENT NOTE - NS ED NURSE REASSESS COMMENT FT1
pt reavaluated and vital signs stable oob ambulating with cane  d/c home with lisy verbalizes understanding

## 2023-11-23 NOTE — ED PROVIDER NOTE - PROGRESS NOTE DETAILS
Sherwin Jasso MD (Attending Physician): Labs and imaging non-actionable. Pt was re-evaluated at bedside, VSS, feeling better overall. Able to ambulate with cane in front of ED team (pt's baseline). Results were discussed with patient as well as return precautions and follow up plan with PCP. Time was taken to answer any questions that the patient had before providing them with discharge paperwork.

## 2023-11-23 NOTE — ED ADULT TRIAGE NOTE - CHIEF COMPLAINT QUOTE
Patient placed in wheelchair upon entering ER.  Patient had a witnessed fall while using a step stool in her home.  Patient fell backward landing on buttocks and hitting back of head on a wooden chair.  Patient has no complaints of pain, dizziness, or weakness.  Patient has a noted wound to top back of head.  Bleeding controlled prior to arrival.  Patient denies LOC, and any other neuro symptoms.  Patient denies taking blood thinners.

## 2023-11-23 NOTE — ED PROVIDER NOTE - CROS ED ROS STATEMENT
Have You Had Botox Before?: has had botox
When Was Your Last Botox Treatment?: 06/22/22
all other ROS negative except as per HPI

## 2023-11-23 NOTE — ED PROVIDER NOTE - PHYSICAL EXAMINATION
GEN - NAD, well appearing, A&Ox3  HEAD - +Abrasion/hematoma on posterior scalp (about 1cm x 1cm) that is not bleeding, minimally TTP.  EYES - PERRL, EOMI  ENT - Airway patent, mucous membranes moist  NECK - Supple, non-tender without lymphadenopathy, no masses  PULMONARY - CTA b/l, symmetric breath sounds, no W/R/R  CARDIAC - +S1S2, RRR, no M/G/R, no JVD  CHEST - Chest wall and ribs NT  ABDOMEN - +BS, ND, NT, soft, no guarding, no rebound, no masses, no rigidity   - No CVA TTP b/l  BACK - No midline tenderness  PELVIS - Stable, NT  EXTREMITIES - FROM, symmetric pulses, no edema, 5/5 strength in b/l UE and LE  NEUROLOGIC - Alert, speech clear, no focal deficits, CN II-XII grossly intact, no pronator drift, normal gait, strength and sensation grossly intact, FTN negative b/l  PSYCH - Normal mood/affect, normal insight

## 2023-11-27 ENCOUNTER — NON-APPOINTMENT (OUTPATIENT)
Age: 88
End: 2023-11-27

## 2023-11-28 ENCOUNTER — OUTPATIENT (OUTPATIENT)
Dept: OUTPATIENT SERVICES | Facility: HOSPITAL | Age: 88
LOS: 1 days | Discharge: HOME | End: 2023-11-28
Payer: MEDICARE

## 2023-11-28 ENCOUNTER — APPOINTMENT (OUTPATIENT)
Dept: WOUND CARE | Facility: HOSPITAL | Age: 88
End: 2023-11-28
Payer: MEDICARE

## 2023-11-28 VITALS
HEIGHT: 63 IN | RESPIRATION RATE: 18 BRPM | DIASTOLIC BLOOD PRESSURE: 68 MMHG | HEART RATE: 63 BPM | SYSTOLIC BLOOD PRESSURE: 166 MMHG | TEMPERATURE: 98 F | WEIGHT: 107 LBS | BODY MASS INDEX: 18.96 KG/M2 | OXYGEN SATURATION: 94 %

## 2023-11-28 DIAGNOSIS — Z96.662 PRESENCE OF LEFT ARTIFICIAL ANKLE JOINT: Chronic | ICD-10-CM

## 2023-11-28 DIAGNOSIS — L97.801 NON-PRESSURE CHRONIC ULCER OF OTHER PART OF UNSPECIFIED LOWER LEG LIMITED TO BREAKDOWN OF SKIN: ICD-10-CM

## 2023-11-28 PROCEDURE — G0463: CPT

## 2023-11-28 PROCEDURE — 99213 OFFICE O/P EST LOW 20 MIN: CPT

## 2023-11-29 DIAGNOSIS — I10 ESSENTIAL (PRIMARY) HYPERTENSION: ICD-10-CM

## 2023-11-29 DIAGNOSIS — Z82.3 FAMILY HISTORY OF STROKE: ICD-10-CM

## 2023-11-29 DIAGNOSIS — I83.218 VARICOSE VEINS OF RIGHT LOWER EXTREMITY WITH BOTH ULCER OF OTHER PART OF LOWER EXTREMITY AND INFLAMMATION: ICD-10-CM

## 2023-11-29 DIAGNOSIS — Z83.3 FAMILY HISTORY OF DIABETES MELLITUS: ICD-10-CM

## 2023-11-29 DIAGNOSIS — M19.90 UNSPECIFIED OSTEOARTHRITIS, UNSPECIFIED SITE: ICD-10-CM

## 2023-11-29 DIAGNOSIS — I73.9 PERIPHERAL VASCULAR DISEASE, UNSPECIFIED: ICD-10-CM

## 2023-11-29 DIAGNOSIS — Z79.899 OTHER LONG TERM (CURRENT) DRUG THERAPY: ICD-10-CM

## 2023-11-29 DIAGNOSIS — Z87.891 PERSONAL HISTORY OF NICOTINE DEPENDENCE: ICD-10-CM

## 2023-11-29 DIAGNOSIS — E03.9 HYPOTHYROIDISM, UNSPECIFIED: ICD-10-CM

## 2023-11-29 DIAGNOSIS — Z80.0 FAMILY HISTORY OF MALIGNANT NEOPLASM OF DIGESTIVE ORGANS: ICD-10-CM

## 2023-11-29 DIAGNOSIS — L97.812 NON-PRESSURE CHRONIC ULCER OF OTHER PART OF RIGHT LOWER LEG WITH FAT LAYER EXPOSED: ICD-10-CM

## 2023-11-29 DIAGNOSIS — Z87.81 PERSONAL HISTORY OF (HEALED) TRAUMATIC FRACTURE: ICD-10-CM

## 2023-12-05 ENCOUNTER — OUTPATIENT (OUTPATIENT)
Dept: OUTPATIENT SERVICES | Facility: HOSPITAL | Age: 88
LOS: 1 days | Discharge: HOME | End: 2023-12-05
Payer: MEDICARE

## 2023-12-05 ENCOUNTER — APPOINTMENT (OUTPATIENT)
Dept: WOUND CARE | Facility: HOSPITAL | Age: 88
End: 2023-12-05
Payer: MEDICARE

## 2023-12-05 VITALS
OXYGEN SATURATION: 96 % | DIASTOLIC BLOOD PRESSURE: 69 MMHG | SYSTOLIC BLOOD PRESSURE: 166 MMHG | WEIGHT: 107 LBS | HEIGHT: 63 IN | RESPIRATION RATE: 18 BRPM | TEMPERATURE: 97.6 F | BODY MASS INDEX: 18.96 KG/M2 | HEART RATE: 66 BPM

## 2023-12-05 DIAGNOSIS — L97.801 NON-PRESSURE CHRONIC ULCER OF OTHER PART OF UNSPECIFIED LOWER LEG LIMITED TO BREAKDOWN OF SKIN: ICD-10-CM

## 2023-12-05 DIAGNOSIS — Z96.662 PRESENCE OF LEFT ARTIFICIAL ANKLE JOINT: Chronic | ICD-10-CM

## 2023-12-05 PROCEDURE — 99213 OFFICE O/P EST LOW 20 MIN: CPT

## 2023-12-05 PROCEDURE — G0463: CPT

## 2023-12-06 DIAGNOSIS — Z87.891 PERSONAL HISTORY OF NICOTINE DEPENDENCE: ICD-10-CM

## 2023-12-06 DIAGNOSIS — Z82.3 FAMILY HISTORY OF STROKE: ICD-10-CM

## 2023-12-06 DIAGNOSIS — Z83.3 FAMILY HISTORY OF DIABETES MELLITUS: ICD-10-CM

## 2023-12-06 DIAGNOSIS — Z87.81 PERSONAL HISTORY OF (HEALED) TRAUMATIC FRACTURE: ICD-10-CM

## 2023-12-06 DIAGNOSIS — I10 ESSENTIAL (PRIMARY) HYPERTENSION: ICD-10-CM

## 2023-12-06 DIAGNOSIS — M19.90 UNSPECIFIED OSTEOARTHRITIS, UNSPECIFIED SITE: ICD-10-CM

## 2023-12-06 DIAGNOSIS — I73.9 PERIPHERAL VASCULAR DISEASE, UNSPECIFIED: ICD-10-CM

## 2023-12-06 DIAGNOSIS — L97.812 NON-PRESSURE CHRONIC ULCER OF OTHER PART OF RIGHT LOWER LEG WITH FAT LAYER EXPOSED: ICD-10-CM

## 2023-12-06 DIAGNOSIS — I83.218 VARICOSE VEINS OF RIGHT LOWER EXTREMITY WITH BOTH ULCER OF OTHER PART OF LOWER EXTREMITY AND INFLAMMATION: ICD-10-CM

## 2023-12-06 DIAGNOSIS — Z80.0 FAMILY HISTORY OF MALIGNANT NEOPLASM OF DIGESTIVE ORGANS: ICD-10-CM

## 2023-12-06 DIAGNOSIS — Z79.899 OTHER LONG TERM (CURRENT) DRUG THERAPY: ICD-10-CM

## 2023-12-06 DIAGNOSIS — E03.9 HYPOTHYROIDISM, UNSPECIFIED: ICD-10-CM

## 2023-12-06 NOTE — HISTORY OF PRESENT ILLNESS
Left message for patient that her UA only shows red blood cells and that we will call again once her urine culture results are back. Explained that if she has microscopic hematuria (rbc's) and no infection comes back on the culture that she will then need a urology referral to evaluate the blood in the urine.   [FreeTextEntry1] : 89 yo F with L lateral malleolar wound since October 2020. Does not remember mechanism. It started as a scab and eventually dell off and a larger and deeper wound developed. Patient is active and is tired of dealing with wound for so long. No evidence of osteo on MRI. She does not have LE pain on ambulation but does get B calve cramping at night that improves with ambulation (going on for many months).

## 2023-12-12 ENCOUNTER — APPOINTMENT (OUTPATIENT)
Dept: WOUND CARE | Facility: HOSPITAL | Age: 88
End: 2023-12-12

## 2023-12-19 ENCOUNTER — OUTPATIENT (OUTPATIENT)
Dept: OUTPATIENT SERVICES | Facility: HOSPITAL | Age: 88
LOS: 1 days | Discharge: HOME | End: 2023-12-19
Payer: MEDICARE

## 2023-12-19 ENCOUNTER — APPOINTMENT (OUTPATIENT)
Dept: WOUND CARE | Facility: HOSPITAL | Age: 88
End: 2023-12-19
Payer: MEDICARE

## 2023-12-19 DIAGNOSIS — Z96.662 PRESENCE OF LEFT ARTIFICIAL ANKLE JOINT: Chronic | ICD-10-CM

## 2023-12-19 DIAGNOSIS — L97.801 NON-PRESSURE CHRONIC ULCER OF OTHER PART OF UNSPECIFIED LOWER LEG LIMITED TO BREAKDOWN OF SKIN: ICD-10-CM

## 2023-12-19 PROCEDURE — 99213 OFFICE O/P EST LOW 20 MIN: CPT

## 2023-12-19 PROCEDURE — G0463: CPT

## 2023-12-19 NOTE — HISTORY OF PRESENT ILLNESS
[FreeTextEntry1] : 89 yo WF, here with her daughter for an  ulcer that developed about several wks ago when a superficial vein on her RLE ruptured. Went to an urgent care where she was Rx mupirocin on 10/24/23. Went back on 10/28 to the urgent care center and rx keflex and referred here . The ulcer is healing well and very superficial. No signs of infection.      Pt declining vascular studies/consult.      On today's visit., the ulcer is superficial and stable. Still with some yellow slough. No signs of infection. 11/22/23 right lower leg wound slightly smaller. No signs of infection. Some loose slough noted and cleansed. 11/28/23 right lower leg wound appears closed. no signs of infection 12/5/23 old scab removed small open area noted and no signs of infection seen 12/19/23 right lower leg wound smaller. no signs of infection

## 2023-12-19 NOTE — ASSESSMENT
[Verbal] : Verbal [Demo] : Demo [Patient] : Patient [Good - alert, interested, motivated] : Good - alert, interested, motivated [Verbalizes knowledge/Understanding] : Verbalizes knowledge/understanding [Dressing changes] : dressing changes [Skin Care] : skin care [Signs and symptoms of infection] : sign and symptoms of infection [Nutrition] : nutrition [How and When to Call] : how and when to call [Patient responsibility to plan of care] : patient responsibility to plan of care [] : Yes [Stable] : stable [Home] : Home [Ambulatory] : Ambulatory [Not Applicable - Long Term Care/Home Health Agency] : Long Term Care/Home Health Agency: Not Applicable [FreeTextEntry2] : Infection Prevention Wound care and Dressing changes Promote and Restore optimal skin integrity Nutrition and Wound Healing  Offloading and Pressure relief Protect and Guard wound site  Compliance R/T Elevation of Lower Extremities [FreeTextEntry4] : MD lifted small scab, wound still open underneath. Continue with Bessy, Patient to return to [facility] in 3 weeks.

## 2023-12-19 NOTE — PHYSICAL EXAM
service: Not on file     Active member of club or organization: Not on file     Attends meetings of clubs or organizations: Not on file     Relationship status: Not on file    Intimate partner violence:     Fear of current or ex partner: Not on file     Emotionally abused: Not on file     Physically abused: Not on file     Forced sexual activity: Not on file   Other Topics Concern    Not on file   Social History Narrative    Not on file       Family Hx:  No family history on file. Review of Systems:   Review of Systems   Constitutional: Negative for activity change and appetite change. HENT: Negative for congestion. Respiratory: Positive for chest tightness. Negative for apnea, choking and shortness of breath. Cardiovascular: Positive for palpitations. Negative for chest pain. Gastrointestinal: Negative for abdominal distention and abdominal pain. Endocrine: Negative for cold intolerance and heat intolerance. Genitourinary: Negative for dysuria and enuresis. Musculoskeletal: Negative for arthralgias and back pain. Skin: Negative for color change. Neurological: Negative for dizziness, seizures, syncope and light-headedness. Psychiatric/Behavioral: Negative for agitation, behavioral problems and confusion. Allergies:   Allergies   Allergen Reactions    Sulfa Antibiotics Other (See Comments)     Elevated blood pressure    Ceclor [Cefaclor] Rash    Ciprofloxacin Other (See Comments)    Singulair [Montelukast Sodium] Other (See Comments)       Current Medications:    Current Facility-Administered Medications:     sodium chloride flush 0.9 % injection 10 mL, 10 mL, Intravenous, 2 times per day, LIN Friedman, 10 mL at 01/04/20 7058    sodium chloride flush 0.9 % injection 10 mL, 10 mL, Intravenous, PRN, LIN Friedman    magnesium hydroxide (MILK OF MAGNESIA) 400 MG/5ML suspension 30 mL, 30 mL, Oral, Daily PRN, LIN Friedman    ondansetron [facility]) injection 4 mg, 4 mg, Intravenous, Q6H PRN, LIN Stallings    atorvastatin (LIPITOR) tablet 40 mg, 40 mg, Oral, Nightly, [facility], [address], 40 mg at 01/04/20 2153    aspirin chewable tablet 81 mg, 81 mg, Oral, Daily, LIN Stallings    influenza quadrivalent split vaccine (FLUZONE;FLUARIX;FLULAVAL;AFLURIA) injection 0.5 mL, 0.5 mL, Intramuscular, Once, Shanika Curiel MD    enoxaparin (LOVENOX) injection 30 mg, 30 mg, Subcutaneous, Daily, Shanika Curiel MD, 30 mg at 01/04/20 2153    hydrALAZINE (APRESOLINE) injection 10 mg, 10 mg, Intravenous, Q6H PRN, Shanika Curiel MD    atenolol (TENORMIN) tablet 25 mg, 25 mg, Oral, Daily, Shanika Curiel MD    aluminum & magnesium hydroxide-simethicone (MAALOX) 200-200-20 MG/5ML suspension 30 mL, 30 mL, Oral, Q6H PRN, Shanika Curiel MD    pantoprazole (PROTONIX) tablet 40 mg, 40 mg, Oral, QAM AC, Shanika Curiel MD, 40 mg at 01/05/20 0530    ipratropium (ATROVENT) 0.06 % nasal spray 1 spray, 1 spray, Each Nostril, BID, [place] LIN Burks, 1 spray at 01/04/20 2159    albuterol sulfate  (90 Base) MCG/ACT inhaler 2 puff, 2 puff, Inhalation, Q4H PRN, LIN Stallings    benazepril (LOTENSIN) tablet 20 mg, 20 mg, Oral, BID, Shanika Curiel MD, 20 mg at 01/04/20 2153    Physical Examination:    BP (!) 151/84   Pulse 59   Temp 98.4 °F (36.9 °C) (Oral)   Resp 16   Wt 127 lb (57.6 kg)   SpO2 98%   BMI 21.80 kg/m²    Physical Exam  Constitutional:       Appearance: She is well-developed. HENT:      Head: Normocephalic and atraumatic. Eyes:      Pupils: Pupils are equal, round, and reactive to light. Neck:      Musculoskeletal: Normal range of motion and neck supple. Cardiovascular:      Rate and Rhythm: Normal rate and regular rhythm. Heart sounds: No murmur. No friction rub. No gallop. Pulmonary:      Effort: Pulmonary effort is normal. No respiratory distress. Breath sounds: Normal breath sounds.  No CREATININE 0.76 01/05/2020    CALCIUM 9.3 01/05/2020    GFRAA >60.0 01/05/2020    LABGLOM >60.0 01/05/2020    GLUCOSE 93 01/05/2020    GLUCOSE 137 10/19/2011     Magnesium:    Lab Results   Component Value Date    MG 2.6 10/18/2017     Troponin:    Lab Results   Component Value Date    TROPONINI <0.010 01/04/2020       EKG: EKG: sinus bradycardia. No ischemia    ASSESSMENT/PLAN:         Active Hospital Problems    Diagnosis Date Noted    Chest pain [R07.9] 01/04/2020     Hypertensive urgency associated with chest tightness. BP is now controlled and CP resolved. Ruled out for MI. Recommend continued observation and follow-up with no further non-invasive or invasive workup needed as an inpatient. Will set up for followup with me. Electronically signed by Vanessa Fan.  Asha Gonzalez MD [facility] Director of Cardiology Services and CardiacCatheterization Laboratory  [facility]   on 1/5/2020 at 8:15 AM [4 x 4] : 4 x 4  [Normal Thyroid] : the thyroid was normal [Normal Breath Sounds] : Normal breath sounds [Normal Heart Sounds] : normal heart sounds [Normal Rate and Rhythm] : normal rate and rhythm [Ankle Swelling (On Exam)] : present [Ankle Swelling On The Left] : moderate [] : of the right leg [Ankle Swelling On The Right] : mild [Alert] : alert [Oriented to Person] : oriented to person [Oriented to Place] : oriented to place [Oriented to Time] : oriented to time [Calm] : calm [JVD] : no jugular venous distention  [Abdomen Masses] : No abdominal massess [Abdomen Tenderness] : ~T ~M No abdominal tenderness [Tender] : nontender [Enlarged] : not enlarged [de-identified] : elderly WF, NAD, alert, Ox3. [FreeTextEntry1] : Right Medial Lower Leg- small superficial open area/new epithelium within measurement [FreeTextEntry2] : 0.5 [FreeTextEntry3] : 0.3 [FreeTextEntry4] : 0.1 [de-identified] : Serous [de-identified] : Bessy [de-identified] : Mechanically cleansed with 0.9% Normal Saline Kerlix  [de-identified] : Doppler utilized;   Pulses present  - Right  Pulses present - Left  Extremity Temperature: Warm to touch Extremity Color:  Normal for skin pigmentation [TWNoteComboBox4] : Small [TWNoteComboBox5] : No [TWNoteComboBox6] : Venous [de-identified] : No [de-identified] : other [de-identified] : None [de-identified] : None [de-identified] : 100% [de-identified] : No [de-identified] : Primary Dressing [de-identified] : Every other day

## 2023-12-21 DIAGNOSIS — Z87.81 PERSONAL HISTORY OF (HEALED) TRAUMATIC FRACTURE: ICD-10-CM

## 2023-12-21 DIAGNOSIS — E03.9 HYPOTHYROIDISM, UNSPECIFIED: ICD-10-CM

## 2023-12-21 DIAGNOSIS — Z80.0 FAMILY HISTORY OF MALIGNANT NEOPLASM OF DIGESTIVE ORGANS: ICD-10-CM

## 2023-12-21 DIAGNOSIS — I10 ESSENTIAL (PRIMARY) HYPERTENSION: ICD-10-CM

## 2023-12-21 DIAGNOSIS — I83.218 VARICOSE VEINS OF RIGHT LOWER EXTREMITY WITH BOTH ULCER OF OTHER PART OF LOWER EXTREMITY AND INFLAMMATION: ICD-10-CM

## 2023-12-21 DIAGNOSIS — Z82.3 FAMILY HISTORY OF STROKE: ICD-10-CM

## 2023-12-21 DIAGNOSIS — Z87.891 PERSONAL HISTORY OF NICOTINE DEPENDENCE: ICD-10-CM

## 2023-12-21 DIAGNOSIS — I73.9 PERIPHERAL VASCULAR DISEASE, UNSPECIFIED: ICD-10-CM

## 2023-12-21 DIAGNOSIS — Z79.899 OTHER LONG TERM (CURRENT) DRUG THERAPY: ICD-10-CM

## 2023-12-21 DIAGNOSIS — M19.90 UNSPECIFIED OSTEOARTHRITIS, UNSPECIFIED SITE: ICD-10-CM

## 2023-12-21 DIAGNOSIS — Z83.3 FAMILY HISTORY OF DIABETES MELLITUS: ICD-10-CM

## 2023-12-21 DIAGNOSIS — L97.812 NON-PRESSURE CHRONIC ULCER OF OTHER PART OF RIGHT LOWER LEG WITH FAT LAYER EXPOSED: ICD-10-CM

## 2023-12-26 ENCOUNTER — APPOINTMENT (OUTPATIENT)
Dept: WOUND CARE | Facility: HOSPITAL | Age: 88
End: 2023-12-26

## 2024-01-09 ENCOUNTER — OUTPATIENT (OUTPATIENT)
Dept: OUTPATIENT SERVICES | Facility: HOSPITAL | Age: 89
LOS: 1 days | Discharge: HOME | End: 2024-01-09
Payer: MEDICARE

## 2024-01-09 ENCOUNTER — APPOINTMENT (OUTPATIENT)
Dept: WOUND CARE | Facility: HOSPITAL | Age: 89
End: 2024-01-09
Payer: MEDICARE

## 2024-01-09 VITALS
HEART RATE: 63 BPM | SYSTOLIC BLOOD PRESSURE: 158 MMHG | OXYGEN SATURATION: 98 % | WEIGHT: 107 LBS | RESPIRATION RATE: 18 BRPM | DIASTOLIC BLOOD PRESSURE: 55 MMHG | HEIGHT: 63 IN | TEMPERATURE: 97.8 F | BODY MASS INDEX: 18.96 KG/M2

## 2024-01-09 DIAGNOSIS — L97.812 NON-PRESSURE CHRONIC ULCER OF OTHER PART OF RIGHT LOWER LEG WITH FAT LAYER EXPOSED: ICD-10-CM

## 2024-01-09 DIAGNOSIS — L97.919 VARICOSE VEINS OF RIGHT LOWER EXTREMITY WITH BOTH ULCER OF OTHER PART OF LOWER EXTREMITY AND INFLAMMATION: ICD-10-CM

## 2024-01-09 DIAGNOSIS — Z96.662 PRESENCE OF LEFT ARTIFICIAL ANKLE JOINT: Chronic | ICD-10-CM

## 2024-01-09 DIAGNOSIS — I83.218 VARICOSE VEINS OF RIGHT LOWER EXTREMITY WITH BOTH ULCER OF OTHER PART OF LOWER EXTREMITY AND INFLAMMATION: ICD-10-CM

## 2024-01-09 PROCEDURE — G0463: CPT

## 2024-01-09 PROCEDURE — 99213 OFFICE O/P EST LOW 20 MIN: CPT

## 2024-01-10 DIAGNOSIS — Z82.3 FAMILY HISTORY OF STROKE: ICD-10-CM

## 2024-01-10 DIAGNOSIS — Z87.81 PERSONAL HISTORY OF (HEALED) TRAUMATIC FRACTURE: ICD-10-CM

## 2024-01-10 DIAGNOSIS — E03.9 HYPOTHYROIDISM, UNSPECIFIED: ICD-10-CM

## 2024-01-10 DIAGNOSIS — L97.812 NON-PRESSURE CHRONIC ULCER OF OTHER PART OF RIGHT LOWER LEG WITH FAT LAYER EXPOSED: ICD-10-CM

## 2024-01-10 DIAGNOSIS — M19.90 UNSPECIFIED OSTEOARTHRITIS, UNSPECIFIED SITE: ICD-10-CM

## 2024-01-10 DIAGNOSIS — I73.9 PERIPHERAL VASCULAR DISEASE, UNSPECIFIED: ICD-10-CM

## 2024-01-10 DIAGNOSIS — I83.218 VARICOSE VEINS OF RIGHT LOWER EXTREMITY WITH BOTH ULCER OF OTHER PART OF LOWER EXTREMITY AND INFLAMMATION: ICD-10-CM

## 2024-01-10 DIAGNOSIS — Z80.0 FAMILY HISTORY OF MALIGNANT NEOPLASM OF DIGESTIVE ORGANS: ICD-10-CM

## 2024-01-10 DIAGNOSIS — Z87.891 PERSONAL HISTORY OF NICOTINE DEPENDENCE: ICD-10-CM

## 2024-01-10 DIAGNOSIS — Z83.3 FAMILY HISTORY OF DIABETES MELLITUS: ICD-10-CM

## 2024-01-10 DIAGNOSIS — Z79.899 OTHER LONG TERM (CURRENT) DRUG THERAPY: ICD-10-CM

## 2024-01-10 DIAGNOSIS — I10 ESSENTIAL (PRIMARY) HYPERTENSION: ICD-10-CM

## 2024-01-12 NOTE — PROGRESS NOTE ADULT - PROBLEM SELECTOR PROBLEM 6
-- DO NOT REPLY / DO NOT REPLY ALL --  -- Message is from Engagement Center Operations ([facility]) --    Offered Waitlist if Available for the Visit Type? Yes    Caller is requesting an appointment - at a sooner time than what was available.      Caller wants sooner appointment - offered other approved options    Reason for Visit: Patient had to reschedule her appointment due to weather and is needing a sooner appointment so that she can get refill on her blood pressure medications.      Is the patient currently scheduled? Yes.  Appointment date:  03/07/2024    Preferred time to be seen: As soon as possible     Caller Information         Type Contact Phone/Fax    01/12/2024 07:46 AM CST Phone (Incoming) Deedee Alonso (Self) [phone] (M)            Alternative phone number: None     Can a detailed message be left? Yes    Message Turnaround:       
Pt rescheduled   
Essential hypertension

## 2024-02-10 NOTE — ED ADULT NURSE NOTE - TOBACCO USE
[FreeTextEntry1] :  plan: - start on potassium citrate - repeat bmp and 24 hr in 1.5 month
yes
Former smoker

## 2024-04-23 ENCOUNTER — OUTPATIENT (OUTPATIENT)
Dept: OUTPATIENT SERVICES | Facility: HOSPITAL | Age: 89
LOS: 1 days | Discharge: HOME | End: 2024-04-23
Payer: MEDICARE

## 2024-04-23 ENCOUNTER — APPOINTMENT (OUTPATIENT)
Dept: WOUND CARE | Facility: HOSPITAL | Age: 89
End: 2024-04-23
Payer: MEDICARE

## 2024-04-23 VITALS
HEIGHT: 63 IN | SYSTOLIC BLOOD PRESSURE: 147 MMHG | TEMPERATURE: 97.8 F | WEIGHT: 107 LBS | HEART RATE: 60 BPM | DIASTOLIC BLOOD PRESSURE: 58 MMHG | OXYGEN SATURATION: 98 % | RESPIRATION RATE: 18 BRPM | BODY MASS INDEX: 18.96 KG/M2

## 2024-04-23 DIAGNOSIS — Z96.662 PRESENCE OF LEFT ARTIFICIAL ANKLE JOINT: Chronic | ICD-10-CM

## 2024-04-23 DIAGNOSIS — S51.811A LACERATION W/OUT FOREIGN BODY OF RIGHT FOREARM, INITIAL ENCOUNTER: ICD-10-CM

## 2024-04-23 DIAGNOSIS — S51.801A UNSPECIFIED OPEN WOUND OF RIGHT FOREARM, INITIAL ENCOUNTER: ICD-10-CM

## 2024-04-23 PROCEDURE — G0463: CPT

## 2024-04-23 PROCEDURE — 99214 OFFICE O/P EST MOD 30 MIN: CPT

## 2024-04-25 DIAGNOSIS — Z79.899 OTHER LONG TERM (CURRENT) DRUG THERAPY: ICD-10-CM
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
## 2024-04-25 DIAGNOSIS — S51.811A LACERATION WITHOUT FOREIGN BODY OF RIGHT FOREARM, INITIAL ENCOUNTER: ICD-10-CM

## 2024-04-25 DIAGNOSIS — I73.9 PERIPHERAL VASCULAR DISEASE, UNSPECIFIED: ICD-10-CM

## 2024-04-25 DIAGNOSIS — Z80.0 FAMILY HISTORY OF MALIGNANT NEOPLASM OF DIGESTIVE ORGANS: ICD-10-CM

## 2024-04-25 DIAGNOSIS — E03.9 HYPOTHYROIDISM, UNSPECIFIED: ICD-10-CM

## 2024-04-25 DIAGNOSIS — Z83.3 FAMILY HISTORY OF DIABETES MELLITUS: ICD-10-CM
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
## 2024-04-25 DIAGNOSIS — I10 ESSENTIAL (PRIMARY) HYPERTENSION: ICD-10-CM
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
## 2024-04-25 DIAGNOSIS — M19.90 UNSPECIFIED OSTEOARTHRITIS, UNSPECIFIED SITE: ICD-10-CM
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
## 2024-04-25 DIAGNOSIS — Z87.81 PERSONAL HISTORY OF (HEALED) TRAUMATIC FRACTURE: ICD-10-CM

## 2024-04-25 DIAGNOSIS — Z82.3 FAMILY HISTORY OF STROKE: ICD-10-CM

## 2024-04-25 DIAGNOSIS — Z87.891 PERSONAL HISTORY OF NICOTINE DEPENDENCE: ICD-10-CM

## 2024-05-08 ENCOUNTER — APPOINTMENT (OUTPATIENT)
Dept: WOUND CARE | Facility: HOSPITAL | Age: 89
End: 2024-05-08
Payer: MEDICARE

## 2024-05-08 ENCOUNTER — OUTPATIENT (OUTPATIENT)
Dept: OUTPATIENT SERVICES | Facility: HOSPITAL | Age: 89
LOS: 1 days | Discharge: HOME | End: 2024-05-08
Payer: MEDICARE

## 2024-05-08 VITALS
WEIGHT: 107 LBS | HEART RATE: 65 BPM | DIASTOLIC BLOOD PRESSURE: 90 MMHG | SYSTOLIC BLOOD PRESSURE: 135 MMHG | BODY MASS INDEX: 18.96 KG/M2 | HEIGHT: 63 IN | OXYGEN SATURATION: 95 % | TEMPERATURE: 97.9 F | RESPIRATION RATE: 18 BRPM

## 2024-05-08 DIAGNOSIS — Z96.662 PRESENCE OF LEFT ARTIFICIAL ANKLE JOINT: Chronic | ICD-10-CM

## 2024-05-08 DIAGNOSIS — S81.811D LACERATION W/OUT FOREIGN BODY, RIGHT LOWER LEG, SUBSEQUENT ENCOUNTER: ICD-10-CM

## 2024-05-08 DIAGNOSIS — S51.801A UNSPECIFIED OPEN WOUND OF RIGHT FOREARM, INITIAL ENCOUNTER: ICD-10-CM

## 2024-05-08 DIAGNOSIS — I73.9 PERIPHERAL VASCULAR DISEASE, UNSPECIFIED: ICD-10-CM

## 2024-05-08 PROCEDURE — 99213 OFFICE O/P EST LOW 20 MIN: CPT

## 2024-05-08 PROCEDURE — G0463: CPT

## 2024-05-08 NOTE — PHYSICAL EXAM
[Normal Thyroid] : the thyroid was normal [Normal Breath Sounds] : Normal breath sounds [Normal Heart Sounds] : normal heart sounds [Normal Rate and Rhythm] : normal rate and rhythm [Ankle Swelling (On Exam)] : present [Ankle Swelling On The Left] : moderate [] : of the right leg [Ankle Swelling On The Right] : mild [Alert] : alert [Oriented to Person] : oriented to person [Oriented to Place] : oriented to place [Oriented to Time] : oriented to time [Calm] : calm [JVD] : no jugular venous distention  [Abdomen Masses] : No abdominal massess [Abdomen Tenderness] : ~T ~M No abdominal tenderness [Tender] : nontender [Enlarged] : not enlarged [de-identified] : elderly WF, NAD, alert, Ox3. [de-identified] : small superficial wound right forearm. No SOI [4 x 4] : 4 x 4  [FreeTextEntry1] : Right forearm [FreeTextEntry2] : 1.5 [FreeTextEntry3] : 1.2 [FreeTextEntry4] : 0.1 [de-identified] : Serosanguinous drainage noted [de-identified] : Xeroform [de-identified] : Mechanically cleansed with sterile gauze and 0.9% normal saline. Dry Dressing Kerlix [TWNoteComboBox4] : Small [TWNoteComboBox5] : No [TWNoteComboBox6] : Traumatic [de-identified] : No [de-identified] : Normal [de-identified] : None [de-identified] : None [de-identified] : No [de-identified] : 100% [de-identified] : Every other day [de-identified] : Primary Dressing

## 2024-05-08 NOTE — PLAN
[FreeTextEntry1] : new wound right forearm xeroform,DD,3X/week return 2 weeks 30 minutes spent in review,evaluation and treatment planning

## 2024-05-08 NOTE — PHYSICAL EXAM
[Normal Breath Sounds] : Normal breath sounds [Normal Heart Sounds] : normal heart sounds [2+] : left 2+ [Alert] : alert [Oriented to Person] : oriented to person [Oriented to Place] : oriented to place [Oriented to Time] : oriented to time [Calm] : calm [de-identified] : Well-developed, Well-nourished in no acute distress. [de-identified] : Within normal limits. [de-identified] : Within normal limits. [de-identified] : Within normal limits. [de-identified] : Right forearm laceration has fully healed.  [FreeTextEntry1] : Right forearm - Healed [FreeTextEntry2] : 0.5 [FreeTextEntry3] : 0.2 [FreeTextEntry4] : 0.1 [de-identified] : No Product [TWNoteComboBox4] : None [TWNoteComboBox5] : No [TWNoteComboBox6] : Traumatic [de-identified] : No [de-identified] : Normal [de-identified] : None [de-identified] : None [de-identified] : 100% [de-identified] : No

## 2024-05-08 NOTE — ASSESSMENT
[Verbal] : Verbal [Demo] : Demo [Patient] : Patient [Good - alert, interested, motivated] : Good - alert, interested, motivated [Verbalizes knowledge/Understanding] : Verbalizes knowledge/understanding [] : Yes [Stable] : stable [Home] : Home [Cane] : Cane [Not Applicable - Long Term Care/Home Health Agency] : Long Term Care/Home Health Agency: Not Applicable [Dressing changes] : dressing changes [Skin Care] : skin care [Signs and symptoms of infection] : sign and symptoms of infection [Nutrition] : nutrition [How and When to Call] : how and when to call [Patient responsibility to plan of care] : patient responsibility to plan of care [FreeTextEntry2] : Dressing changes Infection prevention Nutrition & Wound Healing Promote optimal skin care.   Protect & Guard Wound Site [FreeTextEntry3] : Initial Visit [FreeTextEntry4] : Pt here to have a WCC MD lookat the wound on her arm because she fell before Easter and feels its not healing fast enough. MD assessed wound and removed some dead skin. Advised to continue to treat as the urgent care center did - with xeroform F/U 1 week

## 2024-05-08 NOTE — HISTORY OF PRESENT ILLNESS
[FreeTextEntry1] : 4/23/24  SANDEE PEARSON is being seen for a initial nursing assessment visit. Patient states she falls often and she fell the day before Easter(3/30/24). She was in the house w/ her daughter. They went to urgent care who treated it and told her to treat w/ xeroform and to change the bandage every other day. The wound wasn't healing so they went back a second time and got the same treatment. Her daughter made an appointment for her to see us because she's been here before. Patient accompanied by friend.

## 2024-05-08 NOTE — ASSESSMENT
[Verbal] : Verbal [Demo] : Demo [Patient] : Patient [Good - alert, interested, motivated] : Good - alert, interested, motivated [Verbalizes knowledge/Understanding] : Verbalizes knowledge/understanding [Dressing changes] : dressing changes [Skin Care] : skin care [Signs and symptoms of infection] : sign and symptoms of infection [Nutrition] : nutrition [How and When to Call] : how and when to call [Patient responsibility to plan of care] : patient responsibility to plan of care [] : Yes [Stable] : stable [Home] : Home [Cane] : Cane [Not Applicable - Long Term Care/Home Health Agency] : Long Term Care/Home Health Agency: Not Applicable [Written] : Written [FreeTextEntry2] : Dressing changes Infection prevention Nutrition & Wound Healing Promote optimal skin care.   Protect & Guard Wound Site [FreeTextEntry4] : MD assessed wound and advised she is healed and can be discharged.  [FreeTextEntry1] : Right forearm laceration has fully healed.

## 2024-05-09 DIAGNOSIS — Z82.3 FAMILY HISTORY OF STROKE: ICD-10-CM

## 2024-05-09 DIAGNOSIS — I10 ESSENTIAL (PRIMARY) HYPERTENSION: ICD-10-CM
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
## 2024-05-09 DIAGNOSIS — Z80.0 FAMILY HISTORY OF MALIGNANT NEOPLASM OF DIGESTIVE ORGANS: ICD-10-CM
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
## 2024-05-09 DIAGNOSIS — S51.811D LACERATION WITHOUT FOREIGN BODY OF RIGHT FOREARM, SUBSEQUENT ENCOUNTER: ICD-10-CM

## 2024-05-09 DIAGNOSIS — Z79.899 OTHER LONG TERM (CURRENT) DRUG THERAPY: ICD-10-CM

## 2024-05-09 DIAGNOSIS — Z83.3 FAMILY HISTORY OF DIABETES MELLITUS: ICD-10-CM
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
## 2024-05-09 DIAGNOSIS — Z87.891 PERSONAL HISTORY OF NICOTINE DEPENDENCE: ICD-10-CM

## 2024-05-09 DIAGNOSIS — I73.9 PERIPHERAL VASCULAR DISEASE, UNSPECIFIED: ICD-10-CM

## 2024-05-09 DIAGNOSIS — M19.90 UNSPECIFIED OSTEOARTHRITIS, UNSPECIFIED SITE: ICD-10-CM
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
## 2024-05-09 DIAGNOSIS — E03.9 HYPOTHYROIDISM, UNSPECIFIED: ICD-10-CM

## 2024-05-09 DIAGNOSIS — Z87.81 PERSONAL HISTORY OF (HEALED) TRAUMATIC FRACTURE: ICD-10-CM

## 2024-07-11 ENCOUNTER — NON-APPOINTMENT (OUTPATIENT)
Age: 89
End: 2024-07-11

## 2024-07-16 ENCOUNTER — OUTPATIENT (OUTPATIENT)
Dept: OUTPATIENT SERVICES | Facility: HOSPITAL | Age: 89
LOS: 1 days | Discharge: HOME | End: 2024-07-16
Payer: MEDICARE

## 2024-07-16 ENCOUNTER — APPOINTMENT (OUTPATIENT)
Dept: WOUND CARE | Facility: HOSPITAL | Age: 89
End: 2024-07-16
Payer: MEDICARE

## 2024-07-16 ENCOUNTER — RESULT REVIEW (OUTPATIENT)
Age: 89
End: 2024-07-16

## 2024-07-16 VITALS
RESPIRATION RATE: 18 BRPM | DIASTOLIC BLOOD PRESSURE: 62 MMHG | BODY MASS INDEX: 17.19 KG/M2 | TEMPERATURE: 98.3 F | HEART RATE: 70 BPM | OXYGEN SATURATION: 92 % | WEIGHT: 97 LBS | HEIGHT: 63 IN | SYSTOLIC BLOOD PRESSURE: 162 MMHG

## 2024-07-16 DIAGNOSIS — Z78.9 OTHER SPECIFIED HEALTH STATUS: ICD-10-CM

## 2024-07-16 DIAGNOSIS — L97.801 NON-PRESSURE CHRONIC ULCER OF OTHER PART OF UNSPECIFIED LOWER LEG LIMITED TO BREAKDOWN OF SKIN: ICD-10-CM

## 2024-07-16 DIAGNOSIS — Z96.662 PRESENCE OF LEFT ARTIFICIAL ANKLE JOINT: Chronic | ICD-10-CM

## 2024-07-16 PROCEDURE — 88304 TISSUE EXAM BY PATHOLOGIST: CPT | Mod: 26

## 2024-07-16 PROCEDURE — 99213 OFFICE O/P EST LOW 20 MIN: CPT

## 2024-07-16 PROCEDURE — 88304 TISSUE EXAM BY PATHOLOGIST: CPT

## 2024-07-16 PROCEDURE — G0463: CPT

## 2024-07-16 NOTE — PROGRESS NOTE ADULT - SUBJECTIVE AND OBJECTIVE BOX
----- Message from Yiselviniciolina Gonzalez sent at 7/16/2024  9:09 AM CDT -----  Contact: Demetrio [phone]  Would like to receive medical advice.    Would they like a call back or a response via [facility]:  call back    Additional information: Calling to speak with the office about the pt. Demetrio states that since the visit and the blood draw the pt is pale and has vomited. Please call back to advise.   History Of Present Illness  Delano Aguilar is a 77 y.o. male presenting history of low back pain radiating to the right lower extremity.  Patient underwent right L1-L2 lumbar laminectomy on 6/29/2023.  Patient had 10% resolution of his radiating pain.  Today patient is complaining of right-sided low back pain that is localized.  Patient describes as sharp stabbing in nature.  Pain radiates to the posterior aspect of the right thigh however does not go below the knee.  No history of weakness numbness right lower extremity.  Factors aggravating the pain include lifting and twisting.  Current pain medications include Percocet 5 mg 1 tablet twice daily.  Patient reports running out of his medication and feels that that was helping with his pain.  Patient is also on gabapentin 600 mg at bedtime.  Patient had similar complaints on the left side for which she received a left SI join in August 2023 that provided him 100% relief.  Patient feels that his right-sided back pain is very similar.  Patient is currently active and is doing home core muscle strengthening exercises.     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    Surgical History  He has a past surgical history that includes Other surgical history (09/10/2021); Other surgical history (09/10/2021); Other surgical history (12/29/2019); US guided thyroid biopsy (8/18/2014); Tonsillectomy (01/22/2015); and Sinus surgery (01/22/2015).     Social History  He reports that he has been smoking cigars. He has never used smokeless tobacco. No history on file for alcohol use and drug  PULMONARY/CRITICAL CARE      INTERVAL HPI/OVERNIGHT EVENTS:  Feels much better, less sob. Some wheeze.    89y FemaleHPI:  This is a 89 year old woman with a history of hypertension, asthma/COPD who presents to the ED BIBEMS for increased respiratory distress and dyspnea.  She was treated for bronchitis as an inpatient, and was discharged yesterday with PO antibiotics and a steroid taper.  She developed overnight increased wheezing, dyspnea, and respiratory distress, and was brought back.  She was found to be in atrial flutter with RVR.  She is reporting mild chest heaviness.  She is denying fevers, chills, PND, orthopnea, new LE swelling, dizziness, or syncope.  She is being admitted to the ICU for respiratory failure and rapid atrial flutter.  She has mildly positive cardiac enzymes.     On her previous admission ( -) she was diagnosed bronchitis secondary to human metapneumovirus. CT chest yesterday did not show an pneumonia. Her cultures were negative prior to discharge. She has a nebulizer machine at home which she does not use. She is a former smoker -- she quit 40 y/a. (2018 12:58)        PAST MEDICAL & SURGICAL HISTORY:  Pelvic fracture  Fall  Fall  Hypertension  S/P ankle joint replacement, left        ICU Vital Signs Last 24 Hrs  T(C): 36.8 (2018 08:00), Max: 36.8 (2018 08:00)  T(F): 98.2 (2018 08:00), Max: 98.2 (2018 08:00)  HR: 70 (2018 09:00) (55 - 99)  BP: 148/90 (2018 09:00) (132/95 - 183/83)  BP(mean): 108 (2018 09:00) (89 - 123)  ABP: --  ABP(mean): --  RR: 20 (2018 09:00) (11 - 31)  SpO2: 95% (2018 09:00) (94% - 100%)    Qtts:     I&O's Summary    2018 07:01  -  2018 07:00  --------------------------------------------------------  IN: 1160 mL / OUT: 2140 mL / NET: -980 mL    2018 07:  -  2018 10:29  --------------------------------------------------------  IN: 230 mL / OUT: 350 mL / NET: -120 mL  REVIEW OF SYSTEMS:    CONSTITUTIONAL: No fever, weight loss, or fatigue  EYES: No eye pain, visual disturbances, or discharge  ENMT:  No difficulty hearing, tinnitus, vertigo; No sinus or throat pain  NECK: No pain or stiffness  BREASTS: No pain, masses, or nipple discharge  RESPIRATORY: has cough, wheezing, No chills or hemoptysis; mild   shortness of breath  CARDIOVASCULAR: No chest pain, palpitations, dizziness, or leg swelling  GASTROINTESTINAL: No abdominal or epigastric pain. No nausea, vomiting, or hematemesis; No diarrhea or constipation. No melena or hematochezia.  GENITOURINARY: No dysuria, frequency, hematuria, or incontinence  NEUROLOGICAL: No headaches, memory loss, loss of strength, numbness, or tremors  SKIN: No itching, burning, rashes, or lesions   LYMPH NODES: No enlarged glands  ENDOCRINE: No heat or cold intolerance; No hair loss  MUSCULOSKELETAL: No joint pain or swelling; No muscle, back, or extremity pain, no calf tenderness  PSYCHIATRIC: No depression, anxiety, mood swings, or difficulty sleeping  HEME/LYMPH: No easy bruising, or bleeding gums  ALLERGY AND IMMUNOLOGIC: No hives or eczema      PHYSICAL EXAM:    GENERAL: no  respiratory distress on O2, well-groomed, well-developed,  HEAD:  Atraumatic, Normocephalic  EYES: EOMI, PERRLA, conjunctiva and sclera clear  ENMT: No tonsillar erythema, exudates, or enlargement; Moist mucous membranes, Good dentition, No lesions  NECK: Supple, No JVD, Normal thyroid  NERVOUS SYSTEM:  Alert & Oriented X3, Good concentration; Motor Strength 5/5 B/L upper and lower extremities  CHEST/LUNG: Wheezing, rhonchi bilat. Good excursion  HEART: irregular rate and rhythm; No murmurs, rubs, or gallops  ABDOMEN: Soft, Nontender, Nondistended; Bowel sounds present  EXTREMITIES:  2+ Peripheral Pulses, No clubbing, cyanosis, or edema  LYMPH: No lymphadenopathy noted  SKIN: No rashes or lesions                LABS:                        10.4   9.7   )-----------( 137      ( 2018 05:10 )             31.3     -    145  |  108  |  21  ----------------------------<  124<H>  4.4   |  31  |  1.00    Ca    7.9<L>      2018 05:10  Phos  2.6       Mg     2.1         TPro  5.8<L>  /  Alb  2.9<L>  /  TBili  0.3  /  DBili  x   /  AST  38<H>  /  ALT  34  /  AlkPhos  63      PT/INR - ( 2018 05:10 )   PT: 10.7 sec;   INR: 0.98 ratio         PTT - ( 2018 05:10 )  PTT:26.1 sec  Urinalysis Basic - ( 2018 18:13 )    Color: Yellow / Appearance: Clear / S.015 / pH: x  Gluc: x / Ketone: Negative  / Bili: Negative / Urobili: Negative   Blood: x / Protein: 25 mg/dL / Nitrite: Negative   Leuk Esterase: Negative / RBC: 0-2 /HPF / WBC 3-5   Sq Epi: x / Non Sq Epi: Few / Bacteria: Few      ABG - ( 2018 20:18 )  pH: 7.31  /  pCO2: 43    /  pO2: 312   / HCO3: 21    / Base Excess: -4.0  /  SaO2: 100               vanco through     RADIOLOGY & ADDITIONAL STUDIES:< from: Xray Chest 1 View- PORTABLE-Routine (18 @ 06:30) >  EXAM:  XR CHEST PORTABLE ROUTINE 1V                            PROCEDURE DATE:  2018          INTERPRETATION:  PORTABLE CHEST X-RAY    HISTORY: bronchitis.    COMPARISON: 2018.    FINDINGS:  There are mild opacities in the right midlung.  Mild biapical thickening again noted.  No pneumothorax or pleural effusion.   The cardiac silhouette is not accurately assessed by AP technique. Aortic   calcifications.    IMPRESSION:  Mild opacities in the right midlung.                HARPREET COOK MD., ATTENDING RADIOLOGIST  This document has been electronically signed. 2018  8:35AM        < end of copied text >        CRITICAL CARE TIME SPENT: use.    Family History  Family History   Problem Relation Name Age of Onset    Alzheimer's disease Mother      Blindness Mother          Legally ([place] Definition)    Colon cancer Father      Heart failure Father      Fibromyalgia Sister          Allergies  Ampicillin    Review of systems:   13-point review of systems done and negative except as noted in HPI      Physical Exam     Pertinent findings: Back: Tenderness on palpation right-sided SI joint.  Exacerbated with extension and lateral rotation.  Positive [place], [place], knee thrust on the right side.  Negative straight leg raise.      Vital signs: Reviewed  Constitutional: No acute distress, well appearing and well nourished. Patient appears stated age.   Eyes: Conjunctiva non-icteric and eye lids are without obvious rash or drooping. Pupils are symmetric.   Ears, Nose, Mouth, and Throat: External ears and nose appear to be without deformity or rash. No lesions or masses noted. Hearing is grossly intact.   Neck:. No JVD noted, tracheal position is midline.   Head and Face: Examination of the head and face revealed no abnormalities.   Respiratory: No gasping or shortness of breath noted, no use of accessory muscles noted.   Cardiovascular: Examination for edema is normal.   GI: Abdomen nontender to palpation.   Skin: No rashes or open lesions/ulcers identified on skin.   Musk: Digits/nails show no clubbing or cyanosis. No asymmetry or masses noted of the musculature. Examination of the muscles/joints/bones show normal range of motion. Gait is grossly normal.  Able to walk on toes and heels.   Neurologic: Cranial nerves II-XII intact, motor strength 5/5 muscle strength of the lower extremities bilaterally and equal. 5/5 muscle strength of the upper extremities bilaterally and equal.   Reflexes: normal.   Sensation: Normal to touch and pinprick in lower extremities bilaterally         Last Recorded Vitals  /75   Pulse 89   Resp 20     Relevant  Results            Last OARRS Review: No data recorded    I have personally reviewed the OARRS report for Delano Aguilar I have considered the risks of abuse, dependence, addiction and diversion       Assessment/Plan   Right sacroiliitis  I discussed doing a right sacroiliac steroid injection.  Risk and benefits were discussed and patient was agreeable with the plan.    Plan  Schedule patient for right sacroiliac joint injection  Percocet 5/325 mg 1 tablet 3 times daily please provide 1 month supply  Continue gabapentin 600 mg once at bedtime  If pain does not respond to the above treatment we will consider MRI of lumbosacral spine.           Yonatan Fredreick MD

## 2024-07-18 DIAGNOSIS — Z83.3 FAMILY HISTORY OF DIABETES MELLITUS: ICD-10-CM

## 2024-07-18 DIAGNOSIS — Z87.891 PERSONAL HISTORY OF NICOTINE DEPENDENCE: ICD-10-CM

## 2024-07-18 DIAGNOSIS — S81.812D LACERATION WITHOUT FOREIGN BODY, LEFT LOWER LEG, SUBSEQUENT ENCOUNTER: ICD-10-CM
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
## 2024-07-18 DIAGNOSIS — I10 ESSENTIAL (PRIMARY) HYPERTENSION: ICD-10-CM

## 2024-07-18 DIAGNOSIS — Z82.3 FAMILY HISTORY OF STROKE: ICD-10-CM

## 2024-07-18 DIAGNOSIS — M19.90 UNSPECIFIED OSTEOARTHRITIS, UNSPECIFIED SITE: ICD-10-CM

## 2024-07-18 DIAGNOSIS — I73.9 PERIPHERAL VASCULAR DISEASE, UNSPECIFIED: ICD-10-CM
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
## 2024-07-18 DIAGNOSIS — E03.9 HYPOTHYROIDISM, UNSPECIFIED: ICD-10-CM

## 2024-07-18 DIAGNOSIS — Z80.0 FAMILY HISTORY OF MALIGNANT NEOPLASM OF DIGESTIVE ORGANS: ICD-10-CM

## 2024-07-18 DIAGNOSIS — Z79.899 OTHER LONG TERM (CURRENT) DRUG THERAPY: ICD-10-CM

## 2024-07-18 DIAGNOSIS — Z87.81 PERSONAL HISTORY OF (HEALED) TRAUMATIC FRACTURE: ICD-10-CM

## 2024-07-18 LAB — SURGICAL PATHOLOGY STUDY: SIGNIFICANT CHANGE UP

## 2024-07-23 ENCOUNTER — APPOINTMENT (OUTPATIENT)
Dept: WOUND CARE | Facility: HOSPITAL | Age: 89
End: 2024-07-23
Payer: MEDICARE

## 2024-07-23 VITALS
BODY MASS INDEX: 17.19 KG/M2 | RESPIRATION RATE: 18 BRPM | HEART RATE: 68 BPM | OXYGEN SATURATION: 94 % | WEIGHT: 97 LBS | HEIGHT: 63 IN | TEMPERATURE: 98 F | SYSTOLIC BLOOD PRESSURE: 173 MMHG | DIASTOLIC BLOOD PRESSURE: 64 MMHG

## 2024-07-23 PROBLEM — S81.812D LACERATION OF LEFT LEG, SUBSEQUENT ENCOUNTER: Status: ACTIVE | Noted: 2024-07-16

## 2024-07-23 PROCEDURE — 99213 OFFICE O/P EST LOW 20 MIN: CPT

## 2024-07-23 NOTE — PLAN
[FreeTextEntry1] : left lower leg medihoney,Adaptic,DD, QOD elevate left lower leg f/u 1 wk  time spent 20 mins.
[Negative] : Genitourinary

## 2024-07-23 NOTE — HISTORY OF PRESENT ILLNESS
[FreeTextEntry1] : 89 yo WF, here for laceration to her LLE that occurred in the kitchen several days ago. Went to urgent care where xeroform was applied.    RN note- Patient presents to the [facility] with a left lower extremity laceration secondary from a fall. Patient tripped, and rubbed against furniture on the fall, causing a skin avulsion. Patient denies head trauma. An X-Ray of the LLE was performed to R/O Fractures. 7/23/24 left lower lateral leg with ecchymosis, edema and open wound to fat layer with slough. No SOI

## 2024-07-23 NOTE — PHYSICAL EXAM
[Normal Thyroid] : the thyroid was normal [Normal Breath Sounds] : Normal breath sounds [Normal Heart Sounds] : normal heart sounds [Normal Rate and Rhythm] : normal rate and rhythm [1+] : left 1+ [Ankle Swelling On The Left] : of the left ankle [Ankle Swelling (On Exam)] : present [Ankle Swelling On The Right] : mild [Alert] : alert [Oriented to Person] : oriented to person [Oriented to Place] : oriented to place [Oriented to Time] : oriented to time [Calm] : calm [JVD] : no jugular venous distention  [Abdomen Masses] : No abdominal massess [Abdomen Tenderness] : ~T ~M No abdominal tenderness [Tender] : nontender [Enlarged] : not enlarged [de-identified] : elderly WF, NAD, alert, Ox3. [4 x 4] : 4 x 4  [FreeTextEntry1] : Left Leg, laceration [FreeTextEntry2] : 6.9 [FreeTextEntry3] : 4.9 [FreeTextEntry4] : 0.1 [de-identified] : Moderate serosanguineous [de-identified] : none [de-identified] : traumatic [de-identified] : none [de-identified] : intact [de-identified] : none [de-identified] : none [de-identified] : <25% [de-identified] : >75% [de-identified] : none [de-identified] : Medihoney, Adaptic touch [de-identified] : Mechanically cleansed with sterile gauze and normal saline 0.9% Dry Dressing [TWNoteComboBox6] : Traumatic [de-identified] : Every other day [de-identified] : Primary Dressing

## 2024-07-23 NOTE — ASSESSMENT
[Verbal] : Verbal [Written] : Written [Demo] : Demo [Patient] : Patient [Family member] : Family member [Good - alert, interested, motivated] : Good - alert, interested, motivated [Demonstrates independently] : demonstrates independently [Dressing changes] : dressing changes [Skin Care] : skin care [Signs and symptoms of infection] : sign and symptoms of infection [Nutrition] : nutrition [How and When to Call] : how and when to call [Off-loading] : off-loading [Patient responsibility to plan of care] : patient responsibility to plan of care [] : Yes [FreeTextEntry2] : Infection Prevention Foot and nail care Nutrition and wound healing Pt Demonstrates use of both nonpharmacological and pharmacological pain relief strategies. [FreeTextEntry4] : Pathology reviewed by MD. Wound improving Patient reports having Medi Honey at home F/U 1 Week [Stable] : stable [Home] : Home [Walker] : Walker [Not Applicable - Long Term Care/Home Health Agency] : Long Term Care/Home Health Agency: Not Applicable

## 2024-07-30 ENCOUNTER — APPOINTMENT (OUTPATIENT)
Dept: WOUND CARE | Facility: HOSPITAL | Age: 89
End: 2024-07-30
Payer: MEDICARE

## 2024-07-30 ENCOUNTER — OUTPATIENT (OUTPATIENT)
Dept: OUTPATIENT SERVICES | Facility: HOSPITAL | Age: 89
LOS: 1 days | Discharge: HOME | End: 2024-07-30
Payer: MEDICARE

## 2024-07-30 VITALS
WEIGHT: 97 LBS | OXYGEN SATURATION: 94 % | RESPIRATION RATE: 18 BRPM | DIASTOLIC BLOOD PRESSURE: 66 MMHG | HEART RATE: 68 BPM | HEIGHT: 63 IN | BODY MASS INDEX: 17.19 KG/M2 | SYSTOLIC BLOOD PRESSURE: 169 MMHG | TEMPERATURE: 98 F

## 2024-07-30 DIAGNOSIS — L97.801 NON-PRESSURE CHRONIC ULCER OF OTHER PART OF UNSPECIFIED LOWER LEG LIMITED TO BREAKDOWN OF SKIN: ICD-10-CM

## 2024-07-30 DIAGNOSIS — Z96.662 PRESENCE OF LEFT ARTIFICIAL ANKLE JOINT: Chronic | ICD-10-CM

## 2024-07-30 DIAGNOSIS — I73.9 PERIPHERAL VASCULAR DISEASE, UNSPECIFIED: ICD-10-CM

## 2024-07-30 PROCEDURE — G0463: CPT

## 2024-07-30 PROCEDURE — 99213 OFFICE O/P EST LOW 20 MIN: CPT

## 2024-07-30 NOTE — HISTORY OF PRESENT ILLNESS
[FreeTextEntry1] : 11 yo WF, here for laceration to her LLE that occurred in the kitchen recently. Healing well. Some loose yellow slough removed. No SOI.

## 2024-07-30 NOTE — PHYSICAL EXAM
[4 x 4] : 4 x 4  [JVD] : no jugular venous distention  [Normal Breath Sounds] : Normal breath sounds [Normal Heart Sounds] : normal heart sounds [Normal Rate and Rhythm] : normal rate and rhythm [Abdomen Masses] : No abdominal massess [Abdomen Tenderness] : ~T ~M No abdominal tenderness [Tender] : nontender [Enlarged] : not enlarged [Alert] : alert [Oriented to Person] : oriented to person [Oriented to Place] : oriented to place [Oriented to Time] : oriented to time [Calm] : calm [de-identified] : elderly WF, NAD, alert, Ox3. [FreeTextEntry1] : Left Leg, laceration [FreeTextEntry2] : 5.5 [FreeTextEntry3] : 4.5 [FreeTextEntry4] : 0.1 [de-identified] : Moderate serosanguineous [de-identified] : none [de-identified] : traumatic [de-identified] : none [de-identified] : intact [de-identified] : none [de-identified] : none [de-identified] : 30% [de-identified] : 70% [de-identified] : none [de-identified] : Medihoney, Adaptic touch [de-identified] : Mechanically cleansed with sterile gauze and normal saline 0.9% Dry Dressing [TWNoteComboBox6] : Traumatic [TWNoteComboBox7] : Mechanical [de-identified] : Every other day [de-identified] : Primary Dressing

## 2024-07-30 NOTE — ASSESSMENT
[Verbal] : Verbal [Written] : Written [Demo] : Demo [Patient] : Patient [Caregiver] : Caregiver [Good - alert, interested, motivated] : Good - alert, interested, motivated [Demonstrates independently] : demonstrates independently [Dressing changes] : dressing changes [Foot Care] : foot care [Skin Care] : skin care [Signs and symptoms of infection] : sign and symptoms of infection [Nutrition] : nutrition [How and When to Call] : how and when to call [Off-loading] : off-loading [Patient responsibility to plan of care] : patient responsibility to plan of care [] : Yes [Stable] : stable [Home] : Home [Walker] : Walker [Not Applicable - Long Term Care/Home Health Agency] : Long Term Care/Home Health Agency: Not Applicable [FreeTextEntry2] : Infection Prevention Foot and nail care Nutrition and wound healing Pt Demonstrates use of both nonpharmacological and pharmacological pain relief strategies. [FreeTextEntry4] :  Wound improving Patient reports having Medi Honey at home F/U 1 Week

## 2024-08-01 DIAGNOSIS — I73.9 PERIPHERAL VASCULAR DISEASE, UNSPECIFIED: ICD-10-CM

## 2024-08-01 DIAGNOSIS — Z82.3 FAMILY HISTORY OF STROKE: ICD-10-CM

## 2024-08-01 DIAGNOSIS — M19.90 UNSPECIFIED OSTEOARTHRITIS, UNSPECIFIED SITE: ICD-10-CM
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
## 2024-08-01 DIAGNOSIS — Z87.891 PERSONAL HISTORY OF NICOTINE DEPENDENCE: ICD-10-CM

## 2024-08-01 DIAGNOSIS — S81.812D LACERATION WITHOUT FOREIGN BODY, LEFT LOWER LEG, SUBSEQUENT ENCOUNTER: ICD-10-CM

## 2024-08-01 DIAGNOSIS — E03.9 HYPOTHYROIDISM, UNSPECIFIED: ICD-10-CM

## 2024-08-01 DIAGNOSIS — Z87.81 PERSONAL HISTORY OF (HEALED) TRAUMATIC FRACTURE: ICD-10-CM
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
## 2024-08-01 DIAGNOSIS — I10 ESSENTIAL (PRIMARY) HYPERTENSION: ICD-10-CM

## 2024-08-01 DIAGNOSIS — Z83.3 FAMILY HISTORY OF DIABETES MELLITUS: ICD-10-CM
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
## 2024-08-01 DIAGNOSIS — Z79.899 OTHER LONG TERM (CURRENT) DRUG THERAPY: ICD-10-CM
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
## 2024-08-01 DIAGNOSIS — Z80.0 FAMILY HISTORY OF MALIGNANT NEOPLASM OF DIGESTIVE ORGANS: ICD-10-CM

## 2024-08-06 ENCOUNTER — OUTPATIENT (OUTPATIENT)
Dept: OUTPATIENT SERVICES | Facility: HOSPITAL | Age: 89
LOS: 1 days | Discharge: HOME | End: 2024-08-06
Payer: MEDICARE

## 2024-08-06 ENCOUNTER — APPOINTMENT (OUTPATIENT)
Dept: WOUND CARE | Facility: HOSPITAL | Age: 89
End: 2024-08-06

## 2024-08-06 DIAGNOSIS — L89.154 PRESSURE ULCER OF SACRAL REGION, STAGE 4: ICD-10-CM

## 2024-08-06 DIAGNOSIS — Z96.662 PRESENCE OF LEFT ARTIFICIAL ANKLE JOINT: Chronic | ICD-10-CM

## 2024-08-06 PROCEDURE — G0463: CPT

## 2024-08-06 PROCEDURE — 99213 OFFICE O/P EST LOW 20 MIN: CPT

## 2024-08-06 NOTE — ASSESSMENT
[Verbal] : Verbal [Written] : Written [Demo] : Demo [Patient] : Patient [Caregiver] : Caregiver [Good - alert, interested, motivated] : Good - alert, interested, motivated [Demonstrates independently] : demonstrates independently [Dressing changes] : dressing changes [Foot Care] : foot care [Skin Care] : skin care [Signs and symptoms of infection] : sign and symptoms of infection [Nutrition] : nutrition [How and When to Call] : how and when to call [Off-loading] : off-loading [Patient responsibility to plan of care] : patient responsibility to plan of care [Stable] : stable [Home] : Home [Walker] : Walker [Not Applicable - Long Term Care/Home Health Agency] : Long Term Care/Home Health Agency: Not Applicable [] : No [FreeTextEntry1] : with care giver [FreeTextEntry2] : Infection Prevention Foot and nail care Nutrition and wound healing Pt Demonstrates use of both nonpharmacological and pharmacological pain relief strategies. [FreeTextEntry3] : increase redness and selling noted. Ashley wound warm and tender Dr Hernandez to prescribe antibiotics. Patient has no allergies [FreeTextEntry4] :  Patient reports having Medi Honey at home given [employer] touch today. Patient came in with telfa on applied by daughter.  F/U 1 Week

## 2024-08-06 NOTE — HISTORY OF PRESENT ILLNESS
[FreeTextEntry1] : 11 yo WF, here for laceration to her LLE that occurred in the kitchen recently. Healing well. Some loose yellow slough removed. No SOI. 8/6/24 left lower leg wound stable but increased edema and some warm erythema and increased discomfort maybe secondary to early cellulitis. will start oral antibiotic

## 2024-08-06 NOTE — VITALS
[Pain related to present condition?] : The patient's  pain is related to present condition. [Sharp] : sharp [] : No [de-identified] : 6/10  [FreeTextEntry3] : Left lower leg [FreeTextEntry1] : Tylenol [FreeTextEntry4] : Tylenol [FreeTextEntry2] : random

## 2024-08-06 NOTE — PLAN
[FreeTextEntry1] : armstrong DD qod doxycycline started Patient to go to ER if area worsens. f/u 1 wk  time spent 25 mins.

## 2024-08-06 NOTE — PHYSICAL EXAM
[4 x 4] : 4 x 4  [JVD] : no jugular venous distention  [Normal Breath Sounds] : Normal breath sounds [Normal Heart Sounds] : normal heart sounds [Normal Rate and Rhythm] : normal rate and rhythm [Abdomen Masses] : No abdominal massess [Abdomen Tenderness] : ~T ~M No abdominal tenderness [Tender] : nontender [Enlarged] : not enlarged [Alert] : alert [Oriented to Person] : oriented to person [Oriented to Place] : oriented to place [Oriented to Time] : oriented to time [Calm] : calm [de-identified] : elderly WF, NAD, alert, Ox3. [FreeTextEntry1] : Left Leg, laceration [FreeTextEntry2] : 5.5 [FreeTextEntry3] : 4.3 [FreeTextEntry4] : 0.1 [de-identified] : Moderate serosanguineous [de-identified] : none [de-identified] : traumatic [de-identified] : none [de-identified] : intact [de-identified] : none [de-identified] : none [de-identified] : 50% [de-identified] : none [de-identified] : Medihoney, Adaptic touch [de-identified] : Mechanically cleansed with sterile gauze and normal saline 0.9% Dry Dressing [TWNoteComboBox6] : Traumatic [de-identified] : Erythema [de-identified] : None [de-identified] : None [de-identified] : 50% [de-identified] : Yes [TWNoteComboBox7] : Mechanical [de-identified] : Every other day [de-identified] : Primary Dressing
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
## 2024-08-07 DIAGNOSIS — Z87.891 PERSONAL HISTORY OF NICOTINE DEPENDENCE: ICD-10-CM
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
## 2024-08-07 DIAGNOSIS — Z79.899 OTHER LONG TERM (CURRENT) DRUG THERAPY: ICD-10-CM

## 2024-08-07 DIAGNOSIS — Z83.3 FAMILY HISTORY OF DIABETES MELLITUS: ICD-10-CM

## 2024-08-07 DIAGNOSIS — I73.9 PERIPHERAL VASCULAR DISEASE, UNSPECIFIED: ICD-10-CM

## 2024-08-07 DIAGNOSIS — Z92.3 PERSONAL HISTORY OF IRRADIATION: ICD-10-CM

## 2024-08-07 DIAGNOSIS — Z80.0 FAMILY HISTORY OF MALIGNANT NEOPLASM OF DIGESTIVE ORGANS: ICD-10-CM
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
## 2024-08-07 DIAGNOSIS — S81.812D LACERATION WITHOUT FOREIGN BODY, LEFT LOWER LEG, SUBSEQUENT ENCOUNTER: ICD-10-CM

## 2024-08-07 DIAGNOSIS — M19.90 UNSPECIFIED OSTEOARTHRITIS, UNSPECIFIED SITE: ICD-10-CM

## 2024-08-07 DIAGNOSIS — Z87.81 PERSONAL HISTORY OF (HEALED) TRAUMATIC FRACTURE: ICD-10-CM

## 2024-08-07 DIAGNOSIS — I10 ESSENTIAL (PRIMARY) HYPERTENSION: ICD-10-CM

## 2024-08-07 DIAGNOSIS — E03.9 HYPOTHYROIDISM, UNSPECIFIED: ICD-10-CM

## 2024-08-13 ENCOUNTER — APPOINTMENT (OUTPATIENT)
Dept: WOUND CARE | Facility: HOSPITAL | Age: 89
End: 2024-08-13
Payer: MEDICARE

## 2024-08-13 VITALS
DIASTOLIC BLOOD PRESSURE: 62 MMHG | OXYGEN SATURATION: 98 % | WEIGHT: 97 LBS | SYSTOLIC BLOOD PRESSURE: 159 MMHG | HEART RATE: 64 BPM | BODY MASS INDEX: 17.19 KG/M2 | TEMPERATURE: 98 F | RESPIRATION RATE: 16 BRPM | HEIGHT: 63 IN

## 2024-08-13 PROCEDURE — 99213 OFFICE O/P EST LOW 20 MIN: CPT

## 2024-08-13 NOTE — HISTORY OF PRESENT ILLNESS
[FreeTextEntry1] : 89 yo WF, here for laceration to her LLE that occurred in the kitchen recently. Healing well. Some loose yellow slough removed. No SOI.

## 2024-08-13 NOTE — ASSESSMENT
[Verbal] : Verbal [Written] : Written [Demo] : Demo [Patient] : Patient [Caregiver] : Caregiver [Good - alert, interested, motivated] : Good - alert, interested, motivated [Verbalizes knowledge/Understanding] : Verbalizes knowledge/understanding [Dressing changes] : dressing changes [Skin Care] : skin care [Signs and symptoms of infection] : sign and symptoms of infection [Nutrition] : nutrition [How and When to Call] : how and when to call [Pain Management] : pain management [Patient responsibility to plan of care] : patient responsibility to plan of care [] : Yes [FreeTextEntry2] : Infection prevention Localized wound care  Goal remaining pain free regarding wounds  [FreeTextEntry4] : Patients daughter provided dressing changes and has supplies at home  follow up in 1 week  [Stable] : stable [Home] : Home [Walker] : Walker [Not Applicable - Long Term Care/Home Health Agency] : Long Term Care/Home Health Agency: Not Applicable

## 2024-08-13 NOTE — PHYSICAL EXAM
[Normal Thyroid] : the thyroid was normal [Normal Breath Sounds] : Normal breath sounds [Normal Heart Sounds] : normal heart sounds [Normal Rate and Rhythm] : normal rate and rhythm [Ankle Swelling Bilaterally] : bilaterally  [Alert] : alert [Oriented to Person] : oriented to person [Oriented to Place] : oriented to place [Oriented to Time] : oriented to time [Calm] : calm [JVD] : no jugular venous distention  [Ankle Swelling (On Exam)] : not present [Abdomen Masses] : No abdominal massess [Abdomen Tenderness] : ~T ~M No abdominal tenderness [Tender] : nontender [Enlarged] : not enlarged [de-identified] : elderly WF, NAD, alert, Ox3 [4 x 4] : 4 x 4  [FreeTextEntry1] : Left leg laceration  [FreeTextEntry2] : 2.8 [FreeTextEntry3] : 0.7 [FreeTextEntry4] : 0.1 [de-identified] : Serous/sanguinous [de-identified] : 50% [de-identified] : Xeroform  [de-identified] : Mechanically cleansed with sterile gauze and normal saline. Kerlix  [TWNoteComboBox4] : Small [TWNoteComboBox6] : Traumatic [de-identified] : Normal [de-identified] : None [de-identified] : None [de-identified] : 50% [de-identified] : Yes [de-identified] : Every other day [de-identified] : Primary Dressing

## 2024-08-20 ENCOUNTER — APPOINTMENT (OUTPATIENT)
Dept: WOUND CARE | Facility: HOSPITAL | Age: 89
End: 2024-08-20
Payer: MEDICARE

## 2024-08-20 ENCOUNTER — OUTPATIENT (OUTPATIENT)
Dept: OUTPATIENT SERVICES | Facility: HOSPITAL | Age: 89
LOS: 1 days | Discharge: HOME | End: 2024-08-20
Payer: MEDICARE

## 2024-08-20 VITALS
DIASTOLIC BLOOD PRESSURE: 56 MMHG | OXYGEN SATURATION: 98 % | TEMPERATURE: 97.6 F | BODY MASS INDEX: 17.19 KG/M2 | SYSTOLIC BLOOD PRESSURE: 147 MMHG | HEIGHT: 63 IN | WEIGHT: 97 LBS | HEART RATE: 61 BPM | RESPIRATION RATE: 18 BRPM

## 2024-08-20 DIAGNOSIS — L97.801 NON-PRESSURE CHRONIC ULCER OF OTHER PART OF UNSPECIFIED LOWER LEG LIMITED TO BREAKDOWN OF SKIN: ICD-10-CM

## 2024-08-20 DIAGNOSIS — Z96.662 PRESENCE OF LEFT ARTIFICIAL ANKLE JOINT: Chronic | ICD-10-CM

## 2024-08-20 PROCEDURE — G0463: CPT

## 2024-08-20 PROCEDURE — 99213 OFFICE O/P EST LOW 20 MIN: CPT

## 2024-08-20 NOTE — HISTORY OF PRESENT ILLNESS
[FreeTextEntry1] : 89 yo WF, here for laceration to her LLE that occurred in the kitchen recently. Healing well. Almost healed. No SOI.

## 2024-08-20 NOTE — ASSESSMENT
[FreeTextEntry2] : Infection prevention Localized wound care  Goal remaining pain free regarding wounds  [FreeTextEntry3] : increased epithelial tissue [FreeTextEntry4] : Patients daughter provided dressing changes and has supplies at home  follow up in 2 weeks

## 2024-08-20 NOTE — PHYSICAL EXAM
[JVD] : no jugular venous distention  [Normal Thyroid] : the thyroid was normal [Normal Breath Sounds] : Normal breath sounds [Normal Heart Sounds] : normal heart sounds [Normal Rate and Rhythm] : normal rate and rhythm [Abdomen Masses] : No abdominal massess [Abdomen Tenderness] : ~T ~M No abdominal tenderness [Tender] : nontender [Enlarged] : not enlarged [Alert] : alert [Oriented to Person] : oriented to person [Oriented to Place] : oriented to place [Oriented to Time] : oriented to time [Calm] : calm [de-identified] : elderly WF, NAd, alert ,Ox3 [FreeTextEntry1] : Left leg laceration  [FreeTextEntry2] : 1.0 [FreeTextEntry3] : 0.3 [FreeTextEntry4] : 0.1 [de-identified] : Serous/sanguinous [de-identified] : epithelial tissue [de-identified] : Xeroform  [de-identified] : Mechanically cleansed with sterile gauze and normal saline. Kerlix  Scattered area [TWNoteComboBox4] : Small [TWNoteComboBox6] : Traumatic [de-identified] : Normal [de-identified] : None [de-identified] : None [de-identified] : >75% [de-identified] : No [TWNoteComboBox7] : Mechanical [de-identified] : Every other day [de-identified] : Primary Dressing

## 2024-08-20 NOTE — VITALS
[] : No [de-identified] : very random  [FreeTextEntry3] : left lower extremity [FreeTextEntry1] : Tylenol if needed [FreeTextEntry2] : nothing

## 2024-08-23 DIAGNOSIS — Z79.899 OTHER LONG TERM (CURRENT) DRUG THERAPY: ICD-10-CM
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
## 2024-08-23 DIAGNOSIS — I73.9 PERIPHERAL VASCULAR DISEASE, UNSPECIFIED: ICD-10-CM

## 2024-08-23 DIAGNOSIS — M19.90 UNSPECIFIED OSTEOARTHRITIS, UNSPECIFIED SITE: ICD-10-CM

## 2024-08-23 DIAGNOSIS — Z82.3 FAMILY HISTORY OF STROKE: ICD-10-CM

## 2024-08-23 DIAGNOSIS — I10 ESSENTIAL (PRIMARY) HYPERTENSION: ICD-10-CM

## 2024-08-23 DIAGNOSIS — Z83.3 FAMILY HISTORY OF DIABETES MELLITUS: ICD-10-CM

## 2024-08-23 DIAGNOSIS — S81.812D LACERATION WITHOUT FOREIGN BODY, LEFT LOWER LEG, SUBSEQUENT ENCOUNTER: ICD-10-CM
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
## 2024-08-23 DIAGNOSIS — Z87.81 PERSONAL HISTORY OF (HEALED) TRAUMATIC FRACTURE: ICD-10-CM

## 2024-08-23 DIAGNOSIS — Z80.0 FAMILY HISTORY OF MALIGNANT NEOPLASM OF DIGESTIVE ORGANS: ICD-10-CM

## 2024-08-23 DIAGNOSIS — Z87.891 PERSONAL HISTORY OF NICOTINE DEPENDENCE: ICD-10-CM
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
## 2024-08-23 DIAGNOSIS — E03.9 HYPOTHYROIDISM, UNSPECIFIED: ICD-10-CM
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
## 2024-08-29 ENCOUNTER — APPOINTMENT (OUTPATIENT)
Dept: WOUND CARE | Facility: HOSPITAL | Age: 89
End: 2024-08-29
Payer: MEDICARE

## 2024-08-29 ENCOUNTER — OUTPATIENT (OUTPATIENT)
Dept: OUTPATIENT SERVICES | Facility: HOSPITAL | Age: 89
LOS: 1 days | Discharge: HOME | End: 2024-08-29
Payer: MEDICARE

## 2024-08-29 VITALS
TEMPERATURE: 98 F | BODY MASS INDEX: 17.19 KG/M2 | RESPIRATION RATE: 18 BRPM | OXYGEN SATURATION: 95 % | WEIGHT: 97 LBS | HEIGHT: 63 IN | SYSTOLIC BLOOD PRESSURE: 152 MMHG | DIASTOLIC BLOOD PRESSURE: 62 MMHG | HEART RATE: 67 BPM

## 2024-08-29 DIAGNOSIS — I73.9 PERIPHERAL VASCULAR DISEASE, UNSPECIFIED: ICD-10-CM

## 2024-08-29 DIAGNOSIS — Z96.662 PRESENCE OF LEFT ARTIFICIAL ANKLE JOINT: Chronic | ICD-10-CM

## 2024-08-29 DIAGNOSIS — L97.801 NON-PRESSURE CHRONIC ULCER OF OTHER PART OF UNSPECIFIED LOWER LEG LIMITED TO BREAKDOWN OF SKIN: ICD-10-CM

## 2024-08-29 DIAGNOSIS — S81.812D LACERATION W/OUT FOREIGN BODY, LEFT LOWER LEG, SUBSEQUENT ENCOUNTER: ICD-10-CM

## 2024-08-29 PROCEDURE — G0463: CPT

## 2024-08-29 PROCEDURE — 99213 OFFICE O/P EST LOW 20 MIN: CPT

## 2024-08-29 NOTE — VITALS
[Pain related to present condition?] : The patient's  pain is not related to present condition. [] : No [de-identified] : 0/10

## 2024-08-29 NOTE — PHYSICAL EXAM
[Normal Breath Sounds] : Normal breath sounds [Normal Heart Sounds] : normal heart sounds [Ankle Swelling Bilaterally] : bilaterally  [Alert] : alert [Oriented to Person] : oriented to person [Oriented to Place] : oriented to place [Oriented to Time] : oriented to time [Calm] : calm [Ankle Swelling (On Exam)] : not present [Varicose Veins Of Lower Extremities] : not present [] : not present [de-identified] : Well-developed, Well-nourished in no acute distress. [de-identified] : Within normal limits. [de-identified] : Within normal limits. [de-identified] : Within normal limits. [de-identified] : Left lag laceration has healed fully.  [FreeTextEntry1] : Left leg laceration  [FreeTextEntry2] : 0.8 [FreeTextEntry3] : 0.3 [FreeTextEntry4] : 0.1 [de-identified] : Serous/sanguinous [de-identified] : epithelial tissue [de-identified] : No wound care product [de-identified] :  Mechanically cleansed with sterile gauze and normal saline 0.9% Band-aid [TWNoteComboBox4] : Small [TWNoteComboBox5] : No [TWNoteComboBox6] : Traumatic [de-identified] : No [de-identified] : Normal [de-identified] : None [de-identified] : None [de-identified] : 100% [de-identified] : No [de-identified] : Every other day [de-identified] : Primary Dressing

## 2024-08-29 NOTE — ASSESSMENT
[Verbal] : Verbal [Written] : Written [Demo] : Demo [Patient] : Patient [Caregiver] : Caregiver [Good - alert, interested, motivated] : Good - alert, interested, motivated [Verbalizes knowledge/Understanding] : Verbalizes knowledge/understanding [Dressing changes] : dressing changes [Skin Care] : skin care [Signs and symptoms of infection] : sign and symptoms of infection [Nutrition] : nutrition [How and When to Call] : how and when to call [Pain Management] : pain management [Patient responsibility to plan of care] : patient responsibility to plan of care [Stable] : stable [Home] : Home [Walker] : Walker [Not Applicable - Long Term Care/Home Health Agency] : Long Term Care/Home Health Agency: Not Applicable [] : Yes [FreeTextEntry2] : Infection prevention Wound care (dressing changes) Maintain optimal skin integrity to high pressure areas Nutrition and wound healing Offloading the stress on skin structures and decreasing potential pathologic biomechanical influences. [FreeTextEntry3] : increased epithelial tissue [FreeTextEntry4] : F/U as needed Advised to apply band-aid every other day [FreeTextEntry1] : Left leg laceration has fully healed.

## 2024-08-30 ENCOUNTER — EMERGENCY (EMERGENCY)
Facility: HOSPITAL | Age: 89
LOS: 1 days | Discharge: HOME | End: 2024-08-30
Attending: STUDENT IN AN ORGANIZED HEALTH CARE EDUCATION/TRAINING PROGRAM | Admitting: STUDENT IN AN ORGANIZED HEALTH CARE EDUCATION/TRAINING PROGRAM
Payer: MEDICARE

## 2024-08-30 VITALS
HEART RATE: 68 BPM | TEMPERATURE: 98 F | SYSTOLIC BLOOD PRESSURE: 174 MMHG | OXYGEN SATURATION: 99 % | DIASTOLIC BLOOD PRESSURE: 75 MMHG | RESPIRATION RATE: 18 BRPM

## 2024-08-30 VITALS
HEIGHT: 63 IN | SYSTOLIC BLOOD PRESSURE: 198 MMHG | WEIGHT: 93.04 LBS | HEART RATE: 74 BPM | RESPIRATION RATE: 16 BRPM | TEMPERATURE: 98 F | OXYGEN SATURATION: 98 % | DIASTOLIC BLOOD PRESSURE: 70 MMHG

## 2024-08-30 DIAGNOSIS — Z96.662 PRESENCE OF LEFT ARTIFICIAL ANKLE JOINT: Chronic | ICD-10-CM

## 2024-08-30 PROCEDURE — 99282 EMERGENCY DEPT VISIT SF MDM: CPT

## 2024-08-30 PROCEDURE — 99283 EMERGENCY DEPT VISIT LOW MDM: CPT

## 2024-08-30 RX ORDER — BACITRACIN 500 UNIT/G
1 OINTMENT (GRAM) TOPICAL ONCE
Refills: 0 | Status: DISCONTINUED | OUTPATIENT
Start: 2024-08-30 | End: 2024-09-03

## 2024-08-30 NOTE — ED PROVIDER NOTE - PATIENT PORTAL LINK FT
You can access the FollowMyHealth Patient Portal offered by [facility] by registering at the following website: [email or web address]. By joining [employer]’s FollowMyHealth portal, you will also be able to view your health information using other applications (apps) compatible with our system.

## 2024-08-30 NOTE — ED PROVIDER NOTE - NSFOLLOWUPINSTRUCTIONS_ED_ALL_ED_FT
Skin Tear    WHAT YOU NEED TO KNOW:    What do I need to know about a skin tear? A skin tear occurs when the layers of weakened skin split open from an injury. It is important to treat and prevent skin tears to prevent infection.    What increases my risk for a skin tear?    [place] and elderly ages    Chronic or critical illness    Long-term use of steroids  How is a skin tear treated? Treatment may include any of the following:    Medicines may be given to decrease pain or treat a bacterial infection.    Bandages such as moist gauze pads or wraps may be placed on your wound. Bandages will help protect your wound from more injury, and allow your wound to heal. Do not use adhesive bandages. These could stick to your wound and make your skin tear worse.    Stitches or medical glue may be used to close the wound so it can heal.    Debridement is removal of dead, damaged, or infected skin to help your wound heal correctly.  How can I help prevent a skin tear?    Clean, moisturize, and protect your skin. Baths, hot showers, and soap can dry your skin and increase your risk for skin tears. Take lukewarm showers, use mild soap as directed, and gently pat your skin dry. Use lotion to keep your skin moist after you shower. Wear long sleeves, pants, and protective footwear.    Move carefully. Ask for help if you cannot lift yourself. Do not drag your skin when you move.    Keep your home safe. Cover sharp corners, keep your pathways clear, and turn on lights so you can see clearly. Ask for more information if you have questions about home safety.    Drink liquids as directed. Ask your provider how much liquid to drink each day and which liquids are best for you. Liquids will help keep your skin moist and protected from another skin tear.    Eat high-protein foods to help with wound healing. Examples are lean meats, fish, low-fat dairy products, and beans.  When should I call my doctor?    You have a fever or chills.    Blood soaks through your bandage.    You have redness, swelling, pus, or a bad odor coming from your wound.    You have severe pain.    Your wound tears open again.    You have questions or concerns about your condition or care.  CARE AGREEMENT:    You have the right to help plan your care. Learn about your health condition and how it may be treated. Discuss treatment options with your healthcare providers to decide what care you want to receive. You always have the right to refuse treatment.

## 2024-08-30 NOTE — ED ADULT NURSE NOTE - NSFALLHARMRISKINTERV_ED_ALL_ED

## 2024-08-30 NOTE — ED ADULT NURSE NOTE - NSFALLDEVICES_ED_ALL_ED
"Subjective:       History was provided by the parents.    Elijah Coreas is a 8 days male who is here for this well-child visit.    Growth parameters: Noted and are appropriate for age.    HPI:  Well  Wt check    ROS  Eating: breast milk-pumped-having trouble latching, and formula  Bottle: yes  Development: seems to see and hear  Stooling:yellow, frequent  Urine:ok  Sleep:ok  Car seat:  yes    Physical Exam:  Physical Exam  Vitals and nursing note reviewed.   Constitutional:       General: He is active. He has a strong cry.      Appearance: He is well-developed.   HENT:      Head: Anterior fontanelle is full.      Right Ear: Tympanic membrane normal.      Left Ear: Tympanic membrane normal.      Nose: Nose normal.      Mouth/Throat:      Mouth: Mucous membranes are moist.      Pharynx: Oropharynx is clear.   Eyes:      General: Red reflex is present bilaterally.      Conjunctiva/sclera: Conjunctivae normal.      Pupils: Pupils are equal, round, and reactive to light.   Cardiovascular:      Rate and Rhythm: Normal rate and regular rhythm.      Pulses: Pulses are strong.      Heart sounds: S1 normal and S2 normal.   Pulmonary:      Effort: Pulmonary effort is normal.      Breath sounds: Normal breath sounds.   Abdominal:      General: Bowel sounds are normal.      Palpations: Abdomen is soft.      Comments: Umb stump off-sm amt dried blood   Genitourinary:     Penis: Normal.       Comments: Testes palp bilat  Musculoskeletal:         General: Normal range of motion.      Cervical back: Normal range of motion and neck supple.      Comments: Hips nl   Skin:     General: Skin is warm and moist.   Neurological:      Mental Status: He is alert.      Primitive Reflexes: Suck normal. Symmetric [place].       Objective:        Vitals:    08/30/24 0823   Temp: 97.7 °F (36.5 °C)   TempSrc: Axillary   Weight: 3.195 kg (7 lb 0.7 oz)   Height: 1' 8.67" (0.525 m)   HC: 34.2 cm (13.47")          Assessment:      Well baby.    " Other Slow wt gain  Plan:      1. Anticipatory guidance discussed.  Gave handout on well-child issues at this age.    2.  Weight management:  The patient was counseled regarding nutrition.    3. Immunizations today: per orders.   Discussed aggressive feeding  Wcc 1 wk

## 2024-08-30 NOTE — ED PROVIDER NOTE - CLINICAL SUMMARY MEDICAL DECISION MAKING FREE TEXT BOX
Patient with skin tear to the lateral aspect of the right lower extremity.  No other wounds noted.  Do not suspect underlying fracture as she has been ambulatory.  Her tetanus shot is up-to-date.  Wound irrigated at bedside, will place dressing over her skin tear and plan for discharge.  About 6 cm in length.  Discussed with daughter at bedside.  Plan to discharge patient. Return to ED precautions were discussed with the patient/family. All questions were answered.  Advised to return to ED for dressing change if they are unable to do it at home.

## 2024-08-30 NOTE — ED ADULT NURSE NOTE - OBJECTIVE STATEMENT
89y F from triage with skin tear to RLE .. pt was in a wheelchair and scraped it on side.. Dr Pringle evaluated.. bacitracin,xeroform and gauz/zonia wrap applied. pt reports mild pain, denies pain meds.. to f/u wound care

## 2024-08-30 NOTE — ED PROVIDER NOTE - PHYSICAL EXAMINATION
Constitutional: Awake, Alert, non-toxic. No acute distress.  HEAD: Normocephalic, atraumatic.   EYES: EOM intact, conjunctiva and sclera are clear bilaterally.  ENT: External ears normal. No rhinorrhea, no tracheal deviation   NECK: Supple, non-tender  CARDIOVASCULAR: regular rate  RESPIRATORY: Normal respiratory effort; Speaking in full sentences. No accessory muscle use.   MSK:  no lower extremity edema, no deformities  SKIN: Warm, dry, 6cm semi circular skin tear to lateral aspect of right shin with no TTP  NEURO: A&O x3. Sensory and motor functions are grossly intact. Speech is normal. ambulating with walker in ED.  PSYCH: Appearance and judgement seem appropriate for gender and age.

## 2024-08-30 NOTE — ED PROVIDER NOTE - CARE PROVIDER_API CALL
Davide Hernandez.  Wound Care  [address] [place] [id number]  Phone: [phone]  Fax: [phone]  Follow Up Time: 1-3 Days

## 2024-08-30 NOTE — ED PROVIDER NOTE - OBJECTIVE STATEMENT
Patient with a past medical history of hypertension and frequent skin tears not on any anticoagulation is presenting with skin tear to the right lower extremity.  Patient was with her daughter today when she cut her leg on the side of wheelchair.  Did not otherwise sustain other trauma fall down or hit her head.  Her last tetanus shot was about a year ago.  Attempted to go to wound care facility but there were no availability so sent to ED for evaluation.  Patient without other complaints here.  Has been ambulatory since the event.
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
## 2024-08-31 DIAGNOSIS — Z79.899 OTHER LONG TERM (CURRENT) DRUG THERAPY: ICD-10-CM
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
## 2024-08-31 DIAGNOSIS — Z87.891 PERSONAL HISTORY OF NICOTINE DEPENDENCE: ICD-10-CM

## 2024-08-31 DIAGNOSIS — I10 ESSENTIAL (PRIMARY) HYPERTENSION: ICD-10-CM

## 2024-08-31 DIAGNOSIS — Z87.81 PERSONAL HISTORY OF (HEALED) TRAUMATIC FRACTURE: ICD-10-CM

## 2024-08-31 DIAGNOSIS — Z80.0 FAMILY HISTORY OF MALIGNANT NEOPLASM OF DIGESTIVE ORGANS: ICD-10-CM

## 2024-08-31 DIAGNOSIS — Z82.3 FAMILY HISTORY OF STROKE: ICD-10-CM

## 2024-08-31 DIAGNOSIS — S81.812D LACERATION WITHOUT FOREIGN BODY, LEFT LOWER LEG, SUBSEQUENT ENCOUNTER: ICD-10-CM

## 2024-08-31 DIAGNOSIS — Z83.3 FAMILY HISTORY OF DIABETES MELLITUS: ICD-10-CM

## 2024-08-31 DIAGNOSIS — M19.90 UNSPECIFIED OSTEOARTHRITIS, UNSPECIFIED SITE: ICD-10-CM

## 2024-08-31 DIAGNOSIS — E03.9 HYPOTHYROIDISM, UNSPECIFIED: ICD-10-CM

## 2024-09-05 ENCOUNTER — OUTPATIENT (OUTPATIENT)
Dept: OUTPATIENT SERVICES | Facility: HOSPITAL | Age: 89
LOS: 1 days | Discharge: HOME | End: 2024-09-05
Payer: MEDICARE

## 2024-09-05 ENCOUNTER — APPOINTMENT (OUTPATIENT)
Dept: WOUND CARE | Facility: HOSPITAL | Age: 89
End: 2024-09-05
Payer: MEDICARE

## 2024-09-05 VITALS
BODY MASS INDEX: 17.19 KG/M2 | RESPIRATION RATE: 15 BRPM | HEIGHT: 63 IN | WEIGHT: 97 LBS | OXYGEN SATURATION: 94 % | HEART RATE: 67 BPM | SYSTOLIC BLOOD PRESSURE: 185 MMHG | TEMPERATURE: 97.8 F | DIASTOLIC BLOOD PRESSURE: 83 MMHG

## 2024-09-05 DIAGNOSIS — L97.301 NON-PRESSURE CHRONIC ULCER OF UNSPECIFIED ANKLE LIMITED TO BREAKDOWN OF SKIN: ICD-10-CM

## 2024-09-05 DIAGNOSIS — S81.811D LACERATION W/OUT FOREIGN BODY, RIGHT LOWER LEG, SUBSEQUENT ENCOUNTER: ICD-10-CM

## 2024-09-05 DIAGNOSIS — Z96.662 PRESENCE OF LEFT ARTIFICIAL ANKLE JOINT: Chronic | ICD-10-CM

## 2024-09-05 PROCEDURE — 99213 OFFICE O/P EST LOW 20 MIN: CPT

## 2024-09-05 PROCEDURE — G0463: CPT

## 2024-09-05 NOTE — PHYSICAL EXAM
[4 x 4] : 4 x 4  [Normal Breath Sounds] : Normal breath sounds [Normal Heart Sounds] : normal heart sounds [2+] : left 2+ [1+] : left 1+ [0] : left 0 [Ankle Swelling Bilaterally] : bilaterally  [Varicose Veins Of Lower Extremities] : bilaterally [Ankle Swelling On The Left] : moderate [] : bilaterally [Ankle Swelling On The Right] : mild [Alert] : alert [Oriented to Person] : oriented to person [Oriented to Place] : oriented to place [Oriented to Time] : oriented to time [Calm] : calm [Ankle Swelling (On Exam)] : not present [de-identified] : Well-developed, Well-nourished in no acute distress. [de-identified] : Within normal limits. [de-identified] : Within normal limits. [de-identified] : Right leg laceration is clean, slight ecchymosis around the wound, base is slough, no drainage, no odor, no acute infection, periwound skin is intact with no cellulitis. [FreeTextEntry1] : Left leg laceration  [FreeTextEntry2] : 0.5 [FreeTextEntry3] : 0.3 [FreeTextEntry4] : 0.1 [de-identified] : Serous/sanguinous [de-identified] : epithelial tissue [de-identified] : No wound care product [de-identified] :  Mechanically cleansed with sterile gauze and normal saline 0.9% Band-aid [FreeTextEntry7] : Lateral lower leg [FreeTextEntry8] : 4.0 [FreeTextEntry9] : 3.0 [de-identified] : 0.1 [de-identified] : serous [de-identified] : ecchymosis [de-identified] : dermis [de-identified] : Xeroform [de-identified] :  Mechanically cleansed with sterile gauze and normal saline 0.9% Kerlix [TWNoteComboBox4] : Small [TWNoteComboBox5] : No [TWNoteComboBox6] : Traumatic [de-identified] : No [de-identified] : Normal [de-identified] : None [de-identified] : None [de-identified] : 100% [de-identified] : No [de-identified] : Primary Dressing [de-identified] : Every other day [TWNoteComboBox9] : Right [de-identified] : Small [de-identified] : No [de-identified] : Traumatic [de-identified] : No [de-identified] : None [de-identified] : other [de-identified] : None [de-identified] : 100% [de-identified] : No [de-identified] : 3x Weekly [de-identified] : Primary Dressing

## 2024-09-05 NOTE — ASSESSMENT
[Verbal] : Verbal [Written] : Written [Demo] : Demo [Patient] : Patient [Caregiver] : Caregiver [Good - alert, interested, motivated] : Good - alert, interested, motivated [Verbalizes knowledge/Understanding] : Verbalizes knowledge/understanding [Dressing changes] : dressing changes [Skin Care] : skin care [Signs and symptoms of infection] : sign and symptoms of infection [Nutrition] : nutrition [How and When to Call] : how and when to call [Pain Management] : pain management [Patient responsibility to plan of care] : patient responsibility to plan of care [Stable] : stable [Home] : Home [Walker] : Walker [Not Applicable - Long Term Care/Home Health Agency] : Long Term Care/Home Health Agency: Not Applicable [] : No [FreeTextEntry2] : Infection prevention Wound care (dressing changes) Maintain optimal skin integrity to high pressure areas Nutrition and wound healing Offloading the stress on skin structures and decreasing potential pathologic biomechanical influences. [FreeTextEntry3] : New wound on right lower leg, s/p trauma. Patient went to ER because she is on Eliquis and it would not stop bleeding. [FreeTextEntry4] : F/U 2 weeks Caregiver is able to perform dressing changes at home, small amount of supplies provided to prevent delay in care. [FreeTextEntry1] : Laceration of right leg is clean, no acute infection.

## 2024-09-05 NOTE — HISTORY OF PRESENT ILLNESS
[FreeTextEntry1] : Few days old laceration of right leg, went to ER was treated with Xeroform, here for further care, has no C/O pain.

## 2024-09-05 NOTE — VITALS
[Pain related to present condition?] : The patient's  pain is not related to present condition. [] : No [de-identified] : 0/10

## 2024-09-08 DIAGNOSIS — Z87.891 PERSONAL HISTORY OF NICOTINE DEPENDENCE: ICD-10-CM

## 2024-09-08 DIAGNOSIS — I10 ESSENTIAL (PRIMARY) HYPERTENSION: ICD-10-CM

## 2024-09-08 DIAGNOSIS — E03.9 HYPOTHYROIDISM, UNSPECIFIED: ICD-10-CM
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
## 2024-09-08 DIAGNOSIS — S81.812D LACERATION WITHOUT FOREIGN BODY, LEFT LOWER LEG, SUBSEQUENT ENCOUNTER: ICD-10-CM

## 2024-09-08 DIAGNOSIS — M19.90 UNSPECIFIED OSTEOARTHRITIS, UNSPECIFIED SITE: ICD-10-CM

## 2024-09-08 DIAGNOSIS — Z87.81 PERSONAL HISTORY OF (HEALED) TRAUMATIC FRACTURE: ICD-10-CM
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
## 2024-09-08 DIAGNOSIS — I73.9 PERIPHERAL VASCULAR DISEASE, UNSPECIFIED: ICD-10-CM

## 2024-09-08 DIAGNOSIS — Z82.3 FAMILY HISTORY OF STROKE: ICD-10-CM
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
## 2024-09-08 DIAGNOSIS — Z83.3 FAMILY HISTORY OF DIABETES MELLITUS: ICD-10-CM

## 2024-09-08 DIAGNOSIS — Z79.899 OTHER LONG TERM (CURRENT) DRUG THERAPY: ICD-10-CM

## 2024-09-08 DIAGNOSIS — Z80.0 FAMILY HISTORY OF MALIGNANT NEOPLASM OF DIGESTIVE ORGANS: ICD-10-CM

## 2024-09-17 ENCOUNTER — APPOINTMENT (OUTPATIENT)
Dept: WOUND CARE | Facility: HOSPITAL | Age: 89
End: 2024-09-17
Payer: MEDICARE

## 2024-09-17 ENCOUNTER — OUTPATIENT (OUTPATIENT)
Dept: OUTPATIENT SERVICES | Facility: HOSPITAL | Age: 89
LOS: 1 days | Discharge: HOME | End: 2024-09-17
Payer: MEDICARE

## 2024-09-17 VITALS
WEIGHT: 97 LBS | HEIGHT: 63 IN | TEMPERATURE: 98 F | RESPIRATION RATE: 16 BRPM | OXYGEN SATURATION: 92 % | SYSTOLIC BLOOD PRESSURE: 180 MMHG | HEART RATE: 78 BPM | BODY MASS INDEX: 17.19 KG/M2 | DIASTOLIC BLOOD PRESSURE: 63 MMHG

## 2024-09-17 DIAGNOSIS — L97.301 NON-PRESSURE CHRONIC ULCER OF UNSPECIFIED ANKLE LIMITED TO BREAKDOWN OF SKIN: ICD-10-CM

## 2024-09-17 DIAGNOSIS — Z96.662 PRESENCE OF LEFT ARTIFICIAL ANKLE JOINT: Chronic | ICD-10-CM

## 2024-09-17 DIAGNOSIS — I73.9 PERIPHERAL VASCULAR DISEASE, UNSPECIFIED: ICD-10-CM

## 2024-09-17 DIAGNOSIS — S81.812D LACERATION W/OUT FOREIGN BODY, LEFT LOWER LEG, SUBSEQUENT ENCOUNTER: ICD-10-CM

## 2024-09-17 PROCEDURE — 99213 OFFICE O/P EST LOW 20 MIN: CPT

## 2024-09-17 PROCEDURE — G0463: CPT

## 2024-09-18 DIAGNOSIS — I73.9 PERIPHERAL VASCULAR DISEASE, UNSPECIFIED: ICD-10-CM

## 2024-09-18 DIAGNOSIS — Z83.3 FAMILY HISTORY OF DIABETES MELLITUS: ICD-10-CM

## 2024-09-18 DIAGNOSIS — Z79.899 OTHER LONG TERM (CURRENT) DRUG THERAPY: ICD-10-CM
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
## 2024-09-18 DIAGNOSIS — S81.812D LACERATION WITHOUT FOREIGN BODY, LEFT LOWER LEG, SUBSEQUENT ENCOUNTER: ICD-10-CM

## 2024-09-18 DIAGNOSIS — Z80.0 FAMILY HISTORY OF MALIGNANT NEOPLASM OF DIGESTIVE ORGANS: ICD-10-CM

## 2024-09-18 DIAGNOSIS — Z82.3 FAMILY HISTORY OF STROKE: ICD-10-CM

## 2024-09-18 DIAGNOSIS — E03.9 HYPOTHYROIDISM, UNSPECIFIED: ICD-10-CM

## 2024-09-18 DIAGNOSIS — I10 ESSENTIAL (PRIMARY) HYPERTENSION: ICD-10-CM

## 2024-09-18 DIAGNOSIS — M19.90 UNSPECIFIED OSTEOARTHRITIS, UNSPECIFIED SITE: ICD-10-CM

## 2024-09-18 DIAGNOSIS — Z87.891 PERSONAL HISTORY OF NICOTINE DEPENDENCE: ICD-10-CM
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
## 2024-09-18 DIAGNOSIS — Z87.81 PERSONAL HISTORY OF (HEALED) TRAUMATIC FRACTURE: ICD-10-CM
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
## 2024-09-18 NOTE — PHYSICAL EXAM
[4 x 4] : 4 x 4  [Normal Thyroid] : the thyroid was normal [Normal Breath Sounds] : Normal breath sounds [Normal Heart Sounds] : normal heart sounds [Normal Rate and Rhythm] : normal rate and rhythm [Ankle Swelling (On Exam)] : present [Ankle Swelling Bilaterally] : bilaterally  [Ankle Swelling On The Right] : mild [Alert] : alert [Oriented to Person] : oriented to person [Oriented to Place] : oriented to place [Oriented to Time] : oriented to time [Calm] : calm [FreeTextEntry2] : 0.5 [FreeTextEntry3] : 0.3 [FreeTextEntry4] : 0.1 [de-identified] : Serous/sanguinous [de-identified] : epithelial tissue [de-identified] : dermis [TWNoteComboBox5] : No [de-identified] : No [de-identified] : None [de-identified] : None [de-identified] : 100% [de-identified] : No [JVD] : no jugular venous distention  [Abdomen Masses] : No abdominal massess [Abdomen Tenderness] : ~T ~M No abdominal tenderness [Tender] : nontender [Enlarged] : not enlarged [de-identified] : elderly WF, NAD, alert, Ox3. [FreeTextEntry1] : Left leg laceration - CLOSED [de-identified] : NONE [de-identified] : Cleansed with 0.9% Normal Saline   [FreeTextEntry7] : Lateral lower leg [FreeTextEntry8] : 4.8 [FreeTextEntry9] : 2.5 [de-identified] : 0.1 [de-identified] : serous [de-identified] : ecchymosis [de-identified] : <25% [de-identified] : Medihoney [de-identified] : Cleansed with 0.9% Normal Saline  Kerlix [TWNoteComboBox4] : None [TWNoteComboBox6] : Traumatic [de-identified] : Normal [de-identified] : Every other day [de-identified] : Primary Dressing [TWNoteComboBox9] : Right [de-identified] : Small [de-identified] : No [de-identified] : Traumatic [de-identified] : No [de-identified] : other [de-identified] : None [de-identified] : None [de-identified] : >75% [de-identified] : Yes [de-identified] : 3x Weekly [de-identified] : Primary Dressing

## 2024-09-18 NOTE — HISTORY OF PRESENT ILLNESS
[FreeTextEntry1] : 89 yo WF, here for laceration to her LE that occurred in the kitchen recently. The LLE laceration has healed. The RLE laceration covered by some necrotic tissue which half of it was removed, the other half adherent  today. No SOI.      Reviewed S/s to watch for , for infection.

## 2024-09-18 NOTE — PHYSICAL EXAM
[4 x 4] : 4 x 4  [Normal Thyroid] : the thyroid was normal [Normal Breath Sounds] : Normal breath sounds [Normal Heart Sounds] : normal heart sounds [Normal Rate and Rhythm] : normal rate and rhythm [Ankle Swelling (On Exam)] : present [Ankle Swelling Bilaterally] : bilaterally  [Ankle Swelling On The Right] : mild [Alert] : alert [Oriented to Person] : oriented to person [Oriented to Place] : oriented to place [Oriented to Time] : oriented to time [Calm] : calm [FreeTextEntry2] : 0.5 [FreeTextEntry3] : 0.3 [FreeTextEntry4] : 0.1 [de-identified] : Serous/sanguinous [de-identified] : epithelial tissue [de-identified] : dermis [TWNoteComboBox5] : No [de-identified] : No [de-identified] : None [de-identified] : None [de-identified] : 100% [de-identified] : No [JVD] : no jugular venous distention  [Abdomen Masses] : No abdominal massess [Abdomen Tenderness] : ~T ~M No abdominal tenderness [Tender] : nontender [Enlarged] : not enlarged [de-identified] : elderly WF, NAD, alert, Ox3. [FreeTextEntry1] : Left leg laceration - CLOSED [de-identified] : NONE [de-identified] : Cleansed with 0.9% Normal Saline   [FreeTextEntry7] : Lateral lower leg [FreeTextEntry8] : 4.8 [FreeTextEntry9] : 2.5 [de-identified] : 0.1 [de-identified] : serous [de-identified] : ecchymosis [de-identified] : <25% [de-identified] : Medihoney [de-identified] : Cleansed with 0.9% Normal Saline  Kerlix [TWNoteComboBox4] : None [TWNoteComboBox6] : Traumatic [de-identified] : Normal [de-identified] : Every other day [de-identified] : Primary Dressing [TWNoteComboBox9] : Right [de-identified] : Small [de-identified] : No [de-identified] : Traumatic [de-identified] : No [de-identified] : other [de-identified] : None [de-identified] : None [de-identified] : >75% [de-identified] : Yes [de-identified] : 3x Weekly [de-identified] : Primary Dressing

## 2024-09-18 NOTE — ASSESSMENT
[Verbal] : Verbal [Demo] : Demo [Patient] : Patient [Family member] : Family member [Good - alert, interested, motivated] : Good - alert, interested, motivated [Verbalizes knowledge/Understanding] : Verbalizes knowledge/understanding [Dressing changes] : dressing changes [Skin Care] : skin care [Pressure relief] : pressure relief [Signs and symptoms of infection] : sign and symptoms of infection [How and When to Call] : how and when to call [Off-loading] : off-loading [Patient responsibility to plan of care] : patient responsibility to plan of care [] : Yes [FreeTextEntry2] : Infection prevention Localized wound care Maintain optimal skin integrity  Maintain acceptable level of pain with use of pharmacological and nonpharmacological interventions Offloading / Pressure relief  Autolytic debridement  [FreeTextEntry4] : Follow up for an assessment in two weeks Patient has a family member and a friend who is able to perform wound care at home.  No additional supplies needed at this time.  [Stable] : stable [Home] : Home [Walker] : Walker [Not Applicable - Long Term Care/Home Health Agency] : Long Term Care/Home Health Agency: Not Applicable

## 2024-09-18 NOTE — HISTORY OF PRESENT ILLNESS
[Follow-Up Visit] : a follow-up visit for [FreeTextEntry1] : 89 yo WF, here for laceration to her LE that occurred in the kitchen recently. The LLE laceration has healed. The RLE laceration covered by some necrotic tissue which half of it was removed, the other half adherent  today. No SOI.      Reviewed S/s to watch for , for infection.

## 2024-10-01 ENCOUNTER — APPOINTMENT (OUTPATIENT)
Dept: WOUND CARE | Facility: HOSPITAL | Age: 89
End: 2024-10-01
Payer: MEDICARE

## 2024-10-01 ENCOUNTER — OUTPATIENT (OUTPATIENT)
Dept: OUTPATIENT SERVICES | Facility: HOSPITAL | Age: 89
LOS: 1 days | Discharge: HOME | End: 2024-10-01

## 2024-10-01 VITALS
TEMPERATURE: 97.8 F | DIASTOLIC BLOOD PRESSURE: 74 MMHG | HEIGHT: 63 IN | BODY MASS INDEX: 17.19 KG/M2 | SYSTOLIC BLOOD PRESSURE: 156 MMHG | WEIGHT: 97 LBS | RESPIRATION RATE: 18 BRPM | HEART RATE: 75 BPM | OXYGEN SATURATION: 88 %

## 2024-10-01 DIAGNOSIS — S81.812D LACERATION W/OUT FOREIGN BODY, LEFT LOWER LEG, SUBSEQUENT ENCOUNTER: ICD-10-CM

## 2024-10-01 DIAGNOSIS — Z96.662 PRESENCE OF LEFT ARTIFICIAL ANKLE JOINT: Chronic | ICD-10-CM

## 2024-10-01 DIAGNOSIS — L97.301 NON-PRESSURE CHRONIC ULCER OF UNSPECIFIED ANKLE LIMITED TO BREAKDOWN OF SKIN: ICD-10-CM

## 2024-10-01 DIAGNOSIS — I73.9 PERIPHERAL VASCULAR DISEASE, UNSPECIFIED: ICD-10-CM

## 2024-10-01 PROCEDURE — 99213 OFFICE O/P EST LOW 20 MIN: CPT

## 2024-10-01 NOTE — PLAN
[FreeTextEntry1] : LINDSAY-ROMEO armstrong qod f/u 10 days Has some SOB today and instructed pt and her aide to go to the ER if it worsens.  time spent 25 mins.

## 2024-10-01 NOTE — HISTORY OF PRESENT ILLNESS
[FreeTextEntry1] : 89 yo WF, here for laceration to her LE that occurred in the kitchen recently. The LLE laceration has healed. The RLE laceration is slowly tania. Small amts of necrotic tissue removed today. Usiing medihoney. No SOI.      Has some SOB this wk.

## 2024-10-01 NOTE — VITALS
[] : No [FreeTextEntry3] : Right lower leg [de-identified] : Right lower leg [FreeTextEntry1] : positioning [FreeTextEntry2] : random [FreeTextEntry4] : position

## 2024-10-01 NOTE — ASSESSMENT
[FreeTextEntry2] : Infection prevention Localized wound care Maintain optimal skin integrity  Maintain acceptable level of pain with use of pharmacological and nonpharmacological interventions Offloading / Pressure relief  Autolytic debridement  [FreeTextEntry3] : tania smaller in size [FreeTextEntry4] : Follow up for an assessment in 10 days Patient has a family member and a caretaker who is able to perform wound care at home.  No additional supplies needed at this time. Change every other day Patient being worked up for sob due to COPD had CT scan yesterday to follow up Thursday with Pulmonology Dr Cartagena aware of Pulse ox is 88% discussed with patient and aide that if sob gets worse go directly to ER

## 2024-10-01 NOTE — PHYSICAL EXAM
[JVD] : no jugular venous distention  [Normal Thyroid] : the thyroid was normal [Normal Breath Sounds] : Normal breath sounds [Normal Heart Sounds] : normal heart sounds [Normal Rate and Rhythm] : normal rate and rhythm [Ankle Swelling (On Exam)] : present [Ankle Swelling On The Right] : mild [Abdomen Masses] : No abdominal massess [Abdomen Tenderness] : ~T ~M No abdominal tenderness [Tender] : nontender [Enlarged] : not enlarged [Alert] : alert [Oriented to Person] : oriented to person [Oriented to Place] : oriented to place [Oriented to Time] : oriented to time [Calm] : calm [de-identified] : elderly WF, NAD, alert, Ox3. [FreeTextEntry1] : Left leg laceration - CLOSED [de-identified] : NONE [de-identified] : Cleansed with 0.9% Normal Saline   [FreeTextEntry7] : Lateral lower leg [FreeTextEntry8] : 3.2 [FreeTextEntry9] : 1.0 [de-identified] : 0.1 [de-identified] : serous [de-identified] : ecchymosis [de-identified] : <25% [de-identified] : Medihoney [de-identified] : Cleansed with 0.9% Normal Saline  Kerlix [TWNoteComboBox4] : None [TWNoteComboBox6] : Traumatic [de-identified] : Every other day [de-identified] : Normal [de-identified] : Primary Dressing [TWNoteComboBox9] : Right [de-identified] : Small [de-identified] : No [de-identified] : Traumatic [de-identified] : No [de-identified] : other [de-identified] : None [de-identified] : None [de-identified] : >75% [de-identified] : Yes [de-identified] : Every other day [de-identified] : Primary Dressing

## 2024-10-02 ENCOUNTER — INPATIENT (INPATIENT)
Facility: HOSPITAL | Age: 89
LOS: 3 days | Discharge: HOME | DRG: 195 | End: 2024-10-06
Attending: INTERNAL MEDICINE | Admitting: INTERNAL MEDICINE
Payer: MEDICARE

## 2024-10-02 VITALS
DIASTOLIC BLOOD PRESSURE: 73 MMHG | HEART RATE: 88 BPM | WEIGHT: 119.93 LBS | OXYGEN SATURATION: 95 % | RESPIRATION RATE: 16 BRPM | HEIGHT: 63 IN | SYSTOLIC BLOOD PRESSURE: 174 MMHG | TEMPERATURE: 98 F

## 2024-10-02 DIAGNOSIS — J18.9 PNEUMONIA, UNSPECIFIED ORGANISM: ICD-10-CM

## 2024-10-02 DIAGNOSIS — Z94.7 CORNEAL TRANSPLANT STATUS: Chronic | ICD-10-CM

## 2024-10-02 DIAGNOSIS — Z96.662 PRESENCE OF LEFT ARTIFICIAL ANKLE JOINT: Chronic | ICD-10-CM

## 2024-10-02 LAB
ALBUMIN SERPL ELPH-MCNC: 2.9 G/DL — LOW (ref 3.3–5)
ALP SERPL-CCNC: 95 U/L — SIGNIFICANT CHANGE UP (ref 40–120)
ALT FLD-CCNC: 27 U/L — SIGNIFICANT CHANGE UP (ref 12–78)
ANION GAP SERPL CALC-SCNC: 4 MMOL/L — LOW (ref 5–17)
APTT BLD: 28.5 SEC — SIGNIFICANT CHANGE UP (ref 24.5–35.6)
AST SERPL-CCNC: 26 U/L — SIGNIFICANT CHANGE UP (ref 15–37)
BASE EXCESS BLDV CALC-SCNC: 7 MMOL/L — HIGH (ref -2–3)
BASOPHILS # BLD AUTO: 0.02 K/UL — SIGNIFICANT CHANGE UP (ref 0–0.2)
BASOPHILS NFR BLD AUTO: 0.3 % — SIGNIFICANT CHANGE UP (ref 0–2)
BILIRUB SERPL-MCNC: 0.3 MG/DL — SIGNIFICANT CHANGE UP (ref 0.2–1.2)
BLOOD GAS COMMENTS, VENOUS: SIGNIFICANT CHANGE UP
BUN SERPL-MCNC: 34 MG/DL — HIGH (ref 7–23)
CALCIUM SERPL-MCNC: 8.9 MG/DL — SIGNIFICANT CHANGE UP (ref 8.5–10.1)
CHLORIDE SERPL-SCNC: 103 MMOL/L — SIGNIFICANT CHANGE UP (ref 96–108)
CO2 SERPL-SCNC: 32 MMOL/L — HIGH (ref 22–31)
CREAT SERPL-MCNC: 0.89 MG/DL — SIGNIFICANT CHANGE UP (ref 0.5–1.3)
EGFR: 58 ML/MIN/1.73M2 — LOW
EOSINOPHIL # BLD AUTO: 0.07 K/UL — SIGNIFICANT CHANGE UP (ref 0–0.5)
EOSINOPHIL NFR BLD AUTO: 0.9 % — SIGNIFICANT CHANGE UP (ref 0–6)
FLUAV AG NPH QL: SIGNIFICANT CHANGE UP
FLUBV AG NPH QL: SIGNIFICANT CHANGE UP
GLUCOSE SERPL-MCNC: 116 MG/DL — HIGH (ref 70–99)
HCO3 BLDV-SCNC: 33 MMOL/L — HIGH (ref 22–29)
HCT VFR BLD CALC: 34.7 % — SIGNIFICANT CHANGE UP (ref 34.5–45)
HGB BLD-MCNC: 10.8 G/DL — LOW (ref 11.5–15.5)
IMM GRANULOCYTES NFR BLD AUTO: 0.5 % — SIGNIFICANT CHANGE UP (ref 0–0.9)
INR BLD: 0.94 RATIO — SIGNIFICANT CHANGE UP (ref 0.85–1.16)
LYMPHOCYTES # BLD AUTO: 0.47 K/UL — LOW (ref 1–3.3)
LYMPHOCYTES # BLD AUTO: 6.4 % — LOW (ref 13–44)
MCHC RBC-ENTMCNC: 29.5 PG — SIGNIFICANT CHANGE UP (ref 27–34)
MCHC RBC-ENTMCNC: 31.1 GM/DL — LOW (ref 32–36)
MCV RBC AUTO: 94.8 FL — SIGNIFICANT CHANGE UP (ref 80–100)
MONOCYTES # BLD AUTO: 0.47 K/UL — SIGNIFICANT CHANGE UP (ref 0–0.9)
MONOCYTES NFR BLD AUTO: 6.4 % — SIGNIFICANT CHANGE UP (ref 2–14)
NEUTROPHILS # BLD AUTO: 6.3 K/UL — SIGNIFICANT CHANGE UP (ref 1.8–7.4)
NEUTROPHILS NFR BLD AUTO: 85.5 % — HIGH (ref 43–77)
NRBC # BLD: 0 /100 WBCS — SIGNIFICANT CHANGE UP (ref 0–0)
NT-PROBNP SERPL-SCNC: 3543 PG/ML — HIGH (ref 0–450)
PCO2 BLDV: 59 MMHG — HIGH (ref 39–42)
PH BLDV: 7.35 — SIGNIFICANT CHANGE UP (ref 7.32–7.43)
PLATELET # BLD AUTO: 252 K/UL — SIGNIFICANT CHANGE UP (ref 150–400)
PO2 BLDV: 52 MMHG — HIGH (ref 25–45)
POTASSIUM SERPL-MCNC: 4.6 MMOL/L — SIGNIFICANT CHANGE UP (ref 3.5–5.3)
POTASSIUM SERPL-SCNC: 4.6 MMOL/L — SIGNIFICANT CHANGE UP (ref 3.5–5.3)
PROCALCITONIN SERPL-MCNC: 0.12 NG/ML — HIGH
PROT SERPL-MCNC: 7.3 G/DL — SIGNIFICANT CHANGE UP (ref 6–8.3)
PROTHROM AB SERPL-ACNC: 11 SEC — SIGNIFICANT CHANGE UP (ref 9.9–13.4)
RBC # BLD: 3.66 M/UL — LOW (ref 3.8–5.2)
RBC # FLD: 14.5 % — SIGNIFICANT CHANGE UP (ref 10.3–14.5)
RSV RNA NPH QL NAA+NON-PROBE: SIGNIFICANT CHANGE UP
SAO2 % BLDV: 86.9 % — SIGNIFICANT CHANGE UP (ref 67–88)
SARS-COV-2 RNA SPEC QL NAA+PROBE: SIGNIFICANT CHANGE UP
SODIUM SERPL-SCNC: 139 MMOL/L — SIGNIFICANT CHANGE UP (ref 135–145)
TROPONIN I, HIGH SENSITIVITY RESULT: 21 NG/L — SIGNIFICANT CHANGE UP
WBC # BLD: 7.37 K/UL — SIGNIFICANT CHANGE UP (ref 3.8–10.5)
WBC # FLD AUTO: 7.37 K/UL — SIGNIFICANT CHANGE UP (ref 3.8–10.5)

## 2024-10-02 PROCEDURE — 99223 1ST HOSP IP/OBS HIGH 75: CPT | Mod: GC

## 2024-10-02 PROCEDURE — 71250 CT THORAX DX C-: CPT | Mod: 26,MC

## 2024-10-02 PROCEDURE — 99285 EMERGENCY DEPT VISIT HI MDM: CPT | Mod: FS

## 2024-10-02 PROCEDURE — 71045 X-RAY EXAM CHEST 1 VIEW: CPT | Mod: 26

## 2024-10-02 PROCEDURE — 93010 ELECTROCARDIOGRAM REPORT: CPT | Mod: 76

## 2024-10-02 RX ORDER — GUAIFENESIN 100 MG/5ML
200 SOLUTION ORAL EVERY 6 HOURS
Refills: 0 | Status: DISCONTINUED | OUTPATIENT
Start: 2024-10-02 | End: 2024-10-06

## 2024-10-02 RX ORDER — PREDNISONE 5 MG/1
2.5 TABLET ORAL DAILY
Refills: 0 | Status: DISCONTINUED | OUTPATIENT
Start: 2024-10-02 | End: 2024-10-06

## 2024-10-02 RX ORDER — ENOXAPARIN SODIUM 150 MG/ML
30 INJECTION SUBCUTANEOUS EVERY 24 HOURS
Refills: 0 | Status: DISCONTINUED | OUTPATIENT
Start: 2024-10-02 | End: 2024-10-06

## 2024-10-02 RX ORDER — AMLODIPINE BESYLATE 5 MG
5 TABLET ORAL DAILY
Refills: 0 | Status: DISCONTINUED | OUTPATIENT
Start: 2024-10-02 | End: 2024-10-06

## 2024-10-02 RX ORDER — AZITHROMYCIN 250 MG/1
500 TABLET, FILM COATED ORAL ONCE
Refills: 0 | Status: COMPLETED | OUTPATIENT
Start: 2024-10-02 | End: 2024-10-02

## 2024-10-02 RX ORDER — ALBUTEROL 90 MCG
2.5 AEROSOL (GRAM) INHALATION EVERY 8 HOURS
Refills: 0 | Status: DISCONTINUED | OUTPATIENT
Start: 2024-10-02 | End: 2024-10-06

## 2024-10-02 RX ORDER — METOPROLOL TARTRATE 50 MG
50 TABLET ORAL DAILY
Refills: 0 | Status: DISCONTINUED | OUTPATIENT
Start: 2024-10-02 | End: 2024-10-06

## 2024-10-02 RX ORDER — 5-HYDROXYTRYPTOPHAN (5-HTP) 100 MG
3 TABLET,DISINTEGRATING ORAL AT BEDTIME
Refills: 0 | Status: DISCONTINUED | OUTPATIENT
Start: 2024-10-02 | End: 2024-10-06

## 2024-10-02 RX ORDER — PREDNISOLONE SODIUM PHOSPHATE 1 %
1 DROPS OPHTHALMIC (EYE) DAILY
Refills: 0 | Status: DISCONTINUED | OUTPATIENT
Start: 2024-10-02 | End: 2024-10-06

## 2024-10-02 RX ORDER — TIOTROPIUM BROMIDE INHALATION SPRAY 3.12 UG/1
2 SPRAY, METERED RESPIRATORY (INHALATION) DAILY
Refills: 0 | Status: DISCONTINUED | OUTPATIENT
Start: 2024-10-02 | End: 2024-10-06

## 2024-10-02 RX ORDER — AZITHROMYCIN 250 MG/1
500 TABLET, FILM COATED ORAL EVERY 24 HOURS
Refills: 0 | Status: DISCONTINUED | OUTPATIENT
Start: 2024-10-03 | End: 2024-10-04

## 2024-10-02 RX ORDER — BISACODYL 5 MG/1
5 TABLET, COATED ORAL EVERY 12 HOURS
Refills: 0 | Status: DISCONTINUED | OUTPATIENT
Start: 2024-10-02 | End: 2024-10-06

## 2024-10-02 RX ORDER — ACETAMINOPHEN 325 MG
650 TABLET ORAL EVERY 6 HOURS
Refills: 0 | Status: DISCONTINUED | OUTPATIENT
Start: 2024-10-02 | End: 2024-10-06

## 2024-10-02 RX ORDER — CEFTRIAXONE SODIUM 1 G
1000 VIAL (EA) INJECTION ONCE
Refills: 0 | Status: COMPLETED | OUTPATIENT
Start: 2024-10-02 | End: 2024-10-02

## 2024-10-02 RX ORDER — CEFTRIAXONE SODIUM 1 G
1000 VIAL (EA) INJECTION EVERY 24 HOURS
Refills: 0 | Status: DISCONTINUED | OUTPATIENT
Start: 2024-10-03 | End: 2024-10-04

## 2024-10-02 RX ORDER — SODIUM CHLORIDE 0.9 % (FLUSH) 0.9 %
4 SYRINGE (ML) INJECTION EVERY 8 HOURS
Refills: 0 | Status: DISCONTINUED | OUTPATIENT
Start: 2024-10-02 | End: 2024-10-04

## 2024-10-02 RX ADMIN — Medication 100 MILLIGRAM(S): at 19:53

## 2024-10-02 RX ADMIN — GUAIFENESIN 200 MILLIGRAM(S): 100 SOLUTION ORAL at 19:53

## 2024-10-02 RX ADMIN — Medication 3 MILLIGRAM(S): at 22:09

## 2024-10-02 RX ADMIN — Medication 4 MILLILITER(S): at 21:45

## 2024-10-02 RX ADMIN — Medication 2.5 MILLIGRAM(S): at 21:43

## 2024-10-02 RX ADMIN — AZITHROMYCIN 255 MILLIGRAM(S): 250 TABLET, FILM COATED ORAL at 20:29

## 2024-10-02 NOTE — ED ADULT NURSE NOTE - OBJECTIVE STATEMENT
Pt received in rm 8B 89y female AXO 4 BIBA from home c/o SOB. Pt states she has sob usually for 2 weeks, was diagnosed recently with a pleural effusion and today developed SOB was exacerbated. Upon assessment pt c/o sob. Pt denies fever, chills, nausea vomiting chest pain and diarrhea. Right 20 AC placed, labs drawn, ct performed. Pending bed.

## 2024-10-02 NOTE — ED ADULT NURSE NOTE - CHIEF COMPLAINT QUOTE
Pt BIBA from home for shortness of breathe ongoing since last week but worsened this morning. Had CT at [facility] on Saturday showing right pleural effusion. denies CP.
No

## 2024-10-02 NOTE — ED ADULT TRIAGE NOTE - CHIEF COMPLAINT QUOTE
Pt BIBA from home for shortness of breathe ongoing since last week but worsened this morning. Had CT at [facility] on Saturday showing right pleural effusion. denies CP.

## 2024-10-02 NOTE — ED PROVIDER NOTE - BREATH SOUNDS
decreased breath sounds bilaterally.  + rales and right base. no respiratory distress. talking in full sentences.

## 2024-10-02 NOTE — H&P ADULT - NSICDXPASTMEDICALHX_GEN_ALL_CORE_FT
PAST MEDICAL HISTORY:  Afib     COPD without exacerbation     Fall     Hypertension     Hypothyroidism     Pelvic fracture     PVD (peripheral vascular disease)

## 2024-10-02 NOTE — ED PROVIDER NOTE - CARE PLAN
Principal Discharge DX:	Pneumonia  Secondary Diagnosis:	Pleural effusion  Secondary Diagnosis:	Shortness of breath   1

## 2024-10-02 NOTE — H&P ADULT - NSHPSOCIALHISTORY_GEN_ALL_CORE
Tobacco Usage:  former smoker  Alcohol Usage: social   Substance Use:  denies  Lives with: home alone, with 12hr aide  ADLs: dependent, has home health aide 12hrs  Ambulates: with walker

## 2024-10-02 NOTE — H&P ADULT - CONVERSATION DETAILS
Writer met with patient and daughter at bedside, reviewed patient's medical and social history as well as events leading to patient's hospitalization. Writer discussed patient's current diagnosis of PNA and management with IV abx. Writer inquired about thoughts regarding cardiopulmonary resuscitation, and mechanical ventilation/intubation. Pt showed fair insight into medical condition. Both the patient and daughter state pt is FULL CODE

## 2024-10-02 NOTE — ED PROVIDER NOTE - DIFFERENTIAL DIAGNOSIS
ddx includes but not limited to: pleural effusion, pneumonia, chf, acs, pneumothorax Differential Diagnosis

## 2024-10-02 NOTE — H&P ADULT - HISTORY OF PRESENT ILLNESS
89y F with PMHx HTN, COPD (no home O2), hypothyroidism, afib (not on AC), PVD and arthritis presenting with worsening dyspnea. Pt states worsening shortness of breath over the past few months however worse over the last few nights.  Outpatient chest x-ray showed pleural effusion.  Denies fever, chills, chest pain, abdominal pain, nausea, vomiting, upper or lower extremity weakness or paresthesias.  No recent sick contacts or travel.    IN THE ED  VS: T 98.2, HR 88, /73, RR 16, SpO2 95% on 1LNC  Labs: Hgb 10.8, BUN 34, BNP 3543  Imaging: CT chest with small right and trace left pleural effusion, MARY and LLL PNA  EKG aflutter @ 82bpm  S/p IV abx Rocephin + azithro 89y F with PMHx HTN, COPD (no home O2), hypothyroidism, afib (not on AC), PVD and arthritis presenting with worsening dyspnea. Pt states worsening shortness of breath over the past few months however worse over the last few nights.  Outpatient chest x-ray showed pleural effusion.  Denies fever, chills, chest pain, abdominal pain, nausea, vomiting, upper or lower extremity weakness or paresthesias.  No recent sick contacts or travel.    IN THE ED  VS: T 98.2, HR 88, /73, RR 16, SpO2 95% on 1LNC  Labs: Hgb 10.8, BUN 34, BNP 3543  Imaging: CT chest with small right and trace left pleural effusion, MARY and LLL PNA  EKG NSR  S/p IV abx Rocephin + azithro

## 2024-10-02 NOTE — H&P ADULT - ASSESSMENT
89y F with PMHx HTN, COPD (no home O2), hypothyroidism, afib (not on AC), PVD and arthritis presenting with worsening dyspnea. Admitted for PNA 89y F with PMHx HTN, COPD (no home O2), hypothyroidism, afib (not on AC), PVD and arthritis presenting with worsening dyspnea. Admitted for PNA    #Pneumonia  Community Acquired  - CT chest with small right and trace left pleural effusion, with MARY and LLL PNA  - S/p IV abx Rocephin and azithro in ED  - Does not meet sepsis criteria  - Continue IV abx Rocephin and azithro  - F/u urine strep and legionella  - Flu/COVID/RSV negative  - PRN Robitussin for cough  - Trend fever curve and WBC  - Pulmonology Dr. Sandhu consulted    #Elevated BNP  - BNP 3543 on admission  - Does not appear volume overloaded  - F/u TTE    #Anemia  - Hgb 10.8 on admission, normocytic  - Hold home celecoxib  - F/u iron panel, B12, folate  - Monitor CBC    #COPD  - Not on home O2  - Currently on 1LNC, SpO2 goal>88%  - Standing albuterol and hypertonic saline nebs  - Continue low dose prednisone 2.5mg qd (takes it for her arthritis)  - Continue daily Spiriva    #HTN  -Continue home amlodipine, hctz and BB  -Monitor hemodynamics  -DASH/TLC diet    #Afib  - Currently rate controlled  - Monitor on remote tele  - Continue therapeutic interchange metoprolol (for home bisoprolol)  - Not on AC  - Monitor and replete electrolytes    #Hypothyroidism  - Continue levothyroxine    #DVT PPx  - Lovenox 30mg qd 89y F with PMHx HTN, COPD (no home O2), hypothyroidism, afib (not on AC), PVD and arthritis presenting with worsening dyspnea. Admitted for PNA    #Pneumonia  Community Acquired  - CT chest with small right and trace left pleural effusion, with MARY and LLL PNA  - S/p IV abx Rocephin and azithro in ED  - Does not meet sepsis criteria  - Continue IV abx Rocephin and azithro  - F/u urine strep and legionella  - Flu/COVID/RSV negative  - PRN Robitussin for cough  - Trend fever curve and WBC  - Pulmonology Dr. Sandhu consulted    #Elevated BNP  - BNP 3543 on admission  - Does not appear volume overloaded  - F/u TTE    #Anemia  - Hgb 10.8 on admission, normocytic  - Hold home celecoxib  - F/u iron panel, B12, folate  - Monitor CBC    #COPD  - Not on home O2  - Currently on 1LNC, SpO2 goal>88%  - Standing albuterol and hypertonic saline nebs  - Continue low dose prednisone 2.5mg qd (takes it for her arthritis)  - Continue daily Spiriva    #HTN  -Continue home amlodipine, hctz and BB  -Monitor hemodynamics  -DASH/TLC diet    #Aflutter  - Currently rate controlled  - Monitor on remote tele  - Continue therapeutic interchange metoprolol (for home bisoprolol)  - Not on AC  - Monitor and replete electrolytes  - Cardiology (Ye Ulloa) consulted    #Hypothyroidism  - Continue levothyroxine    #DVT PPx  - Lovenox 30mg qd 89y F with PMHx HTN, COPD (no home O2), hypothyroidism, afib (not on AC), PVD and arthritis presenting with worsening dyspnea. Admitted for PNA    #Pneumonia  Community Acquired  - CT chest with small right and trace left pleural effusion, with MARY and LLL PNA  - S/p IV abx Rocephin and azithro in ED  - Does not meet sepsis criteria  - Continue IV abx Rocephin and azithro  - F/u urine strep and legionella  - Flu/COVID/RSV negative  - PRN Robitussin for cough  - Trend fever curve and WBC  - Pulmonology Dr. Sandhu consulted    #Elevated BNP  - BNP 3543 on admission  - Does not appear volume overloaded  - F/u TTE    #Anemia  - Hgb 10.8 on admission, normocytic  - Hold home celecoxib  - F/u iron panel, B12, folate  - Monitor CBC    #COPD  - Not on home O2  - Currently on 1LNC, SpO2 goal>88%  - Standing albuterol and hypertonic saline nebs  - Continue low dose prednisone 2.5mg qd (takes it for her arthritis)  - Continue daily Spiriva    #HTN  -Continue home amlodipine, hctz and BB  -Monitor hemodynamics  -DASH/TLC diet    #Afib  - Currently rate controlled  - Continue therapeutic interchange metoprolol (for home bisoprolol)  - Not on AC  - Monitor and replete electrolytes    #Hypothyroidism  - Continue levothyroxine    #DVT PPx  - Lovenox 30mg qd 89y F with PMHx HTN, COPD (no home O2), hypothyroidism, PVD and arthritis presenting with worsening dyspnea. Admitted for PNA    #Pneumonia  Community Acquired  - CT chest with small right and trace left pleural effusion, with MARY and LLL PNA  - S/p IV abx Rocephin and azithro in ED  - Does not meet sepsis criteria  - Continue IV abx Rocephin and azithro  - F/u urine strep and legionella  - Flu/COVID/RSV negative  - PRN Robitussin for cough  - Trend fever curve and WBC  - Pulmonology Dr. Sandhu consulted    #Elevated BNP  - BNP 3543 on admission  - Does not appear volume overloaded  - F/u TTE    #Anemia  - Hgb 10.8 on admission, normocytic  - Hold home celecoxib  - F/u iron panel, B12, folate  - Monitor CBC    #COPD  - Not on home O2  - Currently on 1LNC, SpO2 goal>88%  - Standing albuterol and hypertonic saline nebs  - Continue low dose prednisone 2.5mg qd (takes it for her arthritis)  - Continue daily Spiriva    #HTN  -Continue home amlodipine, hctz and BB  -Monitor hemodynamics  -DASH/TLC diet      #Hypothyroidism  - Continue levothyroxine    #DVT PPx  - Lovenox 30mg qd

## 2024-10-02 NOTE — H&P ADULT - ATTENDING COMMENTS
89y F with PMHx HTN, COPD (no home O2), hypothyroidism, PVD and arthritis presenting with worsening dyspnea. Admitted for PNA    #Pneumonia  Community Acquired  - CT chest with small right and trace left pleural effusion, with MARY and LLL PNA  - S/p IV abx Rocephin and azithro in ED  - Does not meet sepsis criteria  - Continue IV abx Rocephin and azithro  - F/u urine strep and legionella  - Flu/COVID/RSV negative  - PRN Robitussin for cough  - Trend fever curve and WBC  - Pulmonology Dr. Sandhu consulted    #Elevated BNP  - BNP 3543 on admission  - Does not appear volume overloaded  - F/u TTE    #Anemia  - Hgb 10.8 on admission, normocytic  - Hold home celecoxib  - F/u iron panel, B12, folate  - Monitor CBC    #COPD  - Not on home O2  - Currently on 1LNC, SpO2 goal>88%  - Standing albuterol and hypertonic saline nebs  - Continue low dose prednisone 2.5mg qd (takes it for her arthritis)  - Continue daily Spiriva    #HTN  -Continue home amlodipine, hctz and BB  -Monitor hemodynamics  -DASH/TLC diet      #Hypothyroidism  - Continue levothyroxine    #DVT PPx  - Lovenox 30mg qd

## 2024-10-02 NOTE — ED PROVIDER NOTE - CONSTITUTIONAL, MLM
normal... well appearing elderly female awake, alert, oriented to person, place, time/situation and in no apparent distress.

## 2024-10-02 NOTE — CONSULT NOTE ADULT - SUBJECTIVE AND OBJECTIVE BOX
Date/Time Patient Seen:  		  Referring MD:   Data Reviewed	       Patient is a 89y old  Female who presents with a chief complaint of     Subjective/HPI     PAST MEDICAL & SURGICAL HISTORY:  Hypertension    Fall    Fall    Pelvic fracture    S/P ankle joint replacement, left      HIV:    HIV Test Questions:  · In accordance with [place] State law, we offer every patient who comes to our ED an HIV test. Would you like to be tested today?	Opt out    HEPATITIS C TEST QUESTIONS:    Hepatitis C Test Questions:  · In accordance with [facility] Law, we offer every patient a Hepatitis C test. Would you like to be tested today?	Opt out    ALLERGIES AND HOME MEDICATIONS:   Allergies:        Allergies:  	Percocet 5/325: Drug, Vomiting  	codeine: Drug, Nausea    Home Medications:   * Patient Currently Takes Medications as of 16-Mar-2021 11:06 documented in Structured Notes  · 	Plavix 75 mg oral tablet: 1 tab(s) orally once a day   · 	aspirin 325 mg oral tablet: 1 tab(s) orally once a day   · 	Cardizem 120 mg oral tablet: 1 tab(s) orally once a day   · 	predniSONE 20 mg oral tablet: 1 tab(s) orally once a day for 3 days then 0.5 tabs once a day orally for 3 days then stop  · 	hydrALAZINE 50 mg oral tablet: 1 tab(s) orally every 8 hours  · 	metoprolol tartrate 50 mg oral tablet: 1 tab(s) orally 2 times a day  · 	aspirin 325 mg oral delayed release tablet: 1 tab(s) orally once a day  · 	lactobacillus acidophilus oral capsule: 1  orally once a day  · 	Advair Diskus 250 mcg-50 mcg inhalation powder: 1 puff(s) inhaled 2 times a day   · 	Ventolin HFA 90 mcg/inh inhalation aerosol: 1 puff(s) inhaled 4 times a day, As Needed   · 	Tessalon Perles 100 mg oral capsule: 1 cap(s) orally 3 times a day -for cough   · 	Spiriva Respimat 1.25 mcg/inh inhalation aerosol: 2 puff(s) inhaled once a day      Medication list         MEDICATIONS  (STANDING):    MEDICATIONS  (PRN):         Vitals log        ICU Vital Signs Last 24 Hrs  T(C): 36.8 (02 Oct 2024 14:31), Max: 36.8 (02 Oct 2024 14:31)  T(F): 98.2 (02 Oct 2024 14:31), Max: 98.2 (02 Oct 2024 14:31)  HR: 88 (02 Oct 2024 14:31) (88 - 88)  BP: 174/73 (02 Oct 2024 14:31) (174/73 - 174/73)  BP(mean): --  ABP: --  ABP(mean): --  RR: 16 (02 Oct 2024 14:31) (16 - 16)  SpO2: 95% (02 Oct 2024 14:31) (95% - 95%)    O2 Parameters below as of 02 Oct 2024 14:31  Patient On (Oxygen Delivery Method): nasal cannula  O2 Flow (L/min): 1               Input and Output:  I&O's Detail      Lab Data                        10.8   7.37  )-----------( 252      ( 02 Oct 2024 16:40 )             34.7                   Review of Systems	      Objective     Physical Examination        Pertinent Lab findings & Imaging      Lara:  NO   Adequate UO     I&O's Detail           Discussed with:     Cultures:	        Radiology      ACC: [id number] EXAM:  XR CHEST PORTABLE URGENT 1V   ORDERED BY: PURVI MARCIAL     PROCEDURE DATE:  10/02/2024          INTERPRETATION:  AP semierect chest on October 2, 2024 at 4:24 PM.   Patient is short of breath.    Heart magnified by technique.    There is a moderate to large right effusion which is new since November 23, 2023.    IMPRESSION: Moderate to large right effusion is new since prior. Old left   rib fractures again noted.    --- End of Report ---            KENYETTA SOL MD; Attending Radiologist  This document has been electronically signed. Oct  2 2024  4:40PM                         Date/Time Patient Seen:  		  Referring MD:   Data Reviewed	       Patient is a 89y old  Female who presents with a chief complaint of     Subjective/HPI     Patient with a past medical history of hypertension  sent in by SRIRAM mac  outpatient imaging shows effusion  spoke with DTR  on o2 support at time of exam    PAST MEDICAL & SURGICAL HISTORY:  Hypertension    Fall    Fall    Pelvic fracture    S/P ankle joint replacement, left      HIV:    HIV Test Questions:  · In accordance with [facility] law, we offer every patient who comes to our ED an HIV test. Would you like to be tested today?	Opt out    HEPATITIS C TEST QUESTIONS:    Hepatitis C Test Questions:  · In accordance with [facility] Law, we offer every patient a Hepatitis C test. Would you like to be tested today?	Opt out    ALLERGIES AND HOME MEDICATIONS:   Allergies:        Allergies:  	Percocet 5/325: Drug, Vomiting  	codeine: Drug, Nausea    Home Medications:   * Patient Currently Takes Medications as of 16-Mar-2021 11:06 documented in Structured Notes  · 	Plavix 75 mg oral tablet: 1 tab(s) orally once a day   · 	aspirin 325 mg oral tablet: 1 tab(s) orally once a day   · 	Cardizem 120 mg oral tablet: 1 tab(s) orally once a day   · 	predniSONE 20 mg oral tablet: 1 tab(s) orally once a day for 3 days then 0.5 tabs once a day orally for 3 days then stop  · 	hydrALAZINE 50 mg oral tablet: 1 tab(s) orally every 8 hours  · 	metoprolol tartrate 50 mg oral tablet: 1 tab(s) orally 2 times a day  · 	aspirin 325 mg oral delayed release tablet: 1 tab(s) orally once a day  · 	lactobacillus acidophilus oral capsule: 1  orally once a day  · 	Advair Diskus 250 mcg-50 mcg inhalation powder: 1 puff(s) inhaled 2 times a day   · 	Ventolin HFA 90 mcg/inh inhalation aerosol: 1 puff(s) inhaled 4 times a day, As Needed   · 	Tessalon Perles 100 mg oral capsule: 1 cap(s) orally 3 times a day -for cough   · 	Spiriva Respimat 1.25 mcg/inh inhalation aerosol: 2 puff(s) inhaled once a day      Medication list         MEDICATIONS  (STANDING):    MEDICATIONS  (PRN):         Vitals log        ICU Vital Signs Last 24 Hrs  T(C): 36.8 (02 Oct 2024 14:31), Max: 36.8 (02 Oct 2024 14:31)  T(F): 98.2 (02 Oct 2024 14:31), Max: 98.2 (02 Oct 2024 14:31)  HR: 88 (02 Oct 2024 14:31) (88 - 88)  BP: 174/73 (02 Oct 2024 14:31) (174/73 - 174/73)  BP(mean): --  ABP: --  ABP(mean): --  RR: 16 (02 Oct 2024 14:31) (16 - 16)  SpO2: 95% (02 Oct 2024 14:31) (95% - 95%)    O2 Parameters below as of 02 Oct 2024 14:31  Patient On (Oxygen Delivery Method): nasal cannula  O2 Flow (L/min): 1               Input and Output:  I&O's Detail      Lab Data                        10.8   7.37  )-----------( 252      ( 02 Oct 2024 16:40 )             34.7                   Review of Systems	  sob  healy  weakness  ataxic gait  on o2 support  alert  all systems reviewed      Objective     Physical Examination    heart s1s2  lung dc BS  head nc  head at  abd soft  o2 support  verbal  chest symmetric      Pertinent Lab findings & Imaging      Lara:  NO   Adequate UO     I&O's Detail           Discussed with:     Cultures:	        Radiology      ACC: [id number] EXAM:  XR CHEST PORTABLE URGENT 1V   ORDERED BY: PURVI MARCIAL     PROCEDURE DATE:  10/02/2024          INTERPRETATION:  AP semierect chest on October 2, 2024 at 4:24 PM.   Patient is short of breath.    Heart magnified by technique.    There is a moderate to large right effusion which is new since November 23, 2023.    IMPRESSION: Moderate to large right effusion is new since prior. Old left   rib fractures again noted.    --- End of Report ---            KENYETTA SOL MD; Attending Radiologist  This document has been electronically signed. Oct  2 2024  4:40PM

## 2024-10-02 NOTE — ED PROVIDER NOTE - OBJECTIVE STATEMENT
89-year-old with history of hypertensionfemale presents to the ED complaining of worsening shortness of breath over the past few months however worse since last night.  Patient saw pulmonologist 1 month ago, was sent for an outpatient CT chest on Saturday.  Patient still pending results.  Outpatient chest x-ray showed pleural effusion.  Denies fever, chills, chest pain, abdominal pain, nausea, vomiting, upper or lower extremity weakness or paresthesias.  No recent sick contacts or travel.    pmd: Dr. Gaytan, pulm: Dr. Trevizo

## 2024-10-02 NOTE — ED PROVIDER NOTE - CLINICAL SUMMARY MEDICAL DECISION MAKING FREE TEXT BOX
Patient is a 89y old  Female who presents with a chief complaint of shortness of breath. patient reports worsening shortness of breath for the past month. concern for pleural effusion on outpatient imaging.    PE: Patient is elderly no acute distress, no signs of head trauma, lungs sounds decreased on the right, abdomen is soft and nontender to palpation without guarding or rebound, normal pulses in all extremities    labs, imaging, medications, oxygen as needed, cardiac monitoring.    patient found to have moderate to large pleural effusion on the right, will require admission and pulmonology consult. further monitoring and oxygen as needed

## 2024-10-02 NOTE — ED ADULT NURSE NOTE - NSFALLHARMRISKINTERV_ED_ALL_ED

## 2024-10-02 NOTE — H&P ADULT - NSHPREVIEWOFSYSTEMS_GEN_ALL_CORE
CONSTITUTIONAL: No weakness, fevers or chills  HEENT:  No headache, no visual changes, no sore throat, neck supple  RESPIRATORY: No cough, wheezing, hemoptysis; +shortness of breath  CARDIOVASCULAR: No chest pain or palpitations  GASTROINTESTINAL: No abdominal pain, no nausea, vomiting, or hematemesis; No diarrhea or constipation. No melena or hematochezia.  GENITOURINARY: No dysuria, frequency or hematuria  NEUROLOGICAL: No focal weakness or dizziness   MSK: No myalgias  SKIN: No itching, burning, rashes, or lesions

## 2024-10-02 NOTE — H&P ADULT - SOCIAL HISTORY
2011: Notified by Dr. Kruse about restarting pt anticoags. Attempting to reach Dr. Christine. Unavailable in [facility]. Contacted PACU attempting to find him as well.    2029: PACU RN called back    2032: Paged Dr. Kruse: Dr. SHARMA has already gone to [facility]. PACU RN attempted to call his PA and left a message to have them contact us. Anything you want us to do in the meantime? Thx      2040: I guess just wait to hear from him. I'm going to sleep, could you contact the admission pager with his response please?     Pt RN paged admitting pager       No

## 2024-10-02 NOTE — H&P ADULT - NSHPPHYSICALEXAM_GEN_ALL_CORE
GENERAL: well-groomed, well-developed, NAD, on nasal cannula  HEENT: head NC/AT; conjunctiva & sclera clear; hearing grossly intact, moist mucous membranes  NECK: supple, no JVD  RESPIRATORY: +rales LLL, decreased breath sounds RLL, no wheezing, rhonchi or rubs  CARDIOVASCULAR: S1&S2, irregular rhythm, no murmurs or gallops  ABDOMEN: soft, non-tender, non-distended, + Bowel sounds, no guarding, rebound or rigidity  MUSCULOSKELETAL:  no clubbing, cyanosis or edema of all 4 extremities. +B/L LE ecchymoses, RLE dressing  SKIN: warm and dry, color normal  NEUROLOGIC: AA&O X2, no focal deficits  Psych: Normal mood and affect, normal behavior

## 2024-10-02 NOTE — ED PROVIDER NOTE - NSCAREINITIATED _GEN_ER
MD at bedside.
MD at bedside.
Patient is attempting to call for a ride prior to administration of benadryl as patient drove herself to the emergency department. She will alert nursing staff with what she finds out.
Patient reports no nausea or pain. Patient states there is still dizziness and it is still spinning.
Patient to CT via stretcher accompanied by tech.
Pt states is feeling better than on arrival. Pt states the tingling has resolved in her fingers.
Bita Hernandez(PA)

## 2024-10-03 ENCOUNTER — RESULT REVIEW (OUTPATIENT)
Age: 89
End: 2024-10-03

## 2024-10-03 DIAGNOSIS — S81.812D LACERATION WITHOUT FOREIGN BODY, LEFT LOWER LEG, SUBSEQUENT ENCOUNTER: ICD-10-CM

## 2024-10-03 DIAGNOSIS — Z79.899 OTHER LONG TERM (CURRENT) DRUG THERAPY: ICD-10-CM
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
## 2024-10-03 DIAGNOSIS — Z82.3 FAMILY HISTORY OF STROKE: ICD-10-CM

## 2024-10-03 DIAGNOSIS — M19.90 UNSPECIFIED OSTEOARTHRITIS, UNSPECIFIED SITE: ICD-10-CM

## 2024-10-03 DIAGNOSIS — I10 ESSENTIAL (PRIMARY) HYPERTENSION: ICD-10-CM

## 2024-10-03 DIAGNOSIS — Z87.891 PERSONAL HISTORY OF NICOTINE DEPENDENCE: ICD-10-CM
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
## 2024-10-03 DIAGNOSIS — E03.9 HYPOTHYROIDISM, UNSPECIFIED: ICD-10-CM

## 2024-10-03 DIAGNOSIS — Z80.0 FAMILY HISTORY OF MALIGNANT NEOPLASM OF DIGESTIVE ORGANS: ICD-10-CM
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
## 2024-10-03 DIAGNOSIS — I73.9 PERIPHERAL VASCULAR DISEASE, UNSPECIFIED: ICD-10-CM
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
## 2024-10-03 DIAGNOSIS — Z83.3 FAMILY HISTORY OF DIABETES MELLITUS: ICD-10-CM

## 2024-10-03 DIAGNOSIS — Z87.81 PERSONAL HISTORY OF (HEALED) TRAUMATIC FRACTURE: ICD-10-CM

## 2024-10-03 LAB
ALBUMIN SERPL ELPH-MCNC: 2.5 G/DL — LOW (ref 3.3–5)
ALP SERPL-CCNC: 84 U/L — SIGNIFICANT CHANGE UP (ref 40–120)
ALT FLD-CCNC: 23 U/L — SIGNIFICANT CHANGE UP (ref 12–78)
ANION GAP SERPL CALC-SCNC: 7 MMOL/L — SIGNIFICANT CHANGE UP (ref 5–17)
AST SERPL-CCNC: 26 U/L — SIGNIFICANT CHANGE UP (ref 15–37)
BASOPHILS # BLD AUTO: 0.02 K/UL — SIGNIFICANT CHANGE UP (ref 0–0.2)
BASOPHILS NFR BLD AUTO: 0.3 % — SIGNIFICANT CHANGE UP (ref 0–2)
BILIRUB SERPL-MCNC: 0.3 MG/DL — SIGNIFICANT CHANGE UP (ref 0.2–1.2)
BUN SERPL-MCNC: 25 MG/DL — HIGH (ref 7–23)
CALCIUM SERPL-MCNC: 9.1 MG/DL — SIGNIFICANT CHANGE UP (ref 8.5–10.1)
CHLORIDE SERPL-SCNC: 102 MMOL/L — SIGNIFICANT CHANGE UP (ref 96–108)
CO2 SERPL-SCNC: 29 MMOL/L — SIGNIFICANT CHANGE UP (ref 22–31)
CREAT SERPL-MCNC: 0.82 MG/DL — SIGNIFICANT CHANGE UP (ref 0.5–1.3)
CRP SERPL-MCNC: 63 MG/L — HIGH
EGFR: 64 ML/MIN/1.73M2 — SIGNIFICANT CHANGE UP
EOSINOPHIL # BLD AUTO: 0.19 K/UL — SIGNIFICANT CHANGE UP (ref 0–0.5)
EOSINOPHIL NFR BLD AUTO: 2.4 % — SIGNIFICANT CHANGE UP (ref 0–6)
FERRITIN SERPL-MCNC: 300 NG/ML — SIGNIFICANT CHANGE UP (ref 13–330)
FOLATE SERPL-MCNC: >20 NG/ML — SIGNIFICANT CHANGE UP
GLUCOSE SERPL-MCNC: 133 MG/DL — HIGH (ref 70–99)
HCT VFR BLD CALC: 31.8 % — LOW (ref 34.5–45)
HGB BLD-MCNC: 10.4 G/DL — LOW (ref 11.5–15.5)
IMM GRANULOCYTES NFR BLD AUTO: 0.3 % — SIGNIFICANT CHANGE UP (ref 0–0.9)
IRON SATN MFR SERPL: 13 % — LOW (ref 14–50)
IRON SATN MFR SERPL: 27 UG/DL — LOW (ref 30–160)
LEGIONELLA AG UR QL: NEGATIVE — SIGNIFICANT CHANGE UP
LYMPHOCYTES # BLD AUTO: 0.76 K/UL — LOW (ref 1–3.3)
LYMPHOCYTES # BLD AUTO: 9.8 % — LOW (ref 13–44)
MCHC RBC-ENTMCNC: 30.6 PG — SIGNIFICANT CHANGE UP (ref 27–34)
MCHC RBC-ENTMCNC: 32.7 GM/DL — SIGNIFICANT CHANGE UP (ref 32–36)
MCV RBC AUTO: 93.5 FL — SIGNIFICANT CHANGE UP (ref 80–100)
MONOCYTES # BLD AUTO: 0.63 K/UL — SIGNIFICANT CHANGE UP (ref 0–0.9)
MONOCYTES NFR BLD AUTO: 8.1 % — SIGNIFICANT CHANGE UP (ref 2–14)
NEUTROPHILS # BLD AUTO: 6.16 K/UL — SIGNIFICANT CHANGE UP (ref 1.8–7.4)
NEUTROPHILS NFR BLD AUTO: 79.1 % — HIGH (ref 43–77)
NRBC # BLD: 0 /100 WBCS — SIGNIFICANT CHANGE UP (ref 0–0)
PLATELET # BLD AUTO: 244 K/UL — SIGNIFICANT CHANGE UP (ref 150–400)
POTASSIUM SERPL-MCNC: 4.2 MMOL/L — SIGNIFICANT CHANGE UP (ref 3.5–5.3)
POTASSIUM SERPL-SCNC: 4.2 MMOL/L — SIGNIFICANT CHANGE UP (ref 3.5–5.3)
PROT SERPL-MCNC: 6.7 G/DL — SIGNIFICANT CHANGE UP (ref 6–8.3)
RBC # BLD: 3.4 M/UL — LOW (ref 3.8–5.2)
RBC # FLD: 14.5 % — SIGNIFICANT CHANGE UP (ref 10.3–14.5)
SODIUM SERPL-SCNC: 138 MMOL/L — SIGNIFICANT CHANGE UP (ref 135–145)
TIBC SERPL-MCNC: 205 UG/DL — LOW (ref 220–430)
UIBC SERPL-MCNC: 178 UG/DL — SIGNIFICANT CHANGE UP (ref 110–370)
VIT B12 SERPL-MCNC: 856 PG/ML — SIGNIFICANT CHANGE UP (ref 232–1245)
WBC # BLD: 7.78 K/UL — SIGNIFICANT CHANGE UP (ref 3.8–10.5)
WBC # FLD AUTO: 7.78 K/UL — SIGNIFICANT CHANGE UP (ref 3.8–10.5)

## 2024-10-03 RX ADMIN — TIOTROPIUM BROMIDE INHALATION SPRAY 2 PUFF(S): 3.12 SPRAY, METERED RESPIRATORY (INHALATION) at 08:31

## 2024-10-03 RX ADMIN — Medication 4 MILLILITER(S): at 15:34

## 2024-10-03 RX ADMIN — AZITHROMYCIN 255 MILLIGRAM(S): 250 TABLET, FILM COATED ORAL at 22:14

## 2024-10-03 RX ADMIN — Medication 100 MILLIGRAM(S): at 22:13

## 2024-10-03 RX ADMIN — Medication 2.5 MILLIGRAM(S): at 15:34

## 2024-10-03 RX ADMIN — Medication 4 MILLILITER(S): at 07:51

## 2024-10-03 RX ADMIN — PREDNISONE 2.5 MILLIGRAM(S): 5 TABLET ORAL at 06:19

## 2024-10-03 RX ADMIN — Medication 1 DROP(S): at 12:14

## 2024-10-03 RX ADMIN — ENOXAPARIN SODIUM 30 MILLIGRAM(S): 150 INJECTION SUBCUTANEOUS at 06:19

## 2024-10-03 RX ADMIN — Medication 2.5 MILLIGRAM(S): at 07:51

## 2024-10-03 RX ADMIN — Medication 4 MILLILITER(S): at 23:14

## 2024-10-03 RX ADMIN — Medication 2.5 MILLIGRAM(S): at 23:14

## 2024-10-03 RX ADMIN — Medication 3 MILLIGRAM(S): at 22:56

## 2024-10-03 RX ADMIN — Medication 50 MICROGRAM(S): at 06:19

## 2024-10-03 NOTE — PATIENT PROFILE ADULT - HAVE YOU BEEN EATING POORLY BECAUSE OF A DECREASED APPETITE?
Psychiatry Progress Note    Subjective    On interview, patient was not cooperative. He continually asked when he could leave and stated that he does not need to be here and that he needs some \"alone time\" to get better. Patient also stated that he does not need medication. We again discussed him starting the risperidone and that he will need to take that and sleep for him to get discharged. Patient believes he is sleeping fine despite recording minimal sleep. He said he would sleep more if he thought he needed it. He also stated that he thinks he is sleeping less because he is continually being checked on at night and that wake him up. When asked if he has been talking to his girlfriend or dad he said he talked to the girlfriend recently but could not tell us when they last spoke.    We talked with patient's father regarding his current and previous behavior. Father stated that he thinks son might've had depressive symptoms in the past few years, but never sought treatment. He confirmed that Sixto's mother and grandmother both passed away in December and that Sixto was very close to both of them. Stated that Sixto often is \"impulsive\" and does not pay his bills on time. Sixto had a period this past July where he sent his father texts about him not being a caring father and then did not talk to his dad for a few months. Dad informed us that Sixto is currently unemployed and has been on employment benefits since March 2020. Dad thinks that Sixto's mood changes very frequently and that he has been \"drawn out\" for awhile now. Narendra does not like his girlfriend (Yue) and thinks that the relationship is \"toxic.\" Dad reported that last time he saw the girlfriend (sometime in January) she had a black eye. She recently underwent treatment for cancer and dad says that Sixto helps her pay her bills despite Sixto not having an income. Dad also stated that he has his own history of alcoholism requiring in-patient treatment    Patient  denies SI/HI and AVH.    Medications:  Scheduled: Pt non-adherent to all scheduled meds.  PRNs (psychotropic): none    Objective    ALLERGIES:  No Known Allergies     Current Facility-Administered Medications   Medication Dose Route Frequency Provider Last Rate Last Admin   • risperiDONE (RisperDAL) tablet 1 mg  1 mg Oral Nightly Trung Izquierdo MD   1 mg at 01/30/21 2054   • traZODone (DESYREL) tablet 50 mg  50 mg Oral Nightly PRN Trung Izquierdo MD       • benztropine mesylate (COGENTIN) 1 MG/ML injection 1 mg  1 mg Intramuscular Q4H PRN Trung Izquierdo MD        Or   • benztropine (COGENTIN) tablet 1 mg  1 mg Oral Q4H PRN Trung Izquierdo MD       • hydrOXYzine (ATARAX) tablet 50 mg  50 mg Oral Q4H PRN Trung Izquierdo MD       • OLANZapine (ZyPREXA ZYDIS) disintegrating tablet 5 mg  5 mg Oral Q2H PRN Trung Izquierdo MD       • LORazepam (ATIVAN) injection 2 mg  2 mg Intramuscular Q4H PRN Trung Izquierdo MD        Or   • LORazepam (ATIVAN) tablet 1 mg  1 mg Oral Q4H PRN Trung Izquierdo MD       • haloperidol (HALDOL) 5 MG/ML injection 5 mg  5 mg Intramuscular Q2H PRN Trung Izquierdo MD        Or   • haloperidol (HALDOL) tablet 5 mg  5 mg Oral Q2H PRN Trung Izquierdo MD       • ibuprofen (MOTRIN) tablet 600 mg  600 mg Oral Q6H PRN Trung Izquierdo MD       • aluminum-magnesium hydroxide-simethicone (MAALOX) 200-200-20 MG/5ML suspension 30 mL  30 mL Oral Q4H PRN Trung Izquierdo MD       • nicotine polacrilex (NICORETTE) gum 2 mg  2 mg Oral Q1H PRN Trung Izquierdo MD           Labs:  No results found for this or any previous visit (from the past 24 hour(s)).    Vitals:  Vitals with min/max:    VS Last Value 24 Hour Range   Temp 98.1 °F (36.7 °C) (02/01/21 0601) Temp  Min: 98.1 °F (36.7 °C)  Max: 98.8 °F (37.1 °C)   HR 64 (02/01/21 0601) Pulse  Min: 64  Max: 93   RR 16 (02/01/21 0601) Resp  Min: 16  Max: 18   /86 (02/01/21 0601) BP  Min: 129/86  Max: 131/86   SpO2 99 % (01/28/21 1251) No data recorded     Per  report, pt slept 3 hours (as documented in Sleep Analysis in flowsheet)    Mental Status Exam:   Appearance: Well-nourished male, appears stated age, dressed in hospital garb, with good hygiene and good grooming.   Behavior: uncooperative, fair to good eye contact, normal gait, normal station and normal psychomotor activity  Mood: \"even\"  Affect: congruent to stated mood, euthymic, full-range, normal intensity, appropriate to content of conversation, stable and calm  Speech: normal rate, normal quality, normal volume and normal production  Thought process/flow: goal-directed and linear and no evidence of thought disorder  Thought content: denies SI, denies HI, denies AH, denies VH, no evidence of responses to internal stimuli, no evidence of paranoia and no evidence of delusional thought processes  Language: Fluent in English as assessed by conversation  Orientation: alert, oriented to person, place, time and situation  Memory: impaired short-term memory  Attention/concentration: adequate attention for interview  Cognition/Fund of knowledge: Average as assessed by conversation  Abstraction: normal, neither bizarre nor concrete  Insight/judgment: insight fair to intact and judgment impaired      Assessment and Plan    Impression  Sixto Bills is a 34 y/o male with no past psych history that presented to [facility] ED 1/29/21 because of increased agitation and not sleeping in 7 days.    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 Unable to articulate the time course; may coincide with the loss of his mother/grandmother. Collateral obtained from girlfriend and father.    · Continue risperidone 1mg PO qHS for psychosis    TBI (traumatic brain injury) (CMS/[facility])  Per pt's  girlfriend, pt has had multiple concussive head injuries secondary to skateboarding for which Sixto never sought care/evaluation.    Complicated grief  Pt suffering multiple losses in December (mother and grandmother); may be origin of psychotic symptoms    16 minutes supportive psychotherapy provided, discussed coping strategies specific to pt's mental illness, mood symptoms, and distress tolerance. Adequate response.      • Continue admission to Unit 631 for psychiatric stabilization  • LCSW team working on patient's post-discharge disposition  • Discussed risks, benefits, side effects, and alternatives to treatment. Patient demonstrated understanding and gave consent.  • Patient will be stabilized using a comprehensive psychiatric approach including  group, individual, activity and milieu therapies  • Internal Medicine on consult to monitor any ongoing non-psychiatric medical problems    After examining the patient and reviewing the chart, my clinical impression is the patient does not require 1:1, within-arms-reach continuous monitoring for suicidal ideation, and is able to contract for safety within the inpatient milieu.    I certify the inpatient hospital admission was medically necessary for either  (X) treatment which could reasonably be expected to improve the patient´s condition OR  (X) diagnostic study.     AND I also certify that the patient is not appropriate for outpatient or [facility] services and the patient continues to need, on a daily basis, active treatment furnished directly by or requiring or the supervision of inpatient psychiatric facility personnel.     I anticipate the patient will remain in the hospital for 5 days.    Trung Izquierdo MD    ** The above HPI and the social/psychiatric/medical histories were contributed to by CMS MS3 Moises Araujo, and I am grateful for their efforts. I have supervised the interview, reviewed and confirmed the information within with the patient.     No (0)

## 2024-10-03 NOTE — CARE COORDINATION ASSESSMENT. - NSPASTMEDSURGHISTORY_GEN_ALL_CORE_FT
PAST MEDICAL & SURGICAL HISTORY:  Hypertension      S/P ankle joint replacement, left      Pelvic fracture      Fall      PVD (peripheral vascular disease)      Afib      COPD without exacerbation      Hypothyroidism      Transplanted cornea

## 2024-10-03 NOTE — CARE COORDINATION ASSESSMENT. - OTHER PERTINENT DISCHARGE PLANNING INFORMATION:
Met with patient at bedside. Patient gives consent to conversation with her private HHA present. Role of CM/transition planning explained. Patient verbalizes understanding. Transition planning information provided. Patient lives in private house w/ HHA. HHA is a new 24/7 live-in, private hire. Pt lived alone independently till hiring HHA just 1 week ago. Pt ambulates with rollator, showers self with HHA present. No needs/HCS present PTA. [facility] or daughter Izabel transports pt to appts and will transport home. Pt is currently on 3L NC and does NOT have home O2 PTA. Pt states she feels she needs home O2 as she gets SOB w/ ambulation at home and has COPD. MD aware and plan to evaluate pt on RA/home O2 need. CM contact information provided. CM will continue to follow.

## 2024-10-03 NOTE — PATIENT PROFILE ADULT - FALL HARM RISK - HARM RISK INTERVENTIONS

## 2024-10-03 NOTE — CONSULT NOTE ADULT - SUBJECTIVE AND OBJECTIVE BOX
[facility] DIVISION of INFECTIOUS DISEASE  Kody Burch MD PhD, Kellen Lopez MD, Allyson Leigh MD, Courtney Connor MD, Nico Delarosa MD  and providing coverage with Escobar Omalley MD  Providing Infectious Disease Consultations at [facility], [facility], [facility], [facility]'s    Office# [phone] to schedule follow up appointments  Answering Service for urgent calls or New Consults [phone]  Cell# to text for urgent issues Kody Burch [phone]     HPI:  89y F with HTN, COPD (no home O2), hypothyroidism, afib (not on AC), PVD and arthritis presenting with worsening dyspnea. Pt states worsening shortness of breath over the past few months however worse over the last few nights.  Outpatient chest x-ray showed pleural effusion.  Denies fever, chills, chest pain, abdominal pain, nausea, vomiting, upper or lower extremity weakness or paresthesias.  No recent sick contacts or travel.    IN THE ED  VS: T 98.2, HR 88, /73, RR 16, SpO2 95% on 1LNC  Labs: Hgb 10.8, BUN 34, BNP 3543  Imaging: CT chest with small right and trace left pleural effusion, MARY and LLL PNA  EKG NSR  S/p IV abx Rocephin + azithro (02 Oct 2024 19:57)      PAST MEDICAL & SURGICAL HISTORY:  Hypertension      Fall      Pelvic fracture      Hypothyroidism      COPD without exacerbation      Afib      PVD (peripheral vascular disease)      S/P ankle joint replacement, left      Transplanted cornea          Antimicrobials  azithromycin  IVPB 500 milliGRAM(s) IV Intermittent every 24 hours  cefTRIAXone   IVPB 1000 milliGRAM(s) IV Intermittent every 24 hours      Immunological      Other  acetaminophen     Tablet .. 650 milliGRAM(s) Oral every 6 hours PRN  albuterol    0.083% 2.5 milliGRAM(s) Nebulizer every 8 hours  amLODIPine   Tablet 5 milliGRAM(s) Oral daily  bisacodyl 5 milliGRAM(s) Oral every 12 hours PRN  enoxaparin Injectable 30 milliGRAM(s) SubCutaneous every 24 hours  guaiFENesin Oral Liquid (Sugar-Free) 200 milliGRAM(s) Oral every 6 hours PRN  hydrochlorothiazide 6.25 milliGRAM(s) Oral daily  levothyroxine 50 MICROGram(s) Oral daily  melatonin 3 milliGRAM(s) Oral at bedtime PRN  metoprolol succinate ER 50 milliGRAM(s) Oral daily  prednisoLONE acetate 1% Suspension 1 Drop(s) Both EYES daily  predniSONE   Tablet 2.5 milliGRAM(s) Oral daily  sodium chloride 3%  Inhalation 4 milliLiter(s) Inhalation every 8 hours  tiotropium 2.5 MICROgram(s) Inhaler 2 Puff(s) Inhalation daily      Allergies    Percocet 5/325 (Vomiting)  codeine (Nausea)    Intolerances        SOCIAL HISTORY:  Social History:  Tobacco Usage:  former smoker  Alcohol Usage: social   Substance Use:  denies  Lives with: home alone, with 12hr aide  ADLs: dependent, has home health aide 12hrs  Ambulates: with walker (02 Oct 2024 19:57)      FAMILY HISTORY:      ROS:    EYES:  Negative  blurry vision or double vision  GASTROINTESTINAL:  Negative for nausea, vomiting, diarrhea  -otherwise negative except for subjective    Vital Signs Last 24 Hrs  T(C): 36.8 (03 Oct 2024 12:57), Max: 36.8 (02 Oct 2024 14:31)  T(F): 98.3 (03 Oct 2024 12:57), Max: 98.3 (03 Oct 2024 12:57)  HR: 78 (03 Oct 2024 12:57) (78 - 95)  BP: 131/60 (03 Oct 2024 12:57) (108/63 - 174/73)  BP(mean): --  RR: 19 (03 Oct 2024 12:57) (16 - 22)  SpO2: 97% (03 Oct 2024 12:57) (86% - 100%)    Parameters below as of 03 Oct 2024 12:57  Patient On (Oxygen Delivery Method): nasal cannula  O2 Flow (L/min): 3      PE:  In no distress  HEENT:  NC, PERRL, sclerae anicteric, conjunctivae clear, EOMI.  Sinuses nontender, no nasal exudate.  No buccal or pharyngeal lesions, erythema or exudate  Neck:  Supple, no adenopathy  Lungs:  No accessory muscle use,  BS on left, few crackles  Cor:  distant  Abd:  Symmetric, normoactive BS.  Soft, nontender, no masses, guarding or rebound.  Liver and spleen not enlarged  Extrem:  No cyanosis or edema  Skin:  No rashes.  Neuro: grossly intact  Musc: moving all limbs freely, no focal deficits        LABS:                        10.4   7.78  )-----------( 244      ( 03 Oct 2024 08:40 )             31.8       WBC Count: 7.78 K/uL (10-03-24 @ 08:40)  WBC Count: 7.37 K/uL (10-02-24 @ 16:40)      10-03    138  |  102  |  25[H]  ----------------------------<  133[H]  4.2   |  29  |  0.82    Ca    9.1      03 Oct 2024 08:40    TPro  6.7  /  Alb  2.5[L]  /  TBili  0.3  /  DBili  x   /  AST  26  /  ALT  23  /  AlkPhos  84  10-03      Creatinine: 0.82 mg/dL (10-03-24 @ 08:40)  Creatinine: 0.89 mg/dL (10-02-24 @ 16:40)      Urinalysis Basic - ( 03 Oct 2024 08:40 )    Color: x / Appearance: x / SG: x / pH: x  Gluc: 133 mg/dL / Ketone: x  / Bili: x / Urobili: x   Blood: x / Protein: x / Nitrite: x   Leuk Esterase: x / RBC: x / WBC x   Sq Epi: x / Non Sq Epi: x / Bacteria: x              MICROBIOLOGY:      RADIOLOGY & ADDITIONAL STUDIES:    --

## 2024-10-03 NOTE — CONSULT NOTE ADULT - SUBJECTIVE AND OBJECTIVE BOX
History of Present Illness:    Past Medical/Surgical History:    Medications:    Family History: Non-contributory family history of premature cardiovascular atherosclerotic disease    Social History: No tobacco, alcohol or drug use    Review of Systems:  General: No fevers, chills, weight gain  Skin: No rashes, color changes  Cardiovascular: No chest pain, orthopnea  Respiratory: No shortness of breath, cough  Gastrointestinal: No nausea, abdominal pain  Genitourinary: No incontinence, pain with urination  Musculoskeletal: No pain, swelling, decreased range of motion  Neurological: No headache, weakness  Psychiatric: No depression, anxiety  Endocrine: No weight gain, increased thirst  All other systems are comprehensively negative.    Physical Exam:  Vitals:        Vital Signs Last 24 Hrs  T(C): 36.5 (03 Oct 2024 05:53), Max: 36.8 (02 Oct 2024 14:31)  T(F): 97.7 (03 Oct 2024 05:53), Max: 98.2 (02 Oct 2024 14:31)  HR: 95 (03 Oct 2024 05:53) (78 - 95)  BP: 108/63 (03 Oct 2024 05:53) (108/63 - 174/73)  BP(mean): --  RR: 19 (03 Oct 2024 05:53) (16 - 22)  SpO2: 96% (03 Oct 2024 05:53) (95% - 98%)    Parameters below as of 03 Oct 2024 05:53  Patient On (Oxygen Delivery Method): nasal cannula  O2 Flow (L/min): 3    General: NAD  HEENT: MMM  Neck: No JVD, no carotid bruit  Lungs: CTAB  CV: RRR, nl S1/S2, no M/R/G  Abdomen: S/NT/ND, +BS  Extremities: No LE edema, no cyanosis  Neuro: AAOx3, non-focal  Skin: No rash    Labs:                        10.8   7.37  )-----------( 252      ( 02 Oct 2024 16:40 )             34.7     10-02    139  |  103  |  34[H]  ----------------------------<  116[H]  4.6   |  32[H]  |  0.89    Ca    8.9      02 Oct 2024 16:40    TPro  7.3  /  Alb  2.9[L]  /  TBili  0.3  /  DBili  x   /  AST  26  /  ALT  27  /  AlkPhos  95  10-02        PT/INR - ( 02 Oct 2024 16:40 )   PT: 11.0 sec;   INR: 0.94 ratio         PTT - ( 02 Oct 2024 16:40 )  PTT:28.5 sec    ECG/Telemetry:      History of Present Illness: The patient is a 89 year old female with a history of HTN, hypothyroidism, atrial fibrillation, COPD, PAD who presents with shortness of breath. She has had shortness of breath over the last few months, worse over the last week at night. No chest pain, dizziness, palpitations.    Past Medical/Surgical History:  HTN, hypothyroidism, atrial fibrillation, COPD, PAD    Medications:  Home Medications:  amLODIPine 5 mg oral tablet: 1 tab(s) orally once a day (02 Oct 2024 19:42)  bisoprolol-hydrochlorothiazide 2.5 mg-6.25 mg oral tablet: 1 tab(s) orally once a day (02 Oct 2024 19:42)  celecoxib 100 mg oral capsule: 1 cap(s) orally once a day (02 Oct 2024 19:42)  levothyroxine 50 mcg (0.05 mg) oral capsule: 1 cap(s) orally once a day (02 Oct 2024 19:42)  prednisoLONE acetate 1% ophthalmic suspension: 1 drop(s) in each affected eye once a day (02 Oct 2024 19:44)  predniSONE 2.5 mg oral tablet: 1 tab(s) orally once a day (02 Oct 2024 19:44)  Spiriva 18 mcg inhalation capsule: 1 cap(s) inhaled once a day (02 Oct 2024 19:43)      Family History: Non-contributory family history of premature cardiovascular atherosclerotic disease    Social History: No tobacco, alcohol or drug use    Review of Systems:  General: No fevers, chills, weight gain  Skin: No rashes, color changes  Cardiovascular: No chest pain, orthopnea  Respiratory: +shortness of breath, cough  Gastrointestinal: No nausea, abdominal pain  Genitourinary: No incontinence, pain with urination  Musculoskeletal: No pain, swelling, decreased range of motion  Neurological: No headache, weakness  Psychiatric: No depression, anxiety  Endocrine: No weight gain, increased thirst  All other systems are comprehensively negative.    Physical Exam:  Vitals:        Vital Signs Last 24 Hrs  T(C): 36.5 (03 Oct 2024 05:53), Max: 36.8 (02 Oct 2024 14:31)  T(F): 97.7 (03 Oct 2024 05:53), Max: 98.2 (02 Oct 2024 14:31)  HR: 95 (03 Oct 2024 05:53) (78 - 95)  BP: 108/63 (03 Oct 2024 05:53) (108/63 - 174/73)  BP(mean): --  RR: 19 (03 Oct 2024 05:53) (16 - 22)  SpO2: 96% (03 Oct 2024 05:53) (95% - 98%)  Parameters below as of 03 Oct 2024 05:53  Patient On (Oxygen Delivery Method): nasal cannula  O2 Flow (L/min): 3  General: NAD  HEENT: MMM  Neck: No JVD, no carotid bruit  Lungs: CTAB  CV: RRR, nl S1/S2, no M/R/G  Abdomen: S/NT/ND, +BS  Extremities: No LE edema, no cyanosis  Neuro: AAOx3, non-focal  Skin: No rash    Labs:                        10.8   7.37  )-----------( 252      ( 02 Oct 2024 16:40 )             34.7     10-02    139  |  103  |  34[H]  ----------------------------<  116[H]  4.6   |  32[H]  |  0.89    Ca    8.9      02 Oct 2024 16:40    TPro  7.3  /  Alb  2.9[L]  /  TBili  0.3  /  DBili  x   /  AST  26  /  ALT  27  /  AlkPhos  95  10-02        PT/INR - ( 02 Oct 2024 16:40 )   PT: 11.0 sec;   INR: 0.94 ratio         PTT - ( 02 Oct 2024 16:40 )  PTT:28.5 sec    ECG/Telemetry: NSR, normal axis, poor R wave progression, borderline LVH

## 2024-10-03 NOTE — PATIENT CHOICE NOTE. - NSPTCHOICESTATE_GEN_ALL_CORE

## 2024-10-03 NOTE — CARE COORDINATION ASSESSMENT. - NSCAREPROVIDERS_GEN_ALL_CORE_FT
CARE PROVIDERS:  Accepting Physician: Lynda Monreal  Administration: Keith Arevalo  Admitting: Lynda Monreal  Attending: Lynda Monreal  Cardiology Technician: Trish Benitez  Case Management: Mirian Valdivia  Case Management: Shelby Herrera  Consultant: Adis Sandhu  Consultant: Ye Ulloa  Covering Team: Annette Adams  ED ACP: Bita Hernandez  ED Attending: Mp Hamlin ED Nurse: Glenn Archer  Emergency Medicine: Bita Hernandez  Nurse: Nedra Benitez  Outpatient Provider: David Gaytan  Outpatient Provider: Mitch Hamilton  Outpatient Provider: Joe Cardoso  Override: Jose Roberto Aiken  Override: Ritika Lee  PCA/Nursing Assistant: Stevie Forrest  PCA/Nursing Assistant: Matilde Webster  Physical Therapy: Chantal Bolanos  Primary Team: Stephen Spain  Registered Dietitian: Virginia Mayers  Respiratory Therapy: Saurav Echeverria  : Opal Kwan

## 2024-10-04 ENCOUNTER — TRANSCRIPTION ENCOUNTER (OUTPATIENT)
Age: 89
End: 2024-10-04

## 2024-10-04 DIAGNOSIS — S81.811A LACERATION WITHOUT FOREIGN BODY, RIGHT LOWER LEG, INITIAL ENCOUNTER: ICD-10-CM

## 2024-10-04 PROCEDURE — 99221 1ST HOSP IP/OBS SF/LOW 40: CPT

## 2024-10-04 PROCEDURE — 93010 ELECTROCARDIOGRAM REPORT: CPT

## 2024-10-04 RX ORDER — AZITHROMYCIN 250 MG/1
500 TABLET, FILM COATED ORAL ONCE
Refills: 0 | Status: COMPLETED | OUTPATIENT
Start: 2024-10-04 | End: 2024-10-04

## 2024-10-04 RX ORDER — 5-HYDROXYTRYPTOPHAN (5-HTP) 100 MG
5 TABLET,DISINTEGRATING ORAL ONCE
Refills: 0 | Status: COMPLETED | OUTPATIENT
Start: 2024-10-04 | End: 2024-10-04

## 2024-10-04 RX ORDER — LEVALBUTEROL HYDROCHLORIDE 1.25 MG/3ML
0.63 SOLUTION RESPIRATORY (INHALATION) ONCE
Refills: 0 | Status: COMPLETED | OUTPATIENT
Start: 2024-10-04 | End: 2024-10-04

## 2024-10-04 RX ORDER — CEFTRIAXONE SODIUM 1 G
1000 VIAL (EA) INJECTION EVERY 24 HOURS
Refills: 0 | Status: COMPLETED | OUTPATIENT
Start: 2024-10-04 | End: 2024-10-06

## 2024-10-04 RX ADMIN — LEVALBUTEROL HYDROCHLORIDE 0.63 MILLIGRAM(S): 1.25 SOLUTION RESPIRATORY (INHALATION) at 23:27

## 2024-10-04 RX ADMIN — Medication 5 MILLIGRAM(S): at 06:14

## 2024-10-04 RX ADMIN — Medication 2.5 MILLIGRAM(S): at 19:13

## 2024-10-04 RX ADMIN — Medication 2.5 MILLIGRAM(S): at 15:30

## 2024-10-04 RX ADMIN — TIOTROPIUM BROMIDE INHALATION SPRAY 2 PUFF(S): 3.12 SPRAY, METERED RESPIRATORY (INHALATION) at 07:49

## 2024-10-04 RX ADMIN — Medication 5 MILLIGRAM(S): at 21:18

## 2024-10-04 RX ADMIN — Medication 50 MILLIGRAM(S): at 06:14

## 2024-10-04 RX ADMIN — Medication 2.5 MILLIGRAM(S): at 07:32

## 2024-10-04 RX ADMIN — Medication 4 MILLILITER(S): at 07:32

## 2024-10-04 RX ADMIN — ENOXAPARIN SODIUM 30 MILLIGRAM(S): 150 INJECTION SUBCUTANEOUS at 06:14

## 2024-10-04 RX ADMIN — PREDNISONE 2.5 MILLIGRAM(S): 5 TABLET ORAL at 06:15

## 2024-10-04 RX ADMIN — Medication 50 MICROGRAM(S): at 06:14

## 2024-10-04 RX ADMIN — Medication 100 MILLIGRAM(S): at 10:09

## 2024-10-04 RX ADMIN — AZITHROMYCIN 500 MILLIGRAM(S): 250 TABLET, FILM COATED ORAL at 11:57

## 2024-10-04 RX ADMIN — Medication 1 DROP(S): at 11:58

## 2024-10-04 NOTE — DISCHARGE NOTE PROVIDER - HOSPITAL COURSE
89y F with PMHx HTN, COPD (no home O2), hypothyroidism, PVD and arthritis presenting with worsening dyspnea. Admitted for PNA    #Pneumonia  Community Acquired  - CT chest with small right and trace left pleural effusion, with MARY and LLL PNA  - S/p IV abx Rocephin and azithro in ED  - Does not meet sepsis criteria  - Continue IV abx Rocephin and azithro  - F/u urine strep and legionella  - Flu/COVID/RSV negative  - PRN Robitussin for cough  - Trend fever curve and WBC  - Pulmonology Dr. Sandhu consulted    #Elevated BNP  - BNP 3543 on admission  - Does not appear volume overloaded  - F/u TTE      #COPD  - Not on home O2  - Currently on 1LNC, SpO2 goal>88%  - Standing albuterol and hypertonic saline nebs  - Continue low dose prednisone 2.5mg qd (takes it for her arthritis)  - Continue daily Spiriva    #HTN  -Continue home amlodipine, hctz and BB  -Monitor hemodynamics  -DASH/TLC diet      #Hypothyroidism  - Continue levothyroxine    #DVT PPx  - Lovenox 30mg qd .      89y F with PMHx HTN, COPD (no home O2), hypothyroidism, PVD and arthritis presenting with worsening dyspnea. Admitted for PNA   CT chest with small right and trace left pleural effusion, with MARY and LLL PNA    #Pneumonia Community Acquired  swallow eval- passed  -completed abx course  pulm consulted- pt with hx of  copd  requiring O2, nebs w saline started  Continue low dose prednisone 2.5mg qd (takes it for her arthritis)  Continue daily Spiriva    #Elevated BNP  Echo with normal LV systolic function, mild pulm HT  - Does not appear volume overloaded      pcp and pulm fu          #HTN  -Continue home amlodipine, hctz and BB  -Monitor hemodynamics  -DASH/TLC diet      #Hypothyroidism  - Continue levothyroxine    #DVT PPx  - Lovenox 30mg qd .

## 2024-10-04 NOTE — CHART NOTE - NSCHARTNOTEFT_GEN_A_CORE
RN called for patient complaint of chest pressure with nausea. Pt seen and examined at bedside. Pt stated that about 30 minutes ago (22:45) she started feeling some nausea and associated chest pressure. She attributed her symptoms to the salmon she ate during dinner, but RN and aide at bedside stated that she was asleep when the symptoms started and hadn't eaten since around 19:00. Pt stated that she is not currently short of breath or in pain, just feels some substernal chest pressure. She also states she has some very mild upper abdominal tenderness associated with the nausea. Denies radiation of pain, vomiting, palpitations, coughing, dizziness, blurry vision, headache, calf pain or shortness of breath.    T(C): 36.8   HR: 92   BP: 159/76  RR: 20  SpO2: 93% on 3L NC    Physical Exam:   GENERAL: well-groomed, well-developed, NAD  HEENT: head NC/AT; EOM intact, PERRLA, conjunctiva & sclera clear; hearing grossly intact  RESPIRATORY: B/l Wheezing in all lung fields. No rales or rhonchi  CARDIOVASCULAR: S1&S2, RRR, no murmurs or gallops  ABDOMEN: soft, non-tender, non-distended, + Bowel sounds x4 quadrants, no guarding  MUSCULOSKELETAL:  no clubbing, cyanosis or edema of all 4 extremities  LYMPH: no cervical lymphadenopathy  VASCULAR: Radial pulses 2+ bilaterally, no varicose veins   SKIN: warm and dry, color normal  NEUROLOGIC: AA&O X3 no focal deficits, no sensory loss, motor Strength 5/5 in UE & LE B/L  Psych: Normal mood and affect, normal behavior RN called for patient complaint of chest pressure with nausea. Pt seen and examined at bedside. Pt stated that about 30 minutes ago (22:45) she started feeling some nausea and associated chest pressure. She attributed her symptoms to the salmon she ate during dinner, but RN and aide at bedside stated that she was asleep when the symptoms started and hadn't eaten since around 19:00. Pt stated that she is not currently short of breath or in pain, just feels some substernal chest pressure. She also states she has some very mild upper abdominal tenderness associated with the nausea. Denies radiation of pain, vomiting, palpitations, coughing, dizziness, blurry vision, headache, calf pain or shortness of breath.    T(C): 36.8   HR: 92   BP: 159/76  RR: 20  SpO2: 93% on 3L NC    Physical Exam:   GENERAL: well-groomed, well-developed, NAD  HEENT: head NC/AT; EOM intact, PERRLA, conjunctiva & sclera clear; hearing grossly intact  RESPIRATORY: B/l Wheezing in all lung fields. No rales or rhonchi  CARDIOVASCULAR: S1&S2, RRR, no murmurs or gallops  ABDOMEN: soft, mild epigastric tenderness to palpation, non-distended, + Bowel sounds x4 quadrants, no guarding  MUSCULOSKELETAL:  no clubbing, cyanosis or edema of all 4 extremities  LYMPH: no cervical lymphadenopathy  VASCULAR: Radial pulses 2+ bilaterally, no varicose veins   SKIN: warm and dry, color normal  NEUROLOGIC: AA&O X3 no focal deficits, no sensory loss, motor Strength 5/5 in UE & LE B/L  Psych: Normal mood and affect, normal behavior    Assessment and Plan:  The patient is a 89 year old female with a history of HTN, hypothyroidism, atrial fibrillation, COPD, PAD who presents with shortness of breath admitted with MARY and LL PNA. Complaining of Chest pressure and nausea.    - Patient complaining of chest pressure and nausea for the past 30 minutes. Vital signs stable. B/l wheezing on lung exam. Patient stated that her chest pressure began to improve over the course of her assessment.  - STAT EKG shows NSR with no notable changes from previous EKG on 10/2  - F/u STAT Cardiac Enzymes  - F/u BMP, Mg, Phos  - Ordered Levalbuterol 0.63 mg inhalation x1 for wheezing  - RN to call with any changes RN called for patient complaint of chest pressure with nausea. Pt seen and examined at bedside. Pt stated that about 30 minutes ago (22:45) she started feeling some nausea and associated chest pressure. She attributed her symptoms to the salmon she ate during dinner, but RN and aide at bedside stated that she was asleep when the symptoms started and hadn't eaten since around 19:00. Pt stated that she is not currently short of breath or in pain, just feels some substernal chest pressure. She also states she has some very mild upper abdominal tenderness associated with the nausea. Denies radiation of pain, vomiting, palpitations, coughing, dizziness, blurry vision, headache, calf pain or shortness of breath.    T(C): 36.8   HR: 92   BP: 159/76  RR: 20  SpO2: 93% on 3L NC    Physical Exam:   GENERAL: well-groomed, well-developed, NAD  HEENT: head NC/AT; EOM intact, PERRLA, conjunctiva & sclera clear; hearing grossly intact  RESPIRATORY: B/l Wheezing in all lung fields. No rales or rhonchi  CARDIOVASCULAR: S1&S2, RRR, no murmurs or gallops  ABDOMEN: soft, mild epigastric tenderness to palpation, non-distended, + Bowel sounds x4 quadrants, no guarding  MUSCULOSKELETAL:  no clubbing, cyanosis or edema of all 4 extremities  LYMPH: no cervical lymphadenopathy  VASCULAR: Radial pulses 2+ bilaterally, no varicose veins   SKIN: warm and dry, color normal  NEUROLOGIC: AA&O X3 no focal deficits, no sensory loss, motor Strength 5/5 in UE & LE B/L  Psych: Normal mood and affect, normal behavior    Assessment and Plan:  The patient is a 89 year old female with a history of HTN, hypothyroidism, atrial fibrillation, COPD, PAD who presents with shortness of breath admitted with MARY and LL PNA. Complaining of Chest pressure and nausea.    - Patient complaining of chest pressure and nausea for the past 30 minutes. Vital signs stable. B/l wheezing on lung exam. Patient stated that her chest pressure began to improve over the course of her assessment.  - STAT EKG shows NSR with no notable changes from previous EKG on 10/2  - F/u STAT Cardiac Enzymes  - F/u BMP, Mg, Phos  - Ordered Levalbuterol 0.63 mg inhalation x1 for wheezing  - RN to call with any changes    F/u:  - Cardiac Enzymes within Normal Limits  - BMP , Mg, Phos also within normal limits  - RN to call with any changes

## 2024-10-04 NOTE — CONSULT NOTE ADULT - CONSULT REASON
Shortness of breath
left lower leg wound, back wound and abdomen
PNA
sob  healy  weakness  effusion  atelectasis  abnormal chest imaging

## 2024-10-04 NOTE — DIETITIAN INITIAL EVALUATION ADULT - ADD RECOMMEND
encourage meal completion, intake of HBV proteins  daily MVI, Vit C 500mg bid, ZnSO4 220 mg QD x 10 days

## 2024-10-04 NOTE — CASE MANAGEMENT PROGRESS NOTE - NSCMPROGRESSNOTE_GEN_ALL_CORE
Patient discussed during interdisciplinary round. CMS/[place] patient admitted for Pneumonia and Pleural Effusion with h/o COPD is for possible transition home tomorrow. Referral for [facility] O2 sent to [facility]. Referral for home care sent to [facility] at home. CM will continue to follow. Patient discussed during interdisciplinary round. CMS/[employer] patient admitted for Pneumonia and Pleural Effusion with h/o COPD is for possible transition home tomorrow. Referral for home O2 sent to [facility]. Referral for home care sent to [facility] at home.  will continue to follow.

## 2024-10-04 NOTE — DIETITIAN INITIAL EVALUATION ADULT - OTHER INFO
"89y F with PMHx HTN, COPD (no home O2), hypothyroidism, PVD and arthritis presenting with worsening dyspnea. Admitted for PNA"

## 2024-10-04 NOTE — DIETITIAN INITIAL EVALUATION ADULT - PERTINENT MEDS FT
MEDICATIONS  (STANDING):  albuterol    0.083% 2.5 milliGRAM(s) Nebulizer every 8 hours  amLODIPine   Tablet 5 milliGRAM(s) Oral daily  cefTRIAXone   IVPB 1000 milliGRAM(s) IV Intermittent every 24 hours  enoxaparin Injectable 30 milliGRAM(s) SubCutaneous every 24 hours  hydrochlorothiazide 6.25 milliGRAM(s) Oral daily  levothyroxine 50 MICROGram(s) Oral daily  metoprolol succinate ER 50 milliGRAM(s) Oral daily  prednisoLONE acetate 1% Suspension 1 Drop(s) Both EYES daily  predniSONE   Tablet 2.5 milliGRAM(s) Oral daily  tiotropium 2.5 MICROgram(s) Inhaler 2 Puff(s) Inhalation daily    MEDICATIONS  (PRN):  acetaminophen     Tablet .. 650 milliGRAM(s) Oral every 6 hours PRN Temp greater or equal to 38C (100.4F), Mild Pain (1 - 3)  bisacodyl 5 milliGRAM(s) Oral every 12 hours PRN Constipation  guaiFENesin Oral Liquid (Sugar-Free) 200 milliGRAM(s) Oral every 6 hours PRN Cough  melatonin 3 milliGRAM(s) Oral at bedtime PRN Insomnia

## 2024-10-04 NOTE — DISCHARGE NOTE PROVIDER - NSDCCPCAREPLAN_GEN_ALL_CORE_FT
PRINCIPAL DISCHARGE DIAGNOSIS  Diagnosis: Pneumonia  Assessment and Plan of Treatment: Bronchodilators and Mucolytics for Mucoid Impaction and Mucociliary clearance found on CT Scan of chest. Your symptoms have improved. You now require oxygen therapy via nasal canula. Plese use continuously and as needed. Please follow with your pulmonologist on discharge.         SECONDARY DISCHARGE DIAGNOSES  Diagnosis: Pleural effusion  Assessment and Plan of Treatment: Small right and trace left pleural effusions. Left upper and lower lobe pneumonia with small airways infection/impaction. You were given intravenous antibiotics.       Diagnosis: Shortness of breath  Assessment and Plan of Treatment: Your oxygen saturation was below 88% at rest and lower with exertion. You now requie Oxygen therapy NC 2-3L to keep your oxygenation above 88%. Please follow with the pulmonologist on discharge.     PRINCIPAL DISCHARGE DIAGNOSIS  Diagnosis: Pneumonia  Assessment and Plan of Treatment: Bronchodilators and Mucolytics for Mucoid Impaction and Mucociliary clearance found on CT Scan of chest. Your symptoms have improved. You now require oxygen therapy via nasal canula. Plese use continuously and as needed. Please follow with your pulmonologist on discharge.    CT chest with small right and trace left pleural effusion, with MARY and LLL PNA  swallow eval- passed  -completed abx course  given hx of  copd  requiring O2, nebs w saline started  Continue low dose prednisone 2.5mg qd (takes it for her arthritis)  Continue daily Spiriva  #Elevated BNP  Echo with normal LV systolic function, mild pulm HT  - Does not appear volume overloaded  pcp and pulm fu        SECONDARY DISCHARGE DIAGNOSES  Diagnosis: Pleural effusion  Assessment and Plan of Treatment: Small right and trace left pleural effusions. Left upper and lower lobe pneumonia with small airways infection/impaction. You were given intravenous antibiotics.       Diagnosis: Shortness of breath  Assessment and Plan of Treatment: Your oxygen saturation was below 88% at rest and lower with exertion. You now requie Oxygen therapy NC 2-3L to keep your oxygenation above 88%. Please follow with the pulmonologist on discharge.

## 2024-10-04 NOTE — DISCHARGE NOTE PROVIDER - NSDCMRMEDTOKEN_GEN_ALL_CORE_FT
amLODIPine 5 mg oral tablet: 1 tab(s) orally once a day  bisoprolol-hydrochlorothiazide 2.5 mg-6.25 mg oral tablet: 1 tab(s) orally once a day  celecoxib 100 mg oral capsule: 1 cap(s) orally once a day  levothyroxine 50 mcg (0.05 mg) oral capsule: 1 cap(s) orally once a day  prednisoLONE acetate 1% ophthalmic suspension: 1 drop(s) in each affected eye once a day  predniSONE 2.5 mg oral tablet: 1 tab(s) orally once a day  Spiriva 18 mcg inhalation capsule: 1 cap(s) inhaled once a day   amLODIPine 5 mg oral tablet: 1 tab(s) orally once a day  bisoprolol-hydrochlorothiazide 2.5 mg-6.25 mg oral tablet: 1 tab(s) orally once a day  celecoxib 100 mg oral capsule: 1 cap(s) orally once a day  ipratropium-albuterol 0.5 mg-2.5 mg/3 mL inhalation solution: 3 milliliter(s) by nebulizer 4 times a day as needed for wheezing and difficulty breathing  levothyroxine 50 mcg (0.05 mg) oral capsule: 1 cap(s) orally once a day  prednisoLONE acetate 1% ophthalmic suspension: 1 drop(s) in each affected eye once a day  predniSONE 2.5 mg oral tablet: 1 tab(s) orally once a day  Spiriva 18 mcg inhalation capsule: 1 cap(s) inhaled once a day   amLODIPine 5 mg oral tablet: 1 tab(s) orally once a day  bisoprolol-hydrochlorothiazide 2.5 mg-6.25 mg oral tablet: 1 tab(s) orally once a day  celecoxib 100 mg oral capsule: 1 cap(s) orally once a day  ipratropium-albuterol 0.5 mg-2.5 mg/3 mL inhalation solution: 3 milliliter(s) by nebulizer 4 times a day as needed for wheezing and difficulty breathing  levothyroxine 50 mcg (0.05 mg) oral capsule: 1 cap(s) orally once a day  prednisoLONE acetate 1% ophthalmic suspension: 1 drop(s) in each affected eye once a day  predniSONE 2.5 mg oral tablet: 1 tab(s) orally once a day  ProAir Digihaler 90 mcg/inh inhalation powder: 2 puff(s) inhaled 4 times a day  Spiriva 18 mcg inhalation capsule: 1 cap(s) inhaled once a day

## 2024-10-04 NOTE — CONSULT NOTE ADULT - ASSESSMENT
89y F with HTN, COPD (no home O2), hypothyroidism, afib (not on AC), PVD and arthritis presenting with worsening dyspnea. Pt states worsening shortness of breath over the past few months however worse over the last few nights.    in ER SpO2 95% on 1LNC  Imaging: CT chest with small right and trace left pleural effusion, MARY and LLL PNA    RECOMMENDATIONS  PNA compelling story with exam and imaging evidence for airspace disease, noted hypoxemia  Azithro started 10/2-contineu for now urine legionella in lab  Ceftriaxone started 10/2-continue for now    Thank you for consulting us and involving us in the management of this most interesting and challenging case.  We will follow along in the care of this patient. Please call us at [phone] or text me directly on my cell# at [phone] with any concerns.  
wound of right lower lateral leg
The patient is a 89 year old female with a history of HTN, hypothyroidism, atrial fibrillation, COPD, PAD who presents with shortness of breath.     Plan:  - ECG with sinus rhythm and borderline LVH  - CT chest with PNA and small right, trace left pleural effusion  - BNP 3543  - Check echo  - Will await echo prior to use of loop diuretics  - Continue amlodipine 5 mg daily  - Continue metoprolol succinate 50 mg daily (formulary equivalent)  - Continue HCTZ 6.25 mg daily  - Pulm follow-up  - IV antibiotics
pt sent in by PMD for eval of effusion on outpatient imaging, with sx of SOB and SHIN    HTN  OP  OA  Ataxic gait  Pleural Eff  Atelectasis  eval for PNA  Mucoid Impaction  eval for HF    proBNP noted  CT chest and CXR reviewed  I everett  Bronchodilators and Mucolytics for Mucoid Impaction and Mucociliary clearance  oral hygiene  skin care  emp ABX  biomarkers  check SLP - Swallow Studies  HOB elev - asp prec  Cardio Eval  cvs rx regimen and BP control  dietary discretion - Nutritional assessment  monitor VS and HD and Sat  goal sat > 88 pct  Spoke with DTR

## 2024-10-04 NOTE — DIETITIAN INITIAL EVALUATION ADULT - ORAL INTAKE PTA/DIET HISTORY
Pt eats fairly well at home, eats 3 small meals a day.  Consumes vegetables, protein, fruit;  bfst typically cereal, yogurt, coffee, eggs sometimes.  Lunch/Dinners vary in content but has pasta, vegetables, meats.  No ONS. Able to tolerate solid food. NKFA.  Has had gradual wt loss over past 10-15 yrs from 125#.

## 2024-10-04 NOTE — DISCHARGE NOTE PROVIDER - CARE PROVIDER_API CALL
David Gaytan  Internal Medicine  [address] [place] [address]  Phone: [phone]  Fax: [phone]  Follow Up Time: 1 week

## 2024-10-04 NOTE — CONSULT NOTE ADULT - SUBJECTIVE AND OBJECTIVE BOX
Chief Complaint: consulted for left lower leg wound, abdominal and back wounds.     HPI: 89 Y/O white female well known to [facility] with a closed wound of the left lower leg. We have been treating her for open wound of the right lower leg, Back wound is completely closed and not abdominal wound can be found and patient enies the existance of one. No SOI    PAST MEDICAL & SURGICAL HISTORY:  Hypertension      Fall      Pelvic fracture      Hypothyroidism      COPD without exacerbation      Afib      PVD (peripheral vascular disease)      S/P ankle joint replacement, left      Transplanted cornea          Allergies    Percocet 5/325 (Vomiting)  codeine (Nausea)    Intolerances        MEDICATIONS  (STANDING):  albuterol    0.083% 2.5 milliGRAM(s) Nebulizer every 8 hours  amLODIPine   Tablet 5 milliGRAM(s) Oral daily  cefTRIAXone   IVPB 1000 milliGRAM(s) IV Intermittent every 24 hours  enoxaparin Injectable 30 milliGRAM(s) SubCutaneous every 24 hours  hydrochlorothiazide 6.25 milliGRAM(s) Oral daily  levothyroxine 50 MICROGram(s) Oral daily  metoprolol succinate ER 50 milliGRAM(s) Oral daily  prednisoLONE acetate 1% Suspension 1 Drop(s) Both EYES daily  predniSONE   Tablet 2.5 milliGRAM(s) Oral daily  tiotropium 2.5 MICROgram(s) Inhaler 2 Puff(s) Inhalation daily    MEDICATIONS  (PRN):  acetaminophen     Tablet .. 650 milliGRAM(s) Oral every 6 hours PRN Temp greater or equal to 38C (100.4F), Mild Pain (1 - 3)  bisacodyl 5 milliGRAM(s) Oral every 12 hours PRN Constipation  guaiFENesin Oral Liquid (Sugar-Free) 200 milliGRAM(s) Oral every 6 hours PRN Cough  melatonin 3 milliGRAM(s) Oral at bedtime PRN Insomnia      FAMILY HISTORY:          ROS:  CONSTITUTIONAL: No fever, weight loss, or fatigue  EYES: No eye pain, visual disturbances, or discharge  ENMT:  No difficulty hearing, tinnitus, vertigo; No sinus or throat pain  NECK: No pain or stiffness  BREASTS: No pain, masses, or nipple discharge  RESPIRATORY: No cough, wheezing, chills or hemoptysis; No shortness of breath  CARDIOVASCULAR: No chest pain, palpitations, dizziness, or leg swelling  GASTROINTESTINAL: No abdominal or epigastric pain. No nausea, vomiting, or hematemesis; No diarrhea or constipation. No melena or hematochezia.  GENITOURINARY: No dysuria, frequency, hematuria, or incontinence  NEUROLOGICAL: No headaches, memory loss, loss of strength, numbness, or tremors  SKIN: No itching, burning, rashes, or lesions   LYMPH NODES: No enlarged glands  ENDOCRINE: No heat or cold intolerance; No hair loss  MUSCULOSKELETAL: No joint pain or swelling; No muscle, back, or extremity pain  PSYCHIATRIC: No depression, anxiety, mood swings, or difficulty sleeping  HEME/LYMPH: No easy bruising, or bleeding gums  ALLERGY AND IMMUNOLOGIC: No hives or eczema    PHYSICAL EXAM-      Vital Signs Last 24 Hrs  T(C): 36.6 (04 Oct 2024 12:44), Max: 36.6 (03 Oct 2024 19:40)  T(F): 97.9 (04 Oct 2024 12:44), Max: 97.9 (03 Oct 2024 19:40)  HR: 73 (04 Oct 2024 13:30) (70 - 84)  BP: 131/67 (04 Oct 2024 12:44) (124/57 - 137/68)  BP(mean): --  RR: 18 (04 Oct 2024 12:44) (18 - 19)  SpO2: 99% (04 Oct 2024 13:30) (95% - 100%)    Parameters below as of 04 Oct 2024 13:30  Patient On (Oxygen Delivery Method): nasal cannula        Constitutional: well developed, well nourished, no apparent distress, alert, oriented x 3.   Pulmonary: no respiratory distress, normal respiratory rhythm and effort, lungs are clear to auscultation/percussion. No CVA tenderness.  Cardiovascular: heart rate normal, normal sinus rhythm; no murmurs, gallops, rubs, heaves or thrills   Abdomen: soft, non-tender, +BS, no guarding/rebound/rigidity.  Vascular:   ENMT:  Neck:  Extremites: right lateral distal lower leg wound into the fat layer with area of necrotic tissue. about 3cm x 1.25 cm. No SOI  Skin:                            10.4   7.78  )-----------( 244      ( 03 Oct 2024 08:40 )             31.8     10-03    138  |  102  |  25[H]  ----------------------------<  133[H]  4.2   |  29  |  0.82    Ca    9.1      03 Oct 2024 08:40    TPro  6.7  /  Alb  2.5[L]  /  TBili  0.3  /  DBili  x   /  AST  26  /  ALT  23  /  AlkPhos  84  10-03      Radiology      Impression:      Recommendations:

## 2024-10-04 NOTE — DIETITIAN INITIAL EVALUATION ADULT - PERTINENT LABORATORY DATA
10-03    138  |  102  |  25[H]  ----------------------------<  133[H]  4.2   |  29  |  0.82    Ca    9.1      03 Oct 2024 08:40    TPro  6.7  /  Alb  2.5[L]  /  TBili  0.3  /  DBili  x   /  AST  26  /  ALT  23  /  AlkPhos  84  10-03

## 2024-10-05 LAB
ANION GAP SERPL CALC-SCNC: 8 MMOL/L — SIGNIFICANT CHANGE UP (ref 5–17)
BUN SERPL-MCNC: 28 MG/DL — HIGH (ref 7–23)
CALCIUM SERPL-MCNC: 8.7 MG/DL — SIGNIFICANT CHANGE UP (ref 8.5–10.1)
CHLORIDE SERPL-SCNC: 100 MMOL/L — SIGNIFICANT CHANGE UP (ref 96–108)
CK MB BLD-MCNC: 5.5 % — HIGH (ref 0–3.5)
CK MB CFR SERPL CALC: 1.1 NG/ML — SIGNIFICANT CHANGE UP (ref 0–3.6)
CK SERPL-CCNC: 20 U/L — LOW (ref 26–192)
CO2 SERPL-SCNC: 31 MMOL/L — SIGNIFICANT CHANGE UP (ref 22–31)
CREAT SERPL-MCNC: 0.89 MG/DL — SIGNIFICANT CHANGE UP (ref 0.5–1.3)
EGFR: 58 ML/MIN/1.73M2 — LOW
GLUCOSE SERPL-MCNC: 149 MG/DL — HIGH (ref 70–99)
MAGNESIUM SERPL-MCNC: 2 MG/DL — SIGNIFICANT CHANGE UP (ref 1.6–2.6)
PHOSPHATE SERPL-MCNC: 2.8 MG/DL — SIGNIFICANT CHANGE UP (ref 2.5–4.5)
POTASSIUM SERPL-MCNC: 4.3 MMOL/L — SIGNIFICANT CHANGE UP (ref 3.5–5.3)
POTASSIUM SERPL-SCNC: 4.3 MMOL/L — SIGNIFICANT CHANGE UP (ref 3.5–5.3)
SODIUM SERPL-SCNC: 139 MMOL/L — SIGNIFICANT CHANGE UP (ref 135–145)
TROPONIN I, HIGH SENSITIVITY RESULT: 20.2 NG/L — SIGNIFICANT CHANGE UP

## 2024-10-05 RX ADMIN — Medication 5 MILLIGRAM(S): at 05:25

## 2024-10-05 RX ADMIN — Medication 2.5 MILLIGRAM(S): at 07:58

## 2024-10-05 RX ADMIN — Medication 2.5 MILLIGRAM(S): at 19:08

## 2024-10-05 RX ADMIN — Medication 50 MICROGRAM(S): at 05:25

## 2024-10-05 RX ADMIN — ENOXAPARIN SODIUM 30 MILLIGRAM(S): 150 INJECTION SUBCUTANEOUS at 05:48

## 2024-10-05 RX ADMIN — Medication 50 MILLIGRAM(S): at 05:25

## 2024-10-05 RX ADMIN — PREDNISONE 2.5 MILLIGRAM(S): 5 TABLET ORAL at 05:26

## 2024-10-05 RX ADMIN — Medication 2.5 MILLIGRAM(S): at 14:11

## 2024-10-05 RX ADMIN — TIOTROPIUM BROMIDE INHALATION SPRAY 2 PUFF(S): 3.12 SPRAY, METERED RESPIRATORY (INHALATION) at 05:49

## 2024-10-05 RX ADMIN — Medication 1 DROP(S): at 12:57

## 2024-10-05 RX ADMIN — Medication 100 MILLIGRAM(S): at 09:52

## 2024-10-06 VITALS
RESPIRATION RATE: 17 BRPM | TEMPERATURE: 98 F | DIASTOLIC BLOOD PRESSURE: 64 MMHG | OXYGEN SATURATION: 97 % | SYSTOLIC BLOOD PRESSURE: 163 MMHG | HEART RATE: 88 BPM

## 2024-10-06 LAB
ANION GAP SERPL CALC-SCNC: 5 MMOL/L — SIGNIFICANT CHANGE UP (ref 5–17)
BUN SERPL-MCNC: 26 MG/DL — HIGH (ref 7–23)
CALCIUM SERPL-MCNC: 8.9 MG/DL — SIGNIFICANT CHANGE UP (ref 8.5–10.1)
CHLORIDE SERPL-SCNC: 103 MMOL/L — SIGNIFICANT CHANGE UP (ref 96–108)
CO2 SERPL-SCNC: 31 MMOL/L — SIGNIFICANT CHANGE UP (ref 22–31)
CREAT SERPL-MCNC: 0.85 MG/DL — SIGNIFICANT CHANGE UP (ref 0.5–1.3)
EGFR: 61 ML/MIN/1.73M2 — SIGNIFICANT CHANGE UP
GLUCOSE SERPL-MCNC: 164 MG/DL — HIGH (ref 70–99)
HCT VFR BLD CALC: 32.5 % — LOW (ref 34.5–45)
HGB BLD-MCNC: 10.1 G/DL — LOW (ref 11.5–15.5)
MCHC RBC-ENTMCNC: 30.4 PG — SIGNIFICANT CHANGE UP (ref 27–34)
MCHC RBC-ENTMCNC: 31.1 GM/DL — LOW (ref 32–36)
MCV RBC AUTO: 97.9 FL — SIGNIFICANT CHANGE UP (ref 80–100)
NRBC # BLD: 0 /100 WBCS — SIGNIFICANT CHANGE UP (ref 0–0)
PLATELET # BLD AUTO: 244 K/UL — SIGNIFICANT CHANGE UP (ref 150–400)
POTASSIUM SERPL-MCNC: 5.1 MMOL/L — SIGNIFICANT CHANGE UP (ref 3.5–5.3)
POTASSIUM SERPL-SCNC: 5.1 MMOL/L — SIGNIFICANT CHANGE UP (ref 3.5–5.3)
RBC # BLD: 3.32 M/UL — LOW (ref 3.8–5.2)
RBC # FLD: 14.2 % — SIGNIFICANT CHANGE UP (ref 10.3–14.5)
SODIUM SERPL-SCNC: 139 MMOL/L — SIGNIFICANT CHANGE UP (ref 135–145)
WBC # BLD: 9.2 K/UL — SIGNIFICANT CHANGE UP (ref 3.8–10.5)
WBC # FLD AUTO: 9.2 K/UL — SIGNIFICANT CHANGE UP (ref 3.8–10.5)

## 2024-10-06 PROCEDURE — 84100 ASSAY OF PHOSPHORUS: CPT

## 2024-10-06 PROCEDURE — 85730 THROMBOPLASTIN TIME PARTIAL: CPT

## 2024-10-06 PROCEDURE — 99285 EMERGENCY DEPT VISIT HI MDM: CPT

## 2024-10-06 PROCEDURE — 82607 VITAMIN B-12: CPT

## 2024-10-06 PROCEDURE — 71045 X-RAY EXAM CHEST 1 VIEW: CPT

## 2024-10-06 PROCEDURE — 80048 BASIC METABOLIC PNL TOTAL CA: CPT

## 2024-10-06 PROCEDURE — 83550 IRON BINDING TEST: CPT

## 2024-10-06 PROCEDURE — 93005 ELECTROCARDIOGRAM TRACING: CPT

## 2024-10-06 PROCEDURE — 84145 PROCALCITONIN (PCT): CPT

## 2024-10-06 PROCEDURE — 82728 ASSAY OF FERRITIN: CPT

## 2024-10-06 PROCEDURE — 83735 ASSAY OF MAGNESIUM: CPT

## 2024-10-06 PROCEDURE — 85610 PROTHROMBIN TIME: CPT

## 2024-10-06 PROCEDURE — 83540 ASSAY OF IRON: CPT

## 2024-10-06 PROCEDURE — 94640 AIRWAY INHALATION TREATMENT: CPT

## 2024-10-06 PROCEDURE — 87637 SARSCOV2&INF A&B&RSV AMP PRB: CPT

## 2024-10-06 PROCEDURE — 82553 CREATINE MB FRACTION: CPT

## 2024-10-06 PROCEDURE — 36415 COLL VENOUS BLD VENIPUNCTURE: CPT

## 2024-10-06 PROCEDURE — 82746 ASSAY OF FOLIC ACID SERUM: CPT

## 2024-10-06 PROCEDURE — 94760 N-INVAS EAR/PLS OXIMETRY 1: CPT

## 2024-10-06 PROCEDURE — 93306 TTE W/DOPPLER COMPLETE: CPT

## 2024-10-06 PROCEDURE — 85025 COMPLETE CBC W/AUTO DIFF WBC: CPT

## 2024-10-06 PROCEDURE — 82550 ASSAY OF CK (CPK): CPT

## 2024-10-06 PROCEDURE — 97162 PT EVAL MOD COMPLEX 30 MIN: CPT

## 2024-10-06 PROCEDURE — 83880 ASSAY OF NATRIURETIC PEPTIDE: CPT

## 2024-10-06 PROCEDURE — 86140 C-REACTIVE PROTEIN: CPT

## 2024-10-06 PROCEDURE — 87449 NOS EACH ORGANISM AG IA: CPT

## 2024-10-06 PROCEDURE — 71250 CT THORAX DX C-: CPT | Mod: MC

## 2024-10-06 PROCEDURE — 82803 BLOOD GASES ANY COMBINATION: CPT

## 2024-10-06 PROCEDURE — G0463: CPT

## 2024-10-06 PROCEDURE — 80053 COMPREHEN METABOLIC PANEL: CPT

## 2024-10-06 PROCEDURE — 84484 ASSAY OF TROPONIN QUANT: CPT

## 2024-10-06 PROCEDURE — 85027 COMPLETE CBC AUTOMATED: CPT

## 2024-10-06 RX ORDER — ALBUTEROL 90 MCG
2 AEROSOL (GRAM) INHALATION
Qty: 1 | Refills: 0
Start: 2024-10-06 | End: 2024-10-12

## 2024-10-06 RX ORDER — IPRATROPIUM BROMIDE AND ALBUTEROL SULFATE .5; 3 MG/3ML; MG/3ML
3 SOLUTION RESPIRATORY (INHALATION)
Qty: 28 | Refills: 0
Start: 2024-10-06 | End: 2024-10-12

## 2024-10-06 RX ADMIN — Medication 2.5 MILLIGRAM(S): at 07:31

## 2024-10-06 RX ADMIN — TIOTROPIUM BROMIDE INHALATION SPRAY 2 PUFF(S): 3.12 SPRAY, METERED RESPIRATORY (INHALATION) at 06:28

## 2024-10-06 RX ADMIN — Medication 650 MILLIGRAM(S): at 00:29

## 2024-10-06 RX ADMIN — Medication 1 DROP(S): at 11:19

## 2024-10-06 RX ADMIN — Medication 50 MICROGRAM(S): at 06:20

## 2024-10-06 RX ADMIN — Medication 5 MILLIGRAM(S): at 06:20

## 2024-10-06 RX ADMIN — PREDNISONE 2.5 MILLIGRAM(S): 5 TABLET ORAL at 06:20

## 2024-10-06 RX ADMIN — Medication 650 MILLIGRAM(S): at 01:15

## 2024-10-06 RX ADMIN — Medication 100 MILLIGRAM(S): at 10:01

## 2024-10-06 RX ADMIN — Medication 50 MILLIGRAM(S): at 06:20

## 2024-10-06 RX ADMIN — ENOXAPARIN SODIUM 30 MILLIGRAM(S): 150 INJECTION SUBCUTANEOUS at 06:22

## 2024-10-06 NOTE — PROGRESS NOTE ADULT - ASSESSMENT
The patient is a 89 year old female with a history of HTN, hypothyroidism, atrial fibrillation, COPD, PAD who presents with shortness of breath.     Plan:  - ECG with sinus rhythm and borderline LVH  - CT chest with PNA and small right, trace left pleural effusion  - BNP 3543  - Echo with normal LV systolic function, mild pulm HTN  - Hold off with loop diuretics unless respiratory status worsens  - Continue amlodipine 5 mg daily  - Continue metoprolol succinate 50 mg daily (formulary equivalent)  - Continue HCTZ 6.25 mg daily  - Pulm follow-up  - IV antibiotics
89y F with HTN, COPD (no home O2), hypothyroidism, afib (not on AC), PVD and arthritis presenting with worsening dyspnea. Pt states worsening shortness of breath over the past few months however worse over the last few nights.    in ER SpO2 95% on 1LNC  Imaging: CT chest with small right and trace left pleural effusion, MARY and LLL PNA    RECOMMENDATIONS  PNA compelling story with exam and imaging evidence for airspace disease, noted hypoxemia  Azithro started 10/2-last dose today oral-urine legionella -NEG  Ceftriaxone started 10/2-continue for now with anticipated last day of abx 10/6 (x 5 days) but as pt improves potential to complete course with cefuroxime 500 mg PO BID    Thank you for consulting us and involving us in the management of this most interesting and challenging case.  We will follow along in the care of this patient. Please call us at [phone] or text me directly on my cell# at [phone] with any concerns.  
89y F with HTN, COPD (no home O2), hypothyroidism, afib (not on AC), PVD and arthritis presenting with worsening dyspnea. Pt states worsening shortness of breath over the past few months however worse over the last few nights.    in ER SpO2 95% on 1LNC  Imaging: CT chest with small right and trace left pleural effusion, MARY and LLL PNA    RECOMMENDATIONS  PNA compelling story with exam and imaging evidence for airspace disease, noted hypoxemia  sp Azithro started 10/2-urine legionella -NEG  sp Ceftriaxone started 10/2- with last day of abx 10/6 (x 5 days)   10/5-pt still requiring 3-4L NC    Thank you for consulting us and involving us in the management of this most interesting and challenging case.  We will follow along in the care of this patient. Please call us at [phone] or text me directly on my cell# at [phone] with any concerns.  
89y F with PMHx HTN, COPD (no home O2), hypothyroidism, PVD and arthritis presenting with worsening dyspnea. Admitted for PNA    #Pneumonia  Community Acquired  - CT chest with small right and trace left pleural effusion, with MARY and LLL PNA  - Continue IV abx Rocephin and azithro  - F/u urine strep and legionella  - Flu/COVID/RSV negative  - PRN Robitussin for cough  - Trend fever curve and WBC  - Pulmonology Dr. Sandhu consulted  ID cs fu  swallow eval pending    #Elevated BNP- stable  - Does not appear volume overloaded  Echo reviewed-Left ventricular systolic function is normal with an ejection fraction of 63 %, Left atrium is moderately dilated.    Estimated pulmonary artery systolic pressure is 46 mmHg, consistent with mild pulmonary hypertension.      #COPD w acute hypoxic respiratory failure likely dt pneumonia  - Standing albuterol and hypertonic saline nebs  - Continue low dose prednisone 2.5mg qd (takes it for her arthritis)  - Continue daily Spiriva  home o2 needs evaluated    #HTN  -Continue home amlodipine, hctz and BB  -Monitor hemodynamics  -DASH/TLC diet      #Hypothyroidism  - Continue levothyroxine    #DVT PPx  - Lovenox 30mg qd .      [facility]   [phone]
89y F with PMHx HTN, COPD (no home O2), hypothyroidism, PVD and arthritis presenting with worsening dyspnea. Admitted for PNA    #Pneumonia  Community Acquired  - CT chest with small right and trace left pleural effusion, with MARY and LLL PNA  - S/p IV abx Rocephin and azithro in ED  - Does not meet sepsis criteria  - Continue IV abx Rocephin and azithro  - F/u urine strep and legionella  - Flu/COVID/RSV negative  - PRN Robitussin for cough  - Trend fever curve and WBC  - Pulmonology Dr. Sandhu consulted    #Elevated BNP  - BNP 3543 on admission  - Does not appear volume overloaded  - F/u TTE      #COPD  - Not on home O2  - Currently on 1LNC, SpO2 goal>88%  - Standing albuterol and hypertonic saline nebs  - Continue low dose prednisone 2.5mg qd (takes it for her arthritis)  - Continue daily Spiriva    #HTN  -Continue home amlodipine, hctz and BB  -Monitor hemodynamics  -DASH/TLC diet      #Hypothyroidism  - Continue levothyroxine    #DVT PPx  - Lovenox 30mg qd .    d/w dgtr at bedside plan of care    [facility]   [phone]
The patient is a 89 year old female with a history of HTN, hypothyroidism, atrial fibrillation, COPD, PAD who presents with shortness of breath.     Plan:  - ECG with sinus rhythm and borderline LVH  - CT chest with PNA and small right, trace left pleural effusion  - BNP 3543  - Echo with normal LV systolic function, mild pulm HTN  - Hold off with loop diuretics unless respiratory status worsens  - Continue amlodipine 5 mg daily  - Continue metoprolol succinate 50 mg daily (formulary equivalent)  - Continue HCTZ 6.25 mg daily  - Pulm follow-up  - IV antibiotics
pt sent in by PMD for eval of effusion on outpatient imaging, with sx of SOB and SHIN    HTN  OP  OA  Ataxic gait  Pleural Eff  Atelectasis  eval for PNA  Mucoid Impaction  eval for HF    crp and procal noted  VBG noted  ABX  fio2 wean  I everett    proBNP noted  CT chest and CXR reviewed  I everett  Bronchodilators and Mucolytics for Mucoid Impaction and Mucociliary clearance  oral hygiene  skin care  emp ABX  biomarkers  check SLP - Swallow Studies  HOB elev - asp prec  Cardio Eval  cvs rx regimen and BP control  dietary discretion - Nutritional assessment  monitor VS and HD and Sat  goal sat > 88 pct  Spoke with DTR  
pt sent in by PMD for eval of effusion on outpatient imaging, with sx of SOB and SHIN    HTN  OP  OA  Ataxic gait  Pleural Eff  Atelectasis  eval for PNA  Mucoid Impaction  eval for HF    crp and procal noted  VBG noted  ABX  fio2 wean  I everett  ID eval noted  TTE - Pulm HTN    proBNP noted  CT chest and CXR reviewed  I everett  Bronchodilators and Mucolytics for Mucoid Impaction and Mucociliary clearance  oral hygiene  skin care  emp ABX  biomarkers  check SLP - Swallow Studies  HOB elev - asp prec  Cardio Eval  cvs rx regimen and BP control  dietary discretion - Nutritional assessment  monitor VS and HD and Sat  goal sat > 88 pct  Spoke with DTR  
pt sent in by PMD for eval of effusion on outpatient imaging, with sx of SOB and SHIN    HTN  OP  OA  Ataxic gait  Pleural Eff  Atelectasis  eval for PNA  Mucoid Impaction  eval for HF    crp and procal noted  VBG noted  ABX  fio2 wean  I everett  ID eval noted  TTE - Pulm HTN    proBNP noted  CT chest and CXR reviewed  I everett  Bronchodilators and Mucolytics for Mucoid Impaction and Mucociliary clearance  oral hygiene  skin care  emp ABX  biomarkers  check SLP - Swallow Studies  HOB elev - asp prec  Cardio Eval  cvs rx regimen and BP control  dietary discretion - Nutritional assessment  monitor VS and HD and Sat  goal sat > 88 pct  Spoke with DTR      
89y F with HTN, COPD (no home O2), hypothyroidism, afib (not on AC), PVD and arthritis presenting with worsening dyspnea. Pt states worsening shortness of breath over the past few months however worse over the last few nights.    in ER SpO2 95% on 1LNC  Imaging: CT chest with small right and trace left pleural effusion, MARY and LLL PNA    RECOMMENDATIONS  PNA compelling story with exam and imaging evidence for airspace disease, noted hypoxemia  sp Azithro started 10/2-urine legionella -NEG  Ceftriaxone started 10/2-continue for now with last day of abx 10/6 (x 5 days)   10/5-pt still requiring 3L NC    Thank you for consulting us and involving us in the management of this most interesting and challenging case.  We will follow along in the care of this patient. Please call us at [phone] or text me directly on my cell# at [phone] with any concerns.  
89y F with PMHx HTN, COPD (no home O2), hypothyroidism, PVD and arthritis presenting with worsening dyspnea. Admitted for PNA    #Pneumonia  Community Acquired  - CT chest with small right and trace left pleural effusion, with MARY and LLL PNA  - Continue IV abx Rocephin and azithro  - F/u urine strep and legionella  - Flu/COVID/RSV negative  - PRN Robitussin for cough  - Trend fever curve and WBC  - Pulmonology Dr. Sandhu consulted  ID cs fu  swallow eval pending    #Elevated BNP- stable  - Does not appear volume overloaded  Echo reviewed-Left ventricular systolic function is normal with an ejection fraction of 63 %, Left atrium is moderately dilated.    Estimated pulmonary artery systolic pressure is 46 mmHg, consistent with mild pulmonary hypertension.      #COPD w acute hypoxic respiratory failure likely dt pneumonia  - Standing albuterol and hypertonic saline nebs  - Continue low dose prednisone 2.5mg qd (takes it for her arthritis)  - Continue daily Spiriva  home o2 needs evaluated    #HTN  -Continue home amlodipine, hctz and BB  -Monitor hemodynamics  -DASH/TLC diet      #Hypothyroidism  - Continue levothyroxine    #DVT PPx  - Lovenox 30mg qd .      [facility]   [phone]
The patient is a 89 year old female with a history of HTN, hypothyroidism, atrial fibrillation, COPD, PAD who presents with shortness of breath.     Plan:  - ECG with sinus rhythm and borderline LVH  - CT chest with PNA and small right, trace left pleural effusion  - BNP 3543  - Echo with normal LV systolic function, mild pulm HTN  - Hold off with loop diuretics unless respiratory status worsens  - Continue amlodipine 5 mg daily  - Continue metoprolol succinate 50 mg daily (formulary equivalent)  - Continue HCTZ 6.25 mg daily  - Pulm follow-up  - IV antibiotics
pt sent in by PMD for eval of effusion on outpatient imaging, with sx of SOB and SHIN    HTN  OP  OA  Ataxic gait  Pleural Eff  Atelectasis  eval for PNA  Mucoid Impaction  eval for HF    crp and procal noted  VBG noted  ABX  fio2 wean  I everett  ID eval noted  TTE - Pulm HTN    proBNP noted  CT chest and CXR reviewed  I everett  Bronchodilators and Mucolytics for Mucoid Impaction and Mucociliary clearance  oral hygiene  skin care  emp ABX  biomarkers  check SLP - Swallow Studies  HOB elev - asp prec  Cardio Eval  cvs rx regimen and BP control  dietary discretion - Nutritional assessment  monitor VS and HD and Sat  goal sat > 88 pct  Spoke with DTR

## 2024-10-06 NOTE — DISCHARGE NOTE NURSING/CASE MANAGEMENT/SOCIAL WORK - NSTRANSFEREYEGLASSESPAIRS_GEN_A_NUR
Patient requesting most recent copy of lab results.  Patient will .  Patient had a CPE on 06/24/2020. [phone].  Will stop in tomorrow to .    1 pair

## 2024-10-06 NOTE — HOME OXYGEN EVALUATION NOTE - NSHOMEOXYEVAL_PHYATTNON-COVID_GEN_ALL_CORE
Physician Progress Note      Sophia Willis  CSN #:                  [id number]  :                       1945  ADMIT DATE:       2021 1:16 AM  DISCH DATE:  RESPONDING  PROVIDER #:        Ethel Ramos MD          QUERY TEXT:    Dr Lisa Villasenor  Patient admitted with HERBERTH, Dehydration. If possible, please document in progress notes and discharge summary if you are evaluating and /or treating any of the following: The medical record reflects the following:  Risk Factors: presents with HERBERTH, dehydration, possible dementia, hypernatremia, hypovolemia, dysphagia poa;  history of esophageal stricture in past. nv/v recent obstipation  Clinical Indicators: registered dietitian notes on3/4  pt meets ASPEN criteria severe malnutrition : Energy Intake:  7 - 50% or less of est energy requirements for 5 or more days; Weight Loss:  7.00 - Greater than 7.5% over 3 months   ; Body Fat Loss:  7 - Moderate body fat loss,   Muscle Mass Loss:  7 - Moderate muscle mass loss,  Treatment: dietitian consult, Magic cups  bid; ensure compact. Options provided:  -- Protein calorie malnutrition moderate  -- Protein calorie malnutrition severe  -- Other - I will add my own diagnosis  -- Disagree - Not applicable / Not valid  -- Disagree - Clinically unable to determine / Unknown  -- Refer to Clinical Documentation Reviewer    PROVIDER RESPONSE TEXT:    This patient has moderate protein calorie malnutrition. Query created by:  Tejas Roman on 3/4/2021 2:13 PM      Electronically signed by:  Ethel Ramos MD 3/4/2021 2:36 PM All acute illnesses and/or exacerbations have been treated and resolved; patient is in a chronic stable state. Alternative treatment methods have been tried and failed.

## 2024-10-06 NOTE — PROGRESS NOTE ADULT - SUBJECTIVE AND OBJECTIVE BOX
Chief Complaint: Shortness of breath    Interval Events: No events overnight.    Review of Systems:  General: No fevers, chills, weight gain  Skin: No rashes, color changes  Cardiovascular: No chest pain, orthopnea  Respiratory: +shortness of breath, cough  Gastrointestinal: No nausea, abdominal pain  Genitourinary: No incontinence, pain with urination  Musculoskeletal: No pain, swelling, decreased range of motion  Neurological: No headache, weakness  Psychiatric: No depression, anxiety  Endocrine: No weight gain, increased thirst  All other systems are comprehensively negative.    Physical Exam:  Vital Signs Last 24 Hrs  T(C): 36.7 (06 Oct 2024 04:41), Max: 36.8 (05 Oct 2024 12:09)  T(F): 98 (06 Oct 2024 04:41), Max: 98.3 (05 Oct 2024 12:09)  HR: 77 (06 Oct 2024 07:31) (72 - 90)  BP: 167/56 (06 Oct 2024 04:41) (136/62 - 167/56)  BP(mean): --  RR: 17 (06 Oct 2024 04:41) (17 - 18)  SpO2: 96% (06 Oct 2024 07:31) (96% - 99%)  Parameters below as of 06 Oct 2024 07:31  Patient On (Oxygen Delivery Method): nasal cannula  General: NAD  HEENT: MMM  Neck: No JVD, no carotid bruit  Lungs: CTAB  CV: RRR, nl S1/S2, no M/R/G  Abdomen: S/NT/ND, +BS  Extremities: No LE edema, no cyanosis  Neuro: AAOx3, non-focal  Skin: No rash    Labs:             10-04    139  |  100  |  28[H]  ----------------------------<  149[H]  4.3   |  31  |  0.89    Ca    8.7      04 Oct 2024 23:39  Phos  2.8     10-04  Mg     2.0     10-04        ECG/Telemetry: Sinus rhythm
[facility]  Kody Burch MD PhD, Kellen Lopez MD, Allyson Leigh MD, Courtney Connor MD, Nico Delarosa MD  and providing coverage with Escobar Omalley MD  Providing Infectious Disease Consultations at [facility], [facility], [facility]'s    Office# [phone] to schedule follow up appointments  Answering Service for urgent calls or New Consults [phone]  Cell# to text for urgent issues Kody Burch [phone]     infectious diseases progress note:    SANDEE PEARSON is a 89y y. o. Female patient    Overnight and events of the last 24hrs reviewed    Allergies    Percocet 5/325 (Vomiting)  codeine (Nausea)    Intolerances        ANTIBIOTICS/RELEVANT:  antimicrobials    immunologic:    OTHER:  acetaminophen     Tablet .. 650 milliGRAM(s) Oral every 6 hours PRN  albuterol    0.083% 2.5 milliGRAM(s) Nebulizer every 8 hours  amLODIPine   Tablet 5 milliGRAM(s) Oral daily  bisacodyl 5 milliGRAM(s) Oral every 12 hours PRN  enoxaparin Injectable 30 milliGRAM(s) SubCutaneous every 24 hours  guaiFENesin Oral Liquid (Sugar-Free) 200 milliGRAM(s) Oral every 6 hours PRN  hydrochlorothiazide 6.25 milliGRAM(s) Oral daily  levothyroxine 50 MICROGram(s) Oral daily  melatonin 3 milliGRAM(s) Oral at bedtime PRN  metoprolol succinate ER 50 milliGRAM(s) Oral daily  prednisoLONE acetate 1% Suspension 1 Drop(s) Both EYES daily  predniSONE   Tablet 2.5 milliGRAM(s) Oral daily  tiotropium 2.5 MICROgram(s) Inhaler 2 Puff(s) Inhalation daily      Objective:  Vital Signs Last 24 Hrs  T(C): 36.7 (06 Oct 2024 04:41), Max: 36.8 (05 Oct 2024 12:09)  T(F): 98 (06 Oct 2024 04:41), Max: 98.3 (05 Oct 2024 12:09)  HR: 77 (06 Oct 2024 07:31) (72 - 90)  BP: 167/56 (06 Oct 2024 04:41) (136/62 - 167/56)  BP(mean): --  RR: 17 (06 Oct 2024 04:41) (17 - 18)  SpO2: 96% (06 Oct 2024 07:31) (96% - 99%)    Parameters below as of 06 Oct 2024 07:31  Patient On (Oxygen Delivery Method): nasal cannula        T(C): 36.7 (10-06-24 @ 04:41), Max: 37.2 (10-04-24 @ 22:44)  T(C): 36.7 (10-06-24 @ 04:41), Max: 37.2 (10-04-24 @ 22:44)  T(C): 36.7 (10-06-24 @ 04:41), Max: 37.2 (10-04-24 @ 22:44)    PHYSICAL EXAM:  HEENT: NC atraumatic  Neck: supple  Respiratory: no accessory muscle use, breathing comfortably  Cardiovascular: distant  Gastrointestinal: normal appearing, nondistended  Extremities: no clubbing, no cyanosis,        LABS:        WBC  7.78 10-03 @ 08:40  7.37 10-02 @ 16:40      10-04    139  |  100  |  28[H]  ----------------------------<  149[H]  4.3   |  31  |  0.89    Ca    8.7      04 Oct 2024 23:39  Phos  2.8     10-04  Mg     2.0     10-04        Creatinine: 0.89 mg/dL (10-04-24 @ 23:39)  Creatinine: 0.82 mg/dL (10-03-24 @ 08:40)  Creatinine: 0.89 mg/dL (10-02-24 @ 16:40)        Urinalysis Basic - ( 04 Oct 2024 23:39 )    Color: x / Appearance: x / SG: x / pH: x  Gluc: 149 mg/dL / Ketone: x  / Bili: x / Urobili: x   Blood: x / Protein: x / Nitrite: x   Leuk Esterase: x / RBC: x / WBC x   Sq Epi: x / Non Sq Epi: x / Bacteria: x            INFLAMMATORY MARKERS      MICROBIOLOGY:              RADIOLOGY & ADDITIONAL STUDIES:  
Date/Time Patient Seen:  		  Referring MD:   Data Reviewed	       Patient is a 89y old  Female who presents with a chief complaint of PNA (04 Oct 2024 18:42)      Subjective/HPI     PAST MEDICAL & SURGICAL HISTORY:  Hypertension    Fall    Fall    Pelvic fracture    Hypothyroidism    COPD exacerbation    COPD without exacerbation    Afib    PVD (peripheral vascular disease)    S/P ankle joint replacement, left    Transplanted cornea          Medication list         MEDICATIONS  (STANDING):  albuterol    0.083% 2.5 milliGRAM(s) Nebulizer every 8 hours  amLODIPine   Tablet 5 milliGRAM(s) Oral daily  cefTRIAXone   IVPB 1000 milliGRAM(s) IV Intermittent every 24 hours  enoxaparin Injectable 30 milliGRAM(s) SubCutaneous every 24 hours  hydrochlorothiazide 6.25 milliGRAM(s) Oral daily  levothyroxine 50 MICROGram(s) Oral daily  metoprolol succinate ER 50 milliGRAM(s) Oral daily  prednisoLONE acetate 1% Suspension 1 Drop(s) Both EYES daily  predniSONE   Tablet 2.5 milliGRAM(s) Oral daily  tiotropium 2.5 MICROgram(s) Inhaler 2 Puff(s) Inhalation daily    MEDICATIONS  (PRN):  acetaminophen     Tablet .. 650 milliGRAM(s) Oral every 6 hours PRN Temp greater or equal to 38C (100.4F), Mild Pain (1 - 3)  bisacodyl 5 milliGRAM(s) Oral every 12 hours PRN Constipation  guaiFENesin Oral Liquid (Sugar-Free) 200 milliGRAM(s) Oral every 6 hours PRN Cough  melatonin 3 milliGRAM(s) Oral at bedtime PRN Insomnia         Vitals log        ICU Vital Signs Last 24 Hrs  T(C): 36.8 (05 Oct 2024 05:09), Max: 37.2 (04 Oct 2024 22:44)  T(F): 98.2 (05 Oct 2024 05:09), Max: 98.9 (04 Oct 2024 22:44)  HR: 75 (05 Oct 2024 07:58) (73 - 95)  BP: 147/57 (05 Oct 2024 05:09) (128/58 - 165/83)  BP(mean): --  ABP: --  ABP(mean): --  RR: 18 (05 Oct 2024 05:09) (17 - 20)  SpO2: 98% (05 Oct 2024 07:58) (90% - 99%)    O2 Parameters below as of 05 Oct 2024 07:58  Patient On (Oxygen Delivery Method): nasal cannula                 Input and Output:  I&O's Detail      Lab Data    10-04    139  |  100  |  28[H]  ----------------------------<  149[H]  4.3   |  31  |  0.89    Ca    8.7      04 Oct 2024 23:39  Phos  2.8     10-04  Mg     2.0     10-04        CARDIAC MARKERS ( 04 Oct 2024 23:39 )  x     / x     / x     / x     / 1.1 ng/mL        Review of Systems	      Objective     Physical Examination    heart s1s2  lung dc bS  head nc  head at      Pertinent Lab findings & Imaging      Jane:  NO   Adequate UO     I&O's Detail           Discussed with:     Cultures:	        Radiology                            
Chief Complaint: Shortness of breath    Interval Events: No events overnight.    Review of Systems:  General: No fevers, chills, weight gain  Skin: No rashes, color changes  Cardiovascular: No chest pain, orthopnea  Respiratory: +shortness of breath, cough  Gastrointestinal: No nausea, abdominal pain  Genitourinary: No incontinence, pain with urination  Musculoskeletal: No pain, swelling, decreased range of motion  Neurological: No headache, weakness  Psychiatric: No depression, anxiety  Endocrine: No weight gain, increased thirst  All other systems are comprehensively negative.    Physical Exam:  Vital Signs Last 24 Hrs  T(C): 36.8 (05 Oct 2024 05:09), Max: 37.2 (04 Oct 2024 22:44)  T(F): 98.2 (05 Oct 2024 05:09), Max: 98.9 (04 Oct 2024 22:44)  HR: 75 (05 Oct 2024 07:58) (73 - 95)  BP: 147/57 (05 Oct 2024 05:09) (128/58 - 165/83)  BP(mean): --  RR: 18 (05 Oct 2024 05:09) (17 - 20)  SpO2: 98% (05 Oct 2024 07:58) (90% - 99%)  Parameters below as of 05 Oct 2024 07:58  Patient On (Oxygen Delivery Method): nasal cannula  General: NAD  HEENT: MMM  Neck: No JVD, no carotid bruit  Lungs: CTAB  CV: RRR, nl S1/S2, no M/R/G  Abdomen: S/NT/ND, +BS  Extremities: No LE edema, no cyanosis  Neuro: AAOx3, non-focal  Skin: No rash    Labs:             10-04    139  |  100  |  28[H]  ----------------------------<  149[H]  4.3   |  31  |  0.89    Ca    8.7      04 Oct 2024 23:39  Phos  2.8     10-04  Mg     2.0     10-04    TPro  6.7  /  Alb  2.5[L]  /  TBili  0.3  /  DBili  x   /  AST  26  /  ALT  23  /  AlkPhos  84  10-03                        10.4   7.78  )-----------( 244      ( 03 Oct 2024 08:40 )             31.8       ECG/Telemetry: Sinus rhythm
Chief Complaint: Shortness of breath    Interval Events: No events overnight.    Review of Systems:  General: No fevers, chills, weight gain  Skin: No rashes, color changes  Cardiovascular: No chest pain, orthopnea  Respiratory: +shortness of breath, cough  Gastrointestinal: No nausea, abdominal pain  Genitourinary: No incontinence, pain with urination  Musculoskeletal: No pain, swelling, decreased range of motion  Neurological: No headache, weakness  Psychiatric: No depression, anxiety  Endocrine: No weight gain, increased thirst  All other systems are comprehensively negative.    Physical Exam:  Vitals:        Vital Signs Last 24 Hrs  T(C): 36.5 (04 Oct 2024 04:36), Max: 36.8 (03 Oct 2024 12:57)  T(F): 97.7 (04 Oct 2024 04:36), Max: 98.3 (03 Oct 2024 12:57)  HR: 70 (04 Oct 2024 07:30) (70 - 84)  BP: 137/68 (04 Oct 2024 04:36) (124/57 - 137/68)  BP(mean): --  RR: 18 (04 Oct 2024 04:36) (18 - 19)  SpO2: 100% (04 Oct 2024 07:30) (86% - 100%)  Parameters below as of 04 Oct 2024 07:30  Patient On (Oxygen Delivery Method): nasal cannula  General: NAD  HEENT: MMM  Neck: No JVD, no carotid bruit  Lungs: CTAB  CV: RRR, nl S1/S2, no M/R/G  Abdomen: S/NT/ND, +BS  Extremities: No LE edema, no cyanosis  Neuro: AAOx3, non-focal  Skin: No rash    Labs:                        10.4   7.78  )-----------( 244      ( 03 Oct 2024 08:40 )             31.8     10-03    138  |  102  |  25[H]  ----------------------------<  133[H]  4.2   |  29  |  0.82    Ca    9.1      03 Oct 2024 08:40    TPro  6.7  /  Alb  2.5[L]  /  TBili  0.3  /  DBili  x   /  AST  26  /  ALT  23  /  AlkPhos  84  10-03        PT/INR - ( 02 Oct 2024 16:40 )   PT: 11.0 sec;   INR: 0.94 ratio         PTT - ( 02 Oct 2024 16:40 )  PTT:28.5 sec    ECG/Telemetry: Sinus rhythm
Date/Time Patient Seen:  		  Referring MD:   Data Reviewed	       Patient is a 89y old  Female who presents with a chief complaint of PNA (02 Oct 2024 19:57)      Subjective/HPI     PAST MEDICAL & SURGICAL HISTORY:  Hypertension    Fall    Fall    Pelvic fracture    Hypothyroidism    COPD exacerbation    COPD without exacerbation    Afib    PVD (peripheral vascular disease)    S/P ankle joint replacement, left    Transplanted cornea          Medication list         MEDICATIONS  (STANDING):  albuterol    0.083% 2.5 milliGRAM(s) Nebulizer every 8 hours  amLODIPine   Tablet 5 milliGRAM(s) Oral daily  azithromycin  IVPB 500 milliGRAM(s) IV Intermittent every 24 hours  cefTRIAXone   IVPB 1000 milliGRAM(s) IV Intermittent every 24 hours  enoxaparin Injectable 30 milliGRAM(s) SubCutaneous every 24 hours  hydrochlorothiazide 6.25 milliGRAM(s) Oral daily  levothyroxine 50 MICROGram(s) Oral daily  metoprolol succinate ER 50 milliGRAM(s) Oral daily  prednisoLONE acetate 1% Suspension 1 Drop(s) Both EYES daily  predniSONE   Tablet 2.5 milliGRAM(s) Oral daily  sodium chloride 3%  Inhalation 4 milliLiter(s) Inhalation every 8 hours  tiotropium 2.5 MICROgram(s) Inhaler 2 Puff(s) Inhalation daily    MEDICATIONS  (PRN):  acetaminophen     Tablet .. 650 milliGRAM(s) Oral every 6 hours PRN Temp greater or equal to 38C (100.4F), Mild Pain (1 - 3)  bisacodyl 5 milliGRAM(s) Oral every 12 hours PRN Constipation  guaiFENesin Oral Liquid (Sugar-Free) 200 milliGRAM(s) Oral every 6 hours PRN Cough  melatonin 3 milliGRAM(s) Oral at bedtime PRN Insomnia         Vitals log        ICU Vital Signs Last 24 Hrs  T(C): 36.5 (02 Oct 2024 21:44), Max: 36.8 (02 Oct 2024 14:31)  T(F): 97.7 (02 Oct 2024 21:44), Max: 98.2 (02 Oct 2024 14:31)  HR: 78 (02 Oct 2024 21:44) (78 - 88)  BP: 154/67 (02 Oct 2024 21:44) (154/67 - 174/73)  BP(mean): --  ABP: --  ABP(mean): --  RR: 19 (02 Oct 2024 21:44) (16 - 22)  SpO2: 97% (02 Oct 2024 21:44) (95% - 98%)    O2 Parameters below as of 02 Oct 2024 21:44  Patient On (Oxygen Delivery Method): nasal cannula  O2 Flow (L/min): 3               Input and Output:  I&O's Detail      Lab Data                        10.8   7.37  )-----------( 252      ( 02 Oct 2024 16:40 )             34.7     10-02    139  |  103  |  34[H]  ----------------------------<  116[H]  4.6   |  32[H]  |  0.89    Ca    8.9      02 Oct 2024 16:40    TPro  7.3  /  Alb  2.9[L]  /  TBili  0.3  /  DBili  x   /  AST  26  /  ALT  27  /  AlkPhos  95  10-02            Review of Systems	      Objective     Physical Examination  heart s1s2  lung dc BS  head nc  head at        Pertinent Lab findings & Imaging      Jane:  NO   Adequate UO     I&O's Detail           Discussed with:     Cultures:	        Radiology                            
Date/Time Patient Seen:  		  Referring MD:   Data Reviewed	       Patient is a 89y old  Female who presents with a chief complaint of PNA (03 Oct 2024 14:17)      Subjective/HPI     PAST MEDICAL & SURGICAL HISTORY:  Hypertension    Fall    Fall    Pelvic fracture    Hypothyroidism    COPD exacerbation    COPD without exacerbation    Afib    PVD (peripheral vascular disease)    S/P ankle joint replacement, left    Transplanted cornea          Medication list         MEDICATIONS  (STANDING):  albuterol    0.083% 2.5 milliGRAM(s) Nebulizer every 8 hours  amLODIPine   Tablet 5 milliGRAM(s) Oral daily  azithromycin  IVPB 500 milliGRAM(s) IV Intermittent every 24 hours  cefTRIAXone   IVPB 1000 milliGRAM(s) IV Intermittent every 24 hours  enoxaparin Injectable 30 milliGRAM(s) SubCutaneous every 24 hours  hydrochlorothiazide 6.25 milliGRAM(s) Oral daily  levothyroxine 50 MICROGram(s) Oral daily  metoprolol succinate ER 50 milliGRAM(s) Oral daily  prednisoLONE acetate 1% Suspension 1 Drop(s) Both EYES daily  predniSONE   Tablet 2.5 milliGRAM(s) Oral daily  sodium chloride 3%  Inhalation 4 milliLiter(s) Inhalation every 8 hours  tiotropium 2.5 MICROgram(s) Inhaler 2 Puff(s) Inhalation daily    MEDICATIONS  (PRN):  acetaminophen     Tablet .. 650 milliGRAM(s) Oral every 6 hours PRN Temp greater or equal to 38C (100.4F), Mild Pain (1 - 3)  bisacodyl 5 milliGRAM(s) Oral every 12 hours PRN Constipation  guaiFENesin Oral Liquid (Sugar-Free) 200 milliGRAM(s) Oral every 6 hours PRN Cough  melatonin 3 milliGRAM(s) Oral at bedtime PRN Insomnia         Vitals log        ICU Vital Signs Last 24 Hrs  T(C): 36.6 (03 Oct 2024 19:40), Max: 36.8 (03 Oct 2024 12:57)  T(F): 97.9 (03 Oct 2024 19:40), Max: 98.3 (03 Oct 2024 12:57)  HR: 78 (03 Oct 2024 23:23) (78 - 95)  BP: 124/57 (03 Oct 2024 19:40) (108/63 - 131/60)  BP(mean): --  ABP: --  ABP(mean): --  RR: 19 (03 Oct 2024 19:40) (19 - 19)  SpO2: 98% (03 Oct 2024 23:23) (86% - 100%)    O2 Parameters below as of 03 Oct 2024 23:23  Patient On (Oxygen Delivery Method): nasal cannula, 2L                 Input and Output:  I&O's Detail      Lab Data                        10.4   7.78  )-----------( 244      ( 03 Oct 2024 08:40 )             31.8     10-03    138  |  102  |  25[H]  ----------------------------<  133[H]  4.2   |  29  |  0.82    Ca    9.1      03 Oct 2024 08:40    TPro  6.7  /  Alb  2.5[L]  /  TBili  0.3  /  DBili  x   /  AST  26  /  ALT  23  /  AlkPhos  84  10-03            Review of Systems	      Objective     Physical Examination  heart s1s2  lung dc BS  head nc  head at        Pertinent Lab findings & Imaging      Jane:  NO   Adequate UO     I&O's Detail           Discussed with:     Cultures:	        Radiology                            
Patient is a 89y old  Female who presents with a chief complaint of PNA (03 Oct 2024 05:06)      INTERVAL HPI/OVERNIGHT EVENTS: noted  pt seen and examined this am   events noted  feels well      Vital Signs Last 24 Hrs  T(C): 36.8 (03 Oct 2024 12:57), Max: 36.8 (02 Oct 2024 14:31)  T(F): 98.3 (03 Oct 2024 12:57), Max: 98.3 (03 Oct 2024 12:57)  HR: 78 (03 Oct 2024 12:57) (78 - 95)  BP: 131/60 (03 Oct 2024 12:57) (108/63 - 174/73)  BP(mean): --  RR: 19 (03 Oct 2024 12:57) (16 - 22)  SpO2: 97% (03 Oct 2024 12:57) (86% - 100%)    Parameters below as of 03 Oct 2024 12:57  Patient On (Oxygen Delivery Method): nasal cannula  O2 Flow (L/min): 3      acetaminophen     Tablet .. 650 milliGRAM(s) Oral every 6 hours PRN  albuterol    0.083% 2.5 milliGRAM(s) Nebulizer every 8 hours  amLODIPine   Tablet 5 milliGRAM(s) Oral daily  azithromycin  IVPB 500 milliGRAM(s) IV Intermittent every 24 hours  bisacodyl 5 milliGRAM(s) Oral every 12 hours PRN  cefTRIAXone   IVPB 1000 milliGRAM(s) IV Intermittent every 24 hours  enoxaparin Injectable 30 milliGRAM(s) SubCutaneous every 24 hours  guaiFENesin Oral Liquid (Sugar-Free) 200 milliGRAM(s) Oral every 6 hours PRN  hydrochlorothiazide 6.25 milliGRAM(s) Oral daily  levothyroxine 50 MICROGram(s) Oral daily  melatonin 3 milliGRAM(s) Oral at bedtime PRN  metoprolol succinate ER 50 milliGRAM(s) Oral daily  prednisoLONE acetate 1% Suspension 1 Drop(s) Both EYES daily  predniSONE   Tablet 2.5 milliGRAM(s) Oral daily  sodium chloride 3%  Inhalation 4 milliLiter(s) Inhalation every 8 hours  tiotropium 2.5 MICROgram(s) Inhaler 2 Puff(s) Inhalation daily      PHYSICAL EXAM:  GENERAL: NAD,   EYES: conjunctiva and sclera clear  ENMT: Moist mucous membranes  NECK: Supple, No JVD, Normal thyroid  CHEST/LUNG: non labored, cta b/l  HEART: Regular rate and rhythm; No murmurs, rubs, or gallops  ABDOMEN: Soft, Nontender, Nondistended; Bowel sounds present  EXTREMITIES:  2+ Peripheral Pulses, No clubbing, cyanosis, or edema  LYMPH: No lymphadenopathy noted  SKIN: No rashes or lesions    Consultant(s) Notes Reviewed:  [x ] YES  [ ] NO  Care Discussed with Consultants/Other Providers [ x] YES  [ ] NO    LABS:                        10.4   7.78  )-----------( 244      ( 03 Oct 2024 08:40 )             31.8     10-03    138  |  102  |  25[H]  ----------------------------<  133[H]  4.2   |  29  |  0.82    Ca    9.1      03 Oct 2024 08:40    TPro  6.7  /  Alb  2.5[L]  /  TBili  0.3  /  DBili  x   /  AST  26  /  ALT  23  /  AlkPhos  84  10-03    PT/INR - ( 02 Oct 2024 16:40 )   PT: 11.0 sec;   INR: 0.94 ratio         PTT - ( 02 Oct 2024 16:40 )  PTT:28.5 sec  Urinalysis Basic - ( 03 Oct 2024 08:40 )    Color: x / Appearance: x / SG: x / pH: x  Gluc: 133 mg/dL / Ketone: x  / Bili: x / Urobili: x   Blood: x / Protein: x / Nitrite: x   Leuk Esterase: x / RBC: x / WBC x   Sq Epi: x / Non Sq Epi: x / Bacteria: x      CAPILLARY BLOOD GLUCOSE            Urinalysis Basic - ( 03 Oct 2024 08:40 )    Color: x / Appearance: x / SG: x / pH: x  Gluc: 133 mg/dL / Ketone: x  / Bili: x / Urobili: x   Blood: x / Protein: x / Nitrite: x   Leuk Esterase: x / RBC: x / WBC x   Sq Epi: x / Non Sq Epi: x / Bacteria: x          RADIOLOGY & ADDITIONAL TESTS:    Imaging Personally Reviewed:  [x ] YES  [ ] NO
Date/Time Patient Seen:  		  Referring MD:   Data Reviewed	       Patient is a 89y old  Female who presents with a chief complaint of PNA (05 Oct 2024 15:36)      Subjective/HPI     PAST MEDICAL & SURGICAL HISTORY:  Hypertension    Fall    Fall    Pelvic fracture    Hypothyroidism    COPD exacerbation    COPD without exacerbation    Afib    PVD (peripheral vascular disease)    S/P ankle joint replacement, left    Transplanted cornea          Medication list         MEDICATIONS  (STANDING):  albuterol    0.083% 2.5 milliGRAM(s) Nebulizer every 8 hours  amLODIPine   Tablet 5 milliGRAM(s) Oral daily  cefTRIAXone   IVPB 1000 milliGRAM(s) IV Intermittent every 24 hours  enoxaparin Injectable 30 milliGRAM(s) SubCutaneous every 24 hours  hydrochlorothiazide 6.25 milliGRAM(s) Oral daily  levothyroxine 50 MICROGram(s) Oral daily  metoprolol succinate ER 50 milliGRAM(s) Oral daily  prednisoLONE acetate 1% Suspension 1 Drop(s) Both EYES daily  predniSONE   Tablet 2.5 milliGRAM(s) Oral daily  tiotropium 2.5 MICROgram(s) Inhaler 2 Puff(s) Inhalation daily    MEDICATIONS  (PRN):  acetaminophen     Tablet .. 650 milliGRAM(s) Oral every 6 hours PRN Temp greater or equal to 38C (100.4F), Mild Pain (1 - 3)  bisacodyl 5 milliGRAM(s) Oral every 12 hours PRN Constipation  guaiFENesin Oral Liquid (Sugar-Free) 200 milliGRAM(s) Oral every 6 hours PRN Cough  melatonin 3 milliGRAM(s) Oral at bedtime PRN Insomnia         Vitals log        ICU Vital Signs Last 24 Hrs  T(C): 36.7 (06 Oct 2024 04:41), Max: 36.8 (05 Oct 2024 12:09)  T(F): 98 (06 Oct 2024 04:41), Max: 98.3 (05 Oct 2024 12:09)  HR: 77 (06 Oct 2024 07:31) (72 - 90)  BP: 167/56 (06 Oct 2024 04:41) (136/62 - 167/56)  BP(mean): --  ABP: --  ABP(mean): --  RR: 17 (06 Oct 2024 04:41) (17 - 18)  SpO2: 96% (06 Oct 2024 07:31) (96% - 99%)    O2 Parameters below as of 06 Oct 2024 07:31  Patient On (Oxygen Delivery Method): nasal cannula                 Input and Output:  I&O's Detail    05 Oct 2024 07:01  -  06 Oct 2024 07:00  --------------------------------------------------------  IN:    IV PiggyBack: 50 mL  Total IN: 50 mL    OUT:  Total OUT: 0 mL    Total NET: 50 mL          Lab Data    10-04    139  |  100  |  28[H]  ----------------------------<  149[H]  4.3   |  31  |  0.89    Ca    8.7      04 Oct 2024 23:39  Phos  2.8     10-04  Mg     2.0     10-04        CARDIAC MARKERS ( 04 Oct 2024 23:39 )  x     / x     / x     / x     / 1.1 ng/mL        Review of Systems	      Objective     Physical Examination    heart s1s2  lung dc BS  head nc  head at      Pertinent Lab findings & Imaging      Jane:  NO   Adequate UO     I&O's Detail    05 Oct 2024 07:01  -  06 Oct 2024 07:00  --------------------------------------------------------  IN:    IV PiggyBack: 50 mL  Total IN: 50 mL    OUT:  Total OUT: 0 mL    Total NET: 50 mL               Discussed with:     Cultures:	        Radiology                            
[facility]OUS [facility]  Kody Burch MD PhD, Kellen Lopez MD, Allyson Leigh MD, Courtney Connor MD, Nico Delarosa MD  and providing coverage with Escobar Omalley MD  Providing Infectious Disease Consultations at [facility], [facility], [facility], [facility]'s    Office# [phone] to schedule follow up appointments  Answering Service for urgent calls or New Consults [phone]  Cell# to text for urgent issues Kody Burch [phone]     infectious diseases progress note:    SANDEE PEARSON is a 89y y. o. Female patient    Overnight and events of the last 24hrs reviewed    Allergies    Percocet 5/325 (Vomiting)  codeine (Nausea)    Intolerances        ANTIBIOTICS/RELEVANT:  antimicrobials  cefTRIAXone   IVPB 1000 milliGRAM(s) IV Intermittent every 24 hours    immunologic:    OTHER:  acetaminophen     Tablet .. 650 milliGRAM(s) Oral every 6 hours PRN  albuterol    0.083% 2.5 milliGRAM(s) Nebulizer every 8 hours  amLODIPine   Tablet 5 milliGRAM(s) Oral daily  bisacodyl 5 milliGRAM(s) Oral every 12 hours PRN  enoxaparin Injectable 30 milliGRAM(s) SubCutaneous every 24 hours  guaiFENesin Oral Liquid (Sugar-Free) 200 milliGRAM(s) Oral every 6 hours PRN  hydrochlorothiazide 6.25 milliGRAM(s) Oral daily  levothyroxine 50 MICROGram(s) Oral daily  melatonin 3 milliGRAM(s) Oral at bedtime PRN  metoprolol succinate ER 50 milliGRAM(s) Oral daily  prednisoLONE acetate 1% Suspension 1 Drop(s) Both EYES daily  predniSONE   Tablet 2.5 milliGRAM(s) Oral daily  tiotropium 2.5 MICROgram(s) Inhaler 2 Puff(s) Inhalation daily      Objective:  Vital Signs Last 24 Hrs  T(C): 36.8 (05 Oct 2024 05:09), Max: 37.2 (04 Oct 2024 22:44)  T(F): 98.2 (05 Oct 2024 05:09), Max: 98.9 (04 Oct 2024 22:44)  HR: 75 (05 Oct 2024 07:58) (73 - 95)  BP: 147/57 (05 Oct 2024 05:09) (128/58 - 165/83)  BP(mean): --  RR: 18 (05 Oct 2024 05:09) (17 - 20)  SpO2: 98% (05 Oct 2024 07:58) (90% - 99%)    Parameters below as of 05 Oct 2024 07:58  Patient On (Oxygen Delivery Method): nasal cannula        T(C): 36.8 (10-05-24 @ 05:09), Max: 37.2 (10-04-24 @ 22:44)  T(C): 36.8 (10-05-24 @ 05:09), Max: 37.2 (10-04-24 @ 22:44)  T(C): 36.8 (10-05-24 @ 05:09), Max: 37.2 (10-04-24 @ 22:44)    PHYSICAL EXAM:  HEENT: NC atraumatic  Neck: supple  Respiratory: no accessory muscle use, breathing comfortably  Cardiovascular: distant  Gastrointestinal: normal appearing, nondistended  Extremities: no clubbing, no cyanosis,        LABS:        WBC  7.78 10-03 @ 08:40  7.37 10-02 @ 16:40      10-04    139  |  100  |  28[H]  ----------------------------<  149[H]  4.3   |  31  |  0.89    Ca    8.7      04 Oct 2024 23:39  Phos  2.8     10-04  Mg     2.0     10-04        Creatinine: 0.89 mg/dL (10-04-24 @ 23:39)  Creatinine: 0.82 mg/dL (10-03-24 @ 08:40)  Creatinine: 0.89 mg/dL (10-02-24 @ 16:40)        Urinalysis Basic - ( 04 Oct 2024 23:39 )    Color: x / Appearance: x / SG: x / pH: x  Gluc: 149 mg/dL / Ketone: x  / Bili: x / Urobili: x   Blood: x / Protein: x / Nitrite: x   Leuk Esterase: x / RBC: x / WBC x   Sq Epi: x / Non Sq Epi: x / Bacteria: x            INFLAMMATORY MARKERS      MICROBIOLOGY:              RADIOLOGY & ADDITIONAL STUDIES:  
[facility] DIVISION of INFECTIOUS DISEASE  Kody Burch MD PhD, Kellen Lopez MD, Allyson Leigh MD, Courtney Connor MD, Nico Delarosa MD  and providing coverage with Escobar Omalley MD  Providing Infectious Disease Consultations at [facility], [facility], [place], [facility], [facility]    Office# [phone] to schedule follow up appointments  Answering Service for urgent calls or New Consults [phone]  Cell# to text for urgent issues Kody Burch [phone]     infectious diseases progress note:    SANDEE PEARSON is a 89y y. o. Female patient    Overnight and events of the last 24hrs reviewed    Allergies    Percocet 5/325 (Vomiting)  codeine (Nausea)    Intolerances        ANTIBIOTICS/RELEVANT:  antimicrobials  azithromycin  IVPB 500 milliGRAM(s) IV Intermittent every 24 hours  cefTRIAXone   IVPB 1000 milliGRAM(s) IV Intermittent every 24 hours    immunologic:    OTHER:  acetaminophen     Tablet .. 650 milliGRAM(s) Oral every 6 hours PRN  albuterol    0.083% 2.5 milliGRAM(s) Nebulizer every 8 hours  amLODIPine   Tablet 5 milliGRAM(s) Oral daily  bisacodyl 5 milliGRAM(s) Oral every 12 hours PRN  enoxaparin Injectable 30 milliGRAM(s) SubCutaneous every 24 hours  guaiFENesin Oral Liquid (Sugar-Free) 200 milliGRAM(s) Oral every 6 hours PRN  hydrochlorothiazide 6.25 milliGRAM(s) Oral daily  levothyroxine 50 MICROGram(s) Oral daily  melatonin 3 milliGRAM(s) Oral at bedtime PRN  metoprolol succinate ER 50 milliGRAM(s) Oral daily  prednisoLONE acetate 1% Suspension 1 Drop(s) Both EYES daily  predniSONE   Tablet 2.5 milliGRAM(s) Oral daily  sodium chloride 3%  Inhalation 4 milliLiter(s) Inhalation every 8 hours  tiotropium 2.5 MICROgram(s) Inhaler 2 Puff(s) Inhalation daily      Objective:  Vital Signs Last 24 Hrs  T(C): 36.5 (04 Oct 2024 04:36), Max: 36.8 (03 Oct 2024 12:57)  T(F): 97.7 (04 Oct 2024 04:36), Max: 98.3 (03 Oct 2024 12:57)  HR: 79 (04 Oct 2024 04:36) (78 - 84)  BP: 137/68 (04 Oct 2024 04:36) (124/57 - 137/68)  BP(mean): --  RR: 18 (04 Oct 2024 04:36) (18 - 19)  SpO2: 100% (04 Oct 2024 04:36) (86% - 100%)    Parameters below as of 04 Oct 2024 04:36  Patient On (Oxygen Delivery Method): nasal cannula  O2 Flow (L/min): 3      T(C): 36.5 (10-04-24 @ 04:36), Max: 36.8 (10-02-24 @ 14:31)  T(C): 36.5 (10-04-24 @ 04:36), Max: 36.8 (10-02-24 @ 14:31)  T(C): 36.5 (10-04-24 @ 04:36), Max: 36.8 (10-02-24 @ 14:31)    PHYSICAL EXAM:  HEENT: NC atraumatic  Neck: supple  Respiratory: no accessory muscle use, breathing comfortably, few  crackles dec BS left base  Cardiovascular: distant  Gastrointestinal: normal appearing, nondistended  Extremities: no clubbing, no cyanosis,        LABS:                          10.4   7.78  )-----------( 244      ( 03 Oct 2024 08:40 )             31.8       WBC  7.78 10-03 @ 08:40  7.37 10-02 @ 16:40      10-03    138  |  102  |  25[H]  ----------------------------<  133[H]  4.2   |  29  |  0.82    Ca    9.1      03 Oct 2024 08:40    TPro  6.7  /  Alb  2.5[L]  /  TBili  0.3  /  DBili  x   /  AST  26  /  ALT  23  /  AlkPhos  84  10-03      Creatinine: 0.82 mg/dL (10-03-24 @ 08:40)  Creatinine: 0.89 mg/dL (10-02-24 @ 16:40)      PT/INR - ( 02 Oct 2024 16:40 )   PT: 11.0 sec;   INR: 0.94 ratio         PTT - ( 02 Oct 2024 16:40 )  PTT:28.5 sec  Urinalysis Basic - ( 03 Oct 2024 08:40 )    Color: x / Appearance: x / SG: x / pH: x  Gluc: 133 mg/dL / Ketone: x  / Bili: x / Urobili: x   Blood: x / Protein: x / Nitrite: x   Leuk Esterase: x / RBC: x / WBC x   Sq Epi: x / Non Sq Epi: x / Bacteria: x            INFLAMMATORY MARKERS      MICROBIOLOGY:    Legionella Antigen, Urine: Negative (10.02.24 @ 20:50)        RADIOLOGY & ADDITIONAL STUDIES:  
Patient is a 89y old  Female who presents with a chief complaint of PNA (04 Oct 2024 09:13)      INTERVAL HPI/OVERNIGHT EVENTS: noted  pt seen and examined this am   events noted  feels well      Vital Signs Last 24 Hrs  T(C): 36.6 (04 Oct 2024 12:44), Max: 36.6 (03 Oct 2024 19:40)  T(F): 97.9 (04 Oct 2024 12:44), Max: 97.9 (03 Oct 2024 19:40)  HR: 79 (04 Oct 2024 12:44) (70 - 84)  BP: 131/67 (04 Oct 2024 12:44) (124/57 - 137/68)  BP(mean): --  RR: 18 (04 Oct 2024 12:44) (18 - 19)  SpO2: 95% (04 Oct 2024 12:44) (95% - 100%)    Parameters below as of 04 Oct 2024 12:44  Patient On (Oxygen Delivery Method): nasal cannula        acetaminophen     Tablet .. 650 milliGRAM(s) Oral every 6 hours PRN  albuterol    0.083% 2.5 milliGRAM(s) Nebulizer every 8 hours  amLODIPine   Tablet 5 milliGRAM(s) Oral daily  bisacodyl 5 milliGRAM(s) Oral every 12 hours PRN  cefTRIAXone   IVPB 1000 milliGRAM(s) IV Intermittent every 24 hours  enoxaparin Injectable 30 milliGRAM(s) SubCutaneous every 24 hours  guaiFENesin Oral Liquid (Sugar-Free) 200 milliGRAM(s) Oral every 6 hours PRN  hydrochlorothiazide 6.25 milliGRAM(s) Oral daily  levothyroxine 50 MICROGram(s) Oral daily  melatonin 3 milliGRAM(s) Oral at bedtime PRN  metoprolol succinate ER 50 milliGRAM(s) Oral daily  prednisoLONE acetate 1% Suspension 1 Drop(s) Both EYES daily  predniSONE   Tablet 2.5 milliGRAM(s) Oral daily  tiotropium 2.5 MICROgram(s) Inhaler 2 Puff(s) Inhalation daily      PHYSICAL EXAM:  GENERAL: NAD,   EYES: conjunctiva and sclera clear  ENMT: Moist mucous membranes  NECK: Supple, No JVD, Normal thyroid  CHEST/LUNG: non labored, cta b/l  HEART: Regular rate and rhythm; No murmurs, rubs, or gallops  ABDOMEN: Soft, Nontender, Nondistended; Bowel sounds present  EXTREMITIES:  2+ Peripheral Pulses, No clubbing, cyanosis, or edema  LYMPH: No lymphadenopathy noted  SKIN: No rashes or lesions    Consultant(s) Notes Reviewed:  [x ] YES  [ ] NO  Care Discussed with Consultants/Other Providers [ x] YES  [ ] NO    LABS:                        10.4   7.78  )-----------( 244      ( 03 Oct 2024 08:40 )             31.8     10-03    138  |  102  |  25[H]  ----------------------------<  133[H]  4.2   |  29  |  0.82    Ca    9.1      03 Oct 2024 08:40    TPro  6.7  /  Alb  2.5[L]  /  TBili  0.3  /  DBili  x   /  AST  26  /  ALT  23  /  AlkPhos  84  10-03    PT/INR - ( 02 Oct 2024 16:40 )   PT: 11.0 sec;   INR: 0.94 ratio         PTT - ( 02 Oct 2024 16:40 )  PTT:28.5 sec  Urinalysis Basic - ( 03 Oct 2024 08:40 )    Color: x / Appearance: x / SG: x / pH: x  Gluc: 133 mg/dL / Ketone: x  / Bili: x / Urobili: x   Blood: x / Protein: x / Nitrite: x   Leuk Esterase: x / RBC: x / WBC x   Sq Epi: x / Non Sq Epi: x / Bacteria: x      CAPILLARY BLOOD GLUCOSE            Urinalysis Basic - ( 03 Oct 2024 08:40 )    Color: x / Appearance: x / SG: x / pH: x  Gluc: 133 mg/dL / Ketone: x  / Bili: x / Urobili: x   Blood: x / Protein: x / Nitrite: x   Leuk Esterase: x / RBC: x / WBC x   Sq Epi: x / Non Sq Epi: x / Bacteria: x          RADIOLOGY & ADDITIONAL TESTS:    Imaging Personally Reviewed:  [x ] YES  [ ] NO
Patient is a 89y old  Female who presents with a chief complaint of PNA (05 Oct 2024 09:34)      INTERVAL HPI/OVERNIGHT EVENTS: noted  pt seen and examined this am   events noted  feels well      Vital Signs Last 24 Hrs  T(C): 36.8 (05 Oct 2024 12:09), Max: 37.2 (04 Oct 2024 22:44)  T(F): 98.3 (05 Oct 2024 12:09), Max: 98.9 (04 Oct 2024 22:44)  HR: 72 (05 Oct 2024 14:11) (72 - 95)  BP: 136/62 (05 Oct 2024 12:09) (128/58 - 165/83)  BP(mean): --  RR: 18 (05 Oct 2024 12:09) (17 - 20)  SpO2: 98% (05 Oct 2024 14:11) (90% - 99%)    Parameters below as of 05 Oct 2024 14:11  Patient On (Oxygen Delivery Method): nasal cannula        acetaminophen     Tablet .. 650 milliGRAM(s) Oral every 6 hours PRN  albuterol    0.083% 2.5 milliGRAM(s) Nebulizer every 8 hours  amLODIPine   Tablet 5 milliGRAM(s) Oral daily  bisacodyl 5 milliGRAM(s) Oral every 12 hours PRN  cefTRIAXone   IVPB 1000 milliGRAM(s) IV Intermittent every 24 hours  enoxaparin Injectable 30 milliGRAM(s) SubCutaneous every 24 hours  guaiFENesin Oral Liquid (Sugar-Free) 200 milliGRAM(s) Oral every 6 hours PRN  hydrochlorothiazide 6.25 milliGRAM(s) Oral daily  levothyroxine 50 MICROGram(s) Oral daily  melatonin 3 milliGRAM(s) Oral at bedtime PRN  metoprolol succinate ER 50 milliGRAM(s) Oral daily  prednisoLONE acetate 1% Suspension 1 Drop(s) Both EYES daily  predniSONE   Tablet 2.5 milliGRAM(s) Oral daily  tiotropium 2.5 MICROgram(s) Inhaler 2 Puff(s) Inhalation daily      PHYSICAL EXAM:  GENERAL: NAD,   EYES: conjunctiva and sclera clear  ENMT: Moist mucous membranes  NECK: Supple, No JVD, Normal thyroid  CHEST/LUNG: non labored, cta b/l  HEART: Regular rate and rhythm; No murmurs, rubs, or gallops  ABDOMEN: Soft, Nontender, Nondistended; Bowel sounds present  EXTREMITIES:  2+ Peripheral Pulses, No clubbing, cyanosis, or edema  LYMPH: No lymphadenopathy noted  SKIN: No rashes or lesions    Consultant(s) Notes Reviewed:  [x ] YES  [ ] NO  Care Discussed with Consultants/Other Providers [ x] YES  [ ] NO    LABS:    10-04    139  |  100  |  28[H]  ----------------------------<  149[H]  4.3   |  31  |  0.89    Ca    8.7      04 Oct 2024 23:39  Phos  2.8     10-04  Mg     2.0     10-04        Urinalysis Basic - ( 04 Oct 2024 23:39 )    Color: x / Appearance: x / SG: x / pH: x  Gluc: 149 mg/dL / Ketone: x  / Bili: x / Urobili: x   Blood: x / Protein: x / Nitrite: x   Leuk Esterase: x / RBC: x / WBC x   Sq Epi: x / Non Sq Epi: x / Bacteria: x      CAPILLARY BLOOD GLUCOSE            Urinalysis Basic - ( 04 Oct 2024 23:39 )    Color: x / Appearance: x / SG: x / pH: x  Gluc: 149 mg/dL / Ketone: x  / Bili: x / Urobili: x   Blood: x / Protein: x / Nitrite: x   Leuk Esterase: x / RBC: x / WBC x   Sq Epi: x / Non Sq Epi: x / Bacteria: x          RADIOLOGY & ADDITIONAL TESTS:    Imaging Personally Reviewed:  [x ] YES  [ ] NO

## 2024-10-06 NOTE — DISCHARGE NOTE NURSING/CASE MANAGEMENT/SOCIAL WORK - PATIENT PORTAL LINK FT
You can access the FollowMyHealth Patient Portal offered by [facility] by registering at the following website: [email or web address]. By joining [employer]’s FollowMyHealth portal, you will also be able to view your health information using other applications (apps) compatible with our system.

## 2024-10-06 NOTE — CASE MANAGEMENT PROGRESS NOTE - NSCMPROGRESSNOTE_GEN_ALL_CORE
Met with the patient and daughter at the bedside. Delivered POC to bedside. Provided instruction to daughter on the use of the POC. Daughter verbalized and appropriately demonstrated how to use the POC. Patient to be discharged home today with SOC tomorrow for VN with [facility]. Daughter in agreement with discharge plan.

## 2024-10-06 NOTE — PROGRESS NOTE ADULT - PROVIDER SPECIALTY LIST ADULT
Cardiology
Infectious Disease
Internal Medicine
Pulmonology
Cardiology
Cardiology
Internal Medicine
Infectious Disease
Infectious Disease
Pulmonology
Pulmonology
Internal Medicine
Pulmonology

## 2024-10-06 NOTE — DISCHARGE NOTE NURSING/CASE MANAGEMENT/SOCIAL WORK - NSDCVIVACCINE_GEN_ALL_CORE_FT
Tdap; 23-Nov-2023 21:41; Natacha Monaco (RN); [employer]; [id number] (Exp. Date: 20-Jul-2025); IntraMuscular; Deltoid Left.; 0.5 milliLiter(s); VIS (VIS Published: 09-May-2013, VIS Presented: 23-Nov-2023);

## 2024-10-07 NOTE — CASE MANAGEMENT PROGRESS NOTE - NSCMPROGRESSNOTE_GEN_ALL_CORE
[facility] requested  newO2 sat for 10/6/24 and updated scrip for home O2. Dr Monreal  complied, requested documents sent to [facility] and copies given to liaison Casie.

## 2024-10-14 PROBLEM — J44.9 CHRONIC OBSTRUCTIVE PULMONARY DISEASE, UNSPECIFIED: Chronic | Status: ACTIVE | Noted: 2024-10-02

## 2024-10-14 PROBLEM — I73.9 PERIPHERAL VASCULAR DISEASE, UNSPECIFIED: Chronic | Status: ACTIVE | Noted: 2024-10-02

## 2024-10-14 PROBLEM — I48.91 UNSPECIFIED ATRIAL FIBRILLATION: Chronic | Status: ACTIVE | Noted: 2024-10-02

## 2024-10-14 PROBLEM — E03.9 HYPOTHYROIDISM, UNSPECIFIED: Chronic | Status: ACTIVE | Noted: 2024-10-02

## 2024-10-15 ENCOUNTER — APPOINTMENT (OUTPATIENT)
Dept: WOUND CARE | Facility: HOSPITAL | Age: 89
End: 2024-10-15

## 2024-10-16 ENCOUNTER — INPATIENT (INPATIENT)
Facility: HOSPITAL | Age: 89
LOS: 7 days | Discharge: NURSING, REHAB OR LONG-TERM CARE | DRG: 204 | End: 2024-10-24
Attending: INTERNAL MEDICINE | Admitting: INTERNAL MEDICINE
Payer: MEDICARE

## 2024-10-16 VITALS
OXYGEN SATURATION: 96 % | HEIGHT: 63 IN | SYSTOLIC BLOOD PRESSURE: 178 MMHG | WEIGHT: 119.93 LBS | DIASTOLIC BLOOD PRESSURE: 69 MMHG | HEART RATE: 78 BPM | RESPIRATION RATE: 18 BRPM | TEMPERATURE: 97 F

## 2024-10-16 DIAGNOSIS — Z96.662 PRESENCE OF LEFT ARTIFICIAL ANKLE JOINT: Chronic | ICD-10-CM

## 2024-10-16 DIAGNOSIS — R06.03 ACUTE RESPIRATORY DISTRESS: ICD-10-CM

## 2024-10-16 DIAGNOSIS — Z71.89 OTHER SPECIFIED COUNSELING: ICD-10-CM

## 2024-10-16 DIAGNOSIS — R06.02 SHORTNESS OF BREATH: ICD-10-CM

## 2024-10-16 DIAGNOSIS — Z94.7 CORNEAL TRANSPLANT STATUS: Chronic | ICD-10-CM

## 2024-10-16 DIAGNOSIS — Z51.5 ENCOUNTER FOR PALLIATIVE CARE: ICD-10-CM

## 2024-10-16 DIAGNOSIS — J44.1 CHRONIC OBSTRUCTIVE PULMONARY DISEASE WITH (ACUTE) EXACERBATION: ICD-10-CM

## 2024-10-16 LAB
ALBUMIN SERPL ELPH-MCNC: 2.9 G/DL — LOW (ref 3.3–5)
ALP SERPL-CCNC: 84 U/L — SIGNIFICANT CHANGE UP (ref 40–120)
ALT FLD-CCNC: 27 U/L — SIGNIFICANT CHANGE UP (ref 12–78)
ANION GAP SERPL CALC-SCNC: 7 MMOL/L — SIGNIFICANT CHANGE UP (ref 5–17)
APPEARANCE UR: CLEAR — SIGNIFICANT CHANGE UP
APTT BLD: 28.1 SEC — SIGNIFICANT CHANGE UP (ref 24.5–35.6)
AST SERPL-CCNC: 29 U/L — SIGNIFICANT CHANGE UP (ref 15–37)
BACTERIA # UR AUTO: ABNORMAL /HPF
BASE EXCESS BLDV CALC-SCNC: 13.7 MMOL/L — HIGH (ref -2–3)
BASOPHILS # BLD AUTO: 0.02 K/UL — SIGNIFICANT CHANGE UP (ref 0–0.2)
BASOPHILS NFR BLD AUTO: 0.2 % — SIGNIFICANT CHANGE UP (ref 0–2)
BILIRUB SERPL-MCNC: 0.3 MG/DL — SIGNIFICANT CHANGE UP (ref 0.2–1.2)
BILIRUB UR-MCNC: NEGATIVE — SIGNIFICANT CHANGE UP
BUN SERPL-MCNC: 34 MG/DL — HIGH (ref 7–23)
CALCIUM SERPL-MCNC: 9.4 MG/DL — SIGNIFICANT CHANGE UP (ref 8.5–10.1)
CHLORIDE SERPL-SCNC: 101 MMOL/L — SIGNIFICANT CHANGE UP (ref 96–108)
CK SERPL-CCNC: 25 U/L — LOW (ref 26–192)
CO2 SERPL-SCNC: 33 MMOL/L — HIGH (ref 22–31)
COLOR SPEC: YELLOW — SIGNIFICANT CHANGE UP
CREAT SERPL-MCNC: 0.71 MG/DL — SIGNIFICANT CHANGE UP (ref 0.5–1.3)
DIFF PNL FLD: NEGATIVE — SIGNIFICANT CHANGE UP
EGFR: 76 ML/MIN/1.73M2 — SIGNIFICANT CHANGE UP
EOSINOPHIL # BLD AUTO: 0 K/UL — SIGNIFICANT CHANGE UP (ref 0–0.5)
EOSINOPHIL NFR BLD AUTO: 0 % — SIGNIFICANT CHANGE UP (ref 0–6)
EPI CELLS # UR: SIGNIFICANT CHANGE UP
GAS PNL BLDV: SIGNIFICANT CHANGE UP
GLUCOSE SERPL-MCNC: 141 MG/DL — HIGH (ref 70–99)
GLUCOSE UR QL: NEGATIVE MG/DL — SIGNIFICANT CHANGE UP
HCO3 BLDV-SCNC: 41 MMOL/L — HIGH (ref 22–29)
HCT VFR BLD CALC: 33.3 % — LOW (ref 34.5–45)
HGB BLD-MCNC: 10.3 G/DL — LOW (ref 11.5–15.5)
IMM GRANULOCYTES NFR BLD AUTO: 0.6 % — SIGNIFICANT CHANGE UP (ref 0–0.9)
INR BLD: 0.91 RATIO — SIGNIFICANT CHANGE UP (ref 0.85–1.16)
KETONES UR-MCNC: NEGATIVE MG/DL — SIGNIFICANT CHANGE UP
LACTATE SERPL-SCNC: 0.6 MMOL/L — LOW (ref 0.7–2)
LEUKOCYTE ESTERASE UR-ACNC: NEGATIVE — SIGNIFICANT CHANGE UP
LYMPHOCYTES # BLD AUTO: 0.36 K/UL — LOW (ref 1–3.3)
LYMPHOCYTES # BLD AUTO: 3.3 % — LOW (ref 13–44)
MCHC RBC-ENTMCNC: 30.4 PG — SIGNIFICANT CHANGE UP (ref 27–34)
MCHC RBC-ENTMCNC: 30.9 GM/DL — LOW (ref 32–36)
MCV RBC AUTO: 98.2 FL — SIGNIFICANT CHANGE UP (ref 80–100)
MONOCYTES # BLD AUTO: 0.4 K/UL — SIGNIFICANT CHANGE UP (ref 0–0.9)
MONOCYTES NFR BLD AUTO: 3.6 % — SIGNIFICANT CHANGE UP (ref 2–14)
NEUTROPHILS # BLD AUTO: 10.15 K/UL — HIGH (ref 1.8–7.4)
NEUTROPHILS NFR BLD AUTO: 92.3 % — HIGH (ref 43–77)
NITRITE UR-MCNC: NEGATIVE — SIGNIFICANT CHANGE UP
NRBC # BLD: 0 /100 WBCS — SIGNIFICANT CHANGE UP (ref 0–0)
NT-PROBNP SERPL-SCNC: 5636 PG/ML — HIGH (ref 0–450)
PCO2 BLDV: 96 MMHG — CRITICAL HIGH (ref 39–42)
PH BLDV: 7.24 — LOW (ref 7.32–7.43)
PH UR: 5 — SIGNIFICANT CHANGE UP (ref 5–8)
PLATELET # BLD AUTO: 296 K/UL — SIGNIFICANT CHANGE UP (ref 150–400)
PO2 BLDV: 100 MMHG — HIGH (ref 25–45)
POTASSIUM SERPL-MCNC: 4.6 MMOL/L — SIGNIFICANT CHANGE UP (ref 3.5–5.3)
POTASSIUM SERPL-SCNC: 4.6 MMOL/L — SIGNIFICANT CHANGE UP (ref 3.5–5.3)
PROCALCITONIN SERPL-MCNC: 0.16 NG/ML — HIGH
PROT SERPL-MCNC: 7.4 G/DL — SIGNIFICANT CHANGE UP (ref 6–8.3)
PROT UR-MCNC: 30 MG/DL
PROTHROM AB SERPL-ACNC: 10.8 SEC — SIGNIFICANT CHANGE UP (ref 9.9–13.4)
RAPID RVP RESULT: SIGNIFICANT CHANGE UP
RBC # BLD: 3.39 M/UL — LOW (ref 3.8–5.2)
RBC # FLD: 13.3 % — SIGNIFICANT CHANGE UP (ref 10.3–14.5)
RBC CASTS # UR COMP ASSIST: SIGNIFICANT CHANGE UP /HPF
SAO2 % BLDV: 99.2 % — HIGH (ref 67–88)
SARS-COV-2 RNA SPEC QL NAA+PROBE: SIGNIFICANT CHANGE UP
SODIUM SERPL-SCNC: 141 MMOL/L — SIGNIFICANT CHANGE UP (ref 135–145)
SP GR SPEC: 1.03 — HIGH (ref 1–1.03)
TROPONIN I, HIGH SENSITIVITY RESULT: 38.5 NG/L — SIGNIFICANT CHANGE UP
UROBILINOGEN FLD QL: 0.2 MG/DL — SIGNIFICANT CHANGE UP (ref 0.2–1)
WBC # BLD: 11.14 K/UL — HIGH (ref 3.8–10.5)
WBC # FLD AUTO: 11.14 K/UL — HIGH (ref 3.8–10.5)
WBC UR QL: SIGNIFICANT CHANGE UP /HPF (ref 0–5)

## 2024-10-16 PROCEDURE — 74177 CT ABD & PELVIS W/CONTRAST: CPT | Mod: 26,MC

## 2024-10-16 PROCEDURE — 71045 X-RAY EXAM CHEST 1 VIEW: CPT | Mod: 26

## 2024-10-16 PROCEDURE — 70450 CT HEAD/BRAIN W/O DYE: CPT | Mod: 26,MC

## 2024-10-16 PROCEDURE — 99223 1ST HOSP IP/OBS HIGH 75: CPT

## 2024-10-16 PROCEDURE — 93010 ELECTROCARDIOGRAM REPORT: CPT

## 2024-10-16 PROCEDURE — 71260 CT THORAX DX C+: CPT | Mod: 26,MC

## 2024-10-16 PROCEDURE — 99285 EMERGENCY DEPT VISIT HI MDM: CPT

## 2024-10-16 RX ORDER — ENOXAPARIN SODIUM 40MG/0.4ML
30 SYRINGE (ML) SUBCUTANEOUS EVERY 24 HOURS
Refills: 0 | Status: DISCONTINUED | OUTPATIENT
Start: 2024-10-16 | End: 2024-10-17

## 2024-10-16 RX ORDER — SODIUM CHLORIDE 2 %
1 DROPS OPHTHALMIC (EYE)
Refills: 0 | DISCHARGE

## 2024-10-16 RX ORDER — AMLODIPINE BESYLATE 5 MG
1 TABLET ORAL
Refills: 0 | DISCHARGE

## 2024-10-16 RX ORDER — PIPERACILLIN AND TAZOBACTAM .5; 4 G/20ML; G/20ML
3.38 INJECTION, POWDER, LYOPHILIZED, FOR SOLUTION INTRAVENOUS ONCE
Refills: 0 | Status: COMPLETED | OUTPATIENT
Start: 2024-10-16 | End: 2024-10-16

## 2024-10-16 RX ORDER — ALBUTEROL 90 MCG
2.5 AEROSOL (GRAM) INHALATION EVERY 8 HOURS
Refills: 0 | Status: DISCONTINUED | OUTPATIENT
Start: 2024-10-16 | End: 2024-10-24

## 2024-10-16 RX ORDER — PIPERACILLIN AND TAZOBACTAM .5; 4 G/20ML; G/20ML
3.38 INJECTION, POWDER, LYOPHILIZED, FOR SOLUTION INTRAVENOUS EVERY 8 HOURS
Refills: 0 | Status: COMPLETED | OUTPATIENT
Start: 2024-10-16 | End: 2024-10-23

## 2024-10-16 RX ORDER — METOPROLOL TARTRATE 50 MG
50 TABLET ORAL DAILY
Refills: 0 | Status: DISCONTINUED | OUTPATIENT
Start: 2024-10-17 | End: 2024-10-17

## 2024-10-16 RX ORDER — SODIUM CHLORIDE 9 MG/ML
1000 INJECTION, SOLUTION INTRAMUSCULAR; INTRAVENOUS; SUBCUTANEOUS ONCE
Refills: 0 | Status: COMPLETED | OUTPATIENT
Start: 2024-10-16 | End: 2024-10-16

## 2024-10-16 RX ORDER — LEVOTHYROXINE SODIUM 88 MCG
50 TABLET ORAL DAILY
Refills: 0 | Status: DISCONTINUED | OUTPATIENT
Start: 2024-10-16 | End: 2024-10-17

## 2024-10-16 RX ORDER — SODIUM CHLORIDE 9 MG/ML
4 INJECTION, SOLUTION INTRAMUSCULAR; INTRAVENOUS; SUBCUTANEOUS EVERY 8 HOURS
Refills: 0 | Status: DISCONTINUED | OUTPATIENT
Start: 2024-10-16 | End: 2024-10-24

## 2024-10-16 RX ORDER — HYDRALAZINE HYDROCHLORIDE 50 MG/1
10 TABLET, FILM COATED ORAL ONCE
Refills: 0 | Status: COMPLETED | OUTPATIENT
Start: 2024-10-16 | End: 2024-10-16

## 2024-10-16 RX ORDER — HALOPERIDOL DECANOATE 50 MG/ML
0.5 INJECTION INTRAMUSCULAR EVERY 6 HOURS
Refills: 0 | Status: DISCONTINUED | OUTPATIENT
Start: 2024-10-16 | End: 2024-10-24

## 2024-10-16 RX ORDER — AMLODIPINE BESYLATE 10 MG
5 TABLET ORAL DAILY
Refills: 0 | Status: DISCONTINUED | OUTPATIENT
Start: 2024-10-17 | End: 2024-10-24

## 2024-10-16 RX ORDER — HALOPERIDOL DECANOATE 50 MG/ML
0.5 INJECTION INTRAMUSCULAR EVERY 8 HOURS
Refills: 0 | Status: DISCONTINUED | OUTPATIENT
Start: 2024-10-16 | End: 2024-10-24

## 2024-10-16 RX ORDER — CELECOXIB 200 MG/1
1 CAPSULE ORAL
Refills: 0 | DISCHARGE

## 2024-10-16 RX ORDER — VANCOMYCIN HYDROCHLORIDE 50 MG/ML
1000 KIT ORAL ONCE
Refills: 0 | Status: COMPLETED | OUTPATIENT
Start: 2024-10-16 | End: 2024-10-16

## 2024-10-16 RX ORDER — FUROSEMIDE 40 MG
40 TABLET ORAL DAILY
Refills: 0 | Status: DISCONTINUED | OUTPATIENT
Start: 2024-10-16 | End: 2024-10-20

## 2024-10-16 RX ADMIN — HYDRALAZINE HYDROCHLORIDE 10 MILLIGRAM(S): 50 TABLET, FILM COATED ORAL at 19:55

## 2024-10-16 RX ADMIN — SODIUM CHLORIDE 4 MILLILITER(S): 9 INJECTION, SOLUTION INTRAMUSCULAR; INTRAVENOUS; SUBCUTANEOUS at 23:27

## 2024-10-16 RX ADMIN — Medication 30 MILLIGRAM(S): at 19:55

## 2024-10-16 RX ADMIN — PIPERACILLIN AND TAZOBACTAM 25 GRAM(S): .5; 4 INJECTION, POWDER, LYOPHILIZED, FOR SOLUTION INTRAVENOUS at 22:18

## 2024-10-16 RX ADMIN — Medication 1 DROP(S): at 19:55

## 2024-10-16 RX ADMIN — Medication 2.5 MILLIGRAM(S): at 16:12

## 2024-10-16 RX ADMIN — SODIUM CHLORIDE 1000 MILLILITER(S): 9 INJECTION, SOLUTION INTRAMUSCULAR; INTRAVENOUS; SUBCUTANEOUS at 10:12

## 2024-10-16 RX ADMIN — SODIUM CHLORIDE 4 MILLILITER(S): 9 INJECTION, SOLUTION INTRAMUSCULAR; INTRAVENOUS; SUBCUTANEOUS at 16:12

## 2024-10-16 RX ADMIN — Medication 40 MILLIGRAM(S): at 13:17

## 2024-10-16 RX ADMIN — PIPERACILLIN AND TAZOBACTAM 200 GRAM(S): .5; 4 INJECTION, POWDER, LYOPHILIZED, FOR SOLUTION INTRAVENOUS at 11:45

## 2024-10-16 RX ADMIN — HALOPERIDOL DECANOATE 0.5 MILLIGRAM(S): 50 INJECTION INTRAMUSCULAR at 18:08

## 2024-10-16 RX ADMIN — VANCOMYCIN HYDROCHLORIDE 250 MILLIGRAM(S): KIT at 13:17

## 2024-10-16 RX ADMIN — Medication 2.5 MILLIGRAM(S): at 23:29

## 2024-10-16 NOTE — CONSULT NOTE ADULT - ASSESSMENT
89yo F with hx of HTN, COPD on home oxygen presented with SOB. palliative consulted for GOC by pulmonary physician.

## 2024-10-16 NOTE — CONSULT NOTE ADULT - ASSESSMENT
89-year-old female with a history of COPD on home oxygen (around 3 L), hypothyroidism, atrial fibrillation, peripheral vascular disease, arthritis status post recent admission for "pneumonia" presents with brought in by EMS from home for generalized weakness and fatigue.    effusion  OP  OA  Atelectasis  AMS  Elderly  Ataxic gait  AF  Hypothyroidism  PVD    ct imaging reviewed  eval for LRTI - recent antibiosis for LRTI - and admission reviewed  nebs and hypertonic saline for Mucoid Impaction  I and O  diuresis  monitor VS and HD and Sat  pleurocentesis with IR - right chest -   TTE reviewed - HFpEF and Pulm HTN - [facility]vs rx regimen and cardio eval  HOB elev  asp prec  oral hygiene  skin care  spoke with DTR  Pall eval - GOC discussion  prognosis guarded  hold sedating agents  monitor mentation

## 2024-10-16 NOTE — CARE COORDINATION ASSESSMENT. - NSCAREPROVIDERS_GEN_ALL_CORE_FT
CARE PROVIDERS:  Accepting Physician: Lynda Monreal  Administration: Carl Leigh  Admitting: Lynda Monreal  Attending: Lynda Monreal  Consultant: Ye Ulloa  Consultant: Adis Sandhu  ED Attending: Elton Bloom  ED Nurse: Elayne Moffett  Ordered: ServiceAccount, [facility]  Outpatient Provider: David Gaytan  Outpatient Provider: Lynda Monreal  Outpatient Provider: Mitch Hamilton  Outpatient Provider: Joe Cardoso  Outpatient Provider: Ye Ulloa  : Zuleika Chisholm  UR// Supp. Assoc.: Harriet Sanchez  UR// Supp. Assoc.: Naima Gillespie  UR// Supp. Assoc.: Elayne Rausch

## 2024-10-16 NOTE — PATIENT PROFILE ADULT - NSPROPTRIGHTSUPPORTPERSON_GEN_A_NUR

## 2024-10-16 NOTE — CONSULT NOTE ADULT - SUBJECTIVE AND OBJECTIVE BOX
History of Present Illness: The patient is a 89 year old female with a history of HTN, hypothyroidism, atrial fibrillation, COPD, PAD, dementia who presents with shortness of breath. The patient is unable to provide additional history; history obtained from daughter. She was recently admitted for PNA. Since discharge, she has been more anxious, agitated, delirious. She has been short of breat at times.    Past Medical/Surgical History:  HTN, hypothyroidism, atrial fibrillation, COPD, PAD, dementia    Medications:  Home Medications:  amLODIPine 5 mg oral tablet: 1 tab(s) orally once a day (16 Oct 2024 11:38)  bisoprolol-hydrochlorothiazide 2.5 mg-6.25 mg oral tablet: 1 tab(s) orally once a day (16 Oct 2024 11:38)  celecoxib 100 mg oral capsule: 1 cap(s) orally 2 times a day as needed (16 Oct 2024 11:03)  Centrum Silver Women 50+ oral tablet: 1 tab(s) orally once a day (16 Oct 2024 11:48)  ferrous sulfate 200 mg (65 mg elemental iron) oral tablet: 1 tab(s) orally once a day (16 Oct 2024 11:48)  levothyroxine 50 mcg (0.05 mg) oral capsule: 1 cap(s) orally once a day (16 Oct 2024 11:13)  lutein-zeaxanthin: 1 cap(s) orally once a day (16 Oct 2024 11:48)  mirtazapine 7.5 mg oral tablet: 2 tab(s) orally once a day (at bedtime) (16 Oct 2024 11:48)  Noe 128 2% ophthalmic solution: 1 drop(s) in each affected eye as needed (16 Oct 2024 11:48)  prednisoLONE acetate 1% ophthalmic suspension: 1 drop(s) in the right eye 2 times a day (16 Oct 2024 11:19)  predniSONE 2.5 mg oral tablet: 1 tab(s) orally once a day (16 Oct 2024 11:19)  Spiriva 18 mcg inhalation capsule: 1 cap(s) inhaled once a day (16 Oct 2024 11:38)      Family History: Non-contributory family history of premature cardiovascular atherosclerotic disease    Social History: No tobacco, alcohol or drug use    Review of Systems:  General: No fevers, chills, weight gain  Skin: No rashes, color changes  Cardiovascular: No chest pain, orthopnea  Respiratory: No shortness of breath, cough  Gastrointestinal: No nausea, abdominal pain  Genitourinary: No incontinence, pain with urination  Musculoskeletal: No pain, swelling, decreased range of motion  Neurological: No headache, weakness  Psychiatric: No depression, anxiety  Endocrine: No weight gain, increased thirst  All other systems are comprehensively negative.    Physical Exam:  Vitals:        Vital Signs Last 24 Hrs  T(C): 36.8 (16 Oct 2024 12:41), Max: 37.2 (16 Oct 2024 09:40)  T(F): 98.2 (16 Oct 2024 12:41), Max: 98.9 (16 Oct 2024 09:40)  HR: 80 (16 Oct 2024 13:15) (76 - 82)  BP: 151/72 (16 Oct 2024 13:15) (142/75 - 178/69)  BP(mean): --  RR: 20 (16 Oct 2024 13:15) (17 - 24)  SpO2: 100% (16 Oct 2024 13:15) (86% - 100%)  Parameters below as of 16 Oct 2024 13:15  Patient On (Oxygen Delivery Method): mask, nonrebreather  General: NAD  HEENT: MMM  Neck: No JVD, no carotid bruit  Lungs: CTAB  CV: RRR, nl S1/S2, no M/R/G  Abdomen: S/NT/ND, +BS  Extremities: No LE edema, no cyanosis  Neuro: AAOx3, non-focal  Skin: No rash    Labs:                        10.3   11.14 )-----------( 296      ( 16 Oct 2024 09:45 )             33.3     10-16    141  |  101  |  34[H]  ----------------------------<  141[H]  4.6   |  33[H]  |  0.71    Ca    9.4      16 Oct 2024 09:45    TPro  7.4  /  Alb  2.9[L]  /  TBili  0.3  /  DBili  x   /  AST  29  /  ALT  27  /  AlkPhos  84  10-16            ECG/Telemetry: NSR, normal axis, LVH

## 2024-10-16 NOTE — PATIENT PROFILE ADULT - CENTRAL VENOUS CATHETER/PICC LINE
Physical Therapy  Facility/Department: [facility] RENAL//MED SURG  Daily Treatment Note  NAME: Binh Walker  : 1968  MRN: [id number]    Date of Service: 2021    Discharge Recommendations:  Patient would benefit from continued therapy after discharge   PT Equipment Recommendations  Equipment Needed: No    Assessment   Body structures, Functions, Activity limitations: Decreased functional mobility ; Decreased strength; Decreased endurance; Increased pain  Assessment: Pt performs bed mobility with CGA for safety. Pt sat EOB for 8 mins with CGA without LOB. Pt demos left side lean due to c/o increased hip pain. Pt performed STS transfer with CGA using a RW along with verbal cues for hand placement. Pt demos a standing tolerance of 30 seconds before requiring a seated rest break. Pt deferred gait training this date due to increased c/o right hip pain with mobility. Pt would benefit from continued PT following discharge to address functional deficits. Prognosis: Good  Decision Making: Medium Complexity  PT Education: Goals; PT Role; Plan of Care  REQUIRES PT FOLLOW UP: Yes  Activity Tolerance  Activity Tolerance: Patient limited by fatigue; Patient limited by pain     Patient Diagnosis(es): There were no encounter diagnoses. has a past medical history of Anxiety and Chronic back pain. has a past surgical history that includes Hysterectomy, total abdominal () and [place] tooth extraction. Restrictions  Restrictions/Precautions  Restrictions/Precautions: General Precautions  Required Braces or Orthoses?: No  Position Activity Restriction  Other position/activity restrictions: Up w/ assist  Subjective   General  Chart Reviewed: Yes  Response To Previous Treatment: Patient with no complaints from previous session. Family / Caregiver Present: Yes  Subjective  Subjective: RN and pt agreeable to PT.   Pain Screening  Patient Currently in Pain: Yes  Pain Assessment  Pain Assessment: 0-10  Pain Level: 8  Vital Signs  Patient Currently in Pain: Yes       Orientation  Orientation  Overall Orientation Status: Within Normal Limits  Cognition   Cognition  Overall Cognitive Status: Exceptions  Following Commands: Follows multistep commands with increased time; Follows multistep commands with repitition  Safety Judgement: Decreased awareness of need for assistance; Decreased awareness of need for safety  Insights: Decreased awareness of deficits  Objective   Bed mobility  Supine to Sit: Contact guard assistance  Sit to Supine: Contact guard assistance  Scooting: Contact guard assistance  Transfers  Sit to Stand: Contact guard assistance  Stand to sit: Contact guard assistance  Ambulation  Ambulation?: No  More Ambulation?: No     Balance  Posture: Good  Sitting - Static: Good  Sitting - Dynamic: Fair  Standing - Static: Fair  Standing - Dynamic: Fair  Comments: Assessed with RW. Exercises  Straight Leg Raise: x10 BLE  Hip Flexion: x10 BLE  Hip Abduction: x10 BLE  Knee Long Arc Quad: x10 BLE  Ankle Pumps: x10 BLE  Core Strengthening: Pt sat EOB for 8 mins while performing dynamic activities with CGA without LOB. Demos left side lean due to right hip pain.   Other exercises  Other exercises?: No                        G-Code     OutComes Score                                                     AM-PAC Score  -PAC Inpatient Mobility Raw Score : 19 (12/14/21 1115)  -PAC Inpatient T-Scale Score : 45.44 (12/14/21 1115)  Mobility Inpatient CMS 0-100% Score: 41.77 (12/14/21 1115)  Mobility Inpatient CMS G-Code Modifier : CK (12/14/21 1115)          Goals  Short term goals  Time Frame for Short term goals: 10 visits  Short term goal 1: transfers with SBA  Short term goal 2: amb 125 ft with a RW x SBA  Short term goal 3: ascend/descend 4 steps with SBA  Short term goal 4: 20 min exercise program x SBA  Patient Goals   Patient goals : Return home    Plan    Plan  Times per week: 5-6x wk  Current Treatment Recommendations: no

## 2024-10-16 NOTE — H&P ADULT - HISTORY OF PRESENT ILLNESS
89-year-old female with a history of COPD on home oxygen (around 3 L), hypothyroidism, atrial fibrillation, peripheral vascular disease, arthritis status post recent admission for "pneumonia" presents with brought in by EMS from home for generalized weakness and fatigue.  The patient is more short of breath than usual.  No acute hypoxia noted.  No acute pain at this time.  No vomiting or diarrhea. 89-year-old female with a history of COPD on home oxygen (around 3 L), hypothyroidism, atrial fibrillation, peripheral vascular disease, arthritis status post recent admission for "pneumonia"brought in by EMS from home for generalized weakness and fatigue.  associated w short of breath , noted w agitation and restlessness for few days-started on remeron with no improvement .dec po

## 2024-10-16 NOTE — ED PROVIDER NOTE - OBJECTIVE STATEMENT
89-year-old female with a history of COPD on home oxygen (around 3 L), hypothyroidism, atrial fibrillation, peripheral vascular disease, arthritis status post recent admission for "pneumonia" presents with brought in by EMS from home for generalized weakness and fatigue.  The patient is more short of breath than usual.  No acute hypoxia noted.  No acute pain at this time.  No vomiting or diarrhea.  Patient with decreased ability to give me history secondary to fatigue.  No other history available.

## 2024-10-16 NOTE — ED ADULT NURSE REASSESSMENT NOTE - NSFALLHARMRISKINTERV_ED_ALL_ED
Assistance OOB with selected safe patient handling equipment if applicable/Assistance with ambulation/Communicate risk of Fall with Harm to all staff, patient, and family/Monitor gait and stability/Monitor for mental status changes and reorient to person, place, and time, as needed/Provide visual cue: red socks, yellow wristband, yellow gown, etc/Reinforce activity limits and safety measures with patient and family/Toileting schedule using arm’s reach rule for commode and bathroom/Use of alarms - bed, stretcher, chair and/or video monitoring/Bed in lowest position, wheels locked, appropriate side rails in place/Call bell, personal items and telephone in reach/Instruct patient to call for assistance before getting out of bed/chair/stretcher/Non-slip footwear applied when patient is off stretcher/[place] to call system/Physically safe environment - no spills, clutter or unnecessary equipment/Purposeful Proactive Rounding/Room/bathroom lighting operational, light cord in reach

## 2024-10-16 NOTE — PATIENT PROFILE ADULT - FALL HARM RISK - RISK INTERVENTIONS

## 2024-10-16 NOTE — ED ADULT NURSE REASSESSMENT NOTE - NSFALLRISKASMT_ED_ALL_ED_DT
16-Oct-2024 20:03 [General Appearance - Well Developed] : interactive [Appearance Of Head] : the head was normocephalic [General Appearance - In No Acute Distress] : in no acute distress [General Appearance - Well-Appearing] : well appearing [Sclera] : the sclera and conjunctiva were normal [Extraocular Movements] : extraocular movements were intact [Outer Ear] : the ears and nose were normal in appearance [PERRL With Normal Accommodation] : pupils were equal in size, round, reactive to light, with normal accommodation [Both Tympanic Membranes Were Examined] : both tympanic membranes were normal [Nasal Cavity] : the nasal mucosa and septum were normal [Examination Of The Oral Cavity] : the teeth, gums, and palate were normal [Oropharynx] : the oropharynx was normal  [Respiration, Rhythm And Depth] : normal respiratory rhythm and effort [Neck Cervical Mass (___cm)] : no neck mass was observed [Auscultation Breath Sounds / Voice Sounds] : clear bilateral breath sounds [Heart Rate And Rhythm] : heart rate and rhythm were normal [Murmurs] : no murmurs [Bowel Sounds] : normal bowel sounds [Heart Sounds] : normal S1 and S2 [Abdomen Soft] : soft [Abdomen Tenderness] : non-tender [Abdominal Distention] : nondistended [Motor Tone] : muscle strength and tone were normal [Musculoskeletal Exam: Normal Movement Of All Extremities] : normal movements of all extremities [No Visual Abnormalities] : no visible abnormailities [Generalized Lymph Node Enlargement] : no lymphadenopathy [Deep Tendon Reflexes (DTR)] : deep tendon reflexes were 2+ and symmetric [Initial Inspection: Infant Active And Alert] : active and alert [Skin Lesions] : no skin lesions [] : no significant rash [Skin Color & Pigmentation] : normal skin color and pigmentation [External Female Genitalia] : normal external genitalia

## 2024-10-16 NOTE — ED PROVIDER NOTE - CLINICAL SUMMARY MEDICAL DECISION MAKING FREE TEXT BOX
Acute shortness of breath with diminished breath size on the right, generalized weakness and fatigue.  Will check labs, x-ray, pulmonology, IV, admission

## 2024-10-16 NOTE — CONSULT NOTE ADULT - PROBLEM SELECTOR RECOMMENDATION 3
DNR/DNI established, MOLST completed  family wants to continue medical management  hospice discussion broached and family aware this may be appropriate transition or disposition.  see GOC narrative written separately by palliative SW for details

## 2024-10-16 NOTE — ED PROVIDER NOTE - CHIEF COMPLAINT
Annelise applied to patient     Fer Kuo, BOLA  05/12/22 [id number] The patient is a 89y Female complaining of altered mental status.

## 2024-10-16 NOTE — H&P ADULT - ASSESSMENT
#89-year-old female with a history of COPD on home oxygen (around 3 L), hypothyroidism, atrial fibrillation, peripheral vascular disease, arthritis status post recent admission for "pneumonia" aw generalized weakness , AMS, SOB, FTT  ABG reviewed  CT chest- Bilateral pleural effusions right larger than left increased in size from prior.  Tree-in-bud parenchymal opacities upper lobes suggestive of small airways impaction/infection.  Nonspecific upper lobe groundglass opacities may reflect pulmonary edema and/or infection.  No acute findings abdomen and pelvis      #.acute on chronic hypoxic and hypercarbic respiratory failure   pna, pl effusions, pulm edema  overall poor prognosis  cards and pulm cs noted  ?plan for thoracentesis  palliative cs noted-pt made dnr dni, NIV and medical mgmt to continue  will start bipap, o2 support  iv antibiotics  ID cs    #delirium/metabolic encephalopathy  prn haldol  npo for now    #Afib- no AC    #DVT ppx- start hep sq    [facility]   [phone]

## 2024-10-16 NOTE — CONSULT NOTE ADULT - SUBJECTIVE AND OBJECTIVE BOX
HPI: 89yo F with hx of HTN, COPD on home oxygen presented with SOB. palliative consulted for GOC by pulmonary physician.    PERTINENT PM/SXH:   Hypertension    Fall    Fall    Pelvic fracture    Hypothyroidism    COPD exacerbation    COPD without exacerbation    Afib    PVD (peripheral vascular disease)      S/P ankle joint replacement, left    Transplanted cornea      FAMILY HISTORY: no known hx in first degree relatives    Family Hx substance abuse [ ]yes [ x]no  ITEMS NOT CHECKED ARE NOT PRESENT    SOCIAL HISTORY:   Significant other/partner[ ]  Children[x ]  Scientologist/Spirituality:  Substance hx:  [ ]   Tobacco hx:  [ ]   Alcohol hx: [ ]   Home Opioid hx:  [ ] I-Stop Reference No:  Living Situation: [x ]Home  [ ]Long term care  [ ]Rehab [ ]Other    ADVANCE DIRECTIVES:    DNR/MOLST  [ ]  Living Will  [ ]   DECISION MAKER(s):  [x ] Health Care Proxy(s)  [ ] Surrogate(s)  [ ] Guardian           Name(s): Phone Number(s): Izabel, number per EMR    BASELINE (I)ADL(s) (prior to admission):  [place]: [ ]Total  [x ] Moderate [ ]Dependent    Allergies    Percocet 5/325 (Vomiting)  codeine (Nausea)    Intolerances    MEDICATIONS  (STANDING):  albuterol    0.083% 2.5 milliGRAM(s) Nebulizer every 8 hours  furosemide   Injectable 40 milliGRAM(s) IV Push daily  levothyroxine 50 MICROGram(s) Oral daily  prednisoLONE acetate 1% Suspension 1 Drop(s) Both EYES two times a day  predniSONE   Tablet 2.5 milliGRAM(s) Oral daily  sodium chloride 3%  Inhalation 4 milliLiter(s) Inhalation every 8 hours    MEDICATIONS  (PRN):  haloperidol     Tablet 0.5 milliGRAM(s) Oral every 8 hours PRN agitation  haloperidol    Injectable 0.5 milliGRAM(s) IV Push every 6 hours PRN Agitation    PRESENT SYMPTOMS: [ x]Unable to self-report  [ ] CPOT [x ] PAINADs [x ] RDOS  Source if other than patient:  [ ]Family   [ ]Team     Pain: [ ]yes [ ]no  QOL impact -   Location -                    Aggravating factors -  Quality -  Radiation -  Timing-  Severity (0-10 scale):  Minimal acceptable level (0-10 scale):     CPOT:    https://www.[facility].org/getattachment/[email or web address]/Critical-Care-Pain-Observation-Tool-(CPOT)    PAIN AD Score:   http://geriatrictoolkit.[facility]/cog/painad.pdf (press ctrl +  left click to view)    Dyspnea:                           [ ]Mild [ ]Moderate [ ]Severe      RDOS:  0 to 2  minimal or no respiratory distress   3  mild distress  4 to 6 moderate distress  >7 severe distress  https://homecareinformation.net/handouts/hen/Respiratory_Distress_Observation_Scale.pdf (Ctrl +  left click to view)     Anxiety:                             [ ]Mild [ ]Moderate [ ]Severe  Fatigue:                             [ ]Mild [ ]Moderate [ ]Severe  Nausea:                             [ ]Mild [ ]Moderate [ ]Severe  Loss of appetite:              [ ]Mild [ ]Moderate [ ]Severe  Constipation:                    [ ]Mild [ ]Moderate [ ]Severe    PCSSQ[Palliative Care Spiritual Screening Question]   Severity (0-10):  Score of 4 or > indicate consideration of Chaplaincy referral.  Chaplaincy Referral: [ ] yes [ ] refused [ ] following [x ] Deferred     Caregiver [place]? : [ ] yes [ x] no [ ] Deferred [ ] Declined             Social work referral [ ] Patient & Family Centered Care Referral [ ]     Anticipatory Grief present?:  [ ] yes [ x] no  [ ] Deferred                  Social work referral [ ] Chaplaincy Referral[ ]      Other Symptoms:  [ ]All other review of systems negative     Palliative Performance Status Version 2:     20    %    http://[facility].org/files/news/palliative_performance_scale_ppsv2.pdf  PHYSICAL EXAM:  Vital Signs Last 24 Hrs  T(C): 36.8 (16 Oct 2024 12:41), Max: 37.2 (16 Oct 2024 09:40)  T(F): 98.2 (16 Oct 2024 12:41), Max: 98.9 (16 Oct 2024 09:40)  HR: 80 (16 Oct 2024 13:15) (76 - 82)  BP: 151/72 (16 Oct 2024 13:15) (142/75 - 178/69)  BP(mean): --  RR: 20 (16 Oct 2024 13:15) (17 - 24)  SpO2: 100% (16 Oct 2024 13:15) (86% - 100%)    Parameters below as of 16 Oct 2024 13:15  Patient On (Oxygen Delivery Method): mask, nonrebreather     I&O's Summary    GENERAL: [ ]Cachexia    [ ]Alert  [ ]Oriented x   [ x]Lethargic  [ ]Unarousable  [ ]Verbal  [ ]Non-Verbal  Behavioral:   [ ] Anxiety  [ ] Delirium [ ] Agitation [ ] Other  HEENT:  [ ]Normal   [x ]Dry mouth   [ ]ET Tube/Trach  [ ]Oral lesions  PULMONARY:   [ ]Clear [x ]Tachypnea  [ ]Audible excessive secretions   [ ]Rhonchi        [ ]Right [ ]Left [ ]Bilateral  [ ]Crackles        [ ]Right [ ]Left [ ]Bilateral  [x ]Wheezing     [ ]Right [ ]Left [ ]Bilateral  [ ]Diminished breath sounds [ ]right [ ]left [ ]bilateral  CARDIOVASCULAR:    [ ]Regular [ ]Irregular [x ]Tachy  [ ]Taz [ ]Murmur [ ]Other  GASTROINTESTINAL:  [ x]Soft  [ ]Distended   [ ]+BS  [ ]Non tender [ ]Tender  [ ]Other [ ]PEG [ ]OGT/ NGT  Last BM:  GENITOURINARY:  [ ]Normal [ x] Incontinent   [ ]Oliguria/Anuria   [ ]Lara  MUSCULOSKELETAL:   [ ]Normal   [x ]Weakness  [ ]Bed/Wheelchair bound [ ]Edema  NEUROLOGIC:   [ ]No focal deficits  [ ]Cognitive impairment  [ ]Dysphagia [ ]Dysarthria [ ]Paresis [ ]Other   SKIN:   [ ]Normal  [ ]Rash  [ ]Other  [ ]Pressure ulcer(s)       Present on admission [ ]y [ ]n    CRITICAL CARE:  [ ] Shock Present  [ ]Septic [ ]Cardiogenic [ ]Neurologic [ ]Hypovolemic  [ ]  Vasopressors [ ]  Inotropes   [ x]Respiratory failure present [ ]Mechanical ventilation [ ]Non-invasive ventilatory support [ ]High flow    [ ]Acute  [ ]Chronic [ ]Hypoxic  [ ]Hypercarbic [ ]Other  [ ]Other organ failure     LABS:                        10.3   11.14 )-----------( 296      ( 16 Oct 2024 09:45 )             33.3   10    141  |  101  |  34[H]  ----------------------------<  141[H]  4.6   |  33[H]  |  0.71    Ca    9.4      16 Oct 2024 09:45    TPro  7.4  /  Alb  2.9[L]  /  TBili  0.3  /  DBili  x   /  AST  29  /  ALT  27  /  AlkPhos  84  10-16      Urinalysis Basic - ( 16 Oct 2024 13:34 )    Color: Yellow / Appearance: Clear / S.035 / pH: x  Gluc: x / Ketone: Negative mg/dL  / Bili: Negative / Urobili: 0.2 mg/dL   Blood: x / Protein: 30 mg/dL / Nitrite: Negative   Leuk Esterase: Negative / RBC: None Seen /HPF / WBC None Seen /HPF   Sq Epi: x / Non Sq Epi: x / Bacteria: Occasional /HPF      RADIOLOGY & ADDITIONAL STUDIES:  < from: Xray Chest 1 View- PORTABLE-Urgent (10.16.24 @ 10:41) >    ACC: [id number] EXAM:  XR CHEST PORTABLE URGENT 1V   ORDERED BY: AUSTIN SWENSON     PROCEDURE DATE:  10/16/2024          INTERPRETATION:  AP chest on 2024 at 10:29 AM. Patient has   weakness and altered mental status.    Heart magnifiedby technique.    There is an increasing right effusion compared to  now moderate   to large. Central CHF also increased. Old left rib fractures again noted.    IMPRESSION: Increasing right effusion and some increase in CHF.    --- End of Report ---            KENYETTA SOL MD; Attending Radiologist  This document has been electronically signed. Oct 16 2024  1:51PM    < end of copied text >      PROTEIN CALORIE MALNUTRITION PRESENT: [ ]mild [ ]moderate [ ]severe [ ]underweight [ ]morbid obesity  https://www.andeal.org/vault/2440/web/files/ONC/Table_Clinical%20Characteristics%20to%20Document%20Malnutrition-White%20JV%20et%20al%2020.pdf    Height (cm): 160 (10-16-24 @ 09:10), 160 (10-02-24 @ 14:31), 160 (24 @ 17:33)  Weight (kg): 54.4 (10-16-24 @ 09:10), 54.4 (10-02-24 @ 14:31), 42.2 (24 @ 17:33)  BMI (kg/m2): 21.3 (10-16-24 @ 09:10), 21.3 (10-02-24 @ 14:31), 16.5 (24 @ 17:33)    [ x]PPSV2 < or = to 30% [ ]significant weight loss  [x ]poor nutritional intake  [ ]anasarca[ ]Artificial Nutrition      Other REFERRALS:  [ ]Hospice  [ ]Child Life  [ ]Social Work  [ x]Case management [ ]Holistic Therapy

## 2024-10-16 NOTE — CONSULT NOTE ADULT - SUBJECTIVE AND OBJECTIVE BOX
Date/Time Patient Seen:  		  Referring MD:   Data Reviewed	       Patient is a 89y old  Female who presents with a chief complaint of     Subjective/HPI   · Birth Sex	Female  · Patient's Preferred Pronoun	Her/She     Child Abuse Assessment (patients less than 13 yrs):  Chief Complaint: altered mental status.    · Chief Complaint: The patient is a 89y Female complaining of altered mental status.  · HPI Objective Statement: 89-year-old female with a history of COPD on home oxygen (around 3 L), hypothyroidism, atrial fibrillation, peripheral vascular disease, arthritis status post recent admission for "pneumonia" presents with brought in by EMS from home for generalized weakness and fatigue.  The patient is more short of breath than usual.  No acute hypoxia noted.  No acute pain at this time.  No vomiting or diarrhea.  Patient with decreased ability to give me history secondary to fatigue.  No other history available.    PAST MEDICAL & SURGICAL HISTORY:  Hypertension    Fall    Fall    Pelvic fracture    Hypothyroidism    COPD exacerbation    COPD without exacerbation    Afib    PVD (peripheral vascular disease)    S/P ankle joint replacement, left    Transplanted cornea    IN THE ED  VS: T 98.2, HR 88, /73, RR 16, SpO2 95% on 1LNC  Labs: Hgb 10.8, BUN 34, BNP 3543  Imaging: CT chest with small right and trace left pleural effusion, MARY and LLL PNA  EKG NSR  S/p IV abx Rocephin + azithro       Review of Systems:  Review of Systems: CONSTITUTIONAL: No weakness, fevers or chills  HEENT:  No headache, no visual changes, no sore throat, neck supple  RESPIRATORY: No cough, wheezing, hemoptysis; +shortness of breath  CARDIOVASCULAR: No chest pain or palpitations  GASTROINTESTINAL: No abdominal pain, no nausea, vomiting, or hematemesis; No diarrhea or constipation. No melena or hematochezia.  GENITOURINARY: No dysuria, frequency or hematuria  NEUROLOGICAL: No focal weakness or dizziness   MSK: No myalgias  SKIN: No itching, burning, rashes, or lesions    Goals of Care:    GOALS OF CARE:  · Participants	Patient; Family  · Child(clovis)	Daughter Izabel     Conversation Discussion:  · Conversation Details	Writer met with patient and daughter at bedside, reviewed patient's medical and social history as well as events leading to patient's hospitalization. Writer discussed patient's current diagnosis of PNA and management with IV abx. Writer inquired about thoughts regarding cardiopulmonary resuscitation, and mechanical ventilation/intubation. Pt showed fair insight into medical condition. Both the patient and daughter state pt is FULL CODE      Allergies and Intolerances:        Allergies:  	Percocet 5/325: Drug, Vomiting  	codeine: Drug, Nausea    Home Medications:   * Patient Currently Takes Medications as of 02-Oct-2024 19:44 documented in Structured Notes  · 	amLODIPine 5 mg oral tablet: Last Dose Taken:  , 1 tab(s) orally once a day  · 	levothyroxine 50 mcg (0.05 mg) oral capsule: Last Dose Taken:  , 1 cap(s) orally once a day  · 	predniSONE 2.5 mg oral tablet: 1 tab(s) orally once a day  · 	prednisoLONE acetate 1% ophthalmic suspension: 1 drop(s) in each affected eye once a day  · 	bisoprolol-hydrochlorothiazide 2.5 mg-6.25 mg oral tablet: Last Dose Taken:  , 1 tab(s) orally once a day  · 	celecoxib 100 mg oral capsule: Last Dose Taken:  , 1 cap(s) orally once a day  · 	Spiriva 18 mcg inhalation capsule: Last Dose Taken:  , 1 cap(s) inhaled once a day    Patient History:    Past Medical, Past Surgical, and Family History:  PAST MEDICAL HISTORY:  Afib     COPD without exacerbation     Fall     Hypertension     Hypothyroidism     Pelvic fracture     PVD (peripheral vascular disease).     PAST SURGICAL HISTORY:  S/P ankle joint replacement, left     Transplanted cornea.     Social History:  · Substance use	No  · Social History (marital status, living situation, occupation, and sexual history)	Tobacco Usage:  former smoker  Alcohol Usage: social   Substance Use:  denies  Lives with: home alone, with 12hr aide  ADLs: dependent, has home health aide 12hrs  Ambulates: with walker     Tobacco Screening:  · Core Measure Site	Yes  · Has the patient used tobacco in the past 30 days?	No    Risk Assessment:    Present on Admission:  Deep Venous Thrombosis	no  Pulmonary Embolus	no     HIV Screening:  · In accordance with [place] State law, we offer every patient who comes to our ED an HIV test. Would you like to be tested today?	Opt out     Hepatitis C Test Questions:  · In accordance with [place] State Law, we offer every patient a Hepatitis C test. Would you like to be tested today?	Opt out        Medication list         MEDICATIONS  (STANDING):  furosemide   Injectable 40 milliGRAM(s) IV Push daily  vancomycin  IVPB. 1000 milliGRAM(s) IV Intermittent once    MEDICATIONS  (PRN):         Vitals log        ICU Vital Signs Last 24 Hrs  T(C): 36.8 (16 Oct 2024 12:41), Max: 37.2 (16 Oct 2024 09:40)  T(F): 98.2 (16 Oct 2024 12:41), Max: 98.9 (16 Oct 2024 09:40)  HR: 76 (16 Oct 2024 12:41) (76 - 82)  BP: 162/71 (16 Oct 2024 12:41) (142/75 - 178/69)  BP(mean): --  ABP: --  ABP(mean): --  RR: 17 (16 Oct 2024 12:41) (17 - 24)  SpO2: 100% (16 Oct 2024 12:41) (86% - 100%)    O2 Parameters below as of 16 Oct 2024 10:05  Patient On (Oxygen Delivery Method): mask, nonrebreather                 Input and Output:  I&O's Detail      Lab Data                        10.3   11.14 )-----------( 296      ( 16 Oct 2024 09:45 )             33.3     10-16    141  |  101  |  34[H]  ----------------------------<  141[H]  4.6   |  33[H]  |  0.71    Ca    9.4      16 Oct 2024 09:45    TPro  7.4  /  Alb  2.9[L]  /  TBili  0.3  /  DBili  x   /  AST  29  /  ALT  27  /  AlkPhos  84  10-16            Review of Systems	    sob  healy  weakness  altered  on o2 support  frail  all systems reviewed  spoke with DTR    Objective     Physical Examination    heart s1s2  lung dc BS  head nc  head at  on o2 support  abd soft  frail  weak  elderly      Pertinent Lab findings & Imaging      Lara:  NO   Adequate UO     I&O's Detail           Discussed with:     Cultures:	        Radiology    LEELA     ACC: [id number] EXAM:  CT CHEST IC   ORDERED BY: AUSTIN SWENSON     PROCEDURE DATE:  10/16/2024          INTERPRETATION:  CLINICAL INFORMATION: Short of breath, abdominal   discomfort    COMPARISON: None.    CONTRAST/COMPLICATIONS:  IV Contrast: Omnipaque 350  90 cc administered   10 cc discarded  Oral Contrast: Other (accession [id number]), NONE (accession [id number])  Complications: None reported at time of study completion    PROCEDURE:  CT of the Chest, Abdomen and Pelvis was performed.  Sagittal and coronal reformats were performed.    FINDINGS:  CHEST:  LUNGS AND LARGE AIRWAYS: Patent central airways. Moderate right pleural   effusion increased in size.  small-to-moderate left pleural effusion   increased in size. Underlying bilateral compressive atelectasis. Subtle   bilateral upper lobe tree-in-bud opacities suggesting small airways   impaction/infection. Nonspecific bilateral upper lobe groundglass   opacification similar to prior May represent pulmonary edema and/or   infection..  PLEURA: As above.  VESSELS: Nonaneurysmal.  HEART: Heart size is normal cardiac vascular calcification. No   pericardial effusion.  MEDIASTINUM AND JAROCHO: Small short axis mediastinal lymph nodes likely   reactive.  CHEST WALL AND LOWER NECK: Within normal limits.    ABDOMEN AND PELVIS:  LIVER: Within normal limits.  BILE DUCTS: Normal caliber.  GALLBLADDER: Within normal limits.  SPLEEN: Within normal limits.  PANCREAS: Mild prominence main pancreatic duct without cutoff.  ADRENALS: Within normal limits.  KIDNEYS/URETERS: Bilateral renal cysts..    BLADDER: Within normal limits.  REPRODUCTIVE ORGANS: Calcified fibroids    BOWEL: No bowel obstruction large colonic stool burden. Appendix  normal  PERITONEUM/RETROPERITONEUM: Within normal limits.  VESSELS: Atherosclerotic, nonaneurysmal.  LYMPH NODES: No lymphadenopathy.  ABDOMINAL WALL: Within normal limits.  BONES: Old bilateral pubic fractures. Degenerative changes. Mild loss of   height of L2 vertebral body likely chronic. Stable T5 and T6 vertebral   body sclerotic foci    IMPRESSION:  Bilateral pleural effusions right larger than left increased in size from   prior.  Tree-in-bud parenchymal opacities upper lobes suggestive of small airways   impaction/infection.  Nonspecific upper lobe groundglass opacities may reflect pulmonary edema   and/or infection.  No acute findings abdomen and pelvis      --- End of Report ---            EMMA TAPIA MD; Attending Radiologist  This document has been electronically signed. Oct 16 2024 10:30AM        CONCLUSIONS:      1. Left ventricular wall thickness is mildly increased. Left ventricular systolic function is normal with an ejection fraction of 63 % by Lima's method of disks.   2. Left atrium is moderately dilated.   3. Estimated pulmonary artery systolic pressure is 46 mmHg, consistent with mild pulmonary hypertension.

## 2024-10-16 NOTE — ED ADULT NURSE NOTE - OBJECTIVE STATEMENT
Pt BIBA from home c/o AMS. Pt hx dementia - daughter at bedside providing hx. Pt daughter states pt was treated for PNA previously last week. Pt noted to be SOB. Pt daughter states pt has been using 3 L nc at home since being dx with PNA. EKG done at bedside. Pt placed on bedside cardiac monitoring along with continuous O2 monitoring. Pt resting in stretcher with daughter at bedside.

## 2024-10-16 NOTE — ED ADULT NURSE NOTE - NSFALLHARMRISKINTERV_ED_ALL_ED
Assistance OOB with selected safe patient handling equipment if applicable/Assistance with ambulation/Communicate risk of Fall with Harm to all staff, patient, and family/Monitor gait and stability/Monitor for mental status changes and reorient to person, place, and time, as needed/Move patient closer to nursing station/within visual sight of ED staff/Provide patient with walking aids/Provide visual cue: red socks, yellow wristband, yellow gown, etc/Reinforce activity limits and safety measures with patient and family/Toileting schedule using arm’s reach rule for commode and bathroom/Use of alarms - bed, stretcher, chair and/or video monitoring/Bed in lowest position, wheels locked, appropriate side rails in place/Call bell, personal items and telephone in reach/Instruct patient to call for assistance before getting out of bed/chair/stretcher/Non-slip footwear applied when patient is off stretcher/[place] to call system/Physically safe environment - no spills, clutter or unnecessary equipment/Purposeful Proactive Rounding/Room/bathroom lighting operational, light cord in reach

## 2024-10-16 NOTE — CARE COORDINATION ASSESSMENT. - OTHER PERTINENT REFERRAL INFORMATION
Sw met with Pt and Dtr at bedside. Pt presented to the ED with AMS and SOB and Pts DTR Hong answered the questions for the patient. Pt has 4 SOBEIDA And 12 steps to 2nd floor. Pt was recently at [facility] for Pna from 10/2-10/6 and was discharged home with services from [facility]. Prior to the end of Sept the patient lived alone at home. Pt has since acquired a 24/7 pvt aide for assistance with her ADL'S. Pt uses a rollator at baseline,in on 3L O2 and her daugther takes her to all her apts. Pts family would like her to return home. PCP: David Gaytan [phone] Lujan: VALENTINA [phone]

## 2024-10-16 NOTE — H&P ADULT - NSHPLABSRESULTS_GEN_ALL_CORE
LABS:                        10.3   11.14 )-----------( 296      ( 16 Oct 2024 09:45 )             33.3     10-16    141  |  101  |  34[H]  ----------------------------<  141[H]  4.6   |  33[H]  |  0.71    Ca    9.4      16 Oct 2024 09:45    TPro  7.4  /  Alb  2.9[L]  /  TBili  0.3  /  DBili  x   /  AST  29  /  ALT  27  /  AlkPhos  84  10-16    PT/INR - ( 16 Oct 2024 14:21 )   PT: 10.8 sec;   INR: 0.91 ratio         PTT - ( 16 Oct 2024 14:21 )  PTT:28.1 sec  CAPILLARY BLOOD GLUCOSE            Urinalysis Basic - ( 16 Oct 2024 13:34 )    Color: Yellow / Appearance: Clear / S.035 / pH: x  Gluc: x / Ketone: Negative mg/dL  / Bili: Negative / Urobili: 0.2 mg/dL   Blood: x / Protein: 30 mg/dL / Nitrite: Negative   Leuk Esterase: Negative / RBC: None Seen /HPF / WBC None Seen /HPF   Sq Epi: x / Non Sq Epi: x / Bacteria: Occasional /HPF        RADIOLOGY & ADDITIONAL TESTS:

## 2024-10-16 NOTE — ED ADULT NURSE REASSESSMENT NOTE - ED CARDIAC CAPILLARY REFILL
Received refill request for prednisone. Reviewed recent notes, planned to continue this medication, has clinic visit scheduled for 4/6. Vivi refill provided.    Bhavesh Wise MD, PhD  PGY-2 Dermatology Resident     Lynsey Ospina and Olegario cc'd as FYI.    2 seconds or less

## 2024-10-16 NOTE — CONSULT NOTE ADULT - ASSESSMENT
The patient is a 89 year old female with a history of HTN, hypothyroidism, atrial fibrillation, COPD, PAD, dementia who presents with shortness of breath.     Plan:  - ECG with sinus rhythm and LVH  - CT chest with PNA and moderate right, small left pleural effusion  - BNP 5636 - higher than last admission  - Echo 10/4/24 with normal LV systolic function, mild pulm HTN  - Continue amlodipine 5 mg daily  - Continue metoprolol succinate 50 mg daily (formulary equivalent)  - Hold HCTZ  - Start furosemide 40 mg IV daily  - May need S&S eval  - Unclear if pleural effusions are due to heart failure or infectious in nature. If family agreeable, thoracentesis may provide additional diagnostic information and help with symptoms.

## 2024-10-16 NOTE — ED PROVIDER NOTE - DIFFERENTIAL DIAGNOSIS
Rule out acute pneumonia, electrode abnormality, leukocytosis, pneumothorax, other acute pathology Differential Diagnosis

## 2024-10-16 NOTE — CONSULT NOTE ADULT - PROBLEM SELECTOR RECOMMENDATION 4
will continue to follow. discussed with Dr. Jocelin Mercado MD, [facility]; Palliative Care Attending, Ethicist. [phone]

## 2024-10-16 NOTE — ED PROVIDER NOTE - RESPIRATORY, MLM
Markedly diminished breath sounds on the right, slight tachypnea, normal respiratory effort.  No accessory muscle use

## 2024-10-16 NOTE — PATIENT PROFILE ADULT - FUNCTIONAL ASSESSMENT - BASIC MOBILITY 6.
1-calculated by average/Not able to assess (calculate score using [facility] averaging method)

## 2024-10-16 NOTE — ED ADULT TRIAGE NOTE - CHIEF COMPLAINT QUOTE
Patient is from home for altered mental status and weakness. Was here last week with pneumonia. Patient is confused. Unable to obtain BEFAST in triage.

## 2024-10-16 NOTE — ED PROVIDER NOTE - CARE PLAN
1 Principal Discharge DX:	Shortness of breath  Secondary Diagnosis:	Pneumonia  Secondary Diagnosis:	Acute CHF

## 2024-10-16 NOTE — H&P ADULT - NSHPPHYSICALEXAM_GEN_ALL_CORE
Vitals last 24 hrs  T(C): 36.3 (10-16-24 @ 16:12), Max: 37.2 (10-16-24 @ 09:40)  HR: 81 (10-16-24 @ 18:08) (70 - 82)  BP: 165/79 (10-16-24 @ 18:05) (142/75 - 178/69)  RR: 48 (10-16-24 @ 18:05) (17 - 48)  SpO2: 98% (10-16-24 @ 18:08) (84% - 100%)    PHYSICAL EXAM:  GENERAL: mild respi distress on nrb  HEAD:  Atraumatic, Normocephalic  EYES: EOMI, PERRLA, conjunctiva and sclera clear  ENMT: No tonsillar erythema, exudates, or enlargement; Moist mucous membranes  NECK: Supple, No JVD, Normal thyroid  HEART: Regular rate and rhythm; No murmurs, rubs, or gallops  RESPIRATORY: coarse bs  ABDOMEN: Soft, Nontender, Nondistended; Bowel sounds present  NEUROLOGY: A&Ox3, nonfocal, moving all extremities  EXTREMITIES:  2+ Peripheral Pulses, No clubbing, cyanosis, or edema  SKIN: warm, dry, normal color, no rash or abnormal lesions

## 2024-10-16 NOTE — PATIENT PROFILE ADULT - ABILITY TO HEAR (WITH HEARING AID OR HEARING APPLIANCE IF NORMALLY USED):
Hearing aides with the patient but is not using them at this time/Severely Impaired: absence of useful hearing

## 2024-10-17 LAB
ANION GAP SERPL CALC-SCNC: 10 MMOL/L — SIGNIFICANT CHANGE UP (ref 5–17)
BUN SERPL-MCNC: 37 MG/DL — HIGH (ref 7–23)
CALCIUM SERPL-MCNC: 9 MG/DL — SIGNIFICANT CHANGE UP (ref 8.5–10.1)
CHLORIDE SERPL-SCNC: 99 MMOL/L — SIGNIFICANT CHANGE UP (ref 96–108)
CO2 SERPL-SCNC: 34 MMOL/L — HIGH (ref 22–31)
CREAT SERPL-MCNC: 1.1 MG/DL — SIGNIFICANT CHANGE UP (ref 0.5–1.3)
CRP SERPL-MCNC: 21 MG/L — HIGH
EGFR: 45 ML/MIN/1.73M2 — LOW
GLUCOSE SERPL-MCNC: 146 MG/DL — HIGH (ref 70–99)
HCT VFR BLD CALC: 30.8 % — LOW (ref 34.5–45)
HGB BLD-MCNC: 9.6 G/DL — LOW (ref 11.5–15.5)
MCHC RBC-ENTMCNC: 30.2 PG — SIGNIFICANT CHANGE UP (ref 27–34)
MCHC RBC-ENTMCNC: 31.2 GM/DL — LOW (ref 32–36)
MCV RBC AUTO: 96.9 FL — SIGNIFICANT CHANGE UP (ref 80–100)
NRBC # BLD: 0 /100 WBCS — SIGNIFICANT CHANGE UP (ref 0–0)
PLATELET # BLD AUTO: 257 K/UL — SIGNIFICANT CHANGE UP (ref 150–400)
POTASSIUM SERPL-MCNC: 3.6 MMOL/L — SIGNIFICANT CHANGE UP (ref 3.5–5.3)
POTASSIUM SERPL-SCNC: 3.6 MMOL/L — SIGNIFICANT CHANGE UP (ref 3.5–5.3)
RBC # BLD: 3.18 M/UL — LOW (ref 3.8–5.2)
RBC # FLD: 13.6 % — SIGNIFICANT CHANGE UP (ref 10.3–14.5)
SODIUM SERPL-SCNC: 143 MMOL/L — SIGNIFICANT CHANGE UP (ref 135–145)
WBC # BLD: 8.23 K/UL — SIGNIFICANT CHANGE UP (ref 3.8–10.5)
WBC # FLD AUTO: 8.23 K/UL — SIGNIFICANT CHANGE UP (ref 3.8–10.5)

## 2024-10-17 PROCEDURE — 32555 ASPIRATE PLEURA W/ IMAGING: CPT | Mod: 50

## 2024-10-17 PROCEDURE — 71045 X-RAY EXAM CHEST 1 VIEW: CPT | Mod: 26

## 2024-10-17 PROCEDURE — 88305 TISSUE EXAM BY PATHOLOGIST: CPT | Mod: 26

## 2024-10-17 PROCEDURE — 88108 CYTOPATH CONCENTRATE TECH: CPT | Mod: 26

## 2024-10-17 PROCEDURE — 99232 SBSQ HOSP IP/OBS MODERATE 35: CPT

## 2024-10-17 RX ORDER — DEXAMETHASONE 1.5 MG 1.5 MG/1
6 TABLET ORAL DAILY
Refills: 0 | Status: COMPLETED | OUTPATIENT
Start: 2024-10-17 | End: 2024-10-17

## 2024-10-17 RX ORDER — LEVOTHYROXINE SODIUM 88 MCG
37.5 TABLET ORAL AT BEDTIME
Refills: 0 | Status: DISCONTINUED | OUTPATIENT
Start: 2024-10-17 | End: 2024-10-24

## 2024-10-17 RX ORDER — ACETAMINOPHEN 500 MG
1000 TABLET ORAL ONCE
Refills: 0 | Status: COMPLETED | OUTPATIENT
Start: 2024-10-17 | End: 2024-10-17

## 2024-10-17 RX ORDER — METOPROLOL TARTRATE 50 MG
2.5 TABLET ORAL EVERY 6 HOURS
Refills: 0 | Status: DISCONTINUED | OUTPATIENT
Start: 2024-10-17 | End: 2024-10-24

## 2024-10-17 RX ORDER — MORPHINE SULFATE 30 MG/1
2 TABLET, EXTENDED RELEASE ORAL EVERY 6 HOURS
Refills: 0 | Status: DISCONTINUED | OUTPATIENT
Start: 2024-10-17 | End: 2024-10-24

## 2024-10-17 RX ADMIN — Medication 1 DROP(S): at 05:14

## 2024-10-17 RX ADMIN — SODIUM CHLORIDE 4 MILLILITER(S): 9 INJECTION, SOLUTION INTRAMUSCULAR; INTRAVENOUS; SUBCUTANEOUS at 14:17

## 2024-10-17 RX ADMIN — Medication 2.5 MILLIGRAM(S): at 19:40

## 2024-10-17 RX ADMIN — HALOPERIDOL DECANOATE 0.5 MILLIGRAM(S): 50 INJECTION INTRAMUSCULAR at 21:33

## 2024-10-17 RX ADMIN — Medication 400 MILLIGRAM(S): at 21:32

## 2024-10-17 RX ADMIN — Medication 2.5 MILLIGRAM(S): at 07:37

## 2024-10-17 RX ADMIN — Medication 1 DROP(S): at 21:33

## 2024-10-17 RX ADMIN — Medication 37.5 MICROGRAM(S): at 21:32

## 2024-10-17 RX ADMIN — Medication 40 MILLIGRAM(S): at 05:14

## 2024-10-17 RX ADMIN — Medication 400 MILLIGRAM(S): at 09:53

## 2024-10-17 RX ADMIN — DEXAMETHASONE 1.5 MG 6 MILLIGRAM(S): 1.5 TABLET ORAL at 05:18

## 2024-10-17 RX ADMIN — HALOPERIDOL DECANOATE 0.5 MILLIGRAM(S): 50 INJECTION INTRAMUSCULAR at 14:30

## 2024-10-17 RX ADMIN — Medication 1000 MILLIGRAM(S): at 22:30

## 2024-10-17 RX ADMIN — SODIUM CHLORIDE 4 MILLILITER(S): 9 INJECTION, SOLUTION INTRAMUSCULAR; INTRAVENOUS; SUBCUTANEOUS at 23:17

## 2024-10-17 RX ADMIN — SODIUM CHLORIDE 4 MILLILITER(S): 9 INJECTION, SOLUTION INTRAMUSCULAR; INTRAVENOUS; SUBCUTANEOUS at 07:37

## 2024-10-17 RX ADMIN — Medication 2.5 MILLIGRAM(S): at 05:18

## 2024-10-17 RX ADMIN — PIPERACILLIN AND TAZOBACTAM 25 GRAM(S): .5; 4 INJECTION, POWDER, LYOPHILIZED, FOR SOLUTION INTRAVENOUS at 19:41

## 2024-10-17 RX ADMIN — PIPERACILLIN AND TAZOBACTAM 25 GRAM(S): .5; 4 INJECTION, POWDER, LYOPHILIZED, FOR SOLUTION INTRAVENOUS at 05:14

## 2024-10-17 RX ADMIN — Medication 2.5 MILLIGRAM(S): at 14:17

## 2024-10-17 RX ADMIN — Medication 2.5 MILLIGRAM(S): at 12:25

## 2024-10-17 RX ADMIN — Medication 2.5 MILLIGRAM(S): at 23:17

## 2024-10-17 RX ADMIN — HALOPERIDOL DECANOATE 0.5 MILLIGRAM(S): 50 INJECTION INTRAMUSCULAR at 01:26

## 2024-10-17 NOTE — PROGRESS NOTE ADULT - ASSESSMENT
The patient is a 89 year old female with a history of HTN, hypothyroidism, atrial fibrillation, COPD, PAD, dementia who presents with shortness of breath.     Plan:  - ECG with sinus rhythm and LVH  - CT chest with PNA and moderate right, small left pleural effusion  - BNP 5636 - higher than last admission  - Echo 10/4/24 with normal LV systolic function, mild pulm HTN  - Continue amlodipine 5 mg daily if able to take PO  - Continue metoprolol succinate 50 mg daily (formulary equivalent) if able to take PO - for now on IV metoprolol  - Continue furosemide 40 mg IV daily  - May need S&S eval  - Unclear if pleural effusions are due to heart failure or infectious in nature. If family agreeable, thoracentesis may provide additional diagnostic information and help with symptoms.

## 2024-10-17 NOTE — SOCIAL WORK PROGRESS NOTE - NSSWPROGRESSNOTE_GEN_ALL_CORE
SW received consult for a depression screen at this time pt is not appropriate to complete this screen due to her acuity of illness . SW to continue to follow and assist as needed.

## 2024-10-17 NOTE — CHART NOTE - NSCHARTNOTEFT_GEN_A_CORE
Called by RN for patient medication management. Currently NPO and needs to have PO am meds switched to IV.       Assessment/Plan  89-year-old female with a history of COPD on home oxygen (around 3 L), hypothyroidism, atrial fibrillation, peripheral vascular disease, arthritis status post recent admission for "pneumonia" aw generalized weakness , AMS, SOB, FTT    - DC'd PO metop; started IV metop  - DC'd PO Levo; started IV Levo  - DC'd PO prednisone; started IV dexamethasone 1x dose  - AM team to f/u on medication management  - Can hold amlodipine for now; will give IV bp med if needed   - RN to call if changes

## 2024-10-17 NOTE — PROCEDURE NOTE - NSPOSTPRCRAD_GEN_A_CORE
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

## 2024-10-17 NOTE — CONSULT NOTE ADULT - ASSESSMENT
Full consult to follow    Infectious Diseases will follow. Please call with any questions.  Allyson Leigh M.D.  [facility] Division of Infectious Diseases [phone]  For after 5 P.M. and weekends, please call [phone]   Pt is a 89W w/ PMHx of COPD on home oxygen (around 3 L), hypothyroidism, atrial fibrillation, peripheral vascular disease, arthritis status post recent admission for "pneumonia" brought in by EMS from home for generalized weakness and fatigue, SOB and agitation/restlessnes    Acute PNA c/b pleural effusions  AHRF 2/2 above  CT CAP Bilateral pleural effusions right larger than left increased in size from prior. Tree-in-bud parenchymal opacities upper lobes suggestive of small airways impaction/infection.  Nonspecific upper lobe groundglass opacities may reflect pulmonary edema and/or infection. No acute findings abdomen and pelvis  RVP negative    Recommendations:   C/w zosyn   F/u pending cx  Appreciate pulm recs; ?role for thoracentesis  Trend temps/WBC  Supportive care, O2 as needed, additional care per primary team    Daughter at bedside indicated steady decline in functional status over the past month  Appreciate palliative care recs  Prognosis guarded    D/w daughter at bedside  D/w Dr. Monreal  Infectious Diseases will follow. Please call with any questions.  Allyson Leigh M.D.  [facility] Division of Infectious Diseases [phone]  For after 5 P.M. and weekends, please call [phone]

## 2024-10-17 NOTE — PRE PROCEDURE NOTE - PRE PROCEDURE EVALUATION
Interventional Radiology    HPI: 89y Female with history of COPD on home oxygen (around 3 L), hypothyroidism, atrial fibrillation, peripheral vascular disease, arthritis status post recent admission for "pneumonia" presents with brought in by EMS from home for generalized weakness and fatigue.  Found to have pleural effusions and presents to IR for right thoracentesis.     Allergies: Percocet 5/325 (Vomiting)  codeine (Nausea)    Medications (Abx/Cardiac/Anticoagulation/Blood Products)    enoxaparin Injectable: 30 milliGRAM(s) SubCutaneous (10-16 @ 19:55)  furosemide   Injectable: 40 milliGRAM(s) IV Push (10-17 @ 05:14)  hydrALAZINE Injectable: 10 milliGRAM(s) IV Push (10-16 @ 19:55)  metoprolol tartrate Injectable: 2.5 milliGRAM(s) IV Push (10-17 @ 12:25)  piperacillin/tazobactam IVPB..: 25 mL/Hr IV Intermittent (10-17 @ 05:14)  piperacillin/tazobactam IVPB...: 200 mL/Hr IV Intermittent (10-16 @ 11:45)  vancomycin  IVPB.: 250 mL/Hr IV Intermittent (10-16 @ 13:17)    Data:    T(C): 36.7  HR: 63  BP: 144/61  RR: 18  SpO2: 98%    Exam  General: No acute distress  Chest: Non labored breathing  Abdomen: Non-distended  Extremities: No swelling, warm    -WBC 8.23 / HgB 9.6 / Hct 30.8 / Plt 257  -Na 143 / Cl 99 / BUN 37 / Glucose 146  -K 3.6 / CO2 34 / Cr 1.10  -ALT -- / Alk Phos -- / T.Bili --  -INR0.91    Imaging: reviewed    Plan: 89y Female presents for right thoracentesis  -Risks/Benefits/alternatives explained with the patient and/or healthcare proxy and witnessed informed consent obtained.

## 2024-10-17 NOTE — PROGRESS NOTE ADULT - ASSESSMENT
89-year-old female with a history of COPD on home oxygen (around 3 L), hypothyroidism, atrial fibrillation, peripheral vascular disease, arthritis status post recent admission for "pneumonia" presents with brought in by EMS from home for generalized weakness and fatigue.    effusion  OP  OA  Atelectasis  AMS  Elderly  Ataxic gait  AF  Hypothyroidism  PVD    on bipap  DNR DNI  plan for thoracentesis  on Zosyn  on LASIX    ct imaging reviewed  eval for LRTI - recent antibiosis for LRTI - and admission reviewed  nebs and hypertonic saline for Mucoid Impaction  I and O  diuresis  monitor VS and HD and Sat  pleurocentesis with IR - right chest -   TTE reviewed - HFpEF and Pulm HTN - cvs rx regimen and cardio eval  HOB elev  asp prec  oral hygiene  skin care  spoke with DTR  Pall eval - GOC discussion  prognosis guarded  hold sedating agents  monitor mentation     89-year-old female with a history of COPD on home oxygen (around 3 L), hypothyroidism, atrial fibrillation, peripheral vascular disease, arthritis status post recent admission for "pneumonia" presents with brought in by EMS from home for generalized weakness and fatigue.    effusion  OP  OA  Atelectasis  AMS  Elderly  Ataxic gait  AF  Hypothyroidism  PVD    s/p BIPAP use overnight -   DNR DNI  plan for thoracentesis  on Zosyn  on LASIX  SPOKE with DTR this am     ct imaging reviewed  eval for LRTI - recent antibiosis for LRTI - and admission reviewed  nebs and hypertonic saline for Mucoid Impaction  I and O  diuresis  monitor VS and HD and Sat  pleurocentesis with IR - right chest -   TTE reviewed - HFpEF and Pulm HTN - cvs rx regimen and cardio eval  HOB elev  asp prec  oral hygiene  skin care  spoke with DTR  Pall eval - GOC discussion  prognosis guarded  hold sedating agents  monitor mentation

## 2024-10-17 NOTE — PROGRESS NOTE ADULT - ASSESSMENT
89-year-old female with a history of COPD on home oxygen (around 3 L), hypothyroidism, atrial fibrillation, peripheral vascular disease, arthritis status post recent admission for "pneumonia" aw generalized weakness , AMS, SOB, FTT  ABG reviewed  CT chest- Bilateral pleural effusions right larger than left increased in size from prior.  Tree-in-bud parenchymal opacities upper lobes suggestive of small airways impaction/infection.  Nonspecific upper lobe groundglass opacities may reflect pulmonary edema and/or infection.  No acute findings abdomen and pelvis      #.acute on chronic hypoxic and hypercarbic respiratory failure   pna, pl effusions, pulm edema  overall poor prognosis  cards and pulm cs noted  plan for thoracentesis today  palliative cs noted-pt made dnr dni, NIV and medical mgmt to continue  will start bipap, o2 support  iv antibiotics  ID cs  prn tylenol, morphine for pain    #delirium/metabolic encephalopathy  prn haldol  npo for now    #Afib- no AC    #DVT ppx- start hep sq    [facility]   [phone]

## 2024-10-17 NOTE — CONSULT NOTE ADULT - SUBJECTIVE AND OBJECTIVE BOX
[facility], Division of Infectious Diseases  HUSEYIN Jones S. Shah, WANDA Muniz [facility]  [phone]    SANDEE PEARSON  89y, Female  [id number]    HPI--  HPI:  89-year-old female with a history of COPD on home oxygen (around 3 L), hypothyroidism, atrial fibrillation, peripheral vascular disease, arthritis status post recent admission for "pneumonia"brought in by EMS from home for generalized weakness and fatigue.  associated w short of breath , noted w agitation and restlessness for few days-started on remeron with no improvement .dec po (16 Oct 2024 18:07)    Active Medications--  albuterol    0.083% 2.5 milliGRAM(s) Nebulizer every 8 hours  amLODIPine   Tablet 5 milliGRAM(s) Oral daily  furosemide   Injectable 40 milliGRAM(s) IV Push daily  haloperidol     Tablet 0.5 milliGRAM(s) Oral every 8 hours PRN  haloperidol    Injectable 0.5 milliGRAM(s) IV Push every 6 hours PRN  levothyroxine Injectable 37.5 MICROGram(s) IV Push at bedtime  metoprolol tartrate Injectable 2.5 milliGRAM(s) IV Push every 6 hours  piperacillin/tazobactam IVPB.. 3.375 Gram(s) IV Intermittent every 8 hours  prednisoLONE acetate 1% Suspension 1 Drop(s) Both EYES two times a day  sodium chloride 3%  Inhalation 4 milliLiter(s) Inhalation every 8 hours    Antimicrobials:   piperacillin/tazobactam IVPB.. 3.375 Gram(s) IV Intermittent every 8 hours    Immunologic:     ROS:  unable to obtain    Allergies: Percocet 5/325 (Vomiting)  codeine (Nausea)    PMH -- Hypertension    Fall    Fall    Pelvic fracture    Hypothyroidism    COPD exacerbation    COPD without exacerbation    Afib    PVD (peripheral vascular disease)      PSH -- S/P ankle joint replacement, left    Transplanted cornea      FH -- No pertinent family history in first degree relatives      Social History --  EtOH: denies   Tobacco: denies   Drug Use: denies     Travel/Environmental/Occupational History:    Physical Exam--  Vital Signs Last 24 Hrs  T(F): 97.5 (17 Oct 2024 05:56), Max: 98.3 (16 Oct 2024 23:31)  HR: 88 (17 Oct 2024 08:30) (64 - 89)  BP: 132/53 (17 Oct 2024 05:56) (132/53 - 190/76)  RR: 19 (17 Oct 2024 05:56) (17 - 48)  SpO2: 98% (17 Oct 2024 08:30) (84% - 100%)  General: nontoxic-appearing, no acute distress  HEENT: NC/AT, EOMI,  Lungs: Clear bilaterally without rales, wheezing or rhonchi  Heart: Regular rate and rhythm. No murmur, rub or gallop.  Abdomen: Soft. Nondistended. Nontender.   Extremities: No cyanosis or clubbing. No edema.   Skin: Warm. Dry. Good turgor.     Laboratory & Imaging Data:  CBC:                       10.3   11.14 )-----------( 296      ( 16 Oct 2024 09:45 )             33.3     CMP: 10-16    141  |  101  |  34[H]  ----------------------------<  141[H]  4.6   |  33[H]  |  0.71    Ca    9.4      16 Oct 2024 09:45    TPro  7.4  /  Alb  2.9[L]  /  TBili  0.3  /  DBili  x   /  AST  29  /  ALT  27  /  AlkPhos  84  10-16    LIVER FUNCTIONS - ( 16 Oct 2024 09:45 )  Alb: 2.9 g/dL / Pro: 7.4 g/dL / ALK PHOS: 84 U/L / ALT: 27 U/L / AST: 29 U/L / GGT: x           Urinalysis Basic - ( 16 Oct 2024 13:34 )    Color: Yellow / Appearance: Clear / S.035 / pH: x  Gluc: x / Ketone: Negative mg/dL  / Bili: Negative / Urobili: 0.2 mg/dL   Blood: x / Protein: 30 mg/dL / Nitrite: Negative   Leuk Esterase: Negative / RBC: None Seen /HPF / WBC None Seen /HPF   Sq Epi: x / Non Sq Epi: x / Bacteria: Occasional /HPF        Microbiology: reviewed        Radiology: reviewed    < from: Xray Chest 1 View- PORTABLE-Urgent (10.16.24 @ 10:41) >    ACC: [id number] EXAM:  XR CHEST PORTABLE URGENT 1V   ORDERED BY: AUSTIN SWENSON     PROCEDURE DATE:  10/16/2024          INTERPRETATION:  AP chest on 2024 at 10:29 AM. Patient has   weakness and altered mental status.    Heart magnifiedby technique.    There is an increasing right effusion compared to  now moderate   to large. Central CHF also increased. Old left rib fractures again noted.    IMPRESSION: Increasing right effusion and some increase in CHF.    --- End of Report ---            KENYETTA SOL MD; Attending Radiologist  This document has been electronically signed. Oct 16 2024  1:51PM    < end of copied text >  < from: CT Abdomen and Pelvis w/ IV Cont (10.16.24 @ 10:06) >    ACC: [id number] EXAM:  CT ABDOMEN AND PELVIS IC   ORDERED BY: AUSTIN SWENSON     ACC: [id number] EXAM:  CT CHEST IC   ORDERED BY: AUSTIN SWENSON     PROCEDURE DATE:  10/16/2024          INTERPRETATION:  CLINICAL INFORMATION: Short of breath, abdominal   discomfort    COMPARISON: None.    CONTRAST/COMPLICATIONS:  IV Contrast: Omnipaque 350  90 cc administered   10 cc discarded  Oral Contrast: Other (accession [id number]), NONE (accession [id number])  Complications: None reported at time of study completion    PROCEDURE:  CT of the Chest, Abdomen and Pelvis was performed.  Sagittal and coronal reformats were performed.    FINDINGS:  CHEST:  LUNGS AND LARGE AIRWAYS: Patent central airways. Moderate right pleural   effusion increased in size.  small-to-moderate left pleural effusion   increased in size. Underlying bilateral compressive atelectasis. Subtle   bilateral upper lobe tree-in-bud opacities suggesting small airways   impaction/infection. Nonspecific bilateral upper lobe groundglass   opacification similar to prior May represent pulmonary edema and/or   infection..  PLEURA: As above.  VESSELS: Nonaneurysmal.  HEART: Heart size is normal cardiac vascular calcification. No   pericardial effusion.  MEDIASTINUM AND JAROCHO: Small short axis mediastinal lymph nodes likely   reactive.  CHEST WALL AND LOWER NECK: Within normal limits.    ABDOMEN AND PELVIS:  LIVER: Within normal limits.  BILE DUCTS: Normal caliber.  GALLBLADDER: Within normal limits.  SPLEEN: Within normal limits.  PANCREAS: Mild prominence main pancreatic duct without cutoff.  ADRENALS: Within normal limits.  KIDNEYS/URETERS: Bilateral renal cysts..    BLADDER: Within normal limits.  REPRODUCTIVE ORGANS: Calcified fibroids    BOWEL: No bowel obstruction large colonic stool burden. Appendix  normal  PERITONEUM/RETROPERITONEUM: Within normal limits.  VESSELS: Atherosclerotic, nonaneurysmal.  LYMPH NODES: No lymphadenopathy.  ABDOMINAL WALL: Within normal limits.  BONES: Old bilateral pubic fractures. Degenerative changes. Mild loss of   height of L2 vertebral body likely chronic. Stable T5 and T6 vertebral   body sclerotic foci    IMPRESSION:  Bilateral pleural effusions right larger than left increased in size from   prior.  Tree-in-bud parenchymal opacities upper lobes suggestive of small airways   impaction/infection.  Nonspecific upper lobe groundglass opacities may reflect pulmonary edema   and/or infection.  No acute findings abdomen and pelvis      --- End of Report ---            EMMA TAPIA MD; Attending Radiologist  This document has been electronically signed. Oct 16 2024 10:30AM    < end of copied text >  < from: CT Head No Cont (10.16.24 @ 10:01) >    ACC: [id number] EXAM:  CT BRAIN   ORDERED BY: AUSTIN SWENSON     PROCEDURE DATE:  10/16/2024          INTERPRETATION:  CLINICAL STATEMENT: Pain.    TECHNIQUE: CT of the head was performed without IV contrast.    COMPARISON: CT head 2023    FINDINGS:  There is moderate diffuse parenchymal volume loss.    There are areas of low attenuation in the periventricular white matter   likely related to mild chronic microvascular ischemic changes.    There is no acute intracranial hemorrhage, parenchymal mass, mass effect   or midline shift. There is no acute territorial infarct. There is no   hydrocephalus. Partial empty sella    The cranium is intact. The visualized paranasal sinuses are well-aerated.        IMPRESSION:  No acute intracranial hemorrhage or acute territorial infarct.  If   symptoms persist, follow-up MRI exam recommended.    --- End of Report ---            JAMA MOLINA MD; Attending Radiologist  This document has been electronically signed. Oct 16 2024 10:16AM    < end of copied text >   [facility], Division of Infectious Diseases  HUSEYIN Jones S. Shah, WANDA Muniz [facility]  [phone]    SANDEE PEARSON  89y, Female  [id number]    HPI--  HPI:  89-year-old female with a history of COPD on home oxygen (around 3 L), hypothyroidism, atrial fibrillation, peripheral vascular disease, arthritis status post recent admission for "pneumonia"brought in by EMS from home for generalized weakness and fatigue.  associated w short of breath , noted w agitation and restlessness for few days-started on remeron with no improvement .dec po (16 Oct 2024 18:07)    Pt seen at bedside  Agitated, daughter at bedside  Hx as above    Active Medications--  albuterol    0.083% 2.5 milliGRAM(s) Nebulizer every 8 hours  amLODIPine   Tablet 5 milliGRAM(s) Oral daily  furosemide   Injectable 40 milliGRAM(s) IV Push daily  haloperidol     Tablet 0.5 milliGRAM(s) Oral every 8 hours PRN  haloperidol    Injectable 0.5 milliGRAM(s) IV Push every 6 hours PRN  levothyroxine Injectable 37.5 MICROGram(s) IV Push at bedtime  metoprolol tartrate Injectable 2.5 milliGRAM(s) IV Push every 6 hours  piperacillin/tazobactam IVPB.. 3.375 Gram(s) IV Intermittent every 8 hours  prednisoLONE acetate 1% Suspension 1 Drop(s) Both EYES two times a day  sodium chloride 3%  Inhalation 4 milliLiter(s) Inhalation every 8 hours    Antimicrobials:   piperacillin/tazobactam IVPB.. 3.375 Gram(s) IV Intermittent every 8 hours    Immunologic:     ROS:  unable to obtain    Allergies: Percocet 5/325 (Vomiting)  codeine (Nausea)    PMH -- Hypertension    Fall    Fall    Pelvic fracture    Hypothyroidism    COPD exacerbation    COPD without exacerbation    Afib    PVD (peripheral vascular disease)      PSH -- S/P ankle joint replacement, left    Transplanted cornea      FH -- No pertinent family history in first degree relatives      Social History --  EtOH: denies   Tobacco: denies   Drug Use: denies     Travel/Environmental/Occupational History:    Physical Exam--  Vital Signs Last 24 Hrs  T(F): 97.5 (17 Oct 2024 05:56), Max: 98.3 (16 Oct 2024 23:31)  HR: 88 (17 Oct 2024 08:30) (64 - 89)  BP: 132/53 (17 Oct 2024 05:56) (132/53 - 190/76)  RR: 19 (17 Oct 2024 05:56) (17 - 48)  SpO2: 98% (17 Oct 2024 08:30) (84% - 100%)  General: elderly W, on NRB, moderate resp distress  HEENT: NC/AT, EOMI,  Lungs: decreased breath sounds  Heart: RRR  Abdomen: Soft. Nondistended. Nontender.   Extremities: No cyanosis or clubbing. No edema.   Skin: Warm. Dry. Good turgor.     Laboratory & Imaging Data:  CBC:                       10.3   11.14 )-----------( 296      ( 16 Oct 2024 09:45 )             33.3     CMP: 10-16    141  |  101  |  34[H]  ----------------------------<  141[H]  4.6   |  33[H]  |  0.71    Ca    9.4      16 Oct 2024 09:45    TPro  7.4  /  Alb  2.9[L]  /  TBili  0.3  /  DBili  x   /  AST  29  /  ALT  27  /  AlkPhos  84  10-16    LIVER FUNCTIONS - ( 16 Oct 2024 09:45 )  Alb: 2.9 g/dL / Pro: 7.4 g/dL / ALK PHOS: 84 U/L / ALT: 27 U/L / AST: 29 U/L / GGT: x           Urinalysis Basic - ( 16 Oct 2024 13:34 )    Color: Yellow / Appearance: Clear / S.035 / pH: x  Gluc: x / Ketone: Negative mg/dL  / Bili: Negative / Urobili: 0.2 mg/dL   Blood: x / Protein: 30 mg/dL / Nitrite: Negative   Leuk Esterase: Negative / RBC: None Seen /HPF / WBC None Seen /HPF   Sq Epi: x / Non Sq Epi: x / Bacteria: Occasional /HPF        Microbiology: reviewed        Radiology: reviewed    < from: Xray Chest 1 View- PORTABLE-Urgent (10.16.24 @ 10:41) >    ACC: [id number] EXAM:  XR CHEST PORTABLE URGENT 1V   ORDERED BY: AUSTIN SWENSON     PROCEDURE DATE:  10/16/2024          INTERPRETATION:  AP chest on 2024 at 10:29 AM. Patient has   weakness and altered mental status.    Heart magnifiedby technique.    There is an increasing right effusion compared to  now moderate   to large. Central CHF also increased. Old left rib fractures again noted.    IMPRESSION: Increasing right effusion and some increase in CHF.    --- End of Report ---            KENYETTA SOL MD; Attending Radiologist  This document has been electronically signed. Oct 16 2024  1:51PM    < end of copied text >  < from: CT Abdomen and Pelvis w/ IV Cont (10.16.24 @ 10:06) >    ACC: [id number] EXAM:  CT ABDOMEN AND PELVIS IC   ORDERED BY: AUSTIN SWENSON     ACC: [id number] EXAM:  CT CHEST IC   ORDERED BY: AUSTIN SWENSON     PROCEDURE DATE:  10/16/2024          INTERPRETATION:  CLINICAL INFORMATION: Short of breath, abdominal   discomfort    COMPARISON: None.    CONTRAST/COMPLICATIONS:  IV Contrast: Omnipaque 350  90 cc administered   10 cc discarded  Oral Contrast: Other (accession [id number]), NONE (accession [id number])  Complications: None reported at time of study completion    PROCEDURE:  CT of the Chest, Abdomen and Pelvis was performed.  Sagittal and coronal reformats were performed.    FINDINGS:  CHEST:  LUNGS AND LARGE AIRWAYS: Patent central airways. Moderate right pleural   effusion increased in size.  small-to-moderate left pleural effusion   increased in size. Underlying bilateral compressive atelectasis. Subtle   bilateral upper lobe tree-in-bud opacities suggesting small airways   impaction/infection. Nonspecific bilateral upper lobe groundglass   opacification similar to prior May represent pulmonary edema and/or   infection..  PLEURA: As above.  VESSELS: Nonaneurysmal.  HEART: Heart size is normal cardiac vascular calcification. No   pericardial effusion.  MEDIASTINUM AND JAROCHO: Small short axis mediastinal lymph nodes likely   reactive.  CHEST WALL AND LOWER NECK: Within normal limits.    ABDOMEN AND PELVIS:  LIVER: Within normal limits.  BILE DUCTS: Normal caliber.  GALLBLADDER: Within normal limits.  SPLEEN: Within normal limits.  PANCREAS: Mild prominence main pancreatic duct without cutoff.  ADRENALS: Within normal limits.  KIDNEYS/URETERS: Bilateral renal cysts..    BLADDER: Within normal limits.  REPRODUCTIVE ORGANS: Calcified fibroids    BOWEL: No bowel obstruction large colonic stool burden. Appendix  normal  PERITONEUM/RETROPERITONEUM: Within normal limits.  VESSELS: Atherosclerotic, nonaneurysmal.  LYMPH NODES: No lymphadenopathy.  ABDOMINAL WALL: Within normal limits.  BONES: Old bilateral pubic fractures. Degenerative changes. Mild loss of   height of L2 vertebral body likely chronic. Stable T5 and T6 vertebral   body sclerotic foci    IMPRESSION:  Bilateral pleural effusions right larger than left increased in size from   prior.  Tree-in-bud parenchymal opacities upper lobes suggestive of small airways   impaction/infection.  Nonspecific upper lobe groundglass opacities may reflect pulmonary edema   and/or infection.  No acute findings abdomen and pelvis      --- End of Report ---            EMMA TAPIA MD; Attending Radiologist  This document has been electronically signed. Oct 16 2024 10:30AM    < end of copied text >  < from: CT Head No Cont (10.16.24 @ 10:01) >    ACC: [id number] EXAM:  CT BRAIN   ORDERED BY: AUSTIN SWENSON     PROCEDURE DATE:  10/16/2024          INTERPRETATION:  CLINICAL STATEMENT: Pain.    TECHNIQUE: CT of the head was performed without IV contrast.    COMPARISON: CT head 2023    FINDINGS:  There is moderate diffuse parenchymal volume loss.    There are areas of low attenuation in the periventricular white matter   likely related to mild chronic microvascular ischemic changes.    There is no acute intracranial hemorrhage, parenchymal mass, mass effect   or midline shift. There is no acute territorial infarct. There is no   hydrocephalus. Partial empty sella    The cranium is intact. The visualized paranasal sinuses are well-aerated.        IMPRESSION:  No acute intracranial hemorrhage or acute territorial infarct.  If   symptoms persist, follow-up MRI exam recommended.    --- End of Report ---            JAMA MOLINA MD; Attending Radiologist  This document has been electronically signed. Oct 16 2024 10:16AM    < end of copied text >

## 2024-10-17 NOTE — PROGRESS NOTE ADULT - SUBJECTIVE AND OBJECTIVE BOX
"[facility]  Neonatology  Progress Note    Patient Name: Raúl Arnold  MRN: [id number]  Admission Date: 2022  Hospital Length of Stay: 6 days  Attending Physician: Louise Terry,*    At Birth Gestational Age: 33w1d  Corrected Gestational Age 34w 0d  Chronological Age: 6 days    Subjective:     Interval History: In heated isolette, room air. No spells. Tolerating enteral feeds.    No medications    Nutritional Support: Enteral: Breast milk 20 KCal    Objective:     Vital Signs (Most Recent):  Temp: 98.4 °F (36.9 °C) (10/18/22 1400)  Pulse: 130 (10/18/22 1400)  Resp: 54 (10/18/22 1400)  BP: (!) 55/36 (10/18/22 0720)  SpO2: (!) 100 % (10/18/22 1400)   Vital Signs (24h Range):  Temp:  [98.3 °F (36.8 °C)-98.8 °F (37.1 °C)] 98.4 °F (36.9 °C)  Pulse:  [118-148] 130  Resp:  [35-99] 54  SpO2:  [92 %-100 %] 100 %  BP: (55-60)/(33-36) 55/36     Anthropometrics:  Head Circumference: 29 cm  Weight: 1590 g (3 lb 8.1 oz) 6 %ile (Z= -1.52) based on [place] (Boys, 22-50 Weeks) weight-for-age data using vitals from 2022.  Height: 41.5 cm (16.34") 13 %ile (Z= -1.14) based on [place] (Boys, 22-50 Weeks) Length-for-age data based on Length recorded on 2022.    Intake/Output - Last 3 Shifts         10/16 0700  10/17 0659 10/17 0700  10/18 0659 10/18 0700  10/19 0659    NG/ 178 72    TPN 60.8 34.7     Total Intake(mL/kg) 190.8 (123.9) 212.7 (133.8) 72 (45.3)    Urine (mL/kg/hr) 98 (2.7) 112 (2.9) 45 (3.6)    Stool 0 0 0    Total Output 98 112 45    Net +92.8 +100.7 +27           Stool Occurrence 3 x 3 x 3 x            Physical Exam  Vitals and nursing note reviewed.   Constitutional:       General: He is sleeping.   HENT:      Head: Normocephalic. Anterior fontanelle is flat.      Right Ear: External ear normal.      Left Ear: External ear normal.      Nose: Nose normal.      Mouth/Throat:      Mouth: Mucous membranes are moist.   Cardiovascular:      Rate and Rhythm: Normal rate and regular " rhythm.      Pulses: Normal pulses.   Pulmonary:      Effort: Pulmonary effort is normal.      Breath sounds: Normal breath sounds.   Abdominal:      General: Bowel sounds are normal. There is no distension.      Palpations: Abdomen is soft.   Genitourinary:     Penis: Normal.    Musculoskeletal:         General: Normal range of motion.   Skin:     General: Skin is warm.      Capillary Refill: Capillary refill takes less than 2 seconds.      Turgor: Normal.   Neurological:      General: No focal deficit present.          Recent Labs     10/17/22  0451   PH 7.359   PCO2 45.3*   PO2 48*   HCO3 25.5   POCSATURATED 81*   BE 0        Lines/Drains:  Lines/Drains/Airways       Drain  Duration                  NG/OG Tube 10/18/22 1100 nasogastric 5 Fr. Right nostril <1 day                      Laboratory:  None    Diagnostic Results:  None      Assessment/Plan:     Pulmonary  Respiratory distress syndrome in   COMMENTS:  S/P Curosurf x1. Weaned to room air 10/13 but needed increase in support (10/14) to BCPAP. Noted to have small pneumothorax  10/14  Was on vapotherm 3L, FiO2 at 21% Clear follow up CXR x2.  Support discontinued yesterday.    Plan  Continue in room air without support.    GI  Hyperbilirubinemia requiring phototherapy  COMMENT:  Mother/Baby O+/O+. S/p photo. Bili stable off of phototherapy    PLAN:  F/U Bili 10/19 to establish downward trend - ordered    Obstetric  Need for observation and evaluation of  for sepsis  COMMENTS:  Maternal labs negative. GBS negative. ROM at delivery, clear. Blood culture sent at birth remains negative, never started on antibiotics. No new CBC today.     PLANS:  - Follow blood culture results until final  - Follow clinically       infant with birth weight of 1,500 to 1,749 grams and 33 completed weeks of gestation  COMMENTS:  6 days, 34w 0d. AGA infant.  Stable course to date, tolerating advance of enteral feed. Urine for CMV is pending.   Noted to  have PVC's alerted on monitor, not present during exam      PLANS:  - Continue to advance feed as tolerated  - continue to monitor PVC's, consider EKG           Other  Healthcare maintenance  SOCIAL COMMENTS:  - 10/12: Parents updated in OR by NNP prior to transfer to NICU    SCREENING PLANS:  - Carseat test  - Hearing test  - NBS ordered for 10/15    COMPLETED:    IMMUNIZATIONS:  - Will be due for Hep B vaccine at 30 DOL    Alteration in nutrition in infant  COMMENTS:  Tolerating feeds, took 133 total fluids, urine output 2.9 ml/kg/hour, Stool x3  On on 24 ml q3h of 20 kcal/oz breast milk, tolerating well.    PLANS:  Increase feeds to 25 ml every 3 hours for projected fluid goal of 125 ml/kg/day.  Will fortify feeds tomorrow.          Augusto Garcia MD  Neonatology  [facility] - [facility]   Patient is a 89y old  Female who presents with a chief complaint of AMS, sob (17 Oct 2024 14:36)      INTERVAL HPI/OVERNIGHT EVENTS: noted  pt seen and examined this am   events noted  feels well      Vital Signs Last 24 Hrs  T(C): 36.7 (17 Oct 2024 11:48), Max: 36.8 (16 Oct 2024 23:31)  T(F): 98 (17 Oct 2024 11:48), Max: 98.3 (16 Oct 2024 23:31)  HR: 90 (17 Oct 2024 15:47) (63 - 96)  BP: 130/53 (17 Oct 2024 15:47) (130/53 - 190/76)  BP(mean): --  RR: 20 (17 Oct 2024 15:47) (18 - 22)  SpO2: 98% (17 Oct 2024 15:47) (94% - 100%)    Parameters below as of 17 Oct 2024 15:47  Patient On (Oxygen Delivery Method): nasal cannula  O2 Flow (L/min): 6      acetaminophen   IVPB .. 1000 milliGRAM(s) IV Intermittent once  albuterol    0.083% 2.5 milliGRAM(s) Nebulizer every 8 hours  amLODIPine   Tablet 5 milliGRAM(s) Oral daily  furosemide   Injectable 40 milliGRAM(s) IV Push daily  haloperidol     Tablet 0.5 milliGRAM(s) Oral every 8 hours PRN  haloperidol    Injectable 0.5 milliGRAM(s) IV Push every 6 hours PRN  levothyroxine Injectable 37.5 MICROGram(s) IV Push at bedtime  metoprolol tartrate Injectable 2.5 milliGRAM(s) IV Push every 6 hours  morphine  - Injectable 2 milliGRAM(s) IV Push every 6 hours PRN  piperacillin/tazobactam IVPB.. 3.375 Gram(s) IV Intermittent every 8 hours  prednisoLONE acetate 1% Suspension 1 Drop(s) Both EYES two times a day  sodium chloride 3%  Inhalation 4 milliLiter(s) Inhalation every 8 hours      PHYSICAL EXAM:  GENERAL: NAD,   EYES: conjunctiva and sclera clear  ENMT: Moist mucous membranes  NECK: Supple, No JVD, Normal thyroid  CHEST/LUNG: non labored, cta b/l  HEART: Regular rate and rhythm; No murmurs, rubs, or gallops  ABDOMEN: Soft, Nontender, Nondistended; Bowel sounds present  EXTREMITIES:  2+ Peripheral Pulses, No clubbing, cyanosis, or edema  LYMPH: No lymphadenopathy noted  SKIN: No rashes or lesions    Consultant(s) Notes Reviewed:  [x ] YES  [ ] NO  Care Discussed with Consultants/Other Providers [ x] YES  [ ] NO    LABS:                        9.6    8.23  )-----------( 257      ( 17 Oct 2024 11:34 )             30.8     10-17    143  |  99  |  37[H]  ----------------------------<  146[H]  3.6   |  34[H]  |  1.10    Ca    9.0      17 Oct 2024 11:34    TPro  7.4  /  Alb  2.9[L]  /  TBili  0.3  /  DBili  x   /  AST  29  /  ALT  27  /  AlkPhos  84  10-16    PT/INR - ( 16 Oct 2024 14:21 )   PT: 10.8 sec;   INR: 0.91 ratio         PTT - ( 16 Oct 2024 14:21 )  PTT:28.1 sec  Urinalysis Basic - ( 17 Oct 2024 11:34 )    Color: x / Appearance: x / SG: x / pH: x  Gluc: 146 mg/dL / Ketone: x  / Bili: x / Urobili: x   Blood: x / Protein: x / Nitrite: x   Leuk Esterase: x / RBC: x / WBC x   Sq Epi: x / Non Sq Epi: x / Bacteria: x      CAPILLARY BLOOD GLUCOSE            Urinalysis Basic - ( 17 Oct 2024 11:34 )    Color: x / Appearance: x / SG: x / pH: x  Gluc: 146 mg/dL / Ketone: x  / Bili: x / Urobili: x   Blood: x / Protein: x / Nitrite: x   Leuk Esterase: x / RBC: x / WBC x   Sq Epi: x / Non Sq Epi: x / Bacteria: x        Culture - Blood (collected 16 Oct 2024 09:45)  Source: .Blood BLOOD  Preliminary Report (17 Oct 2024 17:02):    No growth at 24 hours    Culture - Blood (collected 16 Oct 2024 09:40)  Source: .Blood BLOOD  Preliminary Report (17 Oct 2024 17:02):    No growth at 24 hours        RADIOLOGY & ADDITIONAL TESTS:    Imaging Personally Reviewed:  [x ] YES  [ ] NO

## 2024-10-17 NOTE — PROGRESS NOTE ADULT - SUBJECTIVE AND OBJECTIVE BOX
Indication for Geriatrics and Palliative Care Services/INTERVAL HPI: goals of care, advanced copd with respiratory distress  SUBJECTIVE AND OBJECTIVE: pt seen and examined, was on bipap overnight, now nrb. grimacing, agitated, uncomfortable appearing.    OVERNIGHT EVENTS: co2 retention, placed on bipap    DNR on chart:DNI: Trial NIV  DNI: Trial NIV      Allergies    Percocet 5/325 (Vomiting)  codeine (Nausea)    Intolerances    MEDICATIONS  (STANDING):  albuterol    0.083% 2.5 milliGRAM(s) Nebulizer every 8 hours  amLODIPine   Tablet 5 milliGRAM(s) Oral daily  furosemide   Injectable 40 milliGRAM(s) IV Push daily  levothyroxine Injectable 37.5 MICROGram(s) IV Push at bedtime  metoprolol tartrate Injectable 2.5 milliGRAM(s) IV Push every 6 hours  piperacillin/tazobactam IVPB.. 3.375 Gram(s) IV Intermittent every 8 hours  prednisoLONE acetate 1% Suspension 1 Drop(s) Both EYES two times a day  sodium chloride 3%  Inhalation 4 milliLiter(s) Inhalation every 8 hours    MEDICATIONS  (PRN):  haloperidol     Tablet 0.5 milliGRAM(s) Oral every 8 hours PRN agitation  haloperidol    Injectable 0.5 milliGRAM(s) IV Push every 6 hours PRN Agitation      ITEMS UNCHECKED ARE NOT PRESENT    PRESENT SYMPTOMS: [x ]Unable to self-report - see [ ] CPOT [ x] PAINADS [x ] RDOS  Source if other than patient:  [ ]Family   [ ]Team     Pain:  [ ]yes [ ]no  QOL impact -   Location -                    Aggravating factors -  Quality -  Radiation -  Timing-  Severity (0-10 scale):  Minimal acceptable level (0-10 scale):     CPOT:    https://www.sccm.org/getattachment/zbo83g56-0w4f-1r4p-2l6t-1783i7668g7g/Critical-Care-Pain-Observation-Tool-(CPOT)    Dyspnea:                           [ ]Mild [ ]Moderate [ ]Severe  Anxiety:                             [ ]Mild [ ]Moderate [ ]Severe  Fatigue:                             [ ]Mild [ ]Moderate [ ]Severe  Nausea:                             [ ]Mild [ ]Moderate [ ]Severe  Loss of appetite:              [ ]Mild [ ]Moderate [ ]Severe  Constipation:                    [ ]Mild [ ]Moderate [ ]Severe    PCSSQ[Palliative Care Spiritual Screening Question]   Severity (0-10):  Score of 4 or > indicate consideration of Chaplaincy referral.  Chaplaincy Referral: [ ] yes [ ] refused [ ] following [ x] Deferred     Careg[facility] [place]? : [ ] yes [ x no [ ] Deferred [ ] Declined             Social work referral [ ] Patient & Family Centered Care Referral [ ]     Anticipatory Grief present?:  [ ] yes [ x] no  [ ] Deferred                  Social work referral [ ] Chaplaincy Referral[ ]      Other Symptoms:  [ ]All other review of systems negative   [x]Unable to obtain due to poor mentation    Palliative Performance Status Version 2:      20   %      http://npcrc.org/files/news/palliative_performance_scale_ppsv2.pdf  PHYSICAL EXAM:  Vital Signs Last 24 Hrs  T(C): 36.7 (17 Oct 2024 11:48), Max: 36.8 (16 Oct 2024 23:31)  T(F): 98 (17 Oct 2024 11:48), Max: 98.3 (16 Oct 2024 23:31)  HR: 63 (17 Oct 2024 11:48) (63 - 89)  BP: 144/61 (17 Oct 2024 11:48) (132/53 - 190/76)  BP(mean): --  RR: 18 (17 Oct 2024 11:48) (18 - 48)  SpO2: 98% (17 Oct 2024 11:48) (84% - 100%)    Parameters below as of 17 Oct 2024 08:30  Patient On (Oxygen Delivery Method): mask, nonrebreather     I&O's Summary    16 Oct 2024 07:01  -  17 Oct 2024 07:00  --------------------------------------------------------  IN: 200 mL / OUT: 650 mL / NET: -450 mL       GENERAL: [ ]Cachexia    [ ]Alert  [ ]Oriented x   [x ]Lethargic  [ ]Unarousable  [ ]Verbal  [ ]Non-Verbal  Behavioral:   [ ]Anxiety  [ ]Delirium [ x]Agitation [ ]Other  HEENT:  [ ]Normal   [ x]Dry mouth   [ ]ET Tube/Trach  [ ]Oral lesions  PULMONARY:   [ ]Clear [x ]Tachypnea  [ ]Audible excessive secretions   [ ]Rhonchi        [ ]Right [ ]Left [ ]Bilateral  [ ]Crackles        [ ]Right [ ]Left [ ]Bilateral  [ ]Wheezing     [ ]Right [ ]Left [ ]Bilateral  [x ]Diminished BS [ ] Right [ ]Left [ ]Bilateral  CARDIOVASCULAR:    [ ]Regular [ ]Irregular [x ]Tachy  [ ]Taz [ ]Murmur [ ]Other  GASTROINTESTINAL:  x ]Soft  [ ]Distended   [ x]+BS  [ ]Non tender [ ]Tender  [ ]Other [ ]PEG [ ]OGT/ NGT   Last BM:   GENITOURINARY:  [ ]Normal [x ]Incontinent   [ ]Oliguria/Anuria   [ ]Lara  MUSCULOSKELETAL:   [ ]Normal   [ x]Weakness  [ ]Bed/Wheelchair bound [ ]Edema  NEUROLOGIC:   [ ]No focal deficits  [ x] Cognitive impairment  [ ] Dysphagia [ ]Dysarthria [ ] Paresis [ ]Other   SKIN:   [ ]Normal  [ ]Rash  [ ]Other  [ ]Pressure ulcer(s) [ ]y [ ]n present on admission    CRITICAL CARE:  [ ]Shock Present  [ ]Septic [ ]Cardiogenic [ ]Neurologic [ ]Hypovolemic  [ ]Vasopressors [ ]Inotropes  x ]Respiratory failure present [ ]Mechanical Ventilation [ ]Non-invasive ventilatory support [ ]High-Flow   [ ]Acute  [ ]Chronic [ ]Hypoxic  [ ]Hypercarbic [ ]Other  [ ]Other organ failure     LABS:                        9.6    8.23  )-----------( 257      ( 17 Oct 2024 11:34 )             30.8   10-17    143  |  99  |  37[H]  ----------------------------<  146[H]  3.6   |  34[H]  |  1.10    Ca    9.0      17 Oct 2024 11:34    TPro  7.4  /  Alb  2.9[L]  /  TBili  0.3  /  DBili  x   /  AST  29  /  ALT  27  /  AlkPhos  84  10-16  PT/INR - ( 16 Oct 2024 14:21 )   PT: 10.8 sec;   INR: 0.91 ratio         PTT - ( 16 Oct 2024 14:21 )  PTT:28.1 sec    Urinalysis Basic - ( 17 Oct 2024 11:34 )    Color: x / Appearance: x / SG: x / pH: x  Gluc: 146 mg/dL / Ketone: x  / Bili: x / Urobili: x   Blood: x / Protein: x / Nitrite: x   Leuk Esterase: x / RBC: x / WBC x   Sq Epi: x / Non Sq Epi: x / Bacteria: x      RADIOLOGY & ADDITIONAL STUDIES: pending thoracentesis    Protein Calorie Malnutrition Present: [ ]mild [ ]moderate [ ]severe [ ]underweight [ ]morbid obesity  https://www.andeal.org/vault/5130/web/files/ONC/Table_Clinical%20Characteristics%20to%20Document%20Malnutrition-White%20JV%20et%20al%202012.pdf    Height (cm): 160 (10-16-24 @ 09:10), 160 (10-02-24 @ 14:31), 160 (08-30-24 @ 17:33)  Weight (kg): 54.4 (10-16-24 @ 09:10), 54.4 (10-02-24 @ 14:31), 42.2 (08-30-24 @ 17:33)  BMI (kg/m2): 21.3 (10-16-24 @ 09:10), 21.3 (10-02-24 @ 14:31), 16.5 (08-30-24 @ 17:33)    [x ]PPSV2 < or = 30%  [ ]significant weight loss [x]poor nutritional intake [ ]anasarca[ ]Artificial Nutrition    Other REFERRALS:  [ ]Hospice  [ ]Child Life  [ x]Social Work  [ ]Case management [ ]Holistic Therapy

## 2024-10-17 NOTE — PROCEDURE NOTE - ADDITIONAL PROCEDURE DETAILS
1500cc of clear yellow pleuritic fluid removed from right thorax under sterile conditions and ultrasound guidance.  Post procedure CXR was ordered.

## 2024-10-17 NOTE — PROCEDURE NOTE - NSINFORMCONSENT_GEN_A_CORE
via telephone with patient's daughter/Benefits, risks, and possible complications of procedure explained to patient/caregiver who verbalized understanding and gave verbal consent.

## 2024-10-17 NOTE — PROGRESS NOTE ADULT - SUBJECTIVE AND OBJECTIVE BOX
Chief Complaint: Shortness of breath    Interval Events: Agitated and complaining of leg pain.    Review of Systems:  General: No fevers, chills, weight gain  Skin: No rashes, color changes  Cardiovascular: No chest pain, orthopnea  Respiratory: No shortness of breath, cough  Gastrointestinal: No nausea, abdominal pain  Genitourinary: No incontinence, pain with urination  Musculoskeletal: +pain, swelling, decreased range of motion  Neurological: No headache, weakness  Psychiatric: No depression, anxiety  Endocrine: No weight gain, increased thirst  All other systems are comprehensively negative.    Physical Exam:  Vitals:        Vital Signs Last 24 Hrs  T(C): 36.4 (17 Oct 2024 05:56), Max: 36.8 (16 Oct 2024 12:41)  T(F): 97.5 (17 Oct 2024 05:56), Max: 98.3 (16 Oct 2024 23:31)  HR: 88 (17 Oct 2024 08:30) (64 - 89)  BP: 132/53 (17 Oct 2024 05:56) (132/53 - 190/76)  BP(mean): --  RR: 19 (17 Oct 2024 05:56) (17 - 48)  SpO2: 98% (17 Oct 2024 08:30) (84% - 100%)  Parameters below as of 17 Oct 2024 08:30  Patient On (Oxygen Delivery Method): mask, nonrebreather  General: NAD  HEENT: MMM  Neck: No JVD, no carotid bruit  Lungs: CTAB  CV: RRR, nl S1/S2, no M/R/G  Abdomen: S/NT/ND, +BS  Extremities: No LE edema, no cyanosis  Neuro: AAOx0  Skin: No rash    Labs:                        10.3   11.14 )-----------( 296      ( 16 Oct 2024 09:45 )             33.3     10-16    141  |  101  |  34[H]  ----------------------------<  141[H]  4.6   |  33[H]  |  0.71    Ca    9.4      16 Oct 2024 09:45    TPro  7.4  /  Alb  2.9[L]  /  TBili  0.3  /  DBili  x   /  AST  29  /  ALT  27  /  AlkPhos  84  10-16        PT/INR - ( 16 Oct 2024 14:21 )   PT: 10.8 sec;   INR: 0.91 ratio         PTT - ( 16 Oct 2024 14:21 )  PTT:28.1 sec    ECG/Telemetry: Sinus rhythm

## 2024-10-18 DIAGNOSIS — L89.152 PRESSURE ULCER OF SACRAL REGION, STAGE 2: ICD-10-CM

## 2024-10-18 DIAGNOSIS — L97.912 NON-PRESSURE CHRONIC ULCER OF UNSPECIFIED PART OF RIGHT LOWER LEG WITH FAT LAYER EXPOSED: ICD-10-CM

## 2024-10-18 LAB
ALBUMIN FLD-MCNC: 1.5 G/DL — SIGNIFICANT CHANGE UP
ANION GAP SERPL CALC-SCNC: 9 MMOL/L — SIGNIFICANT CHANGE UP (ref 5–17)
B PERT IGG+IGM PNL SER: CLEAR — SIGNIFICANT CHANGE UP
BUN SERPL-MCNC: 46 MG/DL — HIGH (ref 7–23)
CALCIUM SERPL-MCNC: 9.1 MG/DL — SIGNIFICANT CHANGE UP (ref 8.5–10.1)
CHLORIDE SERPL-SCNC: 100 MMOL/L — SIGNIFICANT CHANGE UP (ref 96–108)
CO2 SERPL-SCNC: 37 MMOL/L — HIGH (ref 22–31)
COLOR FLD: YELLOW — SIGNIFICANT CHANGE UP
CREAT SERPL-MCNC: 1.4 MG/DL — HIGH (ref 0.5–1.3)
CULTURE RESULTS: NO GROWTH — SIGNIFICANT CHANGE UP
EGFR: 34 ML/MIN/1.73M2 — LOW
FLUID INTAKE SUBSTANCE CLASS: SIGNIFICANT CHANGE UP
GLUCOSE FLD-MCNC: 149 MG/DL — SIGNIFICANT CHANGE UP
GLUCOSE SERPL-MCNC: 130 MG/DL — HIGH (ref 70–99)
GRAM STN FLD: SIGNIFICANT CHANGE UP
HCT VFR BLD CALC: 30.7 % — LOW (ref 34.5–45)
HGB BLD-MCNC: 9.6 G/DL — LOW (ref 11.5–15.5)
LDH SERPL L TO P-CCNC: 480 U/L — HIGH (ref 50–242)
LDH SERPL L TO P-CCNC: 56 U/L — SIGNIFICANT CHANGE UP
LYMPHOCYTES # FLD: 27 % — SIGNIFICANT CHANGE UP
MCHC RBC-ENTMCNC: 30.3 PG — SIGNIFICANT CHANGE UP (ref 27–34)
MCHC RBC-ENTMCNC: 31.3 GM/DL — LOW (ref 32–36)
MCV RBC AUTO: 96.8 FL — SIGNIFICANT CHANGE UP (ref 80–100)
MONOS+MACROS # FLD: 67 % — SIGNIFICANT CHANGE UP
NEUTROPHILS-BODY FLUID: 6 % — SIGNIFICANT CHANGE UP
NRBC # BLD: 0 /100 WBCS — SIGNIFICANT CHANGE UP (ref 0–0)
PH FLD: 7.9 — SIGNIFICANT CHANGE UP
PLATELET # BLD AUTO: 257 K/UL — SIGNIFICANT CHANGE UP (ref 150–400)
POTASSIUM SERPL-MCNC: 3.6 MMOL/L — SIGNIFICANT CHANGE UP (ref 3.5–5.3)
POTASSIUM SERPL-SCNC: 3.6 MMOL/L — SIGNIFICANT CHANGE UP (ref 3.5–5.3)
PROT FLD-MCNC: 3 G/DL — SIGNIFICANT CHANGE UP
RBC # BLD: 3.17 M/UL — LOW (ref 3.8–5.2)
RBC # FLD: 14.1 % — SIGNIFICANT CHANGE UP (ref 10.3–14.5)
RCV VOL RI: <2000 /UL — HIGH (ref 0–0)
SODIUM SERPL-SCNC: 146 MMOL/L — HIGH (ref 135–145)
SPECIMEN SOURCE: SIGNIFICANT CHANGE UP
SPECIMEN SOURCE: SIGNIFICANT CHANGE UP
TOTAL NUCLEATED CELL COUNT, BODY FLUID: 218 /UL — SIGNIFICANT CHANGE UP
TUBE TYPE: SIGNIFICANT CHANGE UP
WBC # BLD: 10.68 K/UL — HIGH (ref 3.8–10.5)
WBC # FLD AUTO: 10.68 K/UL — HIGH (ref 3.8–10.5)

## 2024-10-18 PROCEDURE — 99221 1ST HOSP IP/OBS SF/LOW 40: CPT

## 2024-10-18 PROCEDURE — 99232 SBSQ HOSP IP/OBS MODERATE 35: CPT

## 2024-10-18 RX ORDER — METHYL SALICYLATE/MENTHOL
1 CREAM (GRAM) TOPICAL
Refills: 0 | Status: DISCONTINUED | OUTPATIENT
Start: 2024-10-18 | End: 2024-10-18

## 2024-10-18 RX ORDER — MENTHOL AND METHYL SALICYLATE 10; 30 G/100G; G/100G
1 CREAM TOPICAL
Refills: 0 | Status: DISCONTINUED | OUTPATIENT
Start: 2024-10-18 | End: 2024-10-24

## 2024-10-18 RX ORDER — COLLAGENASE CLOSTRIDIUM HIST. 250 UNIT/G
1 OINTMENT (GRAM) TOPICAL DAILY
Refills: 0 | Status: DISCONTINUED | OUTPATIENT
Start: 2024-10-18 | End: 2024-10-24

## 2024-10-18 RX ORDER — ACETAMINOPHEN 500 MG
725 TABLET ORAL ONCE
Refills: 0 | Status: COMPLETED | OUTPATIENT
Start: 2024-10-18 | End: 2024-10-18

## 2024-10-18 RX ORDER — ACETAMINOPHEN 500 MG
1000 TABLET ORAL ONCE
Refills: 0 | Status: DISCONTINUED | OUTPATIENT
Start: 2024-10-18 | End: 2024-10-18

## 2024-10-18 RX ORDER — LORAZEPAM 2 MG
0.5 TABLET ORAL EVERY 6 HOURS
Refills: 0 | Status: DISCONTINUED | OUTPATIENT
Start: 2024-10-18 | End: 2024-10-24

## 2024-10-18 RX ADMIN — SODIUM CHLORIDE 4 MILLILITER(S): 9 INJECTION, SOLUTION INTRAMUSCULAR; INTRAVENOUS; SUBCUTANEOUS at 23:19

## 2024-10-18 RX ADMIN — Medication 40 MILLIGRAM(S): at 05:31

## 2024-10-18 RX ADMIN — Medication 725 MILLIGRAM(S): at 23:59

## 2024-10-18 RX ADMIN — Medication 1 DROP(S): at 17:47

## 2024-10-18 RX ADMIN — Medication 37.5 MICROGRAM(S): at 20:52

## 2024-10-18 RX ADMIN — Medication 2.5 MILLIGRAM(S): at 23:19

## 2024-10-18 RX ADMIN — Medication 2.5 MILLIGRAM(S): at 09:28

## 2024-10-18 RX ADMIN — Medication 1 DROP(S): at 09:28

## 2024-10-18 RX ADMIN — Medication 2.5 MILLIGRAM(S): at 13:36

## 2024-10-18 RX ADMIN — Medication 290 MILLIGRAM(S): at 22:29

## 2024-10-18 RX ADMIN — PIPERACILLIN AND TAZOBACTAM 25 GRAM(S): .5; 4 INJECTION, POWDER, LYOPHILIZED, FOR SOLUTION INTRAVENOUS at 20:38

## 2024-10-18 RX ADMIN — Medication 2.5 MILLIGRAM(S): at 02:27

## 2024-10-18 RX ADMIN — Medication 290 MILLIGRAM(S): at 11:27

## 2024-10-18 RX ADMIN — Medication 725 MILLIGRAM(S): at 12:14

## 2024-10-18 RX ADMIN — SODIUM CHLORIDE 4 MILLILITER(S): 9 INJECTION, SOLUTION INTRAMUSCULAR; INTRAVENOUS; SUBCUTANEOUS at 14:03

## 2024-10-18 RX ADMIN — Medication 2.5 MILLIGRAM(S): at 08:20

## 2024-10-18 RX ADMIN — SODIUM CHLORIDE 4 MILLILITER(S): 9 INJECTION, SOLUTION INTRAMUSCULAR; INTRAVENOUS; SUBCUTANEOUS at 08:20

## 2024-10-18 RX ADMIN — PIPERACILLIN AND TAZOBACTAM 25 GRAM(S): .5; 4 INJECTION, POWDER, LYOPHILIZED, FOR SOLUTION INTRAVENOUS at 12:21

## 2024-10-18 RX ADMIN — Medication 2.5 MILLIGRAM(S): at 20:38

## 2024-10-18 RX ADMIN — PIPERACILLIN AND TAZOBACTAM 25 GRAM(S): .5; 4 INJECTION, POWDER, LYOPHILIZED, FOR SOLUTION INTRAVENOUS at 02:27

## 2024-10-18 RX ADMIN — Medication 1 APPLICATION(S): at 20:39

## 2024-10-18 NOTE — PROGRESS NOTE ADULT - PROBLEM SELECTOR PLAN 1
LOS: 9 days   Interval History: Awake and alert. Still with epigastric pain amd nausea. CT shows new pseudocyst formation. Will await GI recommendations for further management.      History taken from: patient chart    Review of Systems:     Review of Systems - Negative except present illness    Objective     Vital Signs  Temp:  [97.8 °F (36.6 °C)-99 °F (37.2 °C)] 98.3 °F (36.8 °C)  Heart Rate:  [] 102  Resp:  [18] 18  BP: ()/(60-74) 129/74    Physical Exam:  HEENT; Neg  CHEST:Clear  HEART:RRR  ABD: Tender epigastrium             Results Review:     I reviewed the patient's new clinical results  : See Below    Medication Review:     Current Facility-Administered Medications:   •  Adult [facility] Clinimix TPN, , Intravenous, Q24H (TPN), Last Rate: 65 mL/hr at 10/18/17 1712 **AND** [DISCONTINUED] fat emulsion (INTRALIPID,LIPOSYN) 20 % infusion 50 g, 250 mL, Intravenous, Once per day on Mon Wed Fri, Last Rate: 20.8 mL/hr at 10/13/17 1849, 50 g at 10/13/17 1849 **AND** multiple vitamin 10 mL, trace elements Cr-Cu-Mn-Zn 5 mL in sodium chloride 0.9 % 500 mL IVPB, , Intravenous, Once per day on Mon Wed Fri, Kait Rao [facility], Last Rate: 128.8 mL/hr at 10/18/17 1136  •  amLODIPine (NORVASC) tablet 10 mg, 10 mg, Oral, Q24H, Seven Gallego MD, 10 mg at 10/18/17 0807  •  dextrose (D50W) solution 25 g, 25 g, Intravenous, Q15 Min PRN, Kel Becker MD  •  dextrose (GLUTOSE) oral gel 15 g, 15 g, Oral, Q15 Min PRN, Kel Becker MD  •  enoxaparin (LOVENOX) syringe 40 mg, 40 mg, Subcutaneous, Nightly, Kel Becker MD, 40 mg at 10/18/17 2010  •  glucagon (human recombinant) (GLUCAGEN DIAGNOSTIC) injection 1 mg, 1 mg, Subcutaneous, Q15 Min PRN, Kel Becker MD  •  insulin aspart (novoLOG) injection 0-14 Units, 0-14 Units, Subcutaneous, 4x Daily AC & at Bedtime, Kait Rao [facility], 3 Units at 10/18/17 1712  •  insulin detemir (LEVEMIR) injection 20 Units, 20 Units, Subcutaneous, Q12H, Kel STEPHENS 
MD Eugenio, 20 Units at 10/18/17 2016  •  ipratropium-albuterol (DUO-NEB) nebulizer solution 3 mL, 3 mL, Nebulization, Q4H PRN, Seven Gallego MD, 3 mL at 10/11/17 2300  •  levoFLOXacin (LEVAQUIN) 750 mg/150 mL D5W (premix) (LEVAQUIN) 750 mg, 750 mg, Intravenous, Q24H, Seven Gallego MD, Last Rate: 0 mL/hr at 10/11/17 1000, 750 mg at 10/18/17 0922  •  lisinopril (PRINIVIL,ZESTRIL) tablet 20 mg, 20 mg, Oral, Q24H, Seven Gallego MD, 20 mg at 10/18/17 0807  •  Magnesium Sulfate 2 gram Bolus, followed by 8 gram infusion (total Mg dose 10 grams)- Mg less than or equal to 1mg/dL, 2 g, Intravenous, PRN **OR** Magnesium Sulfate 6 gram Infusion (2 gm x 3) -Mg 1.1 -1.5 mg/dL, 2 g, Intravenous, PRN **OR** magnesium sulfate 4 gram infusion- Mg 1.6-1.9 mg/dL, 4 g, Intravenous, PRN, Seven Gallego MD  •  metoprolol tartrate (LOPRESSOR) tablet 100 mg, 100 mg, Oral, Q12H, Seven Gallego MD, 100 mg at 10/18/17 0807  •  morphine injection 2 mg, 2 mg, Intravenous, Daily PRN, Nico Fung MD  •  morphine injection 2 mg, 2 mg, Intravenous, Q2H PRN, Nico Fung MD, 2 mg at 10/19/17 0414  •  ondansetron (ZOFRAN) injection 4 mg, 4 mg, Intravenous, Q6H PRN, Kel Becker MD, 4 mg at 10/18/17 1405  •  pancrelipase (Lip-Prot-Amyl) (CREON) capsule 24,000 units of lipase, 24,000 units of lipase, Oral, TID With Meals, Seven Gallego MD, 24,000 units of lipase at 10/12/17 1813  •  pantoprazole (PROTONIX) injection 40 mg, 40 mg, Intravenous, Q12H, Nico Fung MD, 40 mg at 10/18/17 2010  •  Pharmacy to Dose TPN, , Does not apply, Continuous PRN, Seven Gallego MD, 65 mg at 10/13/17 1148  •  potassium chloride 10 mEq in 100 mL IVPB, 10 mEq, Intravenous, Q1H PRN, Seven Gallego MD, Last Rate: 100 mL/hr at 10/13/17 1442, 10 mEq at 10/13/17 1442  •  potassium chloride 10 mEq in 100 mL IVPB, 10 mEq, Intravenous, Q1H, Seven Gallego MD  •  promethazine (PHENERGAN) 12.5 mg in sodium chloride 0.9 % 50 mL, 12.5 mg, 
Intravenous, Q4H PRN, Nico Fung MD, 12.5 mg at 10/19/17 0414  •  Risaquad-2 capsule 1 capsule, 1 capsule, Oral, Daily, Kait Rao [facility], 1 capsule at 10/18/17 0807  •  sodium chloride 0.9 % flush 10 mL, 10 mL, Intracatheter, Q12H, Seven Gallego MD, 10 mL at 10/18/17 2011  •  sodium chloride 0.9 % flush 10 mL, 10 mL, Intracatheter, PRN, Seven Gallego MD, 10 mL at 10/14/17 0631  •  sodium chloride 0.9 % flush 10 mL, 10 mL, Intracatheter, Q12H, Seven Gallego MD, 10 mL at 10/18/17 2010  •  sodium chloride 0.9 % flush 10 mL, 10 mL, Intracatheter, PRN, Seven Gallego MD  •  sodium chloride 0.9 % infusion, 50 mL/hr, Intravenous, Continuous, Kel Becker MD, Last Rate: 50 mL/hr at 10/18/17 0922, 50 mL/hr at 10/18/17 0922  •  traMADol (ULTRAM) tablet 50 mg, 50 mg, Oral, Q6H PRN, Seven Gallego MD, 50 mg at 10/13/17 1704  •  venlafaxine XR (EFFEXOR-XR) 24 hr capsule 75 mg, 75 mg, Oral, Daily, Seven Gallego MD, 75 mg at 10/18/17 0819    amLODIPine 10 mg Oral Q24H   enoxaparin 40 mg Subcutaneous Nightly   insulin aspart 0-14 Units Subcutaneous 4x Daily AC & at Bedtime   insulin detemir 20 Units Subcutaneous Q12H   levoFLOXacin 750 mg Intravenous Q24H   lisinopril 20 mg Oral Q24H   metoprolol tartrate 100 mg Oral Q12H   trace minerals + multivitamin IVPB  Intravenous Once per day on Mon Wed Fri   pancrelipase (Lip-Prot-Amyl) 24,000 units of lipase Oral TID With Meals   pantoprazole 40 mg Intravenous Q12H   potassium chloride 10 mEq Intravenous Q1H   Risaquad-2 1 capsule Oral Daily   sodium chloride 10 mL Intracatheter Q12H   sodium chloride 10 mL Intracatheter Q12H   venlafaxine XR 75 mg Oral Daily     dextrose  •  dextrose  •  glucagon (human recombinant)  •  ipratropium-albuterol  •  magnesium sulfate **OR** magnesium sulfate **OR** magnesium sulfate  •  Morphine  •  Morphine  •  ondansetron  •  Pharmacy to Dose TPN  •  potassium chloride  •  promethazine (PHENERGAN) in NS 50 mL  •  sodium 
chloride  •  sodium chloride  •  traMADol      Results from last 7 days  Lab Units 10/18/17  1139 10/16/17  0544 10/14/17  0129 10/13/17  0106   WBC 10*3/mm3 9.69 7.47 7.67 10.22   HEMOGLOBIN g/dL 12.8 12.0 11.1* 11.4*   PLATELETS 10*3/mm3 390 357 417* 466*       Results from last 7 days  Lab Units 10/13/17  0106   CRP mg/dL 21.14*       Results from last 7 days  Lab Units 10/19/17  0137 10/18/17  0059 10/17/17  0042 10/16/17  0544 10/16/17  0123 10/15/17  0147 10/14/17  1413 10/14/17  0129   SODIUM mmol/L 136 133* 138 137 135 133*  --  136   POTASSIUM mmol/L 3.6 3.6 3.8 3.8 3.5 4.0 4.0 3.2*   CHLORIDE mmol/L 104 101 103 101 100 101  --  104   CO2 mmol/L 27.0 25.6 27.2 30.5 28.8 28.2  --  25.4   BUN mg/dL 9 8 6* 8 8 8  --  5*   CREATININE mg/dL 0.44 0.47 0.54 0.55 0.51 0.57  --  0.45   CALCIUM mg/dL 8.8 8.6 9.0 9.2 8.5 8.9  --  8.9   GLUCOSE mg/dL 252* 306* 312* 321* 299* 318*  --  253*       Results from last 7 days  Lab Units 10/19/17  0137 10/18/17  0059 10/17/17  0042 10/16/17  0123 10/15/17  1754 10/15/17  0147 10/13/17  0106   MAGNESIUM mg/dL 1.7 1.8 1.8 2.4 1.8 1.6* 2.3     No results found for: HGBA1C    Results from last 7 days  Lab Units 10/16/17  0544 10/14/17  0129 10/13/17  0106   BILIRUBIN mg/dL 0.2 0.1* 0.2   ALK PHOS U/L 113* 112* 125*   AST (SGOT) U/L 14 16 16   ALT (SGPT) U/L 6* 12 15                       Imaging Results (last 72 hours)     Procedure Component Value Units Date/Time    CT Abdomen Pelvis With Contrast [[id number]] Collected:  10/18/17 1514     Updated:  10/18/17 1519    Narrative:          CT ABDOMEN PELVIS W CONTRAST-        TECHNIQUE: Multiple axial CT images were obtained from lung bases  through pubic symphysis with administration of IV contrast. Reformatted  images in the coronal and/or sagittal plane(s) were generated from the  axial data set to facilitate diagnostic accuracy and/or surgical  planning.  Oral Contrast:NONE.     Radiation dose reduction techniques were utilized 
"per ALARA protocol.  Automated exposure control was initiated through either or CareDose or  DoseRight software packages by  protocol.       Radiation dose reduction techniques were utilized per ALARA protocol.  Automated exposure control was initiated through either or CareDoAkoha or  DoseRight software packages by  protocol.       599.94 mGy.cm     Clinical information  worsening abdominal pain, (patient reports sensation that something  \"burst\" in abdomen), pancreatitis with pain lasting longer than 2 weeks,  on TPN cannot tolerate PO with continued nausea; K85.90-Acute  pancreatitis without necrosis or infection, unspecified      Comparison  Comparison: 9/29/2017     Findings  LOWER THORAX: SMALL LEFT GREATER THAN RIGHT PLEURAL EFFUSIONS WITH  BIBASILAR AIRSPACE DISEASE NOTED.     ABDOMEN:        LIVER: Homogeneous. No focal hepatic mass or ductal dilatation.        GALLBLADDER: PRIOR CHOLECYSTECTOMY.        PANCREAS: SINCE THE PREVIOUS EXAM, A LARGE PSEUDOCYST HAS DEVELOPED  INVOLVING THE MIDPORTION OF PANCREAS IMMEDIATELY SITUATED POSTERIOR TO  THE POSTERIOR GASTRIC WALL AND IS APPROXIMATELY 11.4 CM X 6.8 CM.        SPLEEN: Homogeneous. No splenomegaly.        ADRENALS: No mass.        KIDNEYS: No mass. No obstructive uropathy.  No evidence of  urolithiasis.        GI TRACT: SMALLER PSEUDOCYST IS SEEN ALONG THE GREATER CURVATURE OF  THE STOMACH BUT IS APPROXIMATELY 2.6 CM IN SIZE.        PERITONEUM: THE AMOUNT OF INTRA-ABDOMINAL ASCITES HAS INCREASED SINCE  THE PREVIOUS EXAM.        MESENTERY: Unremarkable.        LYMPH NODES: No lymphadenopathy.        VASCULATURE: No evidence of aneurysm.        ABDOMINAL WALL: No focal hernia or mass.           OTHER: None.     PELVIS:        BLADDER: No focal mass or significant wall thickening        REPRODUCTIVE: Unremarkable as visualized.        APPENDIX: Nondistended. No surrounding inflammation.     BONES: No acute bony abnormality.       "
Impression:       Impression:  1. DEVELOPMENT OF 11.4 X 6.8 CM LARGE MID BODY REGION PSEUDOCYST OF THE  PANCREAS WHICH IS SITUATED IMMEDIATELY POSTERIOR TO THE STOMACH EFFACING  THE POSTERIOR GASTRIC WALL.  2. SMALLER PSEUDOCYST ALONG GREATER CURVATURE OF STOMACH NEAR FUNDUS.  3. EVOLVING SMALL AMOUNTS OF ASCITES THROUGHOUT THE ABDOMINAL AND PELVIC  REGIONS. NO LOCULATED COLLECTIONS.  4. DEVELOPMENT OF PLEURAL EFFUSIONS WITH BIBASILAR AIRSPACE DISEASE.        This report was finalized on 10/18/2017 3:16 PM by Dr. Mike Tolbert MD.             Assessment/Plan    Active Problems:    Acute pancreatitis         See orders entered.     Kel Becker MD  10/19/17  5:54 AM        
c/w supplemental oxygen  family wants to proceed with thoracentesis and see how breathing does afterwards  aware that patient hospice appropriate and home hospice vs. CMO also discussed irrespective of improvement
c/w supplemental oxygen  breathing better, s/p thoracentesis  on nc, nocturnal bipap

## 2024-10-18 NOTE — PROGRESS NOTE ADULT - SUBJECTIVE AND OBJECTIVE BOX
Indication for Geriatrics and Palliative Care Services/INTERVAL HPI: goals of care  SUBJECTIVE AND OBJECTIVE: pt seen and evaluated; denies pain, some sob but feels better. asking "when am I going to die?" asked her if she wants to go home to which she says "yes". aide at bedside    OVERNIGHT EVENTS: no acute overnight events    DNR on chart:DNI: Trial NIV  DNI: Trial NIV      Allergies    Percocet 5/325 (Vomiting)  codeine (Nausea)    Intolerances    MEDICATIONS  (STANDING):  albuterol    0.083% 2.5 milliGRAM(s) Nebulizer every 8 hours  amLODIPine   Tablet 5 milliGRAM(s) Oral daily  furosemide   Injectable 40 milliGRAM(s) IV Push daily  levothyroxine Injectable 37.5 MICROGram(s) IV Push at bedtime  metoprolol tartrate Injectable 2.5 milliGRAM(s) IV Push every 6 hours  piperacillin/tazobactam IVPB.. 3.375 Gram(s) IV Intermittent every 8 hours  prednisoLONE acetate 1% Suspension 1 Drop(s) Both EYES two times a day  sodium chloride 3%  Inhalation 4 milliLiter(s) Inhalation every 8 hours    MEDICATIONS  (PRN):  haloperidol     Tablet 0.5 milliGRAM(s) Oral every 8 hours PRN agitation  haloperidol    Injectable 0.5 milliGRAM(s) IV Push every 6 hours PRN Agitation  morphine  - Injectable 2 milliGRAM(s) IV Push every 6 hours PRN Severe Pain (7 - 10)      ITEMS UNCHECKED ARE NOT PRESENT    PRESENT SYMPTOMS: [ ]Unable to self-report - see [ ] CPOT [ ] PAINADS [ ] RDOS  Source if other than patient:  [ ]Family   [ ]Team     Pain:  [ ]yes [x ]no  QOL impact -   Location -                    Aggravating factors -  Quality -  Radiation -  Timing-  Severity (0-10 scale):  Minimal acceptable level (0-10 scale):     CPOT:    https://www.sccm.org/getattachment/pom62c79-8h7c-5c9b-5m5d-8774k5981f6t/Critical-Care-Pain-Observation-Tool-(CPOT)    Dyspnea:                           [ x]Mild [ ]Moderate [ ]Severe  Anxiety:                             [ ]Mild [ ]Moderate [ ]Severe  Fatigue:                             [ ]Mild [ ]Moderate [ ]Severe  Nausea:                             [ ]Mild [ ]Moderate [ ]Severe  Loss of appetite:              [ ]Mild [ ]Moderate [ ]Severe  Constipation:                    [ ]Mild [ ]Moderate [ ]Severe    PCSSQ[Palliative Care Spiritual Screening Question]   Severity (0-10):  Score of 4 or > indicate consideration of Chaplaincy referral.  Chaplaincy Referral: [ ] yes [ ] refused [ ] following [ x] Deferred     Caregiver [place]? : [ ] yes [ x] no [ ] Deferred [ ] Declined             Social work referral [ ] Patient & Family Centered Care Referral [ ]     Anticipatory Grief present?:  [ ] yes [x ] no  [ ] Deferred                  Social work referral [ ] Chaplaincy Referral[ ]      Other Symptoms:  [x ]All other review of systems negative   [ ]Unable to obtain due to poor mentation    Palliative Performance Status Version 2:     30    %      http://npcrc.org/files/news/palliative_performance_scale_ppsv2.pdf  PHYSICAL EXAM:  Vital Signs Last 24 Hrs  T(C): 36.6 (18 Oct 2024 11:35), Max: 37.1 (17 Oct 2024 20:15)  T(F): 97.8 (18 Oct 2024 11:35), Max: 98.8 (17 Oct 2024 20:15)  HR: 68 (18 Oct 2024 11:35) (68 - 96)  BP: 138/70 (18 Oct 2024 11:35) (130/53 - 155/68)  BP(mean): --  RR: 17 (18 Oct 2024 11:35) (17 - 22)  SpO2: 93% (18 Oct 2024 11:35) (93% - 100%)    Parameters below as of 18 Oct 2024 11:35  Patient On (Oxygen Delivery Method): nasal cannula  O2 Flow (L/min): 4   I&O's Summary    17 Oct 2024 07:01  -  18 Oct 2024 07:00  --------------------------------------------------------  IN: 0 mL / OUT: 1500 mL / NET: -1500 mL    18 Oct 2024 07:01  -  18 Oct 2024 12:53  --------------------------------------------------------  IN: 0 mL / OUT: 400 mL / NET: -400 mL       GENERAL: [ ]Cachexia    [x ]Alert  [ x]Oriented x1-2   [ ]Lethargic  [ ]Unarousable  [ ]Verbal  [ ]Non-Verbal  Behavioral:   [ ]Anxiety  [ ]Delirium [ ]Agitation [ ]Other  HEENT:  [ ]Normal   [ x]Dry mouth   [ ]ET Tube/Trach  [ ]Oral lesions  PULMONARY:   [ ]Clear [ ]Tachypnea  [ ]Audible excessive secretions   [ ]Rhonchi        [ ]Right [ ]Left [ ]Bilateral  [ ]Crackles        [ ]Right [ ]Left [ ]Bilateral  [ ]Wheezing     [ ]Right [ ]Left [ ]Bilateral  [x ]Diminished BS [ ] Right [ ]Left [ ]Bilateral  CARDIOVASCULAR:    [x ]Regular [ ]Irregular [ ]Tachy  [ ]Taz [ ]Murmur [ ]Other  GASTROINTESTINAL:  [x ]Soft  [ ]Distended   [x ]+BS  [ ]Non tender [ ]Tender  [ ]Other [ ]PEG [ ]OGT/ NGT   Last BM:   GENITOURINARY:  [ ]Normal [x]Incontinent   [ ]Oliguria/Anuria   [ ]Lara  MUSCULOSKELETAL:   [ ]Normal   [ x]Weakness  [ ]Bed/Wheelchair bound [ ]Edema  NEUROLOGIC:   [ ]No focal deficits  [ x] Cognitive impairment  [ ] Dysphagia [ ]Dysarthria [ ] Paresis [ ]Other   SKIN:   [ ]Normal  [ ]Rash  [ ]Other  [ ]Pressure ulcer(s) [ ]y [ ]n present on admission    CRITICAL CARE:  [ ]Shock Present  [ ]Septic [ ]Cardiogenic [ ]Neurologic [ ]Hypovolemic  [ ]Vasopressors [ ]Inotropes  [ ]Respiratory failure present [ ]Mechanical Ventilation [ ]Non-invasive ventilatory support [ ]High-Flow   [ ]Acute  [ ]Chronic [ ]Hypoxic  [ ]Hypercarbic [ ]Other  [ ]Other organ failure     LABS:                        9.6    10.68 )-----------( 257      ( 18 Oct 2024 05:59 )             30.7   10-18    146[H]  |  100  |  46[H]  ----------------------------<  130[H]  3.6   |  37[H]  |  1.40[H]    Ca    9.1      18 Oct 2024 05:59    PT/INR - ( 16 Oct 2024 14:21 )   PT: 10.8 sec;   INR: 0.91 ratio         PTT - ( 16 Oct 2024 14:21 )  PTT:28.1 sec    Urinalysis Basic - ( 18 Oct 2024 05:59 )    Color: x / Appearance: x / SG: x / pH: x  Gluc: 130 mg/dL / Ketone: x  / Bili: x / Urobili: x   Blood: x / Protein: x / Nitrite: x   Leuk Esterase: x / RBC: x / WBC x   Sq Epi: x / Non Sq Epi: x / Bacteria: x      RADIOLOGY & ADDITIONAL STUDIES: recent imaging reviewed    Protein Calorie Malnutrition Present: [ ]mild [ ]moderate [ ]severe [ ]underweight [ ]morbid obesity  https://www.andeal.org/vault/2440/web/files/ONC/Table_Clinical%20Characteristics%20to%20Document%20Malnutrition-White%20JV%20et%20al%202012.pdf    Height (cm): 160 (10-16-24 @ 09:10), 160 (10-02-24 @ 14:31), 160 (08-30-24 @ 17:33)  Weight (kg): 47.9 (10-18-24 @ 06:47), 54.4 (10-02-24 @ 14:31), 42.2 (08-30-24 @ 17:33)  BMI (kg/m2): 18.7 (10-18-24 @ 06:47), 21.3 (10-16-24 @ 09:10), 21.3 (10-02-24 @ 14:31)    [ x]PPSV2 < or = 30%  [ ]significant weight loss [ ]poor nutritional intake [ ]anasarca[ ]Artificial Nutrition    Other REFERRALS:  [ ]Hospice  [ ]Child Life  [ x]Social Work  [ ]Case management [ ]Holistic Therapy

## 2024-10-18 NOTE — DIETITIAN INITIAL EVALUATION ADULT - ADD RECOMMEND
1. If comfort measures not elected and medically feasible to advance diet, first consider pleasure feeds and if advanced further, consider regular diet.   2. If comfort measures not elected and medically feasible to advance diet, consider SLP evaluation   3. If comfort measures not elected, recommend multivitamin supplement daily and 500mg vitamin C BID to aid in wound healing.   4. If comfort measures not elected and medically feasible, consider magic cup daily which provides an additional 290 kcal and 9g protein per serving.   5. Obtain and honor food preferences as able.   6. Nutrition education not appropriate at this time.

## 2024-10-18 NOTE — CASE MANAGEMENT PROGRESS NOTE - NSCMPROGRESSNOTE_GEN_ALL_CORE
Discussed pt in rounds, pt remains acute on bipap overnight, now on 4L N/C , S/P thoracentesis yesterday, 1500mls removed. Remains on IV antibiotics. NPO, awaiting S/S. Pt admitted from home with 24/7 Aide. Discussed pt in rounds, pt remains acute on bipap overnight, now on 4L N/C , S/P thoracentesis yesterday, 1500mls removed. Remains on IV antibiotics. NPO, awaiting S/S.

## 2024-10-18 NOTE — PROGRESS NOTE ADULT - ASSESSMENT
89-year-old female with a history of COPD on home oxygen (around 3 L), hypothyroidism, atrial fibrillation, peripheral vascular disease, arthritis status post recent admission for "pneumonia" aw generalized weakness , AMS, SOB, FTT  ABG reviewed  CT chest- Bilateral pleural effusions right larger than left increased in size from prior.  Tree-in-bud parenchymal opacities upper lobes suggestive of small airways impaction/infection.  Nonspecific upper lobe groundglass opacities may reflect pulmonary edema and/or infection.  No acute findings abdomen and pelvis      #.acute on chronic hypoxic and hypercarbic respiratory failure   pna, pl effusions, pulm edema  overall poor prognosis  cards and pulm cs noted  sp thoracentesis tolerated well  palliative cs noted-pt made dnr dni, NIV and medical mgmt to continue  iv antibiotics  prn tylenol, morphine for pain    #delirium/metabolic encephalopathy, FTT  prn haldol, prn ativan  start dysphagia2 diet, pending speech eval  plan to dc home with hospice  d/w pall care      #Afib- no AC    #DVT ppx- start hep sq    [facility]   [phone]

## 2024-10-18 NOTE — DIETITIAN INITIAL EVALUATION ADULT - NSFNSPHYEXAMSKINFT_GEN_A_CORE
Pressure Injury 1: upper, back, Suspected deep tissue injury  Pressure Injury 2: sacrum, Stage II  Pressure Injury 3: Right:, heel, Stage I  Pressure Injury 4: Left:, heel, Stage I  Pressure Injury 5: none, none  Pressure Injury 6: none, none  Pressure Injury 7: none, none  Pressure Injury 8: none, none  Pressure Injury 9: none, none  Pressure Injury 10: none, none  Pressure Injury 11: none, none

## 2024-10-18 NOTE — PROVIDER CONTACT NOTE (OTHER) - SITUATION
ED MD Clark instructed RN to inform pt Medical MD Field about VBG results. Results sent over via teams, pending response from MD.
Pt. BP is 155/68. Pt. is NPO and has a standing dose of Amlodipine PO.

## 2024-10-18 NOTE — CONSULT NOTE ADULT - PROBLEM SELECTOR RECOMMENDATION 2
on home oxygen  hospice appropriate  introduced discussion with daughter on this date
off load sacral area and left lower back area  triad cream QOD/PRN soiling and cover with allevyn border

## 2024-10-18 NOTE — DIETITIAN INITIAL EVALUATION ADULT - PERTINENT MEDS FT
MEDICATIONS  (STANDING):  albuterol    0.083% 2.5 milliGRAM(s) Nebulizer every 8 hours  amLODIPine   Tablet 5 milliGRAM(s) Oral daily  furosemide   Injectable 40 milliGRAM(s) IV Push daily  levothyroxine Injectable 37.5 MICROGram(s) IV Push at bedtime  metoprolol tartrate Injectable 2.5 milliGRAM(s) IV Push every 6 hours  piperacillin/tazobactam IVPB.. 3.375 Gram(s) IV Intermittent every 8 hours  prednisoLONE acetate 1% Suspension 1 Drop(s) Both EYES two times a day  sodium chloride 3%  Inhalation 4 milliLiter(s) Inhalation every 8 hours    MEDICATIONS  (PRN):  haloperidol     Tablet 0.5 milliGRAM(s) Oral every 8 hours PRN agitation  haloperidol    Injectable 0.5 milliGRAM(s) IV Push every 6 hours PRN Agitation  LORazepam   Injectable 0.5 milliGRAM(s) IV Push every 6 hours PRN Agitation  morphine  - Injectable 2 milliGRAM(s) IV Push every 6 hours PRN Severe Pain (7 - 10)

## 2024-10-18 NOTE — PROGRESS NOTE ADULT - PROBLEM SELECTOR PLAN 3
see GOC note on this date    family wants to continue medical management including proceeding with NIPPV and thorcentesis  hospice and CMO were discussed, but no decision regarding this was made.  c/w medical care as ordered.
family wants to continue medical management including proceeding with NIPPV and thorcentesis  will revisit hospice discussion with family, currently not at bedside

## 2024-10-18 NOTE — PROGRESS NOTE ADULT - ASSESSMENT
Pt is a 89W w/ PMHx of COPD on home oxygen (around 3 L), hypothyroidism, atrial fibrillation, peripheral vascular disease, arthritis status post recent admission for "pneumonia" brought in by EMS from home for generalized weakness and fatigue, SOB and agitation/restlessnes    Acute PNA c/b pleural effusions  AHRF 2/2 above  CT CAP Bilateral pleural effusions right larger than left increased in size from prior. Tree-in-bud parenchymal opacities upper lobes suggestive of small airways impaction/infection.  Nonspecific upper lobe groundglass opacities may reflect pulmonary edema and/or infection. No acute findings abdomen and pelvis  RVP negative  Urine legionella negative    Recommendations:   C/w zosyn   F/u pending cx  Appreciate pulm recs; ?role for thoracentesis  Trend temps/WBC--slightly elevated WBC, pt noted to have received steroid  Supportive care, O2 as needed, additional care per primary team    Daughter at bedside indicated steady decline in functional status over the past month  Appreciate palliative care recs  Prognosis guarded    Dr. Omalley covering weekend service  I will resume care 10/20  Infectious Diseases will follow. Please call with any questions.  Allyson Leigh M.D.  [facility] Division of Infectious Diseases [phone]  For after 5 P.M. and weekends, please call [phone]   Pt is a 89W w/ PMHx of COPD on home oxygen (around 3 L), hypothyroidism, atrial fibrillation, peripheral vascular disease, arthritis status post recent admission for "pneumonia" brought in by EMS from home for generalized weakness and fatigue, SOB and agitation/restlessnes    Acute PNA c/b pleural effusions  AHRF 2/2 above  CT CAP Bilateral pleural effusions right larger than left increased in size from prior. Tree-in-bud parenchymal opacities upper lobes suggestive of small airways impaction/infection.  Nonspecific upper lobe groundglass opacities may reflect pulmonary edema and/or infection. No acute findings abdomen and pelvis  RVP negative  Urine legionella negative    Recommendations:   C/w zosyn   F/u pending cx  Appreciate pulm recs; ?role for thoracentesis  Trend temps/WBC--slightly elevated WBC, pt noted to have received steroid  Supportive care, O2 as needed, additional care per primary team    Daughter at bedside indicated steady decline in functional status over the past month  Appreciate palliative care recs  Prognosis guarded    Dr. Omalley covering weekend service  I will resume care 10/21  Infectious Diseases will follow. Please call with any questions.  Allyson Leigh M.D.  [facility] Infectious [facility] [phone]  For after 5 P.M. and weekends, please call [phone]   Pt is a 89W w/ PMHx of COPD on home oxygen (around 3 L), hypothyroidism, atrial fibrillation, peripheral vascular disease, arthritis status post recent admission for "pneumonia" brought in by EMS from home for generalized weakness and fatigue, SOB and agitation/restlessnes    Acute PNA c/b pleural effusions  AHRF 2/2 above  CT CAP Bilateral pleural effusions right larger than left increased in size from prior. Tree-in-bud parenchymal opacities upper lobes suggestive of small airways impaction/infection.  Nonspecific upper lobe groundglass opacities may reflect pulmonary edema and/or infection. No acute findings abdomen and pelvis  RVP negative  Urine legionella negative  S/p thoracentesis 10/17    Recommendations:   C/w zosyn   F/u pending cx  Trend temps/WBC--slightly elevated WBC, pt noted to have received steroid  pulmonary following  Supportive care, O2 as needed, additional care per primary team    Daughter at bedside indicated steady decline in functional status over the past month  Appreciate palliative care recs  Prognosis guarded    Dr. Omalley covering weekend service  I will resume care 10/21  Infectious Diseases will follow. Please call with any questions.  Allyson Leigh M.D.  [facility] Division of Infectious Diseases [phone]  For after 5 P.M. and weekends, please call [phone]   Pt is a 89W w/ PMHx of COPD on home oxygen (around 3 L), hypothyroidism, atrial fibrillation, peripheral vascular disease, arthritis status post recent admission for "pneumonia" brought in by EMS from home for generalized weakness and fatigue, SOB and agitation/restlessnes    Acute PNA c/b pleural effusions  AHRF 2/2 above  CT CAP Bilateral pleural effusions right larger than left increased in size from prior. Tree-in-bud parenchymal opacities upper lobes suggestive of small airways impaction/infection.  Nonspecific upper lobe groundglass opacities may reflect pulmonary edema and/or infection. No acute findings abdomen and pelvis  RVP negative  Urine legionella negative  S/p thoracentesis 10/17    Recommendations:   C/w zosyn   F/u pending cx  Trend temps/WBC--slightly elevated WBC, pt noted to have received steroid  pulmonary following  Supportive care, O2 as needed, additional care per primary team    Daughter at bedside indicated steady decline in functional status over the past month  Appreciate palliative care recs  Prognosis guarded    D/w Dr. Jocelin Omalley covering weekend service  I will resume care 10/21  Infectious Diseases will follow. Please call with any questions.  Allyson Leigh M.D.  [facility] Division of Infectious Diseases [phone]  For after 5 P.M. and weekends, please call [phone]

## 2024-10-18 NOTE — DIETITIAN INITIAL EVALUATION ADULT - NUTRITIONGOAL OUTCOME2
Patient to consume >75% estimated nutrient needs, multivitamin and oral supplements to aid in wound healing.

## 2024-10-18 NOTE — CAREGIVER ENGAGEMENT NOTE - CAREGIVER OUTREACH NOTES - FREE TEXT
CM met with patient, daughter and Aide at bedside, 10/17/24. Educated all on role of CM and transition planning. All verbalized understanding. CM provided direct contact/resource folder and remains available. PER daughter patient has home oxygen 3L Portable and concentrator. [facility]. was recently discharged with pneumonia and was receiving visiting nurse and Home PT through [facility] and they would like her to be referred back to [facility] when cleared for discharge.

## 2024-10-18 NOTE — DIETITIAN INITIAL EVALUATION ADULT - PERTINENT LABORATORY DATA
10-18    146[H]  |  100  |  46[H]  ----------------------------<  130[H]  3.6   |  37[H]  |  1.40[H]    Ca    9.1      18 Oct 2024 05:59    POCT Blood Glucose.: 145 mg/dL (10-18-24 @ 08:09)

## 2024-10-18 NOTE — DIETITIAN INITIAL EVALUATION ADULT - ORAL INTAKE PTA/DIET HISTORY
Spoke with son, Colby, at bedside.   B: Toast, fruit, granola, yogurt  L: Deli sandwich, leftovers, soup  D: Pasta, chicken, turkey, fruit, broccoli, asparagus

## 2024-10-18 NOTE — PROGRESS NOTE ADULT - PROBLEM SELECTOR PLAN 4
will continue to follow  patient with high risk of clinical decompensation and in my opinion, has overall poor prognosis.  hospice appropriate.    Sofie Mercado MD, [facility]; Palliative Care Attending, Ethicist. [phone]
will continue to follow  patient with high risk of clinical decompensation and in my opinion, has overall poor prognosis.    Sofie Mercado MD, [facility]; Palliative Care Attending, Ethicist. [phone]

## 2024-10-18 NOTE — DIETITIAN INITIAL EVALUATION ADULT - NUTRITIONGOAL OUTCOME1
Patient to meet >75% estimated nutrient needs and consume HBV protein sources to preserve muscle stores.

## 2024-10-18 NOTE — PROGRESS NOTE ADULT - SUBJECTIVE AND OBJECTIVE BOX
[facility], Division of Infectious Diseases  HUSEYIN Jones Y. Patel, S. Shah, G. [facility]  [phone]    Name: SANDEE PEARSON  Age: 89y  Gender: Female  MRN: [id number]    Interval History:  Patient seen and examined at bedside   Complaining of leg pain  on NC, appears more comfortable today  No complaints  Notes reviewed    Antibiotics:  piperacillin/tazobactam IVPB.. 3.375 Gram(s) IV Intermittent every 8 hours      Medications:  acetaminophen   IVPB .. 725 milliGRAM(s) IV Intermittent once  albuterol    0.083% 2.5 milliGRAM(s) Nebulizer every 8 hours  amLODIPine   Tablet 5 milliGRAM(s) Oral daily  furosemide   Injectable 40 milliGRAM(s) IV Push daily  haloperidol     Tablet 0.5 milliGRAM(s) Oral every 8 hours PRN  haloperidol    Injectable 0.5 milliGRAM(s) IV Push every 6 hours PRN  levothyroxine Injectable 37.5 MICROGram(s) IV Push at bedtime  metoprolol tartrate Injectable 2.5 milliGRAM(s) IV Push every 6 hours  morphine  - Injectable 2 milliGRAM(s) IV Push every 6 hours PRN  piperacillin/tazobactam IVPB.. 3.375 Gram(s) IV Intermittent every 8 hours  prednisoLONE acetate 1% Suspension 1 Drop(s) Both EYES two times a day  sodium chloride 3%  Inhalation 4 milliLiter(s) Inhalation every 8 hours      Review of Systems:  unable to obtain    Allergies: Percocet 5/325 (Vomiting)  codeine (Nausea)    For details regarding the patient's past medical history, social history, family history, and other miscellaneous elements, please refer the initial infectious diseases consultation and/or the admitting history and physical examination for this admission.    Objective:  Vitals:   T(C): 36.3 (10-18-24 @ 09:00), Max: 37.1 (10-17-24 @ 20:15)  HR: 92 (10-18-24 @ 09:00) (63 - 96)  BP: 142/57 (10-18-24 @ 09:00) (130/53 - 155/68)  RR: 20 (10-18-24 @ 09:00) (18 - 22)  SpO2: 99% (10-18-24 @ 09:00) (94% - 100%)    Physical Examination:  General: no acute distress  HEENT: NC/AT,  Cardio: RRR  Resp: decreased breath sounds on NC  Abd: soft, NT, ND  Ext: no edema or cyanosis  Skin: warm, dry, no visible rash      Laboratory Studies:  CBC:                       9.6    10.68 )-----------( 257      ( 18 Oct 2024 05:59 )             30.7     CMP: 10-18    146[H]  |  100  |  46[H]  ----------------------------<  130[H]  3.6   |  37[H]  |  1.40[H]    Ca    9.1      18 Oct 2024 05:59        Urinalysis Basic - ( 18 Oct 2024 05:59 )    Color: x / Appearance: x / SG: x / pH: x  Gluc: 130 mg/dL / Ketone: x  / Bili: x / Urobili: x   Blood: x / Protein: x / Nitrite: x   Leuk Esterase: x / RBC: x / WBC x   Sq Epi: x / Non Sq Epi: x / Bacteria: x        Microbiology: reviewed    Culture - Body Fluid with Gram Stain (collected 10-17-24 @ 15:27)  Source: Pleural Fl  Gram Stain (10-18-24 @ 06:38):    No polymorphonuclear cells seen    No organisms seen    by cytocentrifuge    Culture - Urine (collected 10-16-24 @ 13:34)  Source: Clean Catch Clean Catch (Midstream)  Final Report (10-18-24 @ 08:11):    No growth    Culture - Blood (collected 10-16-24 @ 09:45)  Source: .Blood BLOOD  Preliminary Report (10-17-24 @ 17:02):    No growth at 24 hours    Culture - Blood (collected 10-16-24 @ 09:40)  Source: .Blood BLOOD  Preliminary Report (10-17-24 @ 17:02):    No growth at 24 hours          Radiology: reviewed       [facility], Division of Infectious Diseases  HUSEYIN Jones Y. Patel, S. Shah, G. [facility]  [phone]    Name: SANDEE PEARSON  Age: 89y  Gender: Female  MRN: [id number]    Interval History:  Patient seen and examined at bedside   S/p thoracentesis 10/17  Complaining of leg pain  on NC, appears more comfortable today  No complaints  Notes reviewed    Antibiotics:  piperacillin/tazobactam IVPB.. 3.375 Gram(s) IV Intermittent every 8 hours      Medications:  acetaminophen   IVPB .. 725 milliGRAM(s) IV Intermittent once  albuterol    0.083% 2.5 milliGRAM(s) Nebulizer every 8 hours  amLODIPine   Tablet 5 milliGRAM(s) Oral daily  furosemide   Injectable 40 milliGRAM(s) IV Push daily  haloperidol     Tablet 0.5 milliGRAM(s) Oral every 8 hours PRN  haloperidol    Injectable 0.5 milliGRAM(s) IV Push every 6 hours PRN  levothyroxine Injectable 37.5 MICROGram(s) IV Push at bedtime  metoprolol tartrate Injectable 2.5 milliGRAM(s) IV Push every 6 hours  morphine  - Injectable 2 milliGRAM(s) IV Push every 6 hours PRN  piperacillin/tazobactam IVPB.. 3.375 Gram(s) IV Intermittent every 8 hours  prednisoLONE acetate 1% Suspension 1 Drop(s) Both EYES two times a day  sodium chloride 3%  Inhalation 4 milliLiter(s) Inhalation every 8 hours      Review of Systems:  unable to obtain    Allergies: Percocet 5/325 (Vomiting)  codeine (Nausea)    For details regarding the patient's past medical history, social history, family history, and other miscellaneous elements, please refer the initial infectious diseases consultation and/or the admitting history and physical examination for this admission.    Objective:  Vitals:   T(C): 36.3 (10-18-24 @ 09:00), Max: 37.1 (10-17-24 @ 20:15)  HR: 92 (10-18-24 @ 09:00) (63 - 96)  BP: 142/57 (10-18-24 @ 09:00) (130/53 - 155/68)  RR: 20 (10-18-24 @ 09:00) (18 - 22)  SpO2: 99% (10-18-24 @ 09:00) (94% - 100%)    Physical Examination:  General: no acute distress  HEENT: NC/AT,  Cardio: RRR  Resp: decreased breath sounds on NC  Abd: soft, NT, ND  Ext: no edema or cyanosis  Skin: warm, dry, no visible rash      Laboratory Studies:  CBC:                       9.6    10.68 )-----------( 257      ( 18 Oct 2024 05:59 )             30.7     CMP: 10-18    146[H]  |  100  |  46[H]  ----------------------------<  130[H]  3.6   |  37[H]  |  1.40[H]    Ca    9.1      18 Oct 2024 05:59        Urinalysis Basic - ( 18 Oct 2024 05:59 )    Color: x / Appearance: x / SG: x / pH: x  Gluc: 130 mg/dL / Ketone: x  / Bili: x / Urobili: x   Blood: x / Protein: x / Nitrite: x   Leuk Esterase: x / RBC: x / WBC x   Sq Epi: x / Non Sq Epi: x / Bacteria: x        Microbiology: reviewed    Culture - Body Fluid with Gram Stain (collected 10-17-24 @ 15:27)  Source: Pleural Fl  Gram Stain (10-18-24 @ 06:38):    No polymorphonuclear cells seen    No organisms seen    by cytocentrifuge    Culture - Urine (collected 10-16-24 @ 13:34)  Source: Clean Catch Clean Catch (Midstream)  Final Report (10-18-24 @ 08:11):    No growth    Culture - Blood (collected 10-16-24 @ 09:45)  Source: .Blood BLOOD  Preliminary Report (10-17-24 @ 17:02):    No growth at 24 hours    Culture - Blood (collected 10-16-24 @ 09:40)  Source: .Blood BLOOD  Preliminary Report (10-17-24 @ 17:02):    No growth at 24 hours          Radiology: reviewed

## 2024-10-18 NOTE — CONSULT NOTE ADULT - PROBLEM SELECTOR RECOMMENDATION 9
collagenase, DD, QD  vascular consult   follow up [facility] after discharge
hx of recent hospitalization for pna  COPD exacerbation/PNA/effusion- seen by pulmonary  goc per family is to continue medical management for now  pending thoracentesis  DNI established

## 2024-10-18 NOTE — DIETITIAN INITIAL EVALUATION ADULT - IDEAL BODY WEIGHT (KG)
[de-identified] : CML- chronic phase. \par Diagnosed 2008.  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  Then started on dasatinib with a MMR, stopped in 11/2011 due to ?colonic ulcers and BRBPR.  Attempted treatment with nilotinib in 1/2012 but stopped soon after? due to hair thinning.  Since then has been on imatinib. \par \par \par  [de-identified] : Patient presents for a follow up today. In the interim, her BCR/ABL continued to uptrend and therefore was switched to Bosutinib 400mg daily. She is tolerating the medication well without any diarrhea. Patient denies fever, chills, night sweats, back pain, headache, abdominal pain, chest pain, shortness of breath, or peripheral edema. Good appetite, stable weight. 54.4

## 2024-10-18 NOTE — PROGRESS NOTE ADULT - ASSESSMENT
89-year-old female with a history of COPD on home oxygen (around 3 L), hypothyroidism, atrial fibrillation, peripheral vascular disease, arthritis status post recent admission for "pneumonia" presents with brought in by EMS from home for generalized weakness and fatigue.    effusion  OP  OA  Atelectasis  AMS  Elderly  Ataxic gait  AF  Hypothyroidism  PVD    niv use  vs noted  s/p right chest thoracentesis 1500 cc - trapped lung on cxr  GOC documented  pall f/u noted  DNR DNI  prognosis guarded    ct imaging reviewed  eval for LRTI - recent antibiosis for LRTI - and admission reviewed  nebs and hypertonic saline for Mucoid Impaction  I and O  diuresis  monitor VS and HD and Sat  TTE reviewed - HFpEF and Pulm HTN - cvs rx regimen and cardio eval  HOB elev  asp prec  oral hygiene  skin care  spoke with DTR  Pall eval - GOC discussion  prognosis guarded  hold sedating agents  monitor mentation

## 2024-10-18 NOTE — PROVIDER CONTACT NOTE (MEDICATION) - ACTION/TREATMENT ORDERED:
No further orders at this time. Pt educated about medication and MD Monreal made aware that pt refusing medication.

## 2024-10-18 NOTE — CONSULT NOTE ADULT - SUBJECTIVE AND OBJECTIVE BOX
Chief Complaint: right lateral lower leg  probable VSU. sacral and left back skin changes    HPI: unknown duration for any of these problems    PAST MEDICAL & SURGICAL HISTORY:  Hypertension      Fall      Pelvic fracture      Hypothyroidism      COPD without exacerbation      Afib      PVD (peripheral vascular disease)      S/P ankle joint replacement, left      Transplanted cornea          Allergies    Percocet 5/325 (Vomiting)  codeine (Nausea)    Intolerances        MEDICATIONS  (STANDING):  albuterol    0.083% 2.5 milliGRAM(s) Nebulizer every 8 hours  amLODIPine   Tablet 5 milliGRAM(s) Oral daily  furosemide   Injectable 40 milliGRAM(s) IV Push daily  levothyroxine Injectable 37.5 MICROGram(s) IV Push at bedtime  metoprolol tartrate Injectable 2.5 milliGRAM(s) IV Push every 6 hours  piperacillin/tazobactam IVPB.. 3.375 Gram(s) IV Intermittent every 8 hours  prednisoLONE acetate 1% Suspension 1 Drop(s) Both EYES two times a day  sodium chloride 3%  Inhalation 4 milliLiter(s) Inhalation every 8 hours    MEDICATIONS  (PRN):  haloperidol     Tablet 0.5 milliGRAM(s) Oral every 8 hours PRN agitation  haloperidol    Injectable 0.5 milliGRAM(s) IV Push every 6 hours PRN Agitation  LORazepam   Injectable 0.5 milliGRAM(s) IV Push every 6 hours PRN Agitation  morphine  - Injectable 2 milliGRAM(s) IV Push every 6 hours PRN Severe Pain (7 - 10)      FAMILY HISTORY:          ROS:  CONSTITUTIONAL: No fever, weight loss, or fatigue  EYES: No eye pain, visual disturbances, or discharge  ENMT:  No difficulty hearing, tinnitus, vertigo; No sinus or throat pain  NECK: No pain or stiffness  BREASTS: No pain, masses, or nipple discharge  RESPIRATORY: No cough, wheezing, chills or hemoptysis; No shortness of breath  CARDIOVASCULAR: No chest pain, palpitations, dizziness, or leg swelling  GASTROINTESTINAL: No abdominal or epigastric pain. No nausea, vomiting, or hematemesis; No diarrhea or constipation. No melena or hematochezia.  GENITOURINARY: No dysuria, frequency, hematuria, or incontinence  NEUROLOGICAL: No headaches, memory loss, loss of strength, numbness, or tremors  SKIN: No itching, burning, rashes, or lesions   LYMPH NODES: No enlarged glands  ENDOCRINE: No heat or cold intolerance; No hair loss  MUSCULOSKELETAL: No joint pain or swelling; No muscle, back, or extremity pain  PSYCHIATRIC: No depression, anxiety, mood swings, or difficulty sleeping  HEME/LYMPH: No easy bruising, or bleeding gums  ALLERGY AND IMMUNOLOGIC: No hives or eczema    PHYSICAL EXAM-      Weight (kg): 47.9 (10-18 @ 06:47)  Vital Signs Last 24 Hrs  T(C): 36.6 (18 Oct 2024 11:35), Max: 37.1 (17 Oct 2024 20:15)  T(F): 97.8 (18 Oct 2024 11:35), Max: 98.8 (17 Oct 2024 20:15)  HR: 86 (18 Oct 2024 15:27) (68 - 96)  BP: 142/50 (18 Oct 2024 15:27) (138/70 - 155/68)  BP(mean): --  RR: 16 (18 Oct 2024 15:27) (16 - 20)  SpO2: 96% (18 Oct 2024 15:27) (93% - 100%)    Parameters below as of 18 Oct 2024 15:27  Patient On (Oxygen Delivery Method): nasal cannula  O2 Flow (L/min): 4      Constitutional: well developed, well nourished, no apparent distress, alert, oriented x 3.   Pulmonary: no respiratory distress, normal respiratory rhythm and effort, lungs are clear to auscultation/percussion. No CVA tenderness.  Cardiovascular: heart rate normal, normal sinus rhythm; no murmurs, gallops, rubs, heaves or thrills   Abdomen: soft, non-tender, +BS, no guarding/rebound/rigidity.  Vascular:   ENMT:  Neck:  Extremites:  right lower lateral leg ulcer of unknown duration with no edema and varicose veins noted. No complaints of pain. No signs of infection. Ulcer into fat approximately 0.5 cm deep 1CM wide and 3CM long  Skin:  left midback area of tan discoloration most likely healed area of unknown etiology, No SOI. Sacral area with erythema and tender spot near the start of the coccyx. Patient very thin and no padding in that area                            9.6    10.68 )-----------( 257      ( 18 Oct 2024 05:59 )             30.7     10-18    146[H]  |  100  |  46[H]  ----------------------------<  130[H]  3.6   |  37[H]  |  1.40[H]    Ca    9.1      18 Oct 2024 05:59        Radiology      Impression:      Recommendations:

## 2024-10-18 NOTE — PROGRESS NOTE ADULT - ASSESSMENT
SPIRITUAL PLAN OF CARE NOTE         Date: 2024    Patient Name: Franklin Bridges  Patient : 1959    Visit Type: In personcare  Referral Source:     Reason for Visit: Other (Comment)  Visited with: Family/Friend, Patient not available         Taxonomy:    Intended Effects: Build relationship of care and support, Convey a calming presence, Demonstrate caring and concern  Methods: Offer support  Interventions: Acknowledge current situation, Active listening    Patient Assessment: Unable to assess    Family/Friend Assessment: Coping    Family/Friend  Intervention:  Support, Prayer    Spiritual Plan of Care: Follow up if requested    Patient Reported Outcome:  Reduced pain    Provided with pastoral care to the wife of the patient, Tonie. Tonie recognized me from speaking to her on the phone. She was thankful for the visit. She was sad to know that her , the patient, keeps declining without hope for recovery.  If needed, Tonie know how to reach the  on call, available . No farther requests or questions formulated by Tonie at this time.     Electronically Signed By:     Kimo Acevedo [facility]  Staff   [facility]  [phone] - On Call    The patient is a 89 year old female with a history of HTN, hypothyroidism, atrial fibrillation, COPD, PAD, dementia who presents with shortness of breath.     Plan:  - ECG with sinus rhythm and LVH  - CT chest with PNA and moderate right, small left pleural effusion  - BNP 5636 - higher than last admission  - Echo 10/4/24 with normal LV systolic function, mild pulm HTN  - Continue amlodipine 5 mg daily if able to take PO  - Continue metoprolol succinate 50 mg daily (formulary equivalent) if able to take PO - for now on IV metoprolol  - Continue furosemide 40 mg IV daily  - S&S eval  - Underwent thoracentesis - consistent with transudate. Breathing improved after procedure.

## 2024-10-18 NOTE — DIETITIAN INITIAL EVALUATION ADULT - ENERGY INTAKE
States patient had a good appetite prior to admission, consuming, % of meals, however, considering weight loss over last year and NFPE findings, unclear and questionable.  NPO

## 2024-10-18 NOTE — CONSULT NOTE ADULT - ASSESSMENT
right lateral lower leg ulcer probable VSU R/O vascular insufficiency  sacral coccyx stage 2 ulcer in area of stage 1  left back skin discoloration non pressure in origin

## 2024-10-18 NOTE — PROGRESS NOTE ADULT - SUBJECTIVE AND OBJECTIVE BOX
Chief Complaint: Shortness of breath    Interval Events: No events overnight.    Review of Systems:  General: No fevers, chills, weight gain  Skin: No rashes, color changes  Cardiovascular: No chest pain, orthopnea  Respiratory: No shortness of breath, cough  Gastrointestinal: No nausea, abdominal pain  Genitourinary: No incontinence, pain with urination  Musculoskeletal: +pain, swelling, decreased range of motion  Neurological: No headache, weakness  Psychiatric: No depression, anxiety  Endocrine: No weight gain, increased thirst  All other systems are comprehensively negative.    Physical Exam:  Vital Signs Last 24 Hrs  T(C): 36.7 (18 Oct 2024 05:08), Max: 37.1 (17 Oct 2024 20:15)  T(F): 98.1 (18 Oct 2024 05:08), Max: 98.8 (17 Oct 2024 20:15)  HR: 86 (18 Oct 2024 05:08) (63 - 96)  BP: 155/68 (18 Oct 2024 05:08) (130/53 - 155/68)  BP(mean): --  RR: 18 (18 Oct 2024 05:08) (18 - 22)  SpO2: 100% (18 Oct 2024 05:08) (94% - 100%)  Parameters below as of 18 Oct 2024 05:08  Patient On (Oxygen Delivery Method): BiPAP/CPAP  General: NAD  HEENT: MMM  Neck: No JVD, no carotid bruit  Lungs: CTAB  CV: RRR, nl S1/S2, no M/R/G  Abdomen: S/NT/ND, +BS  Extremities: No LE edema, no cyanosis  Neuro: AAOx0  Skin: No rash    Labs:    10-17    143  |  99  |  37[H]  ----------------------------<  146[H]  3.6   |  34[H]  |  1.10    Ca    9.0      17 Oct 2024 11:34    TPro  7.4  /  Alb  2.9[L]  /  TBili  0.3  /  DBili  x   /  AST  29  /  ALT  27  /  AlkPhos  84  10-16                        9.6    8.23  )-----------( 257      ( 17 Oct 2024 11:34 )             30.8       ECG/Telemetry: Sinus rhythm

## 2024-10-18 NOTE — DIETITIAN INITIAL EVALUATION ADULT - OTHER INFO
Brief History: "89-year-old female with a history of COPD on home oxygen (around 3 L), hypothyroidism, atrial fibrillation, peripheral vascular disease, arthritis status post recent admission for "pneumonia"brought in by EMS from home for generalized weakness and fatigue.  associated w short of breath , noted w agitation and restlessness for few days-started on remeron with no improvement."    Met with patient and son at bedside. Patient awaiting pain medication and son was primary historian. Currently NPO. Son denies food allergies and difficulty chewing/swallowing PTA, but recommend SLP evaluation if patient to resume po intake as condition has significantly declined. Son states UW of 130 lb (1 year ago) and CW eight of 105 lb with low appetite. As per flow sheets, no edema noted but wound located on R leg, SDTI on upper back, stage 2 pressure injury located on sacrum, and stage 1 pressure injuries located on R/L heels. Last BM documented today, 10/18.

## 2024-10-18 NOTE — DIETITIAN INITIAL EVALUATION ADULT - SIGNS/SYMPTOMS
20% weight loss in last year, NFPE findings.  Stage 2 pressure injury on sacrum, and stage 1 pressure injuries located on L/R Heels

## 2024-10-18 NOTE — PROGRESS NOTE ADULT - PROBLEM SELECTOR PLAN 2
advanced, on home oxygen   hospice appropriate  pulmonary following
advanced, on home oxygen   hospice appropriate  pulmonary following

## 2024-10-18 NOTE — CONSULT NOTE ADULT - CONSULT REASON
sob  healy  weakness  ams  resp distress
Acute diastolic heart failure
PNA
sacral ulcer and right VSU and left back discoloration
goc, advanced COPD

## 2024-10-18 NOTE — PROGRESS NOTE ADULT - SUBJECTIVE AND OBJECTIVE BOX
Date/Time Patient Seen:  		  Referring MD:   Data Reviewed	       Patient is a 89y old  Female who presents with a chief complaint of AMS, sob (17 Oct 2024 19:17)      Subjective/HPI     PAST MEDICAL & SURGICAL HISTORY:  Hypertension    Fall    Fall    Pelvic fracture    Hypothyroidism    COPD exacerbation    COPD without exacerbation    Afib    PVD (peripheral vascular disease)    S/P ankle joint replacement, left    Transplanted cornea          Medication list         MEDICATIONS  (STANDING):  albuterol    0.083% 2.5 milliGRAM(s) Nebulizer every 8 hours  amLODIPine   Tablet 5 milliGRAM(s) Oral daily  furosemide   Injectable 40 milliGRAM(s) IV Push daily  levothyroxine Injectable 37.5 MICROGram(s) IV Push at bedtime  metoprolol tartrate Injectable 2.5 milliGRAM(s) IV Push every 6 hours  piperacillin/tazobactam IVPB.. 3.375 Gram(s) IV Intermittent every 8 hours  prednisoLONE acetate 1% Suspension 1 Drop(s) Both EYES two times a day  sodium chloride 3%  Inhalation 4 milliLiter(s) Inhalation every 8 hours    MEDICATIONS  (PRN):  haloperidol     Tablet 0.5 milliGRAM(s) Oral every 8 hours PRN agitation  haloperidol    Injectable 0.5 milliGRAM(s) IV Push every 6 hours PRN Agitation  morphine  - Injectable 2 milliGRAM(s) IV Push every 6 hours PRN Severe Pain (7 - 10)         Vitals log        ICU Vital Signs Last 24 Hrs  T(C): 36.7 (18 Oct 2024 05:08), Max: 37.1 (17 Oct 2024 20:15)  T(F): 98.1 (18 Oct 2024 05:08), Max: 98.8 (17 Oct 2024 20:15)  HR: 86 (18 Oct 2024 05:08) (63 - 96)  BP: 155/68 (18 Oct 2024 05:08) (130/53 - 155/68)  BP(mean): --  ABP: --  ABP(mean): --  RR: 18 (18 Oct 2024 05:08) (18 - 22)  SpO2: 100% (18 Oct 2024 05:08) (94% - 100%)    O2 Parameters below as of 18 Oct 2024 05:08  Patient On (Oxygen Delivery Method): BiPAP/CPAP                 Input and Output:  I&O's Detail    16 Oct 2024 07:01  -  17 Oct 2024 07:00  --------------------------------------------------------  IN:    IV PiggyBack: 200 mL  Total IN: 200 mL    OUT:    Voided (mL): 650 mL  Total OUT: 650 mL    Total NET: -450 mL      17 Oct 2024 07:01  -  18 Oct 2024 05:10  --------------------------------------------------------  IN:  Total IN: 0 mL    OUT:    Other (mL): 1500 mL  Total OUT: 1500 mL    Total NET: -1500 mL          Lab Data                        9.6    8.23  )-----------( 257      ( 17 Oct 2024 11:34 )             30.8     10-17    143  |  99  |  37[H]  ----------------------------<  146[H]  3.6   |  34[H]  |  1.10    Ca    9.0      17 Oct 2024 11:34    TPro  7.4  /  Alb  2.9[L]  /  TBili  0.3  /  DBili  x   /  AST  29  /  ALT  27  /  AlkPhos  84  10-16            Review of Systems	      Objective     Physical Examination    heart s1s2  lung dc BS  head nc  head at  frail  weak      Pertinent Lab findings & Imaging      Jane:  NO   Adequate UO     I&O's Detail    16 Oct 2024 07:01  -  17 Oct 2024 07:00  --------------------------------------------------------  IN:    IV PiggyBack: 200 mL  Total IN: 200 mL    OUT:    Voided (mL): 650 mL  Total OUT: 650 mL    Total NET: -450 mL      17 Oct 2024 07:01  -  18 Oct 2024 05:10  --------------------------------------------------------  IN:  Total IN: 0 mL    OUT:    Other (mL): 1500 mL  Total OUT: 1500 mL    Total NET: -1500 mL               Discussed with:     Cultures:	        Radiology

## 2024-10-18 NOTE — PROGRESS NOTE ADULT - SUBJECTIVE AND OBJECTIVE BOX
Patient is a 89y old  Female who presents with a chief complaint of AMS, sob (18 Oct 2024 17:40)      INTERVAL HPI/OVERNIGHT EVENTS: noted  pt seen and examined this am   events noted  feels well      Vital Signs Last 24 Hrs  T(C): 36.6 (18 Oct 2024 11:35), Max: 37.1 (17 Oct 2024 20:15)  T(F): 97.8 (18 Oct 2024 11:35), Max: 98.8 (17 Oct 2024 20:15)  HR: 86 (18 Oct 2024 15:27) (68 - 96)  BP: 142/50 (18 Oct 2024 15:27) (138/70 - 155/68)  BP(mean): --  RR: 16 (18 Oct 2024 15:27) (16 - 20)  SpO2: 96% (18 Oct 2024 15:27) (93% - 100%)    Parameters below as of 18 Oct 2024 15:27  Patient On (Oxygen Delivery Method): nasal cannula  O2 Flow (L/min): 4      albuterol    0.083% 2.5 milliGRAM(s) Nebulizer every 8 hours  amLODIPine   Tablet 5 milliGRAM(s) Oral daily  collagenase Ointment 1 Application(s) Topical daily  furosemide   Injectable 40 milliGRAM(s) IV Push daily  haloperidol     Tablet 0.5 milliGRAM(s) Oral every 8 hours PRN  haloperidol    Injectable 0.5 milliGRAM(s) IV Push every 6 hours PRN  levothyroxine Injectable 37.5 MICROGram(s) IV Push at bedtime  LORazepam   Injectable 0.5 milliGRAM(s) IV Push every 6 hours PRN  metoprolol tartrate Injectable 2.5 milliGRAM(s) IV Push every 6 hours  morphine  - Injectable 2 milliGRAM(s) IV Push every 6 hours PRN  piperacillin/tazobactam IVPB.. 3.375 Gram(s) IV Intermittent every 8 hours  prednisoLONE acetate 1% Suspension 1 Drop(s) Both EYES two times a day  sodium chloride 3%  Inhalation 4 milliLiter(s) Inhalation every 8 hours      PHYSICAL EXAM:  GENERAL: NAD,   EYES: conjunctiva and sclera clear  ENMT: Moist mucous membranes  NECK: Supple, No JVD, Normal thyroid  CHEST/LUNG: non labored, cta b/l  HEART: Regular rate and rhythm; No murmurs, rubs, or gallops  ABDOMEN: Soft, Nontender, Nondistended; Bowel sounds present  EXTREMITIES:  2+ Peripheral Pulses, No clubbing, cyanosis, or edema  LYMPH: No lymphadenopathy noted  SKIN: No rashes or lesions    Consultant(s) Notes Reviewed:  [x ] YES  [ ] NO  Care Discussed with Consultants/Other Providers [ x] YES  [ ] NO    LABS:                        9.6    10.68 )-----------( 257      ( 18 Oct 2024 05:59 )             30.7     10-18    146[H]  |  100  |  46[H]  ----------------------------<  130[H]  3.6   |  37[H]  |  1.40[H]    Ca    9.1      18 Oct 2024 05:59        Urinalysis Basic - ( 18 Oct 2024 05:59 )    Color: x / Appearance: x / SG: x / pH: x  Gluc: 130 mg/dL / Ketone: x  / Bili: x / Urobili: x   Blood: x / Protein: x / Nitrite: x   Leuk Esterase: x / RBC: x / WBC x   Sq Epi: x / Non Sq Epi: x / Bacteria: x      CAPILLARY BLOOD GLUCOSE      POCT Blood Glucose.: 145 mg/dL (18 Oct 2024 08:09)        Urinalysis Basic - ( 18 Oct 2024 05:59 )    Color: x / Appearance: x / SG: x / pH: x  Gluc: 130 mg/dL / Ketone: x  / Bili: x / Urobili: x   Blood: x / Protein: x / Nitrite: x   Leuk Esterase: x / RBC: x / WBC x   Sq Epi: x / Non Sq Epi: x / Bacteria: x        Culture - Fungal, Body Fluid (collected 17 Oct 2024 15:27)  Source: Pleural Fl  Preliminary Report (18 Oct 2024 11:08):    Testing in progress    Culture - Body Fluid with Gram Stain (collected 17 Oct 2024 15:27)  Source: Pleural Fl  Gram Stain (18 Oct 2024 06:38):    No polymorphonuclear cells seen    No organisms seen    by cytocentrifuge    Culture - Urine (collected 16 Oct 2024 13:34)  Source: Clean Catch Clean Catch (Midstream)  Final Report (18 Oct 2024 08:11):    No growth    Culture - Blood (collected 16 Oct 2024 09:45)  Source: .Blood BLOOD  Preliminary Report (18 Oct 2024 17:01):    No growth at 48 Hours    Culture - Blood (collected 16 Oct 2024 09:40)  Source: .Blood BLOOD  Preliminary Report (18 Oct 2024 17:01):    No growth at 48 Hours        RADIOLOGY & ADDITIONAL TESTS:    Imaging Personally Reviewed:  [x ] YES  [ ] NO

## 2024-10-19 LAB
ANION GAP SERPL CALC-SCNC: 6 MMOL/L — SIGNIFICANT CHANGE UP (ref 5–17)
BUN SERPL-MCNC: 61 MG/DL — HIGH (ref 7–23)
CALCIUM SERPL-MCNC: 9.3 MG/DL — SIGNIFICANT CHANGE UP (ref 8.5–10.1)
CHLORIDE SERPL-SCNC: 96 MMOL/L — SIGNIFICANT CHANGE UP (ref 96–108)
CO2 SERPL-SCNC: 41 MMOL/L — HIGH (ref 22–31)
CREAT SERPL-MCNC: 1.6 MG/DL — HIGH (ref 0.5–1.3)
EGFR: 29 ML/MIN/1.73M2 — LOW
GLUCOSE SERPL-MCNC: 136 MG/DL — HIGH (ref 70–99)
HCT VFR BLD CALC: 32.7 % — LOW (ref 34.5–45)
HGB BLD-MCNC: 10.3 G/DL — LOW (ref 11.5–15.5)
MCHC RBC-ENTMCNC: 30.2 PG — SIGNIFICANT CHANGE UP (ref 27–34)
MCHC RBC-ENTMCNC: 31.5 GM/DL — LOW (ref 32–36)
MCV RBC AUTO: 95.9 FL — SIGNIFICANT CHANGE UP (ref 80–100)
NRBC # BLD: 0 /100 WBCS — SIGNIFICANT CHANGE UP (ref 0–0)
PLATELET # BLD AUTO: 265 K/UL — SIGNIFICANT CHANGE UP (ref 150–400)
POTASSIUM SERPL-MCNC: 3.3 MMOL/L — LOW (ref 3.5–5.3)
POTASSIUM SERPL-SCNC: 3.3 MMOL/L — LOW (ref 3.5–5.3)
RBC # BLD: 3.41 M/UL — LOW (ref 3.8–5.2)
RBC # FLD: 14.2 % — SIGNIFICANT CHANGE UP (ref 10.3–14.5)
SODIUM SERPL-SCNC: 143 MMOL/L — SIGNIFICANT CHANGE UP (ref 135–145)
WBC # BLD: 8.67 K/UL — SIGNIFICANT CHANGE UP (ref 3.8–10.5)
WBC # FLD AUTO: 8.67 K/UL — SIGNIFICANT CHANGE UP (ref 3.8–10.5)

## 2024-10-19 RX ORDER — ACETAMINOPHEN 500 MG
725 TABLET ORAL ONCE
Refills: 0 | Status: COMPLETED | OUTPATIENT
Start: 2024-10-19 | End: 2024-10-19

## 2024-10-19 RX ADMIN — Medication 40 MILLIGRAM(S): at 06:34

## 2024-10-19 RX ADMIN — SODIUM CHLORIDE 4 MILLILITER(S): 9 INJECTION, SOLUTION INTRAMUSCULAR; INTRAVENOUS; SUBCUTANEOUS at 07:51

## 2024-10-19 RX ADMIN — Medication 725 MILLIGRAM(S): at 17:03

## 2024-10-19 RX ADMIN — PIPERACILLIN AND TAZOBACTAM 25 GRAM(S): .5; 4 INJECTION, POWDER, LYOPHILIZED, FOR SOLUTION INTRAVENOUS at 12:50

## 2024-10-19 RX ADMIN — Medication 2.5 MILLIGRAM(S): at 07:54

## 2024-10-19 RX ADMIN — Medication 1 DROP(S): at 17:48

## 2024-10-19 RX ADMIN — Medication 2.5 MILLIGRAM(S): at 08:59

## 2024-10-19 RX ADMIN — SODIUM CHLORIDE 4 MILLILITER(S): 9 INJECTION, SOLUTION INTRAMUSCULAR; INTRAVENOUS; SUBCUTANEOUS at 14:22

## 2024-10-19 RX ADMIN — Medication 2.5 MILLIGRAM(S): at 21:52

## 2024-10-19 RX ADMIN — Medication 2.5 MILLIGRAM(S): at 17:44

## 2024-10-19 RX ADMIN — Medication 2.5 MILLIGRAM(S): at 14:19

## 2024-10-19 RX ADMIN — PIPERACILLIN AND TAZOBACTAM 25 GRAM(S): .5; 4 INJECTION, POWDER, LYOPHILIZED, FOR SOLUTION INTRAVENOUS at 21:52

## 2024-10-19 RX ADMIN — Medication 1 APPLICATION(S): at 12:48

## 2024-10-19 RX ADMIN — Medication 290 MILLIGRAM(S): at 16:03

## 2024-10-19 RX ADMIN — Medication 2.5 MILLIGRAM(S): at 19:05

## 2024-10-19 RX ADMIN — Medication 5 MILLIGRAM(S): at 06:35

## 2024-10-19 RX ADMIN — Medication 37.5 MICROGRAM(S): at 22:25

## 2024-10-19 RX ADMIN — PIPERACILLIN AND TAZOBACTAM 25 GRAM(S): .5; 4 INJECTION, POWDER, LYOPHILIZED, FOR SOLUTION INTRAVENOUS at 04:20

## 2024-10-19 RX ADMIN — Medication 1 DROP(S): at 06:35

## 2024-10-19 RX ADMIN — Medication 2.5 MILLIGRAM(S): at 04:20

## 2024-10-19 RX ADMIN — MENTHOL AND METHYL SALICYLATE 1 APPLICATION(S): 10; 30 CREAM TOPICAL at 06:34

## 2024-10-19 RX ADMIN — SODIUM CHLORIDE 4 MILLILITER(S): 9 INJECTION, SOLUTION INTRAMUSCULAR; INTRAVENOUS; SUBCUTANEOUS at 19:05

## 2024-10-19 NOTE — PHYSICAL THERAPY INITIAL EVALUATION ADULT - PERTINENT HX OF CURRENT PROBLEM, REHAB EVAL
Per H&P: "89-year-old female with a history of COPD on home oxygen (around 3 L), hypothyroidism, atrial fibrillation, peripheral vascular disease, arthritis status post recent admission for "pneumonia" brought in by EMS from home for generalized weakness and fatigue.  associated w short of breath , noted w agitation and restlessness for few days-started on remeron with no improvement .dec po"

## 2024-10-19 NOTE — SWALLOW BEDSIDE ASSESSMENT ADULT - ASR SWALLOW ASPIRATION MONITOR
change of breathing pattern/oral hygiene/position upright (90Y)/cough/gurgly voice/fever/throat clearing/upper respiratory infection

## 2024-10-19 NOTE — PHYSICAL THERAPY INITIAL EVALUATION ADULT - PATIENT PROFILE REVIEW, REHAB EVAL
PT orders received: increase as tolerated. Consult with MELISSA Jones, pt may participate in PT evaluation./yes

## 2024-10-19 NOTE — PROGRESS NOTE ADULT - ASSESSMENT
The patient is a 89 year old female with a history of HTN, hypothyroidism, atrial fibrillation, COPD, PAD, dementia who presents with shortness of breath.     Plan:  - ECG with sinus rhythm and LVH  - CT chest with PNA and moderate right, small left pleural effusion  - BNP 5636 - higher than last admission  - Echo 10/4/24 with normal LV systolic function, mild pulm HTN  - Continue amlodipine 5 mg daily if able to take PO  - Continue metoprolol succinate 50 mg daily (formulary equivalent)  - Continue furosemide 40 mg IV daily - lower to PO furosemide in 1-2 days  - Consider S&S eval  - Underwent thoracentesis - consistent with transudate. Breathing improved after procedure.

## 2024-10-19 NOTE — SWALLOW BEDSIDE ASSESSMENT ADULT - SWALLOW EVAL: DIAGNOSIS
1. Mild oral dysphagia for pureed, minced and moist, moderately thick, and mildly thick liquids marked by reduced stripping of bolus from utensil with prolonged manipulation resulting in delayed collection and transport. Adequate clearance post swallow. 2. Mild-moderate oral dysphagia for soft and bite sized and thin liquids marked by reduced stripping, prolonged mastication/manipulation, delayed collection and transport. Trace-mild stasis post swallow with solids which reduced with liquid wash. Suspected posterior loss at BOT with thin liquids. 3. Mild pharyngeal dysphagia for pureed, solids, moderately thick, and mildly thick liquids marked by suspected delayed pharyngeal swallow trigger and hyolaryngeal elevation noted by digital palpation without evidence of airway penetration/aspiration. 4. Severe pharyngeal dysphagia for thin liquids marked by suspected delayed pharyngeal swallow trigger and immediate coughing observed post swallow.

## 2024-10-19 NOTE — SWALLOW BEDSIDE ASSESSMENT ADULT - ASR SWALLOW RECOMMEND DIAG
objective testing not indicated due to overt signs on thin liquids and max cues required to follow commands; will continue to monitor at bedside at this time

## 2024-10-19 NOTE — SWALLOW BEDSIDE ASSESSMENT ADULT - SWALLOW EVAL: PROGNOSIS
DIAGNOSIS CONTINUED: 5. Recommend minced and moist and mildly thick liquids with aspiration precautions and assistance during meals. PROGNOSIS: fair; continue to monitor closely and notify SLP if changes occur

## 2024-10-19 NOTE — SWALLOW BEDSIDE ASSESSMENT ADULT - COMMENTS
Consult received and chart reviewed. Patient seen at bedside this AM for initial assessment of swallow function, at which time patient was alert, oriented x2, pleasant, and denied pain. Patient on supplemental O2 via NC, 3L. Patient demonstrates ability to follow low level commands with cues. Patient with clear vocal quality, reduced vocal intensity and speech production WFL. Per patient and family at bedside report, the patient was previously tolerating a regular and thin liquid diet level PTA; however, the patient with only upper dentures in place and typically wears lower dentures which are not here.    Per charting, the patient is a "89-year-old female with a history of COPD on home oxygen (around 3 L), hypothyroidism, atrial fibrillation, peripheral vascular disease, arthritis status post recent admission for "pneumonia" presents with brought in by EMS from home for generalized weakness and fatigue."    Per internal medicine note 10/18/24: "pna, pl effusions, pulm edema overall poor prognosis  cards and pulm cs noted sp thoracentesis tolerated well palliative cs noted-pt made dnr dni, NIV and medical mgmt to continue iv antibiotics  prn tylenol, morphine for pain #delirium/metabolic encephalopathy, FTT prn haldol, prn ativan start dysphagia2 diet, pending speech eval  plan to dc home with hospice d/w pall care"    WBC elevated  CXR 10/17/24 revealed "Significant reduction of right pleural effusion with residual loculated right basilar pneumothorax that may be related to trapped lung."    Discussed results and recommendations with patient, RN, and called out to MD.

## 2024-10-19 NOTE — PROGRESS NOTE ADULT - SUBJECTIVE AND OBJECTIVE BOX
Patient is a 89y old  Female who presents with a chief complaint of AMS, sob (19 Oct 2024 05:23)      INTERVAL HPI/OVERNIGHT EVENTS: noted  pt seen and examined this am   events noted  feels well      Vital Signs Last 24 Hrs  T(C): 36.8 (19 Oct 2024 11:14), Max: 36.8 (19 Oct 2024 11:14)  T(F): 98.2 (19 Oct 2024 11:14), Max: 98.2 (19 Oct 2024 11:14)  HR: 96 (19 Oct 2024 14:35) (74 - 99)  BP: 127/66 (19 Oct 2024 11:14) (127/66 - 152/63)  BP(mean): --  RR: 16 (19 Oct 2024 11:14) (16 - 18)  SpO2: 99% (19 Oct 2024 14:35) (93% - 100%)    Parameters below as of 19 Oct 2024 14:35  Patient On (Oxygen Delivery Method): nasal cannula, 3L        albuterol    0.083% 2.5 milliGRAM(s) Nebulizer every 8 hours  amLODIPine   Tablet 5 milliGRAM(s) Oral daily  collagenase Ointment 1 Application(s) Topical daily  furosemide   Injectable 40 milliGRAM(s) IV Push daily  haloperidol     Tablet 0.5 milliGRAM(s) Oral every 8 hours PRN  haloperidol    Injectable 0.5 milliGRAM(s) IV Push every 6 hours PRN  levothyroxine Injectable 37.5 MICROGram(s) IV Push at bedtime  LORazepam   Injectable 0.5 milliGRAM(s) IV Push every 6 hours PRN  methyl salicylate 15%/menthol 10% Topical Cream 1 Application(s) Topical four times a day PRN  metoprolol tartrate Injectable 2.5 milliGRAM(s) IV Push every 6 hours  morphine  - Injectable 2 milliGRAM(s) IV Push every 6 hours PRN  piperacillin/tazobactam IVPB.. 3.375 Gram(s) IV Intermittent every 8 hours  prednisoLONE acetate 1% Suspension 1 Drop(s) Both EYES two times a day  sodium chloride 3%  Inhalation 4 milliLiter(s) Inhalation every 8 hours      PHYSICAL EXAM:  GENERAL: NAD,   EYES: conjunctiva and sclera clear  ENMT: Moist mucous membranes  NECK: Supple, No JVD, Normal thyroid  CHEST/LUNG: non labored, cta b/l  HEART: Regular rate and rhythm; No murmurs, rubs, or gallops  ABDOMEN: Soft, Nontender, Nondistended; Bowel sounds present  EXTREMITIES:  2+ Peripheral Pulses, No clubbing, cyanosis, or edema  LYMPH: No lymphadenopathy noted  SKIN: No rashes or lesions    Consultant(s) Notes Reviewed:  [x ] YES  [ ] NO  Care Discussed with Consultants/Other Providers [ x] YES  [ ] NO    LABS:                        10.3   8.67  )-----------( 265      ( 19 Oct 2024 07:20 )             32.7     10-19    143  |  96  |  61[H]  ----------------------------<  136[H]  3.3[L]   |  41[H]  |  1.60[H]    Ca    9.3      19 Oct 2024 07:20        Urinalysis Basic - ( 19 Oct 2024 07:20 )    Color: x / Appearance: x / SG: x / pH: x  Gluc: 136 mg/dL / Ketone: x  / Bili: x / Urobili: x   Blood: x / Protein: x / Nitrite: x   Leuk Esterase: x / RBC: x / WBC x   Sq Epi: x / Non Sq Epi: x / Bacteria: x      CAPILLARY BLOOD GLUCOSE            Urinalysis Basic - ( 19 Oct 2024 07:20 )    Color: x / Appearance: x / SG: x / pH: x  Gluc: 136 mg/dL / Ketone: x  / Bili: x / Urobili: x   Blood: x / Protein: x / Nitrite: x   Leuk Esterase: x / RBC: x / WBC x   Sq Epi: x / Non Sq Epi: x / Bacteria: x        Culture - Fungal, Body Fluid (collected 17 Oct 2024 15:27)  Source: Pleural Fl  Preliminary Report (19 Oct 2024 09:08):    No growth    Culture - Body Fluid with Gram Stain (collected 17 Oct 2024 15:27)  Source: Pleural Fl  Gram Stain (18 Oct 2024 06:38):    No polymorphonuclear cells seen    No organisms seen    by cytocentrifuge  Preliminary Report (19 Oct 2024 08:01):    No growth to date        RADIOLOGY & ADDITIONAL TESTS:    Imaging Personally Reviewed:  [x ] YES  [ ] NO

## 2024-10-19 NOTE — PROGRESS NOTE ADULT - SUBJECTIVE AND OBJECTIVE BOX
Date/Time Patient Seen:  		  Referring MD:   Data Reviewed	       Patient is a 89y old  Female who presents with a chief complaint of AMS, sob (18 Oct 2024 18:23)      Subjective/HPI     PAST MEDICAL & SURGICAL HISTORY:  Hypertension    Fall    Fall    Pelvic fracture    Hypothyroidism    COPD exacerbation    COPD without exacerbation    Afib    PVD (peripheral vascular disease)    S/P ankle joint replacement, left    Transplanted cornea          Medication list         MEDICATIONS  (STANDING):  albuterol    0.083% 2.5 milliGRAM(s) Nebulizer every 8 hours  amLODIPine   Tablet 5 milliGRAM(s) Oral daily  collagenase Ointment 1 Application(s) Topical daily  furosemide   Injectable 40 milliGRAM(s) IV Push daily  levothyroxine Injectable 37.5 MICROGram(s) IV Push at bedtime  metoprolol tartrate Injectable 2.5 milliGRAM(s) IV Push every 6 hours  piperacillin/tazobactam IVPB.. 3.375 Gram(s) IV Intermittent every 8 hours  prednisoLONE acetate 1% Suspension 1 Drop(s) Both EYES two times a day  sodium chloride 3%  Inhalation 4 milliLiter(s) Inhalation every 8 hours    MEDICATIONS  (PRN):  haloperidol     Tablet 0.5 milliGRAM(s) Oral every 8 hours PRN agitation  haloperidol    Injectable 0.5 milliGRAM(s) IV Push every 6 hours PRN Agitation  LORazepam   Injectable 0.5 milliGRAM(s) IV Push every 6 hours PRN Agitation  methyl salicylate 15%/menthol 10% Topical Cream 1 Application(s) Topical four times a day PRN arthritic paiin  morphine  - Injectable 2 milliGRAM(s) IV Push every 6 hours PRN Severe Pain (7 - 10)         Vitals log        ICU Vital Signs Last 24 Hrs  T(C): 36.4 (19 Oct 2024 05:08), Max: 36.7 (18 Oct 2024 20:01)  T(F): 97.5 (19 Oct 2024 05:08), Max: 98.1 (19 Oct 2024 04:34)  HR: 74 (19 Oct 2024 05:08) (68 - 92)  BP: 152/63 (19 Oct 2024 05:08) (138/70 - 152/63)  BP(mean): --  ABP: --  ABP(mean): --  RR: 17 (19 Oct 2024 05:08) (16 - 20)  SpO2: 100% (19 Oct 2024 05:08) (93% - 100%)    O2 Parameters below as of 19 Oct 2024 05:08  Patient On (Oxygen Delivery Method): BiPAP/CPAP                 Input and Output:  I&O's Detail    17 Oct 2024 07:01  -  18 Oct 2024 07:00  --------------------------------------------------------  IN:  Total IN: 0 mL    OUT:    Other (mL): 1500 mL  Total OUT: 1500 mL    Total NET: -1500 mL      18 Oct 2024 07:01  -  19 Oct 2024 05:23  --------------------------------------------------------  IN:  Total IN: 0 mL    OUT:    Voided (mL): 800 mL  Total OUT: 800 mL    Total NET: -800 mL          Lab Data                        9.6    10.68 )-----------( 257      ( 18 Oct 2024 05:59 )             30.7     10-18    146[H]  |  100  |  46[H]  ----------------------------<  130[H]  3.6   |  37[H]  |  1.40[H]    Ca    9.1      18 Oct 2024 05:59              Review of Systems	      Objective     Physical Examination    heart s1s2  lung dc BS  head nc  head at      Pertinent Lab findings & Imaging      Jane:  NO   Adequate UO     I&O's Detail    17 Oct 2024 07:01  -  18 Oct 2024 07:00  --------------------------------------------------------  IN:  Total IN: 0 mL    OUT:    Other (mL): 1500 mL  Total OUT: 1500 mL    Total NET: -1500 mL      18 Oct 2024 07:01  -  19 Oct 2024 05:23  --------------------------------------------------------  IN:  Total IN: 0 mL    OUT:    Voided (mL): 800 mL  Total OUT: 800 mL    Total NET: -800 mL               Discussed with:     Cultures:	        Radiology

## 2024-10-19 NOTE — PHYSICAL THERAPY INITIAL EVALUATION ADULT - GAIT TRAINING, PT EVAL
Patient will ambulate 75 feet with supervision with rolling walker by 2 weeks to allow for increased independence in the community.

## 2024-10-19 NOTE — SWALLOW BEDSIDE ASSESSMENT ADULT - ORAL PREPARATORY PHASE
Reduced oral grading/Decreased mastication ability Decreased mastication ability Reduced oral grading

## 2024-10-19 NOTE — PHYSICAL THERAPY INITIAL EVALUATION ADULT - ADDITIONAL COMMENTS
Pt lives with 24/7 home health aide. Pt resides in private house, [address]. Pt ambulated with rollator prior to admission.

## 2024-10-19 NOTE — SWALLOW BEDSIDE ASSESSMENT ADULT - ASR SWALLOW DENTITION
Problem: At Risk for Falls  Goal: # Patient does not fall  Outcome: Outcome Met, Continue evaluating goal progress toward completion  Pt still experiencing imbalance.       edentulous on bottom; lower dentures at home/upper dentures

## 2024-10-19 NOTE — PROGRESS NOTE ADULT - ASSESSMENT
89-year-old female with a history of COPD on home oxygen (around 3 L), hypothyroidism, atrial fibrillation, peripheral vascular disease, arthritis status post recent admission for "pneumonia" aw generalized weakness , AMS, SOB, FTT  ABG reviewed  CT chest- Bilateral pleural effusions right larger than left increased in size from prior.  Tree-in-bud parenchymal opacities upper lobes suggestive of small airways impaction/infection.  Nonspecific upper lobe groundglass opacities may reflect pulmonary edema and/or infection.  No acute findings abdomen and pelvis      #.acute on chronic hypoxic and hypercarbic respiratory failure   pna, pl effusions, pulm edema  overall poor prognosis  cards and pulm cs noted  sp thoracentesis tolerated well  palliative cs noted-pt made dnr dni, NIV and medical mgmt to continue  iv antibiotics  prn tylenol, morphine for pain    #delirium/metabolic encephalopathy, FTT  prn haldol, prn ativan  start dysphagia2 diet, pending speech eval  plan to dc home with hospice  d/w pall care      #Afib- no AC    #DVT ppx- start hep sq  dc planning -home hospice    [facility]   [phone]

## 2024-10-19 NOTE — SOCIAL WORK PROGRESS NOTE - NSSWPROGRESSNOTE_GEN_ALL_CORE
SW attempted to speak with pt's dtr Izabel to discuss dc plan as GOC note states that pt's family is contemplating dc to [facility] on comfort measures. Per PT eval, pt was recommended for home PT. SW will continue to follow.

## 2024-10-19 NOTE — PROGRESS NOTE ADULT - ASSESSMENT
89-year-old female with a history of COPD on home oxygen (around 3 L), hypothyroidism, atrial fibrillation, peripheral vascular disease, arthritis status post recent admission for "pneumonia" presents with brought in by EMS from home for generalized weakness and fatigue.    effusion  OP  OA  Atelectasis  AMS  Elderly  Ataxic gait  AF  Hypothyroidism  PVD    niv use  on ABX  vs noted  s/p right chest thoracentesis 1500 cc - trapped lung on cxr  GOC documented  pall f/u noted  DNR DNI  prognosis guarded    ct imaging reviewed  eval for LRTI - recent antibiosis for LRTI - and admission reviewed  nebs and hypertonic saline for Mucoid Impaction  I and O  diuresis as per cardio recs  monitor VS and HD and Sat  TTE reviewed - HFpEF and Pulm HTN - cvs rx regimen and cardio eval  HOB elev  asp prec  oral hygiene  skin care  spoke with DTR  Pall eval - GOC discussion  prognosis guarded  hold sedating agents  monitor mentation

## 2024-10-19 NOTE — PROGRESS NOTE ADULT - SUBJECTIVE AND OBJECTIVE BOX
Chief Complaint: Shortness of breath    Interval Events: No events overnight.    Review of Systems:  General: No fevers, chills, weight gain  Skin: No rashes, color changes  Cardiovascular: No chest pain, orthopnea  Respiratory: No shortness of breath, cough  Gastrointestinal: No nausea, abdominal pain  Genitourinary: No incontinence, pain with urination  Musculoskeletal: +pain, swelling, decreased range of motion  Neurological: No headache, weakness  Psychiatric: No depression, anxiety  Endocrine: No weight gain, increased thirst  All other systems are comprehensively negative.    Physical Exam:  Vital Signs Last 24 Hrs  T(C): 36.3 (19 Oct 2024 08:50), Max: 36.7 (18 Oct 2024 20:01)  T(F): 97.4 (19 Oct 2024 08:50), Max: 98.1 (19 Oct 2024 04:34)  HR: 86 (19 Oct 2024 08:50) (68 - 89)  BP: 140/50 (19 Oct 2024 08:50) (138/70 - 152/63)  BP(mean): --  RR: 17 (19 Oct 2024 08:50) (16 - 18)  SpO2: 99% (19 Oct 2024 08:50) (93% - 100%)  Parameters below as of 19 Oct 2024 08:50  Patient On (Oxygen Delivery Method): nasal cannula, 3L  O2 Flow (L/min): 3  General: NAD  HEENT: MMM  Neck: No JVD, no carotid bruit  Lungs: CTAB  CV: RRR, nl S1/S2, no M/R/G  Abdomen: S/NT/ND, +BS  Extremities: No LE edema, no cyanosis  Neuro: AAOx0  Skin: No rash    Labs:    10-19    143  |  96  |  61[H]  ----------------------------<  136[H]  3.3[L]   |  41[H]  |  1.60[H]    Ca    9.3      19 Oct 2024 07:20                          10.3   8.67  )-----------( 265      ( 19 Oct 2024 07:20 )             32.7       ECG/Telemetry: Sinus rhythm

## 2024-10-20 LAB
ANION GAP SERPL CALC-SCNC: 7 MMOL/L — SIGNIFICANT CHANGE UP (ref 5–17)
BUN SERPL-MCNC: 59 MG/DL — HIGH (ref 7–23)
CALCIUM SERPL-MCNC: 8.6 MG/DL — SIGNIFICANT CHANGE UP (ref 8.5–10.1)
CHLORIDE SERPL-SCNC: 93 MMOL/L — LOW (ref 96–108)
CO2 SERPL-SCNC: 40 MMOL/L — HIGH (ref 22–31)
CREAT SERPL-MCNC: 1.4 MG/DL — HIGH (ref 0.5–1.3)
EGFR: 34 ML/MIN/1.73M2 — LOW
GLUCOSE SERPL-MCNC: 137 MG/DL — HIGH (ref 70–99)
HCT VFR BLD CALC: 30.8 % — LOW (ref 34.5–45)
HGB BLD-MCNC: 9.7 G/DL — LOW (ref 11.5–15.5)
MCHC RBC-ENTMCNC: 30.2 PG — SIGNIFICANT CHANGE UP (ref 27–34)
MCHC RBC-ENTMCNC: 31.5 GM/DL — LOW (ref 32–36)
MCV RBC AUTO: 96 FL — SIGNIFICANT CHANGE UP (ref 80–100)
NRBC # BLD: 0 /100 WBCS — SIGNIFICANT CHANGE UP (ref 0–0)
PLATELET # BLD AUTO: 238 K/UL — SIGNIFICANT CHANGE UP (ref 150–400)
POTASSIUM SERPL-MCNC: 2.9 MMOL/L — CRITICAL LOW (ref 3.5–5.3)
POTASSIUM SERPL-SCNC: 2.9 MMOL/L — CRITICAL LOW (ref 3.5–5.3)
RBC # BLD: 3.21 M/UL — LOW (ref 3.8–5.2)
RBC # FLD: 13.7 % — SIGNIFICANT CHANGE UP (ref 10.3–14.5)
SODIUM SERPL-SCNC: 140 MMOL/L — SIGNIFICANT CHANGE UP (ref 135–145)
WBC # BLD: 8.8 K/UL — SIGNIFICANT CHANGE UP (ref 3.8–10.5)
WBC # FLD AUTO: 8.8 K/UL — SIGNIFICANT CHANGE UP (ref 3.8–10.5)

## 2024-10-20 RX ORDER — POTASSIUM CHLORIDE 10 MEQ
20 TABLET, EXTENDED RELEASE ORAL
Refills: 0 | Status: COMPLETED | OUTPATIENT
Start: 2024-10-20 | End: 2024-10-20

## 2024-10-20 RX ORDER — FUROSEMIDE 40 MG
40 TABLET ORAL DAILY
Refills: 0 | Status: DISCONTINUED | OUTPATIENT
Start: 2024-10-21 | End: 2024-10-24

## 2024-10-20 RX ORDER — ACETAMINOPHEN 500 MG
650 TABLET ORAL EVERY 6 HOURS
Refills: 0 | Status: DISCONTINUED | OUTPATIENT
Start: 2024-10-20 | End: 2024-10-24

## 2024-10-20 RX ADMIN — Medication 1 APPLICATION(S): at 11:31

## 2024-10-20 RX ADMIN — Medication 2.5 MILLIGRAM(S): at 15:19

## 2024-10-20 RX ADMIN — Medication 2.5 MILLIGRAM(S): at 05:46

## 2024-10-20 RX ADMIN — PIPERACILLIN AND TAZOBACTAM 25 GRAM(S): .5; 4 INJECTION, POWDER, LYOPHILIZED, FOR SOLUTION INTRAVENOUS at 11:31

## 2024-10-20 RX ADMIN — PIPERACILLIN AND TAZOBACTAM 25 GRAM(S): .5; 4 INJECTION, POWDER, LYOPHILIZED, FOR SOLUTION INTRAVENOUS at 21:35

## 2024-10-20 RX ADMIN — Medication 2.5 MILLIGRAM(S): at 21:37

## 2024-10-20 RX ADMIN — Medication 5 MILLIGRAM(S): at 05:46

## 2024-10-20 RX ADMIN — PIPERACILLIN AND TAZOBACTAM 25 GRAM(S): .5; 4 INJECTION, POWDER, LYOPHILIZED, FOR SOLUTION INTRAVENOUS at 05:47

## 2024-10-20 RX ADMIN — Medication 2.5 MILLIGRAM(S): at 08:23

## 2024-10-20 RX ADMIN — Medication 2.5 MILLIGRAM(S): at 14:27

## 2024-10-20 RX ADMIN — SODIUM CHLORIDE 4 MILLILITER(S): 9 INJECTION, SOLUTION INTRAMUSCULAR; INTRAVENOUS; SUBCUTANEOUS at 14:17

## 2024-10-20 RX ADMIN — Medication 2.5 MILLIGRAM(S): at 20:24

## 2024-10-20 RX ADMIN — Medication 20 MILLIEQUIVALENT(S): at 15:19

## 2024-10-20 RX ADMIN — Medication 40 MILLIGRAM(S): at 05:51

## 2024-10-20 RX ADMIN — Medication 37.5 MICROGRAM(S): at 22:18

## 2024-10-20 RX ADMIN — Medication 2.5 MILLIGRAM(S): at 09:53

## 2024-10-20 RX ADMIN — Medication 650 MILLIGRAM(S): at 21:36

## 2024-10-20 RX ADMIN — SODIUM CHLORIDE 4 MILLILITER(S): 9 INJECTION, SOLUTION INTRAMUSCULAR; INTRAVENOUS; SUBCUTANEOUS at 20:24

## 2024-10-20 RX ADMIN — Medication 20 MILLIEQUIVALENT(S): at 18:00

## 2024-10-20 RX ADMIN — Medication 1 DROP(S): at 18:00

## 2024-10-20 RX ADMIN — Medication 20 MILLIEQUIVALENT(S): at 21:36

## 2024-10-20 RX ADMIN — SODIUM CHLORIDE 4 MILLILITER(S): 9 INJECTION, SOLUTION INTRAMUSCULAR; INTRAVENOUS; SUBCUTANEOUS at 08:25

## 2024-10-20 RX ADMIN — MENTHOL AND METHYL SALICYLATE 1 APPLICATION(S): 10; 30 CREAM TOPICAL at 09:53

## 2024-10-20 RX ADMIN — Medication 650 MILLIGRAM(S): at 21:59

## 2024-10-20 RX ADMIN — Medication 1 DROP(S): at 05:47

## 2024-10-20 NOTE — PROGRESS NOTE ADULT - ASSESSMENT
The patient is a 89 year old female with a history of HTN, hypothyroidism, atrial fibrillation, COPD, PAD, dementia who presents with shortness of breath.     Plan:  - ECG with sinus rhythm and LVH  - CT chest with PNA and moderate right, small left pleural effusion  - BNP 5636 - higher than last admission  - Echo 10/4/24 with normal LV systolic function, mild pulm HTN  - Continue amlodipine 5 mg daily if able to take PO  - Continue metoprolol succinate 50 mg daily (formulary equivalent)  - Lower to furosemide 40 mg PO daily  - Underwent thoracentesis - consistent with transudate. Breathing improved after procedure.

## 2024-10-20 NOTE — PROGRESS NOTE ADULT - ASSESSMENT
89-year-old female with a history of COPD on home oxygen (around 3 L), hypothyroidism, atrial fibrillation, peripheral vascular disease, arthritis status post recent admission for "pneumonia" aw generalized weakness , AMS, SOB, FTT  ABG reviewed  CT chest- Bilateral pleural effusions right larger than left increased in size from prior.  Tree-in-bud parenchymal opacities upper lobes suggestive of small airways impaction/infection.  Nonspecific upper lobe groundglass opacities may reflect pulmonary edema and/or infection.  No acute findings abdomen and pelvis      #.acute on chronic hypoxic and hypercarbic respiratory failure   pna, pl effusions, pulm edema  overall poor prognosis  cards and pulm cs noted  sp thoracentesis tolerated well  palliative cs noted-pt made dnr dni, NIV and medical mgmt to continue  iv antibiotics  prn tylenol, morphine for pain    #delirium/metabolic encephalopathy, FTT  prn haldol, prn ativan  start dysphagia2 diet, pending speech eval  plan to dc home with hospice  d/w pall care      #Afib- no AC    #DVT ppx- start hep sq  dc planning -home hospice    [facility]   [phone]

## 2024-10-20 NOTE — PROGRESS NOTE ADULT - SUBJECTIVE AND OBJECTIVE BOX
Patient is a 89y old  Female who presents with a chief complaint of AMS, sob (20 Oct 2024 05:32)      INTERVAL HPI/OVERNIGHT EVENTS: noted  pt seen and examined this am   events noted  feels well      Vital Signs Last 24 Hrs  T(C): 36.9 (20 Oct 2024 12:33), Max: 36.9 (20 Oct 2024 12:33)  T(F): 98.5 (20 Oct 2024 12:33), Max: 98.5 (20 Oct 2024 12:33)  HR: 77 (20 Oct 2024 12:33) (57 - 99)  BP: 131/53 (20 Oct 2024 12:33) (112/56 - 135/81)  BP(mean): --  RR: 18 (20 Oct 2024 12:33) (17 - 18)  SpO2: 100% (20 Oct 2024 12:33) (95% - 100%)    Parameters below as of 20 Oct 2024 12:33  Patient On (Oxygen Delivery Method): nasal cannula  O2 Flow (L/min): 3      albuterol    0.083% 2.5 milliGRAM(s) Nebulizer every 8 hours  amLODIPine   Tablet 5 milliGRAM(s) Oral daily  collagenase Ointment 1 Application(s) Topical daily  haloperidol     Tablet 0.5 milliGRAM(s) Oral every 8 hours PRN  haloperidol    Injectable 0.5 milliGRAM(s) IV Push every 6 hours PRN  levothyroxine Injectable 37.5 MICROGram(s) IV Push at bedtime  LORazepam   Injectable 0.5 milliGRAM(s) IV Push every 6 hours PRN  methyl salicylate 15%/menthol 10% Topical Cream 1 Application(s) Topical four times a day PRN  metoprolol tartrate Injectable 2.5 milliGRAM(s) IV Push every 6 hours  morphine  - Injectable 2 milliGRAM(s) IV Push every 6 hours PRN  piperacillin/tazobactam IVPB.. 3.375 Gram(s) IV Intermittent every 8 hours  prednisoLONE acetate 1% Suspension 1 Drop(s) Both EYES two times a day  sodium chloride 3%  Inhalation 4 milliLiter(s) Inhalation every 8 hours      PHYSICAL EXAM:  GENERAL: NAD,   EYES: conjunctiva and sclera clear  ENMT: Moist mucous membranes  NECK: Supple, No JVD, Normal thyroid  CHEST/LUNG: non labored, cta b/l  HEART: Regular rate and rhythm; No murmurs, rubs, or gallops  ABDOMEN: Soft, Nontender, Nondistended; Bowel sounds present  EXTREMITIES:  2+ Peripheral Pulses, No clubbing, cyanosis, or edema  LYMPH: No lymphadenopathy noted  SKIN: No rashes or lesions    Consultant(s) Notes Reviewed:  [x ] YES  [ ] NO  Care Discussed with Consultants/Other Providers [ x] YES  [ ] NO    LABS:                        9.7    8.80  )-----------( 238      ( 20 Oct 2024 10:25 )             30.8     10-20    140  |  93[L]  |  59[H]  ----------------------------<  137[H]  2.9[LL]   |  40[H]  |  1.40[H]    Ca    8.6      20 Oct 2024 10:25        Urinalysis Basic - ( 20 Oct 2024 10:25 )    Color: x / Appearance: x / SG: x / pH: x  Gluc: 137 mg/dL / Ketone: x  / Bili: x / Urobili: x   Blood: x / Protein: x / Nitrite: x   Leuk Esterase: x / RBC: x / WBC x   Sq Epi: x / Non Sq Epi: x / Bacteria: x      CAPILLARY BLOOD GLUCOSE            Urinalysis Basic - ( 20 Oct 2024 10:25 )    Color: x / Appearance: x / SG: x / pH: x  Gluc: 137 mg/dL / Ketone: x  / Bili: x / Urobili: x   Blood: x / Protein: x / Nitrite: x   Leuk Esterase: x / RBC: x / WBC x   Sq Epi: x / Non Sq Epi: x / Bacteria: x        Culture - Fungal, Body Fluid (collected 17 Oct 2024 15:27)  Source: Pleural Fl  Preliminary Report (20 Oct 2024 11:26):    No growth    Culture - Body Fluid with Gram Stain (collected 17 Oct 2024 15:27)  Source: Pleural Fl  Gram Stain (18 Oct 2024 06:38):    No polymorphonuclear cells seen    No organisms seen    by cytocentrifuge  Preliminary Report (19 Oct 2024 08:01):    No growth to date        RADIOLOGY & ADDITIONAL TESTS:    Imaging Personally Reviewed:  [x ] YES  [ ] NO

## 2024-10-20 NOTE — SOCIAL WORK PROGRESS NOTE - NSSWPROGRESSNOTE_GEN_ALL_CORE
sw able to speak w pt's dtr tommy via telephone in re to planning for pt. Dtr states she has spoken to pt's son whom has just left to ret home to [place] and together they feel that a hospice eval would be appropriate for eval and information. Sw to refer to hospice. Dtr states plan for possibly to ret home w hospice and pt's 24/7 private aide. Dtr lives on [place]. sw to follow for planning and support.

## 2024-10-20 NOTE — PROGRESS NOTE ADULT - SUBJECTIVE AND OBJECTIVE BOX
Date/Time Patient Seen:  		  Referring MD:   Data Reviewed	       Patient is a 89y old  Female who presents with a chief complaint of AMS, sob (19 Oct 2024 17:31)      Subjective/HPI     PAST MEDICAL & SURGICAL HISTORY:  Hypertension    Fall    Fall    Pelvic fracture    Hypothyroidism    COPD exacerbation    COPD without exacerbation    Afib    PVD (peripheral vascular disease)    S/P ankle joint replacement, left    Transplanted cornea          Medication list         MEDICATIONS  (STANDING):  albuterol    0.083% 2.5 milliGRAM(s) Nebulizer every 8 hours  amLODIPine   Tablet 5 milliGRAM(s) Oral daily  collagenase Ointment 1 Application(s) Topical daily  furosemide   Injectable 40 milliGRAM(s) IV Push daily  levothyroxine Injectable 37.5 MICROGram(s) IV Push at bedtime  metoprolol tartrate Injectable 2.5 milliGRAM(s) IV Push every 6 hours  piperacillin/tazobactam IVPB.. 3.375 Gram(s) IV Intermittent every 8 hours  prednisoLONE acetate 1% Suspension 1 Drop(s) Both EYES two times a day  sodium chloride 3%  Inhalation 4 milliLiter(s) Inhalation every 8 hours    MEDICATIONS  (PRN):  haloperidol     Tablet 0.5 milliGRAM(s) Oral every 8 hours PRN agitation  haloperidol    Injectable 0.5 milliGRAM(s) IV Push every 6 hours PRN Agitation  LORazepam   Injectable 0.5 milliGRAM(s) IV Push every 6 hours PRN Agitation  methyl salicylate 15%/menthol 10% Topical Cream 1 Application(s) Topical four times a day PRN arthritic paiin  morphine  - Injectable 2 milliGRAM(s) IV Push every 6 hours PRN Severe Pain (7 - 10)         Vitals log        ICU Vital Signs Last 24 Hrs  T(C): 36.6 (19 Oct 2024 20:56), Max: 36.8 (19 Oct 2024 11:14)  T(F): 97.8 (19 Oct 2024 20:56), Max: 98.2 (19 Oct 2024 11:14)  HR: 80 (19 Oct 2024 20:56) (60 - 99)  BP: 112/56 (19 Oct 2024 20:56) (112/56 - 140/50)  BP(mean): --  ABP: --  ABP(mean): --  RR: 17 (19 Oct 2024 20:56) (16 - 17)  SpO2: 98% (19 Oct 2024 20:56) (93% - 100%)    O2 Parameters below as of 19 Oct 2024 20:56  Patient On (Oxygen Delivery Method): nasal cannula  O2 Flow (L/min): 3               Input and Output:  I&O's Detail    18 Oct 2024 07:01  -  19 Oct 2024 07:00  --------------------------------------------------------  IN:  Total IN: 0 mL    OUT:    Voided (mL): 800 mL  Total OUT: 800 mL    Total NET: -800 mL      19 Oct 2024 07:01  -  20 Oct 2024 05:32  --------------------------------------------------------  IN:  Total IN: 0 mL    OUT:    Voided (mL): 700 mL  Total OUT: 700 mL    Total NET: -700 mL          Lab Data                        10.3   8.67  )-----------( 265      ( 19 Oct 2024 07:20 )             32.7     10-19    143  |  96  |  61[H]  ----------------------------<  136[H]  3.3[L]   |  41[H]  |  1.60[H]    Ca    9.3      19 Oct 2024 07:20              Review of Systems	      Objective     Physical Examination    heart s1s2  lung dc BS  head nc  head at      Pertinent Lab findings & Imaging      Jane:  NO   Adequate UO     I&O's Detail    18 Oct 2024 07:01  -  19 Oct 2024 07:00  --------------------------------------------------------  IN:  Total IN: 0 mL    OUT:    Voided (mL): 800 mL  Total OUT: 800 mL    Total NET: -800 mL      19 Oct 2024 07:01  -  20 Oct 2024 05:32  --------------------------------------------------------  IN:  Total IN: 0 mL    OUT:    Voided (mL): 700 mL  Total OUT: 700 mL    Total NET: -700 mL               Discussed with:     Cultures:	        Radiology

## 2024-10-20 NOTE — PROGRESS NOTE ADULT - ASSESSMENT
89-year-old female with a history of COPD on home oxygen (around 3 L), hypothyroidism, atrial fibrillation, peripheral vascular disease, arthritis status post recent admission for "pneumonia" presents with brought in by EMS from home for generalized weakness and fatigue.    effusion  OP  OA  Atelectasis  AMS  Elderly  Ataxic gait  AF  Hypothyroidism  PVD    on lasix  on ABX  vs noted  s/p right chest thoracentesis 1500 cc - trapped lung on cxr  GOC documented  pall f/u noted  DNR DNI  prognosis guarded    ct imaging reviewed  eval for LRTI - recent antibiosis for LRTI - and admission reviewed  nebs and hypertonic saline for Mucoid Impaction  I and O  diuresis as per cardio recs  monitor VS and HD and Sat  TTE reviewed - HFpEF and Pulm HTN - cvs rx regimen and cardio eval  HOB elev  asp prec  oral hygiene  skin care  spoke with DTR  Pall eval - GOC discussion  prognosis guarded  hold sedating agents  monitor mentation

## 2024-10-20 NOTE — PROGRESS NOTE ADULT - SUBJECTIVE AND OBJECTIVE BOX
Chief Complaint: Shortness of breath    Interval Events: No events overnight.    Review of Systems:  General: No fevers, chills, weight gain  Skin: No rashes, color changes  Cardiovascular: No chest pain, orthopnea  Respiratory: No shortness of breath, cough  Gastrointestinal: No nausea, abdominal pain  Genitourinary: No incontinence, pain with urination  Musculoskeletal: +pain, swelling, decreased range of motion  Neurological: No headache, weakness  Psychiatric: No depression, anxiety  Endocrine: No weight gain, increased thirst  All other systems are comprehensively negative.    Physical Exam:  Vital Signs Last 24 Hrs  T(C): 36.8 (20 Oct 2024 05:26), Max: 36.8 (19 Oct 2024 11:14)  T(F): 98.3 (20 Oct 2024 05:26), Max: 98.3 (20 Oct 2024 05:26)  HR: 57 (20 Oct 2024 05:26) (57 - 99)  BP: 135/81 (20 Oct 2024 05:26) (112/56 - 140/50)  BP(mean): --  RR: 18 (20 Oct 2024 05:26) (16 - 18)  SpO2: 95% (20 Oct 2024 05:26) (95% - 100%)  Parameters below as of 20 Oct 2024 05:26  Patient On (Oxygen Delivery Method): nasal cannula  O2 Flow (L/min): 3  General: NAD  HEENT: MMM  Neck: No JVD, no carotid bruit  Lungs: CTAB  CV: RRR, nl S1/S2, no M/R/G  Abdomen: S/NT/ND, +BS  Extremities: No LE edema, no cyanosis  Neuro: AAOx0  Skin: No rash    Labs:    10-19    143  |  96  |  61[H]  ----------------------------<  136[H]  3.3[L]   |  41[H]  |  1.60[H]    Ca    9.3      19 Oct 2024 07:20                          10.3   8.67  )-----------( 265      ( 19 Oct 2024 07:20 )             32.7       ECG/Telemetry: Sinus rhythm

## 2024-10-21 LAB
ANION GAP SERPL CALC-SCNC: 2 MMOL/L — LOW (ref 5–17)
BUN SERPL-MCNC: 60 MG/DL — HIGH (ref 7–23)
CALCIUM SERPL-MCNC: 8.8 MG/DL — SIGNIFICANT CHANGE UP (ref 8.5–10.1)
CHLORIDE SERPL-SCNC: 101 MMOL/L — SIGNIFICANT CHANGE UP (ref 96–108)
CO2 SERPL-SCNC: 38 MMOL/L — HIGH (ref 22–31)
CREAT SERPL-MCNC: 1.4 MG/DL — HIGH (ref 0.5–1.3)
CULTURE RESULTS: SIGNIFICANT CHANGE UP
CULTURE RESULTS: SIGNIFICANT CHANGE UP
EGFR: 34 ML/MIN/1.73M2 — LOW
GLUCOSE SERPL-MCNC: 105 MG/DL — HIGH (ref 70–99)
HCT VFR BLD CALC: 29.8 % — LOW (ref 34.5–45)
HGB BLD-MCNC: 9.3 G/DL — LOW (ref 11.5–15.5)
MCHC RBC-ENTMCNC: 30.1 PG — SIGNIFICANT CHANGE UP (ref 27–34)
MCHC RBC-ENTMCNC: 31.2 GM/DL — LOW (ref 32–36)
MCV RBC AUTO: 96.4 FL — SIGNIFICANT CHANGE UP (ref 80–100)
NRBC # BLD: 0 /100 WBCS — SIGNIFICANT CHANGE UP (ref 0–0)
PLATELET # BLD AUTO: 195 K/UL — SIGNIFICANT CHANGE UP (ref 150–400)
POTASSIUM SERPL-MCNC: 4.1 MMOL/L — SIGNIFICANT CHANGE UP (ref 3.5–5.3)
POTASSIUM SERPL-SCNC: 4.1 MMOL/L — SIGNIFICANT CHANGE UP (ref 3.5–5.3)
RBC # BLD: 3.09 M/UL — LOW (ref 3.8–5.2)
RBC # FLD: 13.8 % — SIGNIFICANT CHANGE UP (ref 10.3–14.5)
SODIUM SERPL-SCNC: 141 MMOL/L — SIGNIFICANT CHANGE UP (ref 135–145)
SPECIMEN SOURCE: SIGNIFICANT CHANGE UP
SPECIMEN SOURCE: SIGNIFICANT CHANGE UP
WBC # BLD: 6.04 K/UL — SIGNIFICANT CHANGE UP (ref 3.8–10.5)
WBC # FLD AUTO: 6.04 K/UL — SIGNIFICANT CHANGE UP (ref 3.8–10.5)

## 2024-10-21 RX ADMIN — Medication 2.5 MILLIGRAM(S): at 09:06

## 2024-10-21 RX ADMIN — PIPERACILLIN AND TAZOBACTAM 25 GRAM(S): .5; 4 INJECTION, POWDER, LYOPHILIZED, FOR SOLUTION INTRAVENOUS at 04:29

## 2024-10-21 RX ADMIN — Medication 1 APPLICATION(S): at 11:28

## 2024-10-21 RX ADMIN — Medication 2.5 MILLIGRAM(S): at 02:10

## 2024-10-21 RX ADMIN — SODIUM CHLORIDE 4 MILLILITER(S): 9 INJECTION, SOLUTION INTRAMUSCULAR; INTRAVENOUS; SUBCUTANEOUS at 07:53

## 2024-10-21 RX ADMIN — Medication 2.5 MILLIGRAM(S): at 21:58

## 2024-10-21 RX ADMIN — SODIUM CHLORIDE 4 MILLILITER(S): 9 INJECTION, SOLUTION INTRAMUSCULAR; INTRAVENOUS; SUBCUTANEOUS at 14:19

## 2024-10-21 RX ADMIN — Medication 1 DROP(S): at 05:38

## 2024-10-21 RX ADMIN — Medication 2.5 MILLIGRAM(S): at 07:54

## 2024-10-21 RX ADMIN — Medication 2.5 MILLIGRAM(S): at 19:04

## 2024-10-21 RX ADMIN — Medication 650 MILLIGRAM(S): at 18:04

## 2024-10-21 RX ADMIN — Medication 5 MILLIGRAM(S): at 05:35

## 2024-10-21 RX ADMIN — Medication 2.5 MILLIGRAM(S): at 18:04

## 2024-10-21 RX ADMIN — Medication 1 DROP(S): at 18:09

## 2024-10-21 RX ADMIN — PIPERACILLIN AND TAZOBACTAM 25 GRAM(S): .5; 4 INJECTION, POWDER, LYOPHILIZED, FOR SOLUTION INTRAVENOUS at 11:27

## 2024-10-21 RX ADMIN — Medication 40 MILLIGRAM(S): at 05:36

## 2024-10-21 RX ADMIN — Medication 2.5 MILLIGRAM(S): at 14:19

## 2024-10-21 RX ADMIN — SODIUM CHLORIDE 4 MILLILITER(S): 9 INJECTION, SOLUTION INTRAMUSCULAR; INTRAVENOUS; SUBCUTANEOUS at 19:04

## 2024-10-21 RX ADMIN — Medication 37.5 MICROGRAM(S): at 21:58

## 2024-10-21 RX ADMIN — PIPERACILLIN AND TAZOBACTAM 25 GRAM(S): .5; 4 INJECTION, POWDER, LYOPHILIZED, FOR SOLUTION INTRAVENOUS at 21:53

## 2024-10-21 NOTE — PROGRESS NOTE ADULT - SUBJECTIVE AND OBJECTIVE BOX
Chief Complaint: Shortness of breath    Interval Events: No events overnight.    Review of Systems:  General: No fevers, chills, weight gain  Skin: No rashes, color changes  Cardiovascular: No chest pain, orthopnea  Respiratory: No shortness of breath, cough  Gastrointestinal: No nausea, abdominal pain  Genitourinary: No incontinence, pain with urination  Musculoskeletal: +pain, swelling, decreased range of motion  Neurological: No headache, weakness  Psychiatric: No depression, anxiety  Endocrine: No weight gain, increased thirst  All other systems are comprehensively negative.    Physical Exam:  Vital Signs Last 24 Hrs  T(C): 36.3 (21 Oct 2024 04:47), Max: 37 (20 Oct 2024 20:23)  T(F): 97.4 (21 Oct 2024 04:47), Max: 98.6 (20 Oct 2024 20:23)  HR: 93 (21 Oct 2024 09:00) (76 - 98)  BP: 117/49 (21 Oct 2024 09:00) (104/53 - 150/59)  BP(mean): --  RR: 18 (21 Oct 2024 04:47) (18 - 18)  SpO2: 94% (21 Oct 2024 07:54) (94% - 100%)  Parameters below as of 21 Oct 2024 07:54  Patient On (Oxygen Delivery Method): nasal cannula, 3 L  General: NAD  HEENT: MMM  Neck: No JVD, no carotid bruit  Lungs: CTAB  CV: RRR, nl S1/S2, no M/R/G  Abdomen: S/NT/ND, +BS  Extremities: No LE edema, no cyanosis  Neuro: AAOx0  Skin: No rash    Labs:    10-21    141  |  101  |  60[H]  ----------------------------<  105[H]  4.1   |  38[H]  |  1.40[H]    Ca    8.8      21 Oct 2024 04:19                          9.3    6.04  )-----------( 195      ( 21 Oct 2024 04:19 )             29.8       ECG/Telemetry: Sinus rhythm

## 2024-10-21 NOTE — PROGRESS NOTE ADULT - ASSESSMENT
89-year-old female with a history of COPD on home oxygen (around 3 L), hypothyroidism, atrial fibrillation, peripheral vascular disease, arthritis status post recent admission for "pneumonia" aw generalized weakness , AMS, SOB, FTT  ABG reviewed  CT chest- Bilateral pleural effusions right larger than left increased in size from prior.  Tree-in-bud parenchymal opacities upper lobes suggestive of small airways impaction/infection.  Nonspecific upper lobe groundglass opacities may reflect pulmonary edema and/or infection.  No acute findings abdomen and pelvis      #.acute on chronic hypoxic and hypercarbic respiratory failure   pna, pl effusions, pulm edema  overall poor prognosis  cards and pulm cs noted  sp thoracentesis tolerated well  palliative cs noted-pt made dnr dni, NIV and medical mgmt to continue  iv antibiotics  prn tylenol, morphine for pain    #delirium/metabolic encephalopathy, FTT  prn haldol, prn ativan  start dysphagia2 diet, pending speech eval  plan to dc home with hospice  d/w pall care      #Afib- no AC    #DVT ppx- start hep sq  dc planning -home hospice    [facility]   [phone]

## 2024-10-21 NOTE — PROGRESS NOTE ADULT - NUTRITIONAL ASSESSMENT
This patient has been assessed with a concern for Malnutrition and has been determined to have a diagnosis/diagnoses of Severe protein-calorie malnutrition and Underweight (BMI < 19).    This patient is being managed with:   Diet Minced and Moist-  Mildly Thick Liquids (MILDTHICKLIQS)  Entered: Oct 20 2024  2:30PM  
This patient has been assessed with a concern for Malnutrition and has been determined to have a diagnosis/diagnoses of Severe protein-calorie malnutrition and Underweight (BMI < 19).    This patient is being managed with:   Diet Minced and Moist-  Moderately Thick Liquids (MODTHICKLIQS)  Entered: Oct 18 2024  5:36PM  

## 2024-10-21 NOTE — PROGRESS NOTE ADULT - ASSESSMENT
Pt is a 89W w/ PMHx of COPD on home oxygen (around 3 L), hypothyroidism, atrial fibrillation, peripheral vascular disease, arthritis status post recent admission for "pneumonia" brought in by EMS from home for generalized weakness and fatigue, SOB and agitation/restlessnes    Acute PNA c/b pleural effusions  AHRF 2/2 above  CT CAP Bilateral pleural effusions right larger than left increased in size from prior. Tree-in-bud parenchymal opacities upper lobes suggestive of small airways impaction/infection.  Nonspecific upper lobe groundglass opacities may reflect pulmonary edema and/or infection. No acute findings abdomen and pelvis  RVP negative  Urine legionella negative  BCx NGTD  S/p thoracentesis 10/17  --pleural fluid cx NGTD    Recommendations:   C/w zosyn x7 day course, last day 10/22  F/u pending cx  Trend temps/WBC--leukocytosis resolved  pulmonary following  Supportive care, O2 as needed, additional care per primary team    Infectious Diseases will follow. Please call with any questions.  Allyson Leigh M.D.  [facility] Division of Infectious Diseases [phone]  For after 5 P.M. and weekends, please call [phone]

## 2024-10-21 NOTE — PROGRESS NOTE ADULT - ASSESSMENT
The patient is a 89 year old female with a history of HTN, hypothyroidism, atrial fibrillation, COPD, PAD, dementia who presents with shortness of breath.     Plan:  - ECG with sinus rhythm and LVH  - CT chest with PNA and moderate right, small left pleural effusion  - BNP 5636 - higher than last admission  - Echo 10/4/24 with normal LV systolic function, mild pulm HTN  - Continue amlodipine 5 mg daily if able to take PO  - Continue metoprolol succinate 50 mg daily (formulary equivalent)  - Continue furosemide 40 mg PO daily  - Underwent thoracentesis - consistent with transudate. Breathing improved after procedure.

## 2024-10-21 NOTE — PROGRESS NOTE ADULT - ASSESSMENT
89-year-old female with a history of COPD on home oxygen (around 3 L), hypothyroidism, atrial fibrillation, peripheral vascular disease, arthritis status post recent admission for "pneumonia" presents with brought in by EMS from home for generalized weakness and fatigue.    effusion  OP  OA  Atelectasis  AMS  Elderly  Ataxic gait  AF  Hypothyroidism  PVD    on lasix  on ABX  vs noted  s/p right chest thoracentesis 1500 cc - trapped lung on cxr  sw f/u  home hospice discussion  DNR DNI    ct imaging reviewed  eval for LRTI - recent antibiosis for LRTI - and admission reviewed  nebs and hypertonic saline for Mucoid Impaction  I and O  diuresis as per cardio recs  monitor VS and HD and Sat  TTE reviewed - HFpEF and Pulm HTN - cvs rx regimen and cardio eval  HOB elev  asp prec  oral hygiene  skin care  spoke with DTR  Pall eval - GOC discussion  prognosis guarded  hold sedating agents  monitor mentation

## 2024-10-21 NOTE — PROGRESS NOTE ADULT - SUBJECTIVE AND OBJECTIVE BOX
[facility], Division of Infectious Diseases  HUSEYIN Jones Y. Patel, S. Shah, G. [facility]  [phone]    Name: SANDEE PEARSON  Age: 89y  Gender: Female  MRN: [id number]    Interval History:  Patient seen and examined at bedside this morning  No acute overnight events. Afebrile  Much better  Notes reviewed    Antibiotics:  piperacillin/tazobactam IVPB.. 3.375 Gram(s) IV Intermittent every 8 hours      Medications:  acetaminophen     Tablet .. 650 milliGRAM(s) Oral every 6 hours PRN  albuterol    0.083% 2.5 milliGRAM(s) Nebulizer every 8 hours  amLODIPine   Tablet 5 milliGRAM(s) Oral daily  collagenase Ointment 1 Application(s) Topical daily  furosemide    Tablet 40 milliGRAM(s) Oral daily  haloperidol     Tablet 0.5 milliGRAM(s) Oral every 8 hours PRN  haloperidol    Injectable 0.5 milliGRAM(s) IV Push every 6 hours PRN  levothyroxine Injectable 37.5 MICROGram(s) IV Push at bedtime  LORazepam   Injectable 0.5 milliGRAM(s) IV Push every 6 hours PRN  methyl salicylate 15%/menthol 10% Topical Cream 1 Application(s) Topical four times a day PRN  metoprolol tartrate Injectable 2.5 milliGRAM(s) IV Push every 6 hours  morphine  - Injectable 2 milliGRAM(s) IV Push every 6 hours PRN  piperacillin/tazobactam IVPB.. 3.375 Gram(s) IV Intermittent every 8 hours  prednisoLONE acetate 1% Suspension 1 Drop(s) Both EYES two times a day  sodium chloride 3%  Inhalation 4 milliLiter(s) Inhalation every 8 hours      Review of Systems:  A 10-point review of systems was obtained.   Review of systems otherwise negative except as previously noted.    Allergies: Percocet 5/325 (Vomiting)  codeine (Nausea)    For details regarding the patient's past medical history, social history, family history, and other miscellaneous elements, please refer the initial infectious diseases consultation and/or the admitting history and physical examination for this admission.    Objective:  Vitals:   T(C): 36.8 (10-21-24 @ 12:00), Max: 37 (10-20-24 @ 20:23)  HR: 87 (10-21-24 @ 12:00) (76 - 98)  BP: 119/54 (10-21-24 @ 12:00) (104/53 - 150/59)  RR: 20 (10-21-24 @ 12:00) (18 - 20)  SpO2: 97% (10-21-24 @ 12:00) (94% - 100%)    Physical Examination:  General: no acute distress  HEENT: NC/AT, EOMI,   Cardio: S1, S2 heard, RRR, no murmurs  Resp: breath sounds heard bilaterally, no rales, wheezes or rhonchi  Abd: soft, NT, ND  Ext: no edema or cyanosis  Skin: warm, dry, no visible rash      Laboratory Studies:  CBC:                       9.3    6.04  )-----------( 195      ( 21 Oct 2024 04:19 )             29.8     CMP: 10-21    141  |  101  |  60[H]  ----------------------------<  105[H]  4.1   |  38[H]  |  1.40[H]    Ca    8.8      21 Oct 2024 04:19        Urinalysis Basic - ( 21 Oct 2024 04:19 )    Color: x / Appearance: x / SG: x / pH: x  Gluc: 105 mg/dL / Ketone: x  / Bili: x / Urobili: x   Blood: x / Protein: x / Nitrite: x   Leuk Esterase: x / RBC: x / WBC x   Sq Epi: x / Non Sq Epi: x / Bacteria: x        Microbiology: reviewed    Culture - Fungal, Body Fluid (collected 10-17-24 @ 15:27)  Source: Pleural Fl  Preliminary Report (10-20-24 @ 11:26):    No growth    Culture - Body Fluid with Gram Stain (collected 10-17-24 @ 15:27)  Source: Pleural Fl  Gram Stain (10-18-24 @ 06:38):    No polymorphonuclear cells seen    No organisms seen    by cytocentrifuge  Preliminary Report (10-19-24 @ 08:01):    No growth to date    Culture - Urine (collected 10-16-24 @ 13:34)  Source: Clean Catch Clean Catch (Midstream)  Final Report (10-18-24 @ 08:11):    No growth    Culture - Blood (collected 10-16-24 @ 09:45)  Source: .Blood BLOOD  Preliminary Report (10-20-24 @ 17:00):    No growth at 4 days    Culture - Blood (collected 10-16-24 @ 09:40)  Source: .Blood BLOOD  Preliminary Report (10-20-24 @ 17:00):    No growth at 4 days          Radiology: reviewed

## 2024-10-21 NOTE — GOALS OF CARE CONVERSATION - ADVANCED CARE PLANNING - CONVERSATION DETAILS
Palliative care team met with patient's son at bedside to further discuss GOC. Son states that he does not feel patient can return to her home with the care she currently has in place. Discussed LTC Placement with comfort care. Son states that family will likely consider this option. Son states he feels patient is suffering and she has significantly declined over the past few months. Discussed with son starting comfort measures now explaining that this would mean no further blood draws/testing, no IVF or antibiotics and that patient would be given medications, including opioids, to address any symptoms. Son states he needs to speak with his sister before making any decisions. All questions answered. Unit SW to provide list of LTC facilities. PC team remains available and will follow up.
Palliative care team met with patient's daughter at bedside in ER. Reviewed patient's medical and social history as well as events leading to patient's hospitalization. Writer discussed patient's current diagnosis (SOB, PNA, AMS, COPD on home O2, Afib, hypothyroidism, PVD, arthritis), medical condition and management. Daughter reports she is patient's health care agent.  Inquired about patient's wishes regarding extent of medical care to be provided including  thoughts regarding cardiopulmonary resuscitation,  mechanical ventilation/intubation and non invasive ventilation. Discussed with daughter when CPR occurs and possible outcomes given patient's advanced age and medical conditions. Daughter became tearful during discussion stating that family has been so used to having patient with them for so long, and these conversations are difficult for her. Daughter placed a call to her brother Colby in [place] and the both agree to DNR/DNI/Trial NIV at this time. Discussed MOLST form. MOLST completed and placed on chart. Team also discussed with daughter possible next steps. Daughter is hopeful patient will be able to bounce back from this hospitalization and return home with the 24/7 HHA assistance she has in place. Discussed home hospice services including criteria, services provided and philosophy of program. Daughter would like to wait and see how patient does over the next few days and is open to speaking with PC team again based on patient's status.  PC team contact information provided. All questions answered. Emotional support provided.
Palliative care SW met with patient's daughter and son at bedside to further discuss GOC.  Patient is pending thoracentesis today and family is hopeful that this will help patient's breathing. Patient currently on NRB and daughter expressed that patient had a very difficult night and expressed that she wanted to die. Family is hopeful that patient will be able to return home and they would consider home hospice services if she is able to do so. Reviewed with family home hospice service including criteria, services provided and philosophy of program and also discussed inpatient hospice. Son states he is familiar with hospice and feels that the focus of patient's care should be patient's comfort at this time. Discussed comfort measures and that this would mean no further blood draws/testing, no IVF, no antibiotics and that medications are given to address any symptoms including the use of opioids. Family would like to wait and see how patient responds to procedure today before making any decisions. PC team to follow up. All questions answered. Emotional support provided.
Palliative care SW spoke with patient's daughter by phone to further discuss GOC, assist with planning and provide supportive counseling. Daughter states that she spoke with her brother and they have decided the plan will be for patient to return home with 24/7 HHA. Family has agreed to home hospice referral and referral was made over the weekend. Daughter is awaiting a call from hospice agency. Daughter expressed that she does not think patient will due well in in a LTC facility and she does not want to change her environment at this time as she feels patient will become more confused. Home hospice referral pending. All questions answered. Emotional support provided.

## 2024-10-21 NOTE — CHART NOTE - NSCHARTNOTEFT_GEN_A_CORE
Assessment: patient seen for malnutrition follow up. chart reviewed. hospital course noted  89y old  Female who presents with a chief complaint of AMS, sob   MOLST in chart  10/21 fecal incontinence per flow sheet  patient with good PO per RN. patient seen sitting in chair states with good PO intake patient seen on NC  will provide magic cup BID       Factors impacting intake: [ ] none [ ] nausea  [ ] vomiting [ ] diarrhea [ ] constipation  [ ]chewing problems [x ] swallowing issues  [ ] other: minced and moist mild thick per speech    Diet Prescription: mince and moist mild thick   Intake: up to % per flow sheet    Current Weight: Weight (kg): 47.9 (10-18 @ 06:47)  % Weight Change    Pertinent Medications: MEDICATIONS  (STANDING):  albuterol    0.083% 2.5 milliGRAM(s) Nebulizer every 8 hours  amLODIPine   Tablet 5 milliGRAM(s) Oral daily  collagenase Ointment 1 Application(s) Topical daily  furosemide    Tablet 40 milliGRAM(s) Oral daily  levothyroxine Injectable 37.5 MICROGram(s) IV Push at bedtime  metoprolol tartrate Injectable 2.5 milliGRAM(s) IV Push every 6 hours  piperacillin/tazobactam IVPB.. 3.375 Gram(s) IV Intermittent every 8 hours  prednisoLONE acetate 1% Suspension 1 Drop(s) Both EYES two times a day  sodium chloride 3%  Inhalation 4 milliLiter(s) Inhalation every 8 hours    MEDICATIONS  (PRN):  acetaminophen     Tablet .. 650 milliGRAM(s) Oral every 6 hours PRN Mild Pain (1 - 3)  haloperidol     Tablet 0.5 milliGRAM(s) Oral every 8 hours PRN agitation  haloperidol    Injectable 0.5 milliGRAM(s) IV Push every 6 hours PRN Agitation  LORazepam   Injectable 0.5 milliGRAM(s) IV Push every 6 hours PRN Agitation  methyl salicylate 15%/menthol 10% Topical Cream 1 Application(s) Topical four times a day PRN arthritic paiin  morphine  - Injectable 2 milliGRAM(s) IV Push every 6 hours PRN Severe Pain (7 - 10)      Skin: upper back DTI , sacrum 2 , right and left heel stage 1    Estimated Needs:   [x ] no change since previous assessment Based on # 54.5kg 25-30kcals/kg 1360-1632kcals and 1-1.2 gms protein/kg 54-65gms protein   [ ] recalculated:     Previous Nutrition Diagnosis:   [ ] Inadequate Energy Intake [ ]Inadequate Oral Intake [ ] Excessive Energy Intake   [ ] Underweight [x ] Increased Nutrient Needs [ ] Overweight/Obesity   [ ] Altered GI Function [ ] Unintended Weight Loss [ ] Food & Nutrition Related Knowledge Deficit [x ] Malnutrition severe acute on chronic    Nutrition Diagnosis is [ x] ongoing  [ ] resolved [ ] not applicable     New Nutrition Diagnosis: [x ] not applicable       Interventions:   Recommend  [ ] Change Diet To:  [ ] Nutrition Supplement  [ ] Nutrition Support  [x ] Other: recommend MVI and Vit C 500mg BID , will provide magic cup BID    Monitoring and Evaluation:   [x ] PO intake [ x ] Tolerance to diet prescription [ x ] weights [ x ] labs[ x ] follow up per protocol  [ ] other:

## 2024-10-21 NOTE — PROGRESS NOTE ADULT - SUBJECTIVE AND OBJECTIVE BOX
Date/Time Patient Seen:  		  Referring MD:   Data Reviewed	       Patient is a 89y old  Female who presents with a chief complaint of AMS, sob (20 Oct 2024 13:21)      Subjective/HPI     PAST MEDICAL & SURGICAL HISTORY:  Hypertension    Fall    Fall    Pelvic fracture    Hypothyroidism    COPD exacerbation    COPD without exacerbation    Afib    PVD (peripheral vascular disease)    S/P ankle joint replacement, left    Transplanted cornea          Medication list         MEDICATIONS  (STANDING):  albuterol    0.083% 2.5 milliGRAM(s) Nebulizer every 8 hours  amLODIPine   Tablet 5 milliGRAM(s) Oral daily  collagenase Ointment 1 Application(s) Topical daily  furosemide    Tablet 40 milliGRAM(s) Oral daily  levothyroxine Injectable 37.5 MICROGram(s) IV Push at bedtime  metoprolol tartrate Injectable 2.5 milliGRAM(s) IV Push every 6 hours  piperacillin/tazobactam IVPB.. 3.375 Gram(s) IV Intermittent every 8 hours  prednisoLONE acetate 1% Suspension 1 Drop(s) Both EYES two times a day  sodium chloride 3%  Inhalation 4 milliLiter(s) Inhalation every 8 hours    MEDICATIONS  (PRN):  acetaminophen     Tablet .. 650 milliGRAM(s) Oral every 6 hours PRN Mild Pain (1 - 3)  haloperidol     Tablet 0.5 milliGRAM(s) Oral every 8 hours PRN agitation  haloperidol    Injectable 0.5 milliGRAM(s) IV Push every 6 hours PRN Agitation  LORazepam   Injectable 0.5 milliGRAM(s) IV Push every 6 hours PRN Agitation  methyl salicylate 15%/menthol 10% Topical Cream 1 Application(s) Topical four times a day PRN arthritic paiin  morphine  - Injectable 2 milliGRAM(s) IV Push every 6 hours PRN Severe Pain (7 - 10)         Vitals log        ICU Vital Signs Last 24 Hrs  T(C): 36.3 (21 Oct 2024 04:47), Max: 37 (20 Oct 2024 20:23)  T(F): 97.4 (21 Oct 2024 04:47), Max: 98.6 (20 Oct 2024 20:23)  HR: 78 (21 Oct 2024 04:47) (72 - 98)  BP: 150/59 (21 Oct 2024 04:47) (104/53 - 150/59)  BP(mean): --  ABP: --  ABP(mean): --  RR: 18 (21 Oct 2024 04:47) (18 - 18)  SpO2: 100% (21 Oct 2024 04:47) (95% - 100%)    O2 Parameters below as of 21 Oct 2024 04:47  Patient On (Oxygen Delivery Method): nasal cannula  O2 Flow (L/min): 3               Input and Output:  I&O's Detail    19 Oct 2024 07:01  -  20 Oct 2024 07:00  --------------------------------------------------------  IN:  Total IN: 0 mL    OUT:    Voided (mL): 700 mL  Total OUT: 700 mL    Total NET: -700 mL      20 Oct 2024 07:01  -  21 Oct 2024 05:41  --------------------------------------------------------  IN:  Total IN: 0 mL    OUT:    Voided (mL): 750 mL  Total OUT: 750 mL    Total NET: -750 mL          Lab Data                        9.3    6.04  )-----------( 195      ( 21 Oct 2024 04:19 )             29.8     10-21    141  |  101  |  60[H]  ----------------------------<  105[H]  4.1   |  38[H]  |  1.40[H]    Ca    8.8      21 Oct 2024 04:19              Review of Systems	      Objective     Physical Examination    heart s1s2  lung dec BS  head nc  head at      Pertinent Lab findings & Imaging      Jane:  NO   Adequate UO     I&O's Detail    19 Oct 2024 07:01  -  20 Oct 2024 07:00  --------------------------------------------------------  IN:  Total IN: 0 mL    OUT:    Voided (mL): 700 mL  Total OUT: 700 mL    Total NET: -700 mL      20 Oct 2024 07:01  -  21 Oct 2024 05:41  --------------------------------------------------------  IN:  Total IN: 0 mL    OUT:    Voided (mL): 750 mL  Total OUT: 750 mL    Total NET: -750 mL               Discussed with:     Cultures:	        Radiology

## 2024-10-21 NOTE — PROGRESS NOTE ADULT - SUBJECTIVE AND OBJECTIVE BOX
Patient is a 89y old  Female who presents with a chief complaint of AMS, sob (21 Oct 2024 12:20)      INTERVAL HPI/OVERNIGHT EVENTS: noted  pt seen and examined this am   events noted  feels well      Vital Signs Last 24 Hrs  T(C): 36.8 (21 Oct 2024 12:00), Max: 37 (20 Oct 2024 20:23)  T(F): 98.3 (21 Oct 2024 12:00), Max: 98.6 (20 Oct 2024 20:23)  HR: 87 (21 Oct 2024 12:00) (78 - 98)  BP: 119/54 (21 Oct 2024 12:00) (104/53 - 150/59)  BP(mean): --  RR: 20 (21 Oct 2024 12:00) (18 - 20)  SpO2: 97% (21 Oct 2024 12:00) (94% - 100%)    Parameters below as of 21 Oct 2024 12:00  Patient On (Oxygen Delivery Method): nasal cannula  O2 Flow (L/min): 4      acetaminophen     Tablet .. 650 milliGRAM(s) Oral every 6 hours PRN  albuterol    0.083% 2.5 milliGRAM(s) Nebulizer every 8 hours  amLODIPine   Tablet 5 milliGRAM(s) Oral daily  collagenase Ointment 1 Application(s) Topical daily  furosemide    Tablet 40 milliGRAM(s) Oral daily  haloperidol     Tablet 0.5 milliGRAM(s) Oral every 8 hours PRN  haloperidol    Injectable 0.5 milliGRAM(s) IV Push every 6 hours PRN  levothyroxine Injectable 37.5 MICROGram(s) IV Push at bedtime  LORazepam   Injectable 0.5 milliGRAM(s) IV Push every 6 hours PRN  methyl salicylate 15%/menthol 10% Topical Cream 1 Application(s) Topical four times a day PRN  metoprolol tartrate Injectable 2.5 milliGRAM(s) IV Push every 6 hours  morphine  - Injectable 2 milliGRAM(s) IV Push every 6 hours PRN  piperacillin/tazobactam IVPB.. 3.375 Gram(s) IV Intermittent every 8 hours  prednisoLONE acetate 1% Suspension 1 Drop(s) Both EYES two times a day  sodium chloride 3%  Inhalation 4 milliLiter(s) Inhalation every 8 hours      PHYSICAL EXAM:  GENERAL: NAD,   EYES: conjunctiva and sclera clear  ENMT: Moist mucous membranes  NECK: Supple, No JVD, Normal thyroid  CHEST/LUNG: non labored, cta b/l  HEART: Regular rate and rhythm; No murmurs, rubs, or gallops  ABDOMEN: Soft, Nontender, Nondistended; Bowel sounds present  EXTREMITIES:  2+ Peripheral Pulses, No clubbing, cyanosis, or edema  LYMPH: No lymphadenopathy noted  SKIN: No rashes or lesions    Consultant(s) Notes Reviewed:  [x ] YES  [ ] NO  Care Discussed with Consultants/Other Providers [ x] YES  [ ] NO    LABS:                        9.3    6.04  )-----------( 195      ( 21 Oct 2024 04:19 )             29.8     10-21    141  |  101  |  60[H]  ----------------------------<  105[H]  4.1   |  38[H]  |  1.40[H]    Ca    8.8      21 Oct 2024 04:19        Urinalysis Basic - ( 21 Oct 2024 04:19 )    Color: x / Appearance: x / SG: x / pH: x  Gluc: 105 mg/dL / Ketone: x  / Bili: x / Urobili: x   Blood: x / Protein: x / Nitrite: x   Leuk Esterase: x / RBC: x / WBC x   Sq Epi: x / Non Sq Epi: x / Bacteria: x      CAPILLARY BLOOD GLUCOSE            Urinalysis Basic - ( 21 Oct 2024 04:19 )    Color: x / Appearance: x / SG: x / pH: x  Gluc: 105 mg/dL / Ketone: x  / Bili: x / Urobili: x   Blood: x / Protein: x / Nitrite: x   Leuk Esterase: x / RBC: x / WBC x   Sq Epi: x / Non Sq Epi: x / Bacteria: x          RADIOLOGY & ADDITIONAL TESTS:    Imaging Personally Reviewed:  [x ] YES  [ ] NO

## 2024-10-22 ENCOUNTER — TRANSCRIPTION ENCOUNTER (OUTPATIENT)
Age: 89
End: 2024-10-22

## 2024-10-22 RX ORDER — FUROSEMIDE 40 MG
1 TABLET ORAL
Qty: 30 | Refills: 0
Start: 2024-10-22 | End: 2024-11-20

## 2024-10-22 RX ORDER — ASCORBATE CALCIUM/BIOFLAVONOID 500-250 MG
1 TABLET ORAL
Refills: 0 | DISCHARGE

## 2024-10-22 RX ORDER — PREDNISOLONE SODIUM PHOSPHATE 1 %
1 DROPS OPHTHALMIC (EYE)
Refills: 0 | DISCHARGE

## 2024-10-22 RX ORDER — TIOTROPIUM BROMIDE INHALATION SPRAY 3.12 UG/1
1 SPRAY, METERED RESPIRATORY (INHALATION)
Refills: 0 | DISCHARGE

## 2024-10-22 RX ORDER — ACETAMINOPHEN 500 MG
2 TABLET ORAL
Qty: 0 | Refills: 0 | DISCHARGE
Start: 2024-10-22

## 2024-10-22 RX ORDER — MIRTAZAPINE 30 MG/1
2 TABLET ORAL
Refills: 0 | DISCHARGE

## 2024-10-22 RX ORDER — FERROUS SULFATE 325(65) MG
1 TABLET ORAL
Refills: 0 | DISCHARGE

## 2024-10-22 RX ORDER — METOPROLOL TARTRATE 50 MG
1 TABLET ORAL
Qty: 15 | Refills: 0
Start: 2024-10-22 | End: 2024-11-05

## 2024-10-22 RX ORDER — PREDNISONE 5 MG/1
1 TABLET ORAL
Refills: 0 | DISCHARGE

## 2024-10-22 RX ADMIN — Medication 2.5 MILLIGRAM(S): at 20:23

## 2024-10-22 RX ADMIN — Medication 5 MILLIGRAM(S): at 05:52

## 2024-10-22 RX ADMIN — Medication 650 MILLIGRAM(S): at 00:58

## 2024-10-22 RX ADMIN — SODIUM CHLORIDE 4 MILLILITER(S): 9 INJECTION, SOLUTION INTRAMUSCULAR; INTRAVENOUS; SUBCUTANEOUS at 07:49

## 2024-10-22 RX ADMIN — Medication 2.5 MILLIGRAM(S): at 09:42

## 2024-10-22 RX ADMIN — PIPERACILLIN AND TAZOBACTAM 25 GRAM(S): .5; 4 INJECTION, POWDER, LYOPHILIZED, FOR SOLUTION INTRAVENOUS at 04:18

## 2024-10-22 RX ADMIN — Medication 2.5 MILLIGRAM(S): at 20:52

## 2024-10-22 RX ADMIN — Medication 37.5 MICROGRAM(S): at 20:53

## 2024-10-22 RX ADMIN — Medication 2.5 MILLIGRAM(S): at 04:18

## 2024-10-22 RX ADMIN — PIPERACILLIN AND TAZOBACTAM 25 GRAM(S): .5; 4 INJECTION, POWDER, LYOPHILIZED, FOR SOLUTION INTRAVENOUS at 20:41

## 2024-10-22 RX ADMIN — Medication 2.5 MILLIGRAM(S): at 07:49

## 2024-10-22 RX ADMIN — Medication 1 DROP(S): at 05:52

## 2024-10-22 RX ADMIN — MENTHOL AND METHYL SALICYLATE 1 APPLICATION(S): 10; 30 CREAM TOPICAL at 20:41

## 2024-10-22 RX ADMIN — SODIUM CHLORIDE 4 MILLILITER(S): 9 INJECTION, SOLUTION INTRAMUSCULAR; INTRAVENOUS; SUBCUTANEOUS at 13:58

## 2024-10-22 RX ADMIN — PIPERACILLIN AND TAZOBACTAM 25 GRAM(S): .5; 4 INJECTION, POWDER, LYOPHILIZED, FOR SOLUTION INTRAVENOUS at 11:32

## 2024-10-22 RX ADMIN — Medication 650 MILLIGRAM(S): at 20:41

## 2024-10-22 RX ADMIN — Medication 40 MILLIGRAM(S): at 05:52

## 2024-10-22 RX ADMIN — Medication 1 APPLICATION(S): at 12:05

## 2024-10-22 RX ADMIN — Medication 650 MILLIGRAM(S): at 13:20

## 2024-10-22 RX ADMIN — Medication 1 DROP(S): at 18:21

## 2024-10-22 RX ADMIN — Medication 2.5 MILLIGRAM(S): at 13:58

## 2024-10-22 RX ADMIN — SODIUM CHLORIDE 4 MILLILITER(S): 9 INJECTION, SOLUTION INTRAMUSCULAR; INTRAVENOUS; SUBCUTANEOUS at 20:24

## 2024-10-22 RX ADMIN — Medication 650 MILLIGRAM(S): at 21:41

## 2024-10-22 NOTE — DISCHARGE NOTE PROVIDER - CARE PROVIDER_API CALL
David Gaytan  Internal Medicine  [address] [place] [address]  Phone: [phone]  Fax: [phone]  Follow Up Time: 2 weeks

## 2024-10-22 NOTE — PROGRESS NOTE ADULT - ASSESSMENT
The patient is a 89 year old female with a history of HTN, hypothyroidism, atrial fibrillation, COPD, PAD, dementia who presents with shortness of breath.     Plan:  - ECG with sinus rhythm and LVH  - CT chest with PNA and moderate right, small left pleural effusion  - BNP 5636 - higher than last admission  - Echo 10/4/24 with normal LV systolic function, mild pulm HTN  - Continue amlodipine 5 mg daily if able to take PO  - Continue metoprolol succinate 50 mg daily (formulary equivalent)  - Continue furosemide 40 mg PO daily  - Underwent thoracentesis - consistent with transudate. Breathing improved after procedure.  - Possible home hospice

## 2024-10-22 NOTE — PATIENT CHOICE NOTE. - NSPTCHOICESTATE_GEN_ALL_CORE
I have met with the patient and/or caregiver to discuss discharge goals and treatment plan. Patient and/or caregiver also provided with instructions on accessing the CMS Compare websites for additional information related to Post Acute Provider quality and resource use measures to assist them in evaluation of the providers and in selecting their post-acute provider of choice. Patient and caregiver were informed of the facilities that are owned and/or operated by [facility]. I have discussed with the patient the availability of in-network facilities and providers. Patient and caregiver provided with a list of post-acute providers whose services are appropriate to the discharge plans and patient needs.     For patient requiring durable medical equipment, patient and/or caregiver were informed that they have the right to request who provides the required equipment.
I have met with the patient and/or caregiver to discuss discharge goals and treatment plan. Patient and/or caregiver also provided with instructions on accessing the CMS Compare websites for additional information related to Post Acute Provider quality and resource use measures to assist them in evaluation of the providers and in selecting their post-acute provider of choice. Patient and caregiver were informed of the facilities that are owned and/or operated by [facility]. I have discussed with the patient the availability of in-network facilities and providers. Patient and caregiver provided with a list of post-acute providers whose services are appropriate to the discharge plans and patient needs.     For patient requiring durable medical equipment, patient and/or caregiver were informed that they have the right to request who provides the required equipment.

## 2024-10-22 NOTE — PROGRESS NOTE ADULT - SUBJECTIVE AND OBJECTIVE BOX
Date/Time Patient Seen:  		  Referring MD:   Data Reviewed	       Patient is a 89y old  Female who presents with a chief complaint of AMS, sob (21 Oct 2024 14:43)      Subjective/HPI     PAST MEDICAL & SURGICAL HISTORY:  Hypertension    Fall    Fall    Pelvic fracture    Hypothyroidism    COPD exacerbation    COPD without exacerbation    Afib    PVD (peripheral vascular disease)    S/P ankle joint replacement, left    Transplanted cornea          Medication list         MEDICATIONS  (STANDING):  albuterol    0.083% 2.5 milliGRAM(s) Nebulizer every 8 hours  amLODIPine   Tablet 5 milliGRAM(s) Oral daily  collagenase Ointment 1 Application(s) Topical daily  furosemide    Tablet 40 milliGRAM(s) Oral daily  levothyroxine Injectable 37.5 MICROGram(s) IV Push at bedtime  metoprolol tartrate Injectable 2.5 milliGRAM(s) IV Push every 6 hours  piperacillin/tazobactam IVPB.. 3.375 Gram(s) IV Intermittent every 8 hours  prednisoLONE acetate 1% Suspension 1 Drop(s) Both EYES two times a day  sodium chloride 3%  Inhalation 4 milliLiter(s) Inhalation every 8 hours    MEDICATIONS  (PRN):  acetaminophen     Tablet .. 650 milliGRAM(s) Oral every 6 hours PRN Mild Pain (1 - 3)  haloperidol     Tablet 0.5 milliGRAM(s) Oral every 8 hours PRN agitation  haloperidol    Injectable 0.5 milliGRAM(s) IV Push every 6 hours PRN Agitation  LORazepam   Injectable 0.5 milliGRAM(s) IV Push every 6 hours PRN Agitation  methyl salicylate 15%/menthol 10% Topical Cream 1 Application(s) Topical four times a day PRN arthritic paiin  morphine  - Injectable 2 milliGRAM(s) IV Push every 6 hours PRN Severe Pain (7 - 10)         Vitals log        ICU Vital Signs Last 24 Hrs  T(C): 36.4 (22 Oct 2024 04:36), Max: 36.8 (21 Oct 2024 12:00)  T(F): 97.5 (22 Oct 2024 04:36), Max: 98.3 (21 Oct 2024 12:00)  HR: 78 (22 Oct 2024 04:36) (78 - 101)  BP: 129/60 (22 Oct 2024 04:36) (117/49 - 138/65)  BP(mean): --  ABP: --  ABP(mean): --  RR: 18 (22 Oct 2024 04:36) (18 - 20)  SpO2: 95% (22 Oct 2024 04:36) (94% - 97%)    O2 Parameters below as of 22 Oct 2024 04:36  Patient On (Oxygen Delivery Method): nasal cannula  O2 Flow (L/min): 3               Input and Output:  I&O's Detail    20 Oct 2024 07:01  -  21 Oct 2024 07:00  --------------------------------------------------------  IN:  Total IN: 0 mL    OUT:    Voided (mL): 750 mL  Total OUT: 750 mL    Total NET: -750 mL      21 Oct 2024 07:01  -  22 Oct 2024 05:38  --------------------------------------------------------  IN:  Total IN: 0 mL    OUT:    Voided (mL): 550 mL  Total OUT: 550 mL    Total NET: -550 mL          Lab Data                        9.3    6.04  )-----------( 195      ( 21 Oct 2024 04:19 )             29.8     10-21    141  |  101  |  60[H]  ----------------------------<  105[H]  4.1   |  38[H]  |  1.40[H]    Ca    8.8      21 Oct 2024 04:19              Review of Systems	      Objective     Physical Examination  heart s1s2  lung dec BS  head nc  head at        Pertinent Lab findings & Imaging      Jane:  NO   Adequate UO     I&O's Detail    20 Oct 2024 07:01  -  21 Oct 2024 07:00  --------------------------------------------------------  IN:  Total IN: 0 mL    OUT:    Voided (mL): 750 mL  Total OUT: 750 mL    Total NET: -750 mL      21 Oct 2024 07:01  -  22 Oct 2024 05:38  --------------------------------------------------------  IN:  Total IN: 0 mL    OUT:    Voided (mL): 550 mL  Total OUT: 550 mL    Total NET: -550 mL               Discussed with:     Cultures:	        Radiology

## 2024-10-22 NOTE — PROGRESS NOTE ADULT - SUBJECTIVE AND OBJECTIVE BOX
Chief Complaint: Shortness of breath    Interval Events: No events overnight.    Review of Systems:  General: No fevers, chills, weight gain  Skin: No rashes, color changes  Cardiovascular: No chest pain, orthopnea  Respiratory: No shortness of breath, cough  Gastrointestinal: No nausea, abdominal pain  Genitourinary: No incontinence, pain with urination  Musculoskeletal: +pain, swelling, decreased range of motion  Neurological: No headache, weakness  Psychiatric: No depression, anxiety  Endocrine: No weight gain, increased thirst  All other systems are comprehensively negative.    Physical Exam:  Vital Signs Last 24 Hrs  T(C): 36.4 (22 Oct 2024 04:36), Max: 36.8 (21 Oct 2024 12:00)  T(F): 97.5 (22 Oct 2024 04:36), Max: 98.3 (21 Oct 2024 12:00)  HR: 85 (22 Oct 2024 07:49) (78 - 101)  BP: 129/60 (22 Oct 2024 04:36) (119/54 - 138/65)  BP(mean): --  RR: 18 (22 Oct 2024 04:36) (18 - 20)  SpO2: 96% (22 Oct 2024 07:49) (95% - 97%)  Parameters below as of 22 Oct 2024 07:49  Patient On (Oxygen Delivery Method): nasal cannula, 4 L  General: NAD  HEENT: MMM  Neck: No JVD, no carotid bruit  Lungs: CTAB  CV: RRR, nl S1/S2, no M/R/G  Abdomen: S/NT/ND, +BS  Extremities: No LE edema, no cyanosis  Neuro: AAOx0  Skin: No rash    Labs:    10-21    141  |  101  |  60[H]  ----------------------------<  105[H]  4.1   |  38[H]  |  1.40[H]    Ca    8.8      21 Oct 2024 04:19                          9.3    6.04  )-----------( 195      ( 21 Oct 2024 04:19 )             29.8       ECG/Telemetry: Sinus rhythm

## 2024-10-22 NOTE — DISCHARGE NOTE PROVIDER - NSDCCPCAREPLAN_GEN_ALL_CORE_FT
PRINCIPAL DISCHARGE DIAGNOSIS  Diagnosis: Shortness of breath  Assessment and Plan of Treatment:   #.acute on chronic hypoxic and hypercarbic respiratory failure   pna possibly dt aspiration pneumonia  acute on chronic diastolic chf  overall poor prognosis  cards and pulm cs noted  sp thoracentesis tolerated well  lasix 40 mg qd, completed abx  pt is DNR/I dced home with home hospice      SECONDARY DISCHARGE DIAGNOSES  Diagnosis: Pneumonia  Assessment and Plan of Treatment:     Diagnosis: Acute CHF  Assessment and Plan of Treatment:

## 2024-10-22 NOTE — PROGRESS NOTE ADULT - SUBJECTIVE AND OBJECTIVE BOX
[facility], Division of Infectious Diseases  HUSEYIN Jones Y. Patel, S. Shah, G. [facility]  [phone]    Name: SANDEE PEARSON  Age: 89y  Gender: Female  MRN: [id number]    Interval History:  Patient seen and examined at bedside   No acute overnight events. Afebrile  No complaints  Notes reviewed    Antibiotics:  piperacillin/tazobactam IVPB.. 3.375 Gram(s) IV Intermittent every 8 hours      Medications:  acetaminophen     Tablet .. 650 milliGRAM(s) Oral every 6 hours PRN  albuterol    0.083% 2.5 milliGRAM(s) Nebulizer every 8 hours  amLODIPine   Tablet 5 milliGRAM(s) Oral daily  collagenase Ointment 1 Application(s) Topical daily  furosemide    Tablet 40 milliGRAM(s) Oral daily  haloperidol     Tablet 0.5 milliGRAM(s) Oral every 8 hours PRN  haloperidol    Injectable 0.5 milliGRAM(s) IV Push every 6 hours PRN  levothyroxine Injectable 37.5 MICROGram(s) IV Push at bedtime  LORazepam   Injectable 0.5 milliGRAM(s) IV Push every 6 hours PRN  methyl salicylate 15%/menthol 10% Topical Cream 1 Application(s) Topical four times a day PRN  metoprolol tartrate Injectable 2.5 milliGRAM(s) IV Push every 6 hours  morphine  - Injectable 2 milliGRAM(s) IV Push every 6 hours PRN  piperacillin/tazobactam IVPB.. 3.375 Gram(s) IV Intermittent every 8 hours  prednisoLONE acetate 1% Suspension 1 Drop(s) Both EYES two times a day  sodium chloride 3%  Inhalation 4 milliLiter(s) Inhalation every 8 hours      Review of Systems:  A 10-point review of systems was obtained.   Review of systems otherwise negative except as previously noted.    Allergies: Percocet 5/325 (Vomiting)  codeine (Nausea)    For details regarding the patient's past medical history, social history, family history, and other miscellaneous elements, please refer the initial infectious diseases consultation and/or the admitting history and physical examination for this admission.    Objective:  Vitals:   T(C): 36.6 (10-22-24 @ 11:06), Max: 36.8 (10-21-24 @ 20:18)  HR: 83 (10-22-24 @ 11:06) (78 - 101)  BP: 131/72 (10-22-24 @ 11:06) (128/68 - 138/65)  RR: 18 (10-22-24 @ 11:06) (18 - 19)  SpO2: 98% (10-22-24 @ 11:06) (95% - 98%)    Physical Examination:  General: no acute distress  HEENT: NC/AT, EOMI,   Cardio: RRR  Resp: breath sounds heard bilaterally, no rales, wheezes or rhonchi  Abd: soft, NT, ND,  Ext: no edema or cyanosis  Skin: warm, dry, no visible rash      Laboratory Studies:  CBC:                       9.3    6.04  )-----------( 195      ( 21 Oct 2024 04:19 )             29.8     CMP: 10-21    141  |  101  |  60[H]  ----------------------------<  105[H]  4.1   |  38[H]  |  1.40[H]    Ca    8.8      21 Oct 2024 04:19        Urinalysis Basic - ( 21 Oct 2024 04:19 )    Color: x / Appearance: x / SG: x / pH: x  Gluc: 105 mg/dL / Ketone: x  / Bili: x / Urobili: x   Blood: x / Protein: x / Nitrite: x   Leuk Esterase: x / RBC: x / WBC x   Sq Epi: x / Non Sq Epi: x / Bacteria: x        Microbiology: reviewed    Culture - Body Fluid with Gram Stain (collected 10-17-24 @ 15:27)  Source: Pleural Fl  Gram Stain (10-18-24 @ 06:38):    No polymorphonuclear cells seen    No organisms seen    by cytocentrifuge  Preliminary Report (10-19-24 @ 08:01):    No growth to date    Culture - Fungal, Body Fluid (collected 10-17-24 @ 15:27)  Source: Pleural Fl  Preliminary Report (10-20-24 @ 11:26):    No growth    Culture - Urine (collected 10-16-24 @ 13:34)  Source: Clean Catch Clean Catch (Midstream)  Final Report (10-18-24 @ 08:11):    No growth    Culture - Blood (collected 10-16-24 @ 09:45)  Source: .Blood BLOOD  Final Report (10-21-24 @ 17:01):    No growth at 5 days    Culture - Blood (collected 10-16-24 @ 09:40)  Source: .Blood BLOOD  Final Report (10-21-24 @ 17:01):    No growth at 5 days          Radiology: reviewed

## 2024-10-22 NOTE — PROGRESS NOTE ADULT - ASSESSMENT
Pt is a 89W w/ PMHx of COPD on home oxygen (around 3 L), hypothyroidism, atrial fibrillation, peripheral vascular disease, arthritis status post recent admission for "pneumonia" brought in by EMS from home for generalized weakness and fatigue, SOB and agitation/restlessnes    Acute PNA c/b pleural effusions  AHRF 2/2 above  CT CAP Bilateral pleural effusions right larger than left increased in size from prior. Tree-in-bud parenchymal opacities upper lobes suggestive of small airways impaction/infection.  Nonspecific upper lobe groundglass opacities may reflect pulmonary edema and/or infection. No acute findings abdomen and pelvis  RVP negative  Urine legionella negative  BCx NGTD  S/p thoracentesis 10/17  --pleural fluid cx NGTD    Recommendations:   C/w zosyn x7 day course, last day today  Trend temps/WBC--leukocytosis resolved  pulmonary following  Supportive care, O2 as needed, additional care per primary team    Infectious Diseases will follow. Please call with any questions.  Allyson Leigh M.D.  [facility] Division of Infectious Diseases [phone]  For after 5 P.M. and weekends, please call [phone]

## 2024-10-22 NOTE — DISCHARGE NOTE PROVIDER - NSTOBACCOUSAGEY/N_GEN_A_CS
Freda Wade 60  LYMPHEDEMA SERVICES  DAILY NOTE & DISCHARGE NOTE    Date: 2017   Patient Name: Chanel Méndez        CSN: [id number]   : 1961  (64 y.o.)  Gender: female   Referring Practitioner: DR. Husam Lai. Joana Castañeda DPM  Diagnosis: EDEMA/LYMPHEDEMA BILATERAL LEGS  Referral Date : 17    PT Visit Information  PT Insurance Information: CHERYL CERRATO/BS  Total # of Visits Approved: 20 (HARD LIMIT)  Total # of Visits to Date: 15  Plan of Care/Certification Expiration Date: 17  No Show: 0  Canceled Appointment: 0  Progress Note Counter:     Restrictions/Precautions  Fall risk, Universal precautions,History of right TKR (). Pain  Patient Currently in Pain: Yes (The patient reported that she accidently caught her foot on something at work and twisted her right lower extremity, especially the right knee region.)  Pain Assessment  Pain Assessment: 0-10  Pain Level: 2  Pain Type: Chronic pain  Pain Location: Ankle, Leg  Pain Orientation: Right, Left  Pain Descriptors: Aching    SUBJECTIVE      -2017: The patient is a pleasant 54year old female who underwent Complete Decongestive Therapy/Manual Lymph Drainage (CDT/MLD) treatment sessions due to having severe right lower extremity and moderate left lower extremity Lymphedema swelling and lower truncal Lymphedema swelling. The patient reported \"I am so happy with how good my legs feel and look, they haven't looked this good in a long time\" per patient. Lymphedema Assessment:  Pitting Edema None   Skin Appearance/Condition Mild Redness-Right lower extremity. Skin Condition Normal   Open Wound(s) No   Tissue Temperature Normal   Skin Folds Small - Location: Right ankle region.     Fibrotic Tissue Minimal-Posterior calf (bilaterally)   Orange Peel Texture None   Nailbed Appearance/Condition WFL    Leakage of Fluid Amount: None             TREATMENT SESSION  Manual Lymph Drainage  Bilateral Lower Extremity Manual Lymph care  Learner:patient and significant other  Method: demonstration, explanation and handout       Outcome: acknowledged understanding of goals/plan of care, demonstrated understanding and asked questions     HANDOUTS PROVIDED:  -09/20/2017: Compression bandaging precautions. GOALS   SHORT-TERM GOALS:  to be met in 6 weeks   X  MET    Patient will demonstrate a decrease in circumferential measurements of the affected extremity by 10.0 cm working towards the lymphedema swelling stabilizing and patient being able to get measured and fitted for a new compression garment to be worn daily to keep swelling down. X  MET     Patient will tolerate wearing the compression bandages from one treatment session until the next treatment session working towards meeting short-term goal #1. X  MET    Patient/family/caregiver will demonstrate and verbalize 3-5 skin care precautionary measures to decrease dry skin and risk of infection. X  MET WITH TUBIGRIP STOCKINETTE BUT NOT WITH COMPRESSION BANDAGES Patient/family/caregiver will demonstrate applying compression bandages with no greater than moderate assist and verbal cues from the therapist working towards being modified independent with applying the compression bandages for night time swelling. LONG-TERM GOALS:  to be met in 12 weeks   X  MET 1. Lymphedema swelling will stabilize as noted by total circumferential changes being no greater than 3.0-5.0 cm in preparation for being measured for new compression garment(s) to be worn daily to keep swelling down. X  MET  TUBIGRIP 2. Patient/family/caregiver will demonstrate applying compression bandages with modified independence to apply at night to keep swelling down overnight to be able to ariane compression stockings in the morning. X  MET 3. Patient/family/caregiver will demonstrate donning/doffing compression garments with modified independence to wear compression garments daily to keep swelling down.    X  MET No

## 2024-10-22 NOTE — SOCIAL WORK PROGRESS NOTE - NSSWPROGRESSNOTE_GEN_ALL_CORE
Akhil has followed up with Izabel in Intake at [facility]. She will be in contact with pts daughter today and advise if pt is approved for home hospice care. Akhil will continue to follow for safe discharge planning.

## 2024-10-22 NOTE — SOCIAL WORK PROGRESS NOTE - NSSWPROGRESSNOTE_GEN_ALL_CORE
Discussed at daily rounds. I spoke with daughter Izabel by phone today. Confirmed she is in agreement with home hospice. States she spoke with rep from [facility] today. Daughter states they are holding off on delivery of hospital bed and she already has oxygen in place. She states her 24 hour aide is available and inquired about discharge date. Informed her I will reach out to MD and get back to her.  Discussed at daily rounds. I spoke with daughter Izabel by phone today. Confirmed she is in agreement with home hospice. States she spoke with rep from [facility] today. Daughter states they are holding off on delivery of hospital bed and she already has oxygen in place. She states her 24 hour aide is available and inquired about discharge date. Informed her I will reach out to MD and get back to her. MD said patient is ready for d/c as she completes IV antibiotics today. Daughter informed and can bring her home after 5PM tomorrow. [facility] informed, await start of care date.

## 2024-10-22 NOTE — DISCHARGE NOTE PROVIDER - DETAILS OF MALNUTRITION DIAGNOSIS/DIAGNOSES
This patient has been assessed with a concern for Malnutrition and was treated during this hospitalization for the following Nutrition diagnosis/diagnoses:     -  10/18/2024: Severe protein-calorie malnutrition   -  10/18/2024: Underweight (BMI < 19)

## 2024-10-22 NOTE — DISCHARGE NOTE PROVIDER - NSDCMRMEDTOKEN_GEN_ALL_CORE_FT
amLODIPine 5 mg oral tablet: 1 tab(s) orally once a day  bisoprolol-hydrochlorothiazide 2.5 mg-6.25 mg oral tablet: 1 tab(s) orally once a day  celecoxib 100 mg oral capsule: 1 cap(s) orally 2 times a day as needed  Centrum Silver Women 50+ oral tablet: 1 tab(s) orally once a day  ferrous sulfate 200 mg (65 mg elemental iron) oral tablet: 1 tab(s) orally once a day  levothyroxine 50 mcg (0.05 mg) oral capsule: 1 cap(s) orally once a day  lutein-zeaxanthin: 1 cap(s) orally once a day  mirtazapine 7.5 mg oral tablet: 2 tab(s) orally once a day (at bedtime)  Noe 128 2% ophthalmic solution: 1 drop(s) in each affected eye as needed  prednisoLONE acetate 1% ophthalmic suspension: 1 drop(s) in the right eye 2 times a day  predniSONE 2.5 mg oral tablet: 1 tab(s) orally once a day  Spiriva 18 mcg inhalation capsule: 1 cap(s) inhaled once a day   acetaminophen 325 mg oral tablet: 2 tab(s) orally every 6 hours As needed Mild Pain (1 - 3)  amLODIPine 5 mg oral tablet: 1 tab(s) orally once a day  celecoxib 100 mg oral capsule: 1 cap(s) orally 2 times a day as needed  Lasix 40 mg oral tablet: 1 tab(s) orally once a day  levothyroxine 50 mcg (0.05 mg) oral capsule: 1 cap(s) orally once a day  Noe 128 2% ophthalmic solution: 1 drop(s) in each affected eye as needed   acetaminophen 325 mg oral tablet: 2 tab(s) orally every 6 hours As needed Mild Pain (1 - 3)  amLODIPine 5 mg oral tablet: 1 tab(s) orally once a day  celecoxib 100 mg oral capsule: 1 cap(s) orally 2 times a day as needed  Lasix 40 mg oral tablet: 1 tab(s) orally once a day  levothyroxine 50 mcg (0.05 mg) oral capsule: 1 cap(s) orally once a day  metoprolol succinate 25 mg oral capsule, extended release: 1 cap(s) orally once a day  Noe 128 2% ophthalmic solution: 1 drop(s) in each affected eye as needed   acetaminophen 325 mg oral tablet: 2 tab(s) orally every 6 hours As needed Mild Pain (1 - 3)  amLODIPine 5 mg oral tablet: 1 tab(s) orally once a day  celecoxib 100 mg oral capsule: 1 cap(s) orally 2 times a day as needed  Lasix 40 mg oral tablet: 1 tab(s) orally once a day  levothyroxine 50 mcg (0.05 mg) oral capsule: 1 cap(s) orally once a day  LORazepam 0.5 mg oral tablet: 0.5 tab(s) orally 4 times a day as needed for  anxiety or sleep MDD: 2 tab  metoprolol succinate 25 mg oral capsule, extended release: 1 cap(s) orally once a day  Noe 128 2% ophthalmic solution: 1 drop(s) in each affected eye as needed  Spiriva HandiHaler 18 mcg inhalation capsule: 1 inhaler(s) inhaled once a day   acetaminophen 325 mg oral tablet: 2 tab(s) orally every 6 hours As needed Mild Pain (1 - 3)  amLODIPine 5 mg oral tablet: 1 tab(s) orally once a day  celecoxib 100 mg oral capsule: 1 cap(s) orally 2 times a day as needed  Lasix 40 mg oral tablet: 1 tab(s) orally once a day  levothyroxine 50 mcg (0.05 mg) oral capsule: 1 cap(s) orally once a day  LORazepam 0.5 mg oral tablet: 0.5 tab(s) orally 4 times a day as needed for  anxiety or sleep MDD: 2 tab  metoprolol succinate 25 mg oral capsule, extended release: 1 cap(s) orally once a day  Noe 128 2% ophthalmic solution: 1 drop(s) in each affected eye as needed  prednisoLONE acetate 1% ophthalmic suspension: 1 drop(s) to each affected eye 2 times a day  predniSONE 2.5 mg oral tablet: 1 tab(s) orally once a day  Spiriva HandiHaler 18 mcg inhalation capsule: 1 inhaler(s) inhaled once a day

## 2024-10-23 ENCOUNTER — TRANSCRIPTION ENCOUNTER (OUTPATIENT)
Age: 89
End: 2024-10-23

## 2024-10-23 VITALS
SYSTOLIC BLOOD PRESSURE: 136 MMHG | OXYGEN SATURATION: 96 % | TEMPERATURE: 98 F | HEART RATE: 92 BPM | RESPIRATION RATE: 18 BRPM | DIASTOLIC BLOOD PRESSURE: 61 MMHG

## 2024-10-23 LAB
CULTURE RESULTS: SIGNIFICANT CHANGE UP
SPECIMEN SOURCE: SIGNIFICANT CHANGE UP

## 2024-10-23 PROCEDURE — 94760 N-INVAS EAR/PLS OXIMETRY 1: CPT

## 2024-10-23 PROCEDURE — 88305 TISSUE EXAM BY PATHOLOGIST: CPT

## 2024-10-23 PROCEDURE — 87015 SPECIMEN INFECT AGNT CONCNTJ: CPT

## 2024-10-23 PROCEDURE — 97530 THERAPEUTIC ACTIVITIES: CPT

## 2024-10-23 PROCEDURE — 82945 GLUCOSE OTHER FLUID: CPT

## 2024-10-23 PROCEDURE — 83605 ASSAY OF LACTIC ACID: CPT

## 2024-10-23 PROCEDURE — 82962 GLUCOSE BLOOD TEST: CPT

## 2024-10-23 PROCEDURE — 93005 ELECTROCARDIOGRAM TRACING: CPT

## 2024-10-23 PROCEDURE — 85025 COMPLETE CBC W/AUTO DIFF WBC: CPT

## 2024-10-23 PROCEDURE — 86140 C-REACTIVE PROTEIN: CPT

## 2024-10-23 PROCEDURE — 0225U NFCT DS DNA&RNA 21 SARSCOV2: CPT

## 2024-10-23 PROCEDURE — 80053 COMPREHEN METABOLIC PANEL: CPT

## 2024-10-23 PROCEDURE — 82042 OTHER SOURCE ALBUMIN QUAN EA: CPT

## 2024-10-23 PROCEDURE — 87070 CULTURE OTHR SPECIMN AEROBIC: CPT

## 2024-10-23 PROCEDURE — 97162 PT EVAL MOD COMPLEX 30 MIN: CPT

## 2024-10-23 PROCEDURE — 92610 EVALUATE SWALLOWING FUNCTION: CPT

## 2024-10-23 PROCEDURE — 84145 PROCALCITONIN (PCT): CPT

## 2024-10-23 PROCEDURE — 87086 URINE CULTURE/COLONY COUNT: CPT

## 2024-10-23 PROCEDURE — 85730 THROMBOPLASTIN TIME PARTIAL: CPT

## 2024-10-23 PROCEDURE — 71045 X-RAY EXAM CHEST 1 VIEW: CPT

## 2024-10-23 PROCEDURE — 87040 BLOOD CULTURE FOR BACTERIA: CPT

## 2024-10-23 PROCEDURE — 70450 CT HEAD/BRAIN W/O DYE: CPT | Mod: MC

## 2024-10-23 PROCEDURE — 83880 ASSAY OF NATRIURETIC PEPTIDE: CPT

## 2024-10-23 PROCEDURE — 82803 BLOOD GASES ANY COMBINATION: CPT

## 2024-10-23 PROCEDURE — 88108 CYTOPATH CONCENTRATE TECH: CPT

## 2024-10-23 PROCEDURE — 84157 ASSAY OF PROTEIN OTHER: CPT

## 2024-10-23 PROCEDURE — 85027 COMPLETE CBC AUTOMATED: CPT

## 2024-10-23 PROCEDURE — 80048 BASIC METABOLIC PNL TOTAL CA: CPT

## 2024-10-23 PROCEDURE — 36415 COLL VENOUS BLD VENIPUNCTURE: CPT

## 2024-10-23 PROCEDURE — 84484 ASSAY OF TROPONIN QUANT: CPT

## 2024-10-23 PROCEDURE — 74177 CT ABD & PELVIS W/CONTRAST: CPT | Mod: MC

## 2024-10-23 PROCEDURE — 96375 TX/PRO/DX INJ NEW DRUG ADDON: CPT

## 2024-10-23 PROCEDURE — 94640 AIRWAY INHALATION TREATMENT: CPT

## 2024-10-23 PROCEDURE — C1729: CPT

## 2024-10-23 PROCEDURE — 82550 ASSAY OF CK (CPK): CPT

## 2024-10-23 PROCEDURE — 85610 PROTHROMBIN TIME: CPT

## 2024-10-23 PROCEDURE — 99285 EMERGENCY DEPT VISIT HI MDM: CPT

## 2024-10-23 PROCEDURE — 71260 CT THORAX DX C+: CPT | Mod: MC

## 2024-10-23 PROCEDURE — 83615 LACTATE (LD) (LDH) ENZYME: CPT

## 2024-10-23 PROCEDURE — 89051 BODY FLUID CELL COUNT: CPT

## 2024-10-23 PROCEDURE — 87205 SMEAR GRAM STAIN: CPT

## 2024-10-23 PROCEDURE — 32555 ASPIRATE PLEURA W/ IMAGING: CPT

## 2024-10-23 PROCEDURE — 96374 THER/PROPH/DIAG INJ IV PUSH: CPT

## 2024-10-23 PROCEDURE — 83986 ASSAY PH BODY FLUID NOS: CPT

## 2024-10-23 PROCEDURE — 97116 GAIT TRAINING THERAPY: CPT

## 2024-10-23 PROCEDURE — 87075 CULTR BACTERIA EXCEPT BLOOD: CPT

## 2024-10-23 PROCEDURE — 94660 CPAP INITIATION&MGMT: CPT

## 2024-10-23 PROCEDURE — 81001 URINALYSIS AUTO W/SCOPE: CPT

## 2024-10-23 PROCEDURE — 87102 FUNGUS ISOLATION CULTURE: CPT

## 2024-10-23 RX ORDER — LORAZEPAM 2 MG
0.5 TABLET ORAL
Qty: 60 | Refills: 0
Start: 2024-10-23

## 2024-10-23 RX ORDER — TIOTROPIUM BROMIDE INHALATION SPRAY 1.56 UG/1
1 SPRAY, METERED RESPIRATORY (INHALATION)
Qty: 0 | Refills: 0 | DISCHARGE
Start: 2024-10-23

## 2024-10-23 RX ADMIN — Medication 650 MILLIGRAM(S): at 20:06

## 2024-10-23 RX ADMIN — Medication 1 DROP(S): at 18:04

## 2024-10-23 RX ADMIN — Medication 2.5 MILLIGRAM(S): at 13:38

## 2024-10-23 RX ADMIN — Medication 5 MILLIGRAM(S): at 05:27

## 2024-10-23 RX ADMIN — Medication 1 DROP(S): at 05:26

## 2024-10-23 RX ADMIN — Medication 2.5 MILLIGRAM(S): at 02:43

## 2024-10-23 RX ADMIN — Medication 1 APPLICATION(S): at 12:40

## 2024-10-23 RX ADMIN — SODIUM CHLORIDE 4 MILLILITER(S): 9 INJECTION, SOLUTION INTRAMUSCULAR; INTRAVENOUS; SUBCUTANEOUS at 07:45

## 2024-10-23 RX ADMIN — Medication 2.5 MILLIGRAM(S): at 10:14

## 2024-10-23 RX ADMIN — Medication 2.5 MILLIGRAM(S): at 18:04

## 2024-10-23 RX ADMIN — SODIUM CHLORIDE 4 MILLILITER(S): 9 INJECTION, SOLUTION INTRAMUSCULAR; INTRAVENOUS; SUBCUTANEOUS at 13:38

## 2024-10-23 RX ADMIN — Medication 2.5 MILLIGRAM(S): at 07:44

## 2024-10-23 RX ADMIN — PIPERACILLIN AND TAZOBACTAM 25 GRAM(S): .5; 4 INJECTION, POWDER, LYOPHILIZED, FOR SOLUTION INTRAVENOUS at 12:39

## 2024-10-23 RX ADMIN — Medication 40 MILLIGRAM(S): at 05:26

## 2024-10-23 RX ADMIN — Medication 650 MILLIGRAM(S): at 21:00

## 2024-10-23 RX ADMIN — PIPERACILLIN AND TAZOBACTAM 25 GRAM(S): .5; 4 INJECTION, POWDER, LYOPHILIZED, FOR SOLUTION INTRAVENOUS at 03:23

## 2024-10-23 NOTE — DISCHARGE NOTE NURSING/CASE MANAGEMENT/SOCIAL WORK - NSDCPEFALRISK_GEN_ALL_CORE
For information on Fall & Injury Prevention, visit: [email or web address] OR  [email or web address] OR  https://www.cdc.gov/steadi/patient.html

## 2024-10-23 NOTE — DISCHARGE NOTE NURSING/CASE MANAGEMENT/SOCIAL WORK - NSDCVIVACCINE_GEN_ALL_CORE_FT
Tdap; 23-Nov-2023 21:41; Natacha Monaco (RN); [employer]; [id number] (Exp. Date: 20-Jul-2025); IntraMuscular; Deltoid Left.; 0.5 milliLiter(s); VIS (VIS Published: 09-May-2013, VIS Presented: 23-Nov-2023);

## 2024-10-23 NOTE — PROGRESS NOTE ADULT - SUBJECTIVE AND OBJECTIVE BOX
Date/Time Patient Seen:  		  Referring MD:   Data Reviewed	       Patient is a 89y old  Female who presents with a chief complaint of AMS, sob (22 Oct 2024 14:55)      Subjective/HPI     PAST MEDICAL & SURGICAL HISTORY:  Hypertension    Fall    Fall    Pelvic fracture    Hypothyroidism    COPD exacerbation    COPD without exacerbation    Afib    PVD (peripheral vascular disease)    S/P ankle joint replacement, left    Transplanted cornea          Medication list         MEDICATIONS  (STANDING):  albuterol    0.083% 2.5 milliGRAM(s) Nebulizer every 8 hours  amLODIPine   Tablet 5 milliGRAM(s) Oral daily  collagenase Ointment 1 Application(s) Topical daily  furosemide    Tablet 40 milliGRAM(s) Oral daily  levothyroxine Injectable 37.5 MICROGram(s) IV Push at bedtime  metoprolol tartrate Injectable 2.5 milliGRAM(s) IV Push every 6 hours  piperacillin/tazobactam IVPB.. 3.375 Gram(s) IV Intermittent every 8 hours  prednisoLONE acetate 1% Suspension 1 Drop(s) Both EYES two times a day  sodium chloride 3%  Inhalation 4 milliLiter(s) Inhalation every 8 hours    MEDICATIONS  (PRN):  acetaminophen     Tablet .. 650 milliGRAM(s) Oral every 6 hours PRN Mild Pain (1 - 3)  haloperidol     Tablet 0.5 milliGRAM(s) Oral every 8 hours PRN agitation  haloperidol    Injectable 0.5 milliGRAM(s) IV Push every 6 hours PRN Agitation  LORazepam   Injectable 0.5 milliGRAM(s) IV Push every 6 hours PRN Agitation  methyl salicylate 15%/menthol 10% Topical Cream 1 Application(s) Topical four times a day PRN arthritic paiin  morphine  - Injectable 2 milliGRAM(s) IV Push every 6 hours PRN Severe Pain (7 - 10)         Vitals log        ICU Vital Signs Last 24 Hrs  T(C): 36.4 (23 Oct 2024 05:03), Max: 37 (22 Oct 2024 20:45)  T(F): 97.6 (23 Oct 2024 05:03), Max: 98.6 (22 Oct 2024 20:45)  HR: 80 (23 Oct 2024 05:03) (80 - 98)  BP: 133/64 (23 Oct 2024 05:03) (101/55 - 133/64)  BP(mean): --  ABP: --  ABP(mean): --  RR: 18 (23 Oct 2024 05:03) (18 - 18)  SpO2: 99% (23 Oct 2024 05:03) (94% - 99%)    O2 Parameters below as of 23 Oct 2024 05:03  Patient On (Oxygen Delivery Method): nasal cannula  O2 Flow (L/min): 3               Input and Output:  I&O's Detail    21 Oct 2024 07:01  -  22 Oct 2024 07:00  --------------------------------------------------------  IN:  Total IN: 0 mL    OUT:    Voided (mL): 550 mL  Total OUT: 550 mL    Total NET: -550 mL          Lab Data                  Review of Systems	      Objective     Physical Examination    heart s1s2  lung dec BS  head nc  head at      Pertinent Lab findings & Imaging      Jane:  NO   Adequate UO     I&O's Detail    21 Oct 2024 07:01  -  22 Oct 2024 07:00  --------------------------------------------------------  IN:  Total IN: 0 mL    OUT:    Voided (mL): 550 mL  Total OUT: 550 mL    Total NET: -550 mL               Discussed with:     Cultures:	        Radiology

## 2024-10-23 NOTE — PROGRESS NOTE ADULT - ASSESSMENT
Pt is a 89W w/ PMHx of COPD on home oxygen (around 3 L), hypothyroidism, atrial fibrillation, peripheral vascular disease, arthritis status post recent admission for "pneumonia" brought in by EMS from home for generalized weakness and fatigue, SOB and agitation/restlessnes    Acute PNA c/b pleural effusions  AHRF 2/2 above  CT CAP Bilateral pleural effusions right larger than left increased in size from prior. Tree-in-bud parenchymal opacities upper lobes suggestive of small airways impaction/infection.  Nonspecific upper lobe groundglass opacities may reflect pulmonary edema and/or infection. No acute findings abdomen and pelvis  RVP negative  Urine legionella negative  BCx NGTD  S/p thoracentesis 10/17  --pleural fluid cx NGTD    Recommendations:   S/p zosyn x7 day course, completed  Trend temps/WBC--leukocytosis resolved  pulmonary following  Supportive care, O2 as needed, additional care per primary team    Infectious Diseases will follow. Please call with any questions.  Allyson Leigh M.D.  [facility] Division of Infectious Diseases [phone]  For after 5 P.M. and weekends, please call [phone]

## 2024-10-23 NOTE — SOCIAL WORK PROGRESS NOTE - NSSWPROGRESSNOTE_GEN_ALL_CORE
Daughter has requested ambulance transport for d/c home today. I requested ambulance thru [facility] [phone] for 5:30PM. Spoke with daughter by phone today and she will be here around 5PM to go over d/c papers. [facility] aware of d/c home today and confirmed start of care for tomorrow 10/24 in AM. Nurse on unit notified of d/c time. I met with patient at bedside and updated her today.

## 2024-10-23 NOTE — PROGRESS NOTE ADULT - PROVIDER SPECIALTY LIST ADULT
Cardiology
Infectious Disease
Internal Medicine
Pulmonology
Cardiology
Internal Medicine
Pulmonology
Pulmonology
Cardiology
Internal Medicine
Internal Medicine
Infectious Disease
Internal Medicine
Pulmonology
Pulmonology
Palliative Care
Palliative Care

## 2024-10-23 NOTE — PROGRESS NOTE ADULT - REASON FOR ADMISSION
AMS, sob

## 2024-10-23 NOTE — PROGRESS NOTE ADULT - SUBJECTIVE AND OBJECTIVE BOX
Chief Complaint: Shortness of breath    Interval Events: No events overnight.    Review of Systems:  General: No fevers, chills, weight gain  Skin: No rashes, color changes  Cardiovascular: No chest pain, orthopnea  Respiratory: No shortness of breath, cough  Gastrointestinal: No nausea, abdominal pain  Genitourinary: No incontinence, pain with urination  Musculoskeletal: +pain, swelling, decreased range of motion  Neurological: No headache, weakness  Psychiatric: No depression, anxiety  Endocrine: No weight gain, increased thirst  All other systems are comprehensively negative.    Physical Exam:  Vital Signs Last 24 Hrs  T(C): 36.4 (23 Oct 2024 05:03), Max: 37 (22 Oct 2024 20:45)  T(F): 97.6 (23 Oct 2024 05:03), Max: 98.6 (22 Oct 2024 20:45)  HR: 85 (23 Oct 2024 09:53) (80 - 98)  BP: 119/61 (23 Oct 2024 09:53) (101/55 - 133/64)  BP(mean): --  RR: 18 (23 Oct 2024 05:03) (18 - 18)  SpO2: 99% (23 Oct 2024 05:03) (94% - 99%)  Parameters below as of 23 Oct 2024 05:03  Patient On (Oxygen Delivery Method): nasal cannula  O2 Flow (L/min): 3  General: NAD  HEENT: MMM  Neck: No JVD, no carotid bruit  Lungs: CTAB  CV: RRR, nl S1/S2, no M/R/G  Abdomen: S/NT/ND, +BS  Extremities: No LE edema, no cyanosis  Neuro: AAOx0  Skin: No rash    Labs:        ECG/Telemetry: Sinus rhythm

## 2024-10-23 NOTE — PROGRESS NOTE ADULT - ASSESSMENT
89-year-old female with a history of COPD on home oxygen (around 3 L), hypothyroidism, atrial fibrillation, peripheral vascular disease, arthritis status post recent admission for "pneumonia" presents with brought in by EMS from home for generalized weakness and fatigue.    effusion  OP  OA  Atelectasis  AMS  Elderly  Ataxic gait  AF  Hypothyroidism  PVD    on abx  on lasix  dc planned  sw f/u noted  vs noted    ct imaging reviewed  eval for LRTI - recent antibiosis for LRTI - and admission reviewed  nebs and hypertonic saline for Mucoid Impaction  I and O  diuresis as per cardio recs  monitor VS and HD and Sat  TTE reviewed - HFpEF and Pulm HTN - cvs rx regimen and cardio eval  HOB elev  asp prec  oral hygiene  skin care  spoke with DTR  Pall eval - GOC discussion  prognosis guarded  hold sedating agents  monitor mentation

## 2024-10-23 NOTE — DISCHARGE NOTE NURSING/CASE MANAGEMENT/SOCIAL WORK - PATIENT PORTAL LINK FT
You can access the FollowMyHealth Patient Portal offered by [facility] by registering at the following website: [email or web address]. By joining [facility]’s FollowMyHealth portal, you will also be able to view your health information using other applications (apps) compatible with our system.

## 2024-10-23 NOTE — DISCHARGE NOTE NURSING/CASE MANAGEMENT/SOCIAL WORK - FINANCIAL ASSISTANCE
[facility] provides services at a reduced cost to those who are determined to be eligible through [facility]’s financial assistance program. Information regarding [facility]’s financial assistance program can be found by going to [email or web address] or by calling [phone].

## 2024-10-23 NOTE — PROGRESS NOTE ADULT - SUBJECTIVE AND OBJECTIVE BOX
[facility], Division of Infectious Diseases  HUSEYIN Jones Y. Patel, S. Shah, G. [facility]  [phone]    Name: SANDEE PEARSON  Age: 89y  Gender: Female  MRN: [id number]    Interval History:  Patient seen and examined at bedside   No acute overnight events.   Notes reviewed    Antibiotics:      Medications:  acetaminophen     Tablet .. 650 milliGRAM(s) Oral every 6 hours PRN  albuterol    0.083% 2.5 milliGRAM(s) Nebulizer every 8 hours  amLODIPine   Tablet 5 milliGRAM(s) Oral daily  collagenase Ointment 1 Application(s) Topical daily  furosemide    Tablet 40 milliGRAM(s) Oral daily  haloperidol     Tablet 0.5 milliGRAM(s) Oral every 8 hours PRN  haloperidol    Injectable 0.5 milliGRAM(s) IV Push every 6 hours PRN  levothyroxine Injectable 37.5 MICROGram(s) IV Push at bedtime  LORazepam   Injectable 0.5 milliGRAM(s) IV Push every 6 hours PRN  methyl salicylate 15%/menthol 10% Topical Cream 1 Application(s) Topical four times a day PRN  metoprolol tartrate Injectable 2.5 milliGRAM(s) IV Push every 6 hours  morphine  - Injectable 2 milliGRAM(s) IV Push every 6 hours PRN  prednisoLONE acetate 1% Suspension 1 Drop(s) Both EYES two times a day  sodium chloride 3%  Inhalation 4 milliLiter(s) Inhalation every 8 hours      Review of Systems:  A 10-point review of systems was obtained.   Review of systems otherwise negative except as previously noted.    Allergies: Percocet 5/325 (Vomiting)  codeine (Nausea)    For details regarding the patient's past medical history, social history, family history, and other miscellaneous elements, please refer the initial infectious diseases consultation and/or the admitting history and physical examination for this admission.    Objective:  Vitals:   T(C): 36.6 (10-23-24 @ 11:18), Max: 37 (10-22-24 @ 20:45)  HR: 69 (10-23-24 @ 11:18) (69 - 98)  BP: 129/64 (10-23-24 @ 11:18) (101/55 - 133/64)  RR: 18 (10-23-24 @ 11:18) (18 - 18)  SpO2: 98% (10-23-24 @ 11:18) (94% - 99%)    Physical Examination:  General: no acute distress  HEENT: NC/AT, EOMI,   Cardio: S1, S2 heard, RRR, no murmurs  Resp: on NC  Abd: soft, NT, ND,  Ext: no edema or cyanosis  Skin: warm, dry, no visible rash      Laboratory Studies:  CBC:   CMP:             Microbiology: reviewed    Culture - Fungal, Body Fluid (collected 10-17-24 @ 15:27)  Source: Pleural Fl  Preliminary Report (10-20-24 @ 11:26):    No growth    Culture - Body Fluid with Gram Stain (collected 10-17-24 @ 15:27)  Source: Pleural Fl  Gram Stain (10-18-24 @ 06:38):    No polymorphonuclear cells seen    No organisms seen    by cytocentrifuge  Final Report (10-23-24 @ 08:52):    No growth at 5 days    Culture - Urine (collected 10-16-24 @ 13:34)  Source: Clean Catch Clean Catch (Midstream)  Final Report (10-18-24 @ 08:11):    No growth    Culture - Blood (collected 10-16-24 @ 09:45)  Source: .Blood BLOOD  Final Report (10-21-24 @ 17:01):    No growth at 5 days    Culture - Blood (collected 10-16-24 @ 09:40)  Source: .Blood BLOOD  Final Report (10-21-24 @ 17:01):    No growth at 5 days          Radiology: reviewed

## 2024-10-30 NOTE — PHYSICAL THERAPY INITIAL EVALUATION ADULT - GENERAL OBSERVATIONS, REHAB EVAL
Pt rec'd OOB sitting in the chair, (+) O2 via NC, daughter at bedside suspected deep tissue injury unstageable

## 2024-11-16 LAB
CULTURE RESULTS: SIGNIFICANT CHANGE UP
SPECIMEN SOURCE: SIGNIFICANT CHANGE UP

## 2024-11-24 NOTE — PATIENT PROFILE ADULT. - FALL HARM RISK
General
age(85 years old or older)/bones(Osteoporosis,prev fx,steroid use,metastatic bone ca/coagulation(Bleeding disorder R/T clinical cond/anti-coags)

## 2024-11-29 ENCOUNTER — EMERGENCY (EMERGENCY)
Facility: HOSPITAL | Age: 88
LOS: 1 days | Discharge: HOME | End: 2024-11-29
Attending: EMERGENCY MEDICINE | Admitting: EMERGENCY MEDICINE
Payer: MEDICARE

## 2024-11-29 VITALS
DIASTOLIC BLOOD PRESSURE: 75 MMHG | RESPIRATION RATE: 16 BRPM | OXYGEN SATURATION: 96 % | TEMPERATURE: 99 F | SYSTOLIC BLOOD PRESSURE: 150 MMHG | HEART RATE: 70 BPM

## 2024-11-29 VITALS
SYSTOLIC BLOOD PRESSURE: 171 MMHG | HEIGHT: 63 IN | HEART RATE: 69 BPM | WEIGHT: 100.09 LBS | OXYGEN SATURATION: 96 % | RESPIRATION RATE: 16 BRPM | TEMPERATURE: 98 F | DIASTOLIC BLOOD PRESSURE: 83 MMHG

## 2024-11-29 DIAGNOSIS — Z94.7 CORNEAL TRANSPLANT STATUS: Chronic | ICD-10-CM

## 2024-11-29 DIAGNOSIS — Z96.662 PRESENCE OF LEFT ARTIFICIAL ANKLE JOINT: Chronic | ICD-10-CM

## 2024-11-29 PROCEDURE — 99284 EMERGENCY DEPT VISIT MOD MDM: CPT | Mod: 25

## 2024-11-29 PROCEDURE — 72125 CT NECK SPINE W/O DYE: CPT | Mod: 26,MC

## 2024-11-29 PROCEDURE — 12002 RPR S/N/AX/GEN/TRNK2.6-7.5CM: CPT

## 2024-11-29 PROCEDURE — 72125 CT NECK SPINE W/O DYE: CPT | Mod: MC

## 2024-11-29 PROCEDURE — 70450 CT HEAD/BRAIN W/O DYE: CPT | Mod: 26,MC

## 2024-11-29 PROCEDURE — 70450 CT HEAD/BRAIN W/O DYE: CPT | Mod: MC

## 2024-11-29 RX ORDER — LIDOCAINE HCL 20 MG/ML
5 VIAL (ML) INJECTION ONCE
Refills: 0 | Status: COMPLETED | OUTPATIENT
Start: 2024-11-29 | End: 2024-11-29

## 2024-11-29 RX ADMIN — Medication 5 MILLILITER(S): at 19:20

## 2024-11-29 NOTE — ED PROVIDER NOTE - OBJECTIVE STATEMENT
Patient is 89-year-old female who presents to the emergency room status post mechanical fall with head injury.  Past medical history of COPD on home oxygen hypothyroidism atrial fibrillation PVD arthritis history of pelvic fracture.  Medication review does not list any anticoagulation.  Was last admitted to the hospital from October 16 through October 22, 2024 with AMS and shortness of breath.  CT revealed bilateral pleural effusions right larger than left increased in size tree-in-bud opacities groundglass opacities.  She was found to have acute on chronic hypoxic hypercarbic respiratory failure and pneumonia.  Underwent thoracentesis was treated with diuresis and antibiotics.  Patient was made a DNR/DNI DC'd with home hospice.  Presents today from home with head injury.  Patient was leaning over fell struck her head on her chair.  There was no reported loss of consciousness.  Aide at bedside confirms patient's story.  She was seated in a chair reaching forward to grab something lost her balance and fell striking her head.  There is no loss of consciousness she is not on anticoagulation.  She sustained a laceration to the right scalp and a skin tear to the right arm.  Skin tear was dressed by the hospice nurse.  She also has a wound to the right shin and a wound to the left upper thigh both of which are from previous injuries and addressed with no acute injury.  She was in her normal state of health prior to today.  Denies any headache nausea vomiting chest pain shortness of breath or abdominal pain.

## 2024-11-29 NOTE — ED PROVIDER NOTE - PHYSICAL EXAMINATION
No midline C–T dorsal tenderness to palpation  Skin tear noted to the left upper forearm with no acute bony tenderness full range of motion of bilateral upper extremities neurovascular intact  No midline chest wall tenderness to palpation  No pelvic tenderness to palpation  Old wound to the left thigh and right shin both dressed no bony tenderness lower extremities neurovascular intact  Laceration noted to the right parietal scalp No midline C–T dorsal tenderness to palpation  Skin tear noted to the left upper forearm with no acute bony tenderness full range of motion of bilateral upper extremities neurovascular intact  No midline chest wall tenderness to palpation  No pelvic tenderness to palpation  Old wound to the left thigh and right shin both dressed no bony tenderness lower extremities neurovascular intact  Laceration noted to the right parietal scalp 6.5 cm in length

## 2024-11-29 NOTE — ED PROVIDER NOTE - NSFOLLOWUPINSTRUCTIONS_ED_ALL_ED_FT
Return to the ED for any new or worsening symptoms  Take your medication as prescribed  Return in 7 days for staple removal   Advance activity as tolerated  Follow up with your PMD as scheduled     Staple Care    WHAT YOU NEED TO KNOW:    How do I care for my wound?    Clean:  You may be able to shower in 24 hours. Do not soak your wound under water.    Gently wash your wound with soap and warm water daily. Lightly pat it dry. Do not cover your wound unless your healthcare provider tells you to.    You may also need to clean your wound with a mixture of hydrogen peroxide and water. Ask your healthcare provider how to do this.    Do not apply ointment or cream to the wound unless your healthcare provider tells you to.    Elevate:  Rest any arm or leg that has a wound on pillows above the level of your heart. Do this as often as possible for 2 days. This will help decrease swelling and pain.      Minimize scarring:  Avoid sunshine on your wound to reduce scarring.    When should I follow up with my healthcare provider? You may need to return for a wound checkup 3 days after your staples are placed. Ask your healthcare provider when to return to get your staples removed. Your healthcare provider will take your staples out as soon as possible to reduce scarring. Face staples may be removed within 3 to 5 days. Scalp, arm, and leg staples may be removed within 7 to 10 days. Joint, palm, and feet staples may be removed within 10 to 14 days.    How will my staples be removed?    A medical staple remover will be used to take out your staples. Your healthcare provider will slide the tool under each staple, squeeze the handle, and gently pull the staple out.    Medical tape will be placed on your wound once your staples are removed. This will help keep your wound closed. The medical tape will fall off on its own after several days.  When should I contact my healthcare provider?    You have redness, pain, swelling, or pus draining from your wound.    Your pain medicine does not relieve your pain.    You have a fever of 101°F (38.5°C) or higher.    You have an odor coming from your wound.    You have questions or concerns about your condition or care.  When should I seek immediate care?    Your wound reopens.    You have red streaks on your skin that spread out from your wound.    You have severe pain or vomiting.  CARE AGREEMENT:    You have the right to help plan your care. Learn about your health condition and how it may be treated. Discuss treatment options with your healthcare providers to decide what care you want to receive. You always have the right to refuse treatment. Return to the ED for any new or worsening symptoms  Take your medication as prescribed  Return in 7 days for staple removal   Advance activity as tolerated  Follow up with your PMD as scheduled   Follow up with hospice for further management of the pleural effusion found on imaging     Staple Care    WHAT YOU NEED TO KNOW:    How do I care for my wound?    Clean:  You may be able to shower in 24 hours. Do not soak your wound under water.    Gently wash your wound with soap and warm water daily. Lightly pat it dry. Do not cover your wound unless your healthcare provider tells you to.    You may also need to clean your wound with a mixture of hydrogen peroxide and water. Ask your healthcare provider how to do this.    Do not apply ointment or cream to the wound unless your healthcare provider tells you to.    Elevate:  Rest any arm or leg that has a wound on pillows above the level of your heart. Do this as often as possible for 2 days. This will help decrease swelling and pain.      Minimize scarring:  Avoid sunshine on your wound to reduce scarring.    When should I follow up with my healthcare provider? You may need to return for a wound checkup 3 days after your staples are placed. Ask your healthcare provider when to return to get your staples removed. Your healthcare provider will take your staples out as soon as possible to reduce scarring. Face staples may be removed within 3 to 5 days. Scalp, arm, and leg staples may be removed within 7 to 10 days. Joint, palm, and feet staples may be removed within 10 to 14 days.    How will my staples be removed?    A medical staple remover will be used to take out your staples. Your healthcare provider will slide the tool under each staple, squeeze the handle, and gently pull the staple out.    Medical tape will be placed on your wound once your staples are removed. This will help keep your wound closed. The medical tape will fall off on its own after several days.  When should I contact my healthcare provider?    You have redness, pain, swelling, or pus draining from your wound.    Your pain medicine does not relieve your pain.    You have a fever of 101°F (38.5°C) or higher.    You have an odor coming from your wound.    You have questions or concerns about your condition or care.  When should I seek immediate care?    Your wound reopens.    You have red streaks on your skin that spread out from your wound.    You have severe pain or vomiting.  CARE AGREEMENT:    You have the right to help plan your care. Learn about your health condition and how it may be treated. Discuss treatment options with your healthcare providers to decide what care you want to receive. You always have the right to refuse treatment.

## 2024-11-29 NOTE — ED ADULT TRIAGE NOTE - CHIEF COMPLAINT QUOTE
Pt brought in by EMS from home - pt is a&ox4, pt was leaning over and fell off of her chair at her desk, hit her head. No LOC, no blood thinner. Pt on 3lpm o2 nc at baseline.

## 2024-11-29 NOTE — ED ADULT NURSE NOTE - NSFALLHARMRISKINTERV_ED_ALL_ED

## 2024-11-29 NOTE — ED PROVIDER NOTE - PATIENT PORTAL LINK FT
You can access the FollowMyHealth Patient Portal offered by [facility] by registering at the following website: [email or web address]. By joining [facility]’s FollowMyHealth portal, you will also be able to view your health information using other applications (apps) compatible with our system.

## 2024-11-29 NOTE — ED PROVIDER NOTE - CLINICAL SUMMARY MEDICAL DECISION MAKING FREE TEXT BOX
Patient is 89-year-old female who presents to the emergency room status post mechanical fall with head injury.  Past medical history of COPD on home oxygen hypothyroidism atrial fibrillation PVD arthritis history of pelvic fracture.  Medication review does not list any anticoagulation.  Was last admitted to the hospital from October 16 through October 22, 2024 with AMS and shortness of breath.  CT revealed bilateral pleural effusions right larger than left increased in size tree-in-bud opacities groundglass opacities.  She was found to have acute on chronic hypoxic hypercarbic respiratory failure and pneumonia.  Underwent thoracentesis was treated with diuresis and antibiotics.  Patient was made a DNR/DNI DC'd with home hospice.  Presents today from home with head injury.  Patient was leaning over fell struck her head on her chair.  There was no reported loss of consciousness.  Aide at bedside confirms patient's story.  She was seated in a chair reaching forward to grab something lost her balance and fell striking her head.  There is no loss of consciousness she is not on anticoagulation.  She sustained a laceration to the right scalp and a skin tear to the right arm.  Skin tear was dressed by the hospice nurse.  She also has a wound to the right shin and a wound to the left upper thigh both of which are from previous injuries and addressed with no acute injury.  She was in her normal state of health prior to today.  Denies any headache nausea vomiting chest pain shortness of breath or abdominal pain. Patient presenting to the emergency room status post reported mechanical fall with head wound laceration.  Will obtain CT imaging clean and repair laceration.  Patient is currently on home hospice that is requesting discharge.  She was in her normal state of health previously aide at bedside confirms story. Patient is 89-year-old female who presents to the emergency room status post mechanical fall with head injury.  Past medical history of COPD on home oxygen hypothyroidism atrial fibrillation PVD arthritis history of pelvic fracture.  Medication review does not list any anticoagulation.  Was last admitted to the hospital from October 16 through October 22, 2024 with AMS and shortness of breath.  CT revealed bilateral pleural effusions right larger than left increased in size tree-in-bud opacities groundglass opacities.  She was found to have acute on chronic hypoxic hypercarbic respiratory failure and pneumonia.  Underwent thoracentesis was treated with diuresis and antibiotics.  Patient was made a DNR/DNI DC'd with home hospice.  Presents today from home with head injury.  Patient was leaning over fell struck her head on her chair.  There was no reported loss of consciousness.  Aide at bedside confirms patient's story.  She was seated in a chair reaching forward to grab something lost her balance and fell striking her head.  There is no loss of consciousness she is not on anticoagulation.  She sustained a laceration to the right scalp and a skin tear to the right arm.  Skin tear was dressed by the hospice nurse.  She also has a wound to the right shin and a wound to the left upper thigh both of which are from previous injuries and addressed with no acute injury.  She was in her normal state of health prior to today.  Denies any headache nausea vomiting chest pain shortness of breath or abdominal pain. Patient presenting to the emergency room status post reported mechanical fall with head wound laceration.  Will obtain CT imaging clean and repair laceration.  Patient is currently on home hospice that is requesting discharge.  She was in her normal state of health previously aide at bedside confirms story. Patient tolerated staple repair well.  Requesting to go home.  CT imaging reveals no acute intracranial hemorrhage no acute cervical spine fracture.  Patient does have a large right pleural effusion that she has had previously drained.  This is reviewed with daughter at the bedside.  Patient does not want to be admitted to the hospital at this time is not hypoxic and showing no signs of respiratory distress and is on home hospice.  They are requesting discharge and will follow-up with the hospice team in relation to the pleural effusion.  Copy of results were provided all questions answered stable for discharge.

## 2024-11-29 NOTE — ED PROVIDER NOTE - DIFFERENTIAL DIAGNOSIS
Patient presenting to the emergency room status post reported mechanical fall with head wound laceration.  Will obtain CT imaging clean and repair laceration.  Patient is currently on home hospice that is requesting discharge.  She was in her normal state of health previously aide at bedside confirms story. Differential Diagnosis

## 2024-11-29 NOTE — ED ADULT TRIAGE NOTE - BSA (M2)
1.44 albuterol: 2.5 milligram(s) by nebulizer every 12 hours  baclofen 10 mg oral tablet: 1.5 milligram(s) orally every 8 hours  cholecalciferol 10 mcg/mL (400 intl units/mL) oral liquid: 400 unit(s) orally once a day  cloNIDine 0.2 mg oral tablet: 6.4 microgram(s) orally every 8 hours After methadone has been weaned off and discontinued, wean clonidine as follows:   clonidine 5.4 mcg PO Q8H   clonidine 4.4 mcg PO Q8H   clonidine 3.4 mcg PO Q8H   clonidine 2.4 mcg PO Q8H   clonidine 2.4 mcg PO Q12H   clonidine 2.4 mcg PO Q24H   discontinue clonidine  cloNIDine 0.2 mg oral tablet: 6.4 microgram(s) orally every 8 hours as needed for  moderate pain PRN if WATS &gt;3  glycopyrrolate 1 mg/5 mL oral solution: 0.65 milliliter(s) orally every 6 hours  methadone: 0.44 milligram(s) orally every 12 hours wean as tolerated as follows: methadone 0.44 mg PO Q12H, methadone 0.34 mg PO Q12H, methadone 0.24 mg PO Q12H, methadone 0.24 mg PO Q24H,  discontinue methadone.  sulfamethoxazole-trimethoprim 200 mg-40 mg/5 mL oral suspension: 23 milligram(s) orally every 12 hours stop after 6 doses

## 2024-11-29 NOTE — ED ADULT NURSE NOTE - OBJECTIVE STATEMENT
Pt received in  ifo 10. Pt is a&ox4, ambulates independently at home, comes in complaining of a slip/fall off of chair at desk and hit her head, has a laceration that possibly needs staples. Pt denies LOC, blood thinner usage. Pt completes all of her ADLs by herself at home, has a full time aide. Pt well appearing otherwise, not offering any complaints.

## 2025-06-26 NOTE — ED PROVIDER NOTE - WR ORDER DATE AND TIME 1
History and Physical       Manjeet Portillo (:  1955) is a 70 y.o. male,Established patient, here for evaluation of the following chief complaint(s):  Surgical Consult (Colonoscopy scheduled for 25. Previous colonoscopy 22, Dr Fox, 6 adenomatous polyps. [facility]. )        Assessment & Plan  Colon cancer screening   Set up for screening colonoscopy.  Risk and benefits of the procedure explained including risk for infection, bleeding, perforation, and death.  Verbal and written informed consent obtained.  The bowel prep was explained to Manjeet and he was instructed to start the prep the day before the procedure (printed directions were given).  Avoid corn 1 week prior to the procedure as this can cause suctioning difficulties during the procedure.  Avoid eating or drinking at least 8 hours prior to the procedure.  Manjeet was encouraged to call the clinic if he has any questions prior to the procedure.   Stop Eliquis 2 days before the procedure.    Orders:    COLONOSCOPY W/ OR W/O BIOPSY; Future     H/O adenomatous polyp of colon           Pre-op testing   [facility]        Follow up after the Colonoscopy.                      Subjective  Manjeet a patient of mine presents to be evaluated for a colonoscopy.  Manjeet is 70 y.o. and has had a colonoscopy in the past.  There is  a history of colon polyps (last colonoscopy in  had 6 adenomatous polyps).  Currently Manjeet denies rectal bleeding, denies bowel habit changes, and denies abdominal pain.  There is not a family history of colon cancer.       No current facility-administered medications on file prior to encounter.     Current Outpatient Medications on File Prior to Encounter   Medication Sig Dispense Refill    valACYclovir (VALTREX) 500 MG tablet Take 1 tablet by mouth two times a day x 3 days as needed. 30 tablet 1    spironolactone (ALDACTONE) 25 MG tablet Take 1 tablet by mouth daily      dilTIAZem (CARDIZEM CD)  02-Oct-2024 16:15